# Patient Record
Sex: MALE | Race: BLACK OR AFRICAN AMERICAN | Employment: OTHER | URBAN - METROPOLITAN AREA
[De-identification: names, ages, dates, MRNs, and addresses within clinical notes are randomized per-mention and may not be internally consistent; named-entity substitution may affect disease eponyms.]

---

## 2017-02-28 ENCOUNTER — OFFICE VISIT (OUTPATIENT)
Dept: CARDIOLOGY CLINIC | Age: 82
End: 2017-02-28

## 2017-02-28 VITALS
RESPIRATION RATE: 18 BRPM | HEART RATE: 58 BPM | HEIGHT: 76 IN | DIASTOLIC BLOOD PRESSURE: 70 MMHG | SYSTOLIC BLOOD PRESSURE: 122 MMHG | WEIGHT: 198.4 LBS | BODY MASS INDEX: 24.16 KG/M2

## 2017-02-28 DIAGNOSIS — I48.0 PAROXYSMAL ATRIAL FIBRILLATION (HCC): Primary | ICD-10-CM

## 2017-02-28 PROCEDURE — G8427 DOCREV CUR MEDS BY ELIG CLIN: HCPCS | Performed by: INTERNAL MEDICINE

## 2017-02-28 PROCEDURE — G8484 FLU IMMUNIZE NO ADMIN: HCPCS | Performed by: INTERNAL MEDICINE

## 2017-02-28 PROCEDURE — 1036F TOBACCO NON-USER: CPT | Performed by: INTERNAL MEDICINE

## 2017-02-28 PROCEDURE — 1123F ACP DISCUSS/DSCN MKR DOCD: CPT | Performed by: INTERNAL MEDICINE

## 2017-02-28 PROCEDURE — 99214 OFFICE O/P EST MOD 30 MIN: CPT | Performed by: INTERNAL MEDICINE

## 2017-02-28 PROCEDURE — G8420 CALC BMI NORM PARAMETERS: HCPCS | Performed by: INTERNAL MEDICINE

## 2017-02-28 PROCEDURE — 4040F PNEUMOC VAC/ADMIN/RCVD: CPT | Performed by: INTERNAL MEDICINE

## 2017-02-28 RX ORDER — LORATADINE 10 MG/1
10 TABLET ORAL DAILY
Status: ON HOLD | COMMUNITY
End: 2022-07-07

## 2017-05-15 ENCOUNTER — TELEPHONE (OUTPATIENT)
Dept: CARDIOLOGY CLINIC | Age: 82
End: 2017-05-15

## 2021-09-27 ENCOUNTER — APPOINTMENT (OUTPATIENT)
Dept: CT IMAGING | Age: 86
End: 2021-09-27
Payer: MEDICARE

## 2021-09-27 ENCOUNTER — APPOINTMENT (OUTPATIENT)
Dept: GENERAL RADIOLOGY | Age: 86
End: 2021-09-27
Payer: MEDICARE

## 2021-09-27 ENCOUNTER — HOSPITAL ENCOUNTER (OUTPATIENT)
Age: 86
Setting detail: OBSERVATION
Discharge: HOME | End: 2021-09-29
Attending: FAMILY MEDICINE | Admitting: FAMILY MEDICINE
Payer: MEDICARE

## 2021-09-27 DIAGNOSIS — N18.9 ACUTE KIDNEY INJURY SUPERIMPOSED ON CKD (HCC): ICD-10-CM

## 2021-09-27 DIAGNOSIS — R80.9 PROTEINURIA, UNSPECIFIED TYPE: ICD-10-CM

## 2021-09-27 DIAGNOSIS — I95.1 ORTHOSTATIC HYPOTENSION: ICD-10-CM

## 2021-09-27 DIAGNOSIS — R77.8 ELEVATED TROPONIN: ICD-10-CM

## 2021-09-27 DIAGNOSIS — E87.5 SERUM POTASSIUM ELEVATED: ICD-10-CM

## 2021-09-27 DIAGNOSIS — N39.0 URINARY TRACT INFECTION WITHOUT HEMATURIA, SITE UNSPECIFIED: ICD-10-CM

## 2021-09-27 DIAGNOSIS — N17.9 ACUTE KIDNEY INJURY SUPERIMPOSED ON CKD (HCC): ICD-10-CM

## 2021-09-27 DIAGNOSIS — R55 NEAR SYNCOPE: Primary | ICD-10-CM

## 2021-09-27 LAB
A/G RATIO: 1 (ref 1.1–2.2)
ALBUMIN SERPL-MCNC: 3.8 G/DL (ref 3.4–5)
ALP BLD-CCNC: 59 U/L (ref 40–129)
ALT SERPL-CCNC: 11 U/L (ref 10–40)
ANION GAP SERPL CALCULATED.3IONS-SCNC: 10 MMOL/L (ref 3–16)
AST SERPL-CCNC: 19 U/L (ref 15–37)
BACTERIA: ABNORMAL /HPF
BASOPHILS ABSOLUTE: 0 K/UL (ref 0–0.2)
BASOPHILS RELATIVE PERCENT: 0.4 %
BILIRUB SERPL-MCNC: 0.3 MG/DL (ref 0–1)
BILIRUBIN URINE: NEGATIVE
BLOOD, URINE: NEGATIVE
BUN BLDV-MCNC: 36 MG/DL (ref 7–20)
CALCIUM SERPL-MCNC: 9.5 MG/DL (ref 8.3–10.6)
CHLORIDE BLD-SCNC: 105 MMOL/L (ref 99–110)
CLARITY: ABNORMAL
CO2: 23 MMOL/L (ref 21–32)
COLOR: YELLOW
CREAT SERPL-MCNC: 2 MG/DL (ref 0.8–1.3)
EOSINOPHILS ABSOLUTE: 0.1 K/UL (ref 0–0.6)
EOSINOPHILS RELATIVE PERCENT: 0.7 %
EPITHELIAL CELLS, UA: 0 /HPF (ref 0–5)
GFR AFRICAN AMERICAN: 39
GFR NON-AFRICAN AMERICAN: 32
GLOBULIN: 3.7 G/DL
GLUCOSE BLD-MCNC: 114 MG/DL (ref 70–99)
GLUCOSE URINE: NEGATIVE MG/DL
HCT VFR BLD CALC: 36.6 % (ref 40.5–52.5)
HEMOGLOBIN: 12.1 G/DL (ref 13.5–17.5)
HYALINE CASTS: 7 /LPF (ref 0–8)
KETONES, URINE: NEGATIVE MG/DL
LEUKOCYTE ESTERASE, URINE: ABNORMAL
LYMPHOCYTES ABSOLUTE: 1 K/UL (ref 1–5.1)
LYMPHOCYTES RELATIVE PERCENT: 12.2 %
MCH RBC QN AUTO: 32.3 PG (ref 26–34)
MCHC RBC AUTO-ENTMCNC: 33 G/DL (ref 31–36)
MCV RBC AUTO: 97.9 FL (ref 80–100)
MICROSCOPIC EXAMINATION: YES
MONOCYTES ABSOLUTE: 0.6 K/UL (ref 0–1.3)
MONOCYTES RELATIVE PERCENT: 7.5 %
NEUTROPHILS ABSOLUTE: 6.4 K/UL (ref 1.7–7.7)
NEUTROPHILS RELATIVE PERCENT: 79.2 %
NITRITE, URINE: NEGATIVE
PDW BLD-RTO: 13.6 % (ref 12.4–15.4)
PH UA: 6.5 (ref 5–8)
PLATELET # BLD: 178 K/UL (ref 135–450)
PMV BLD AUTO: 7.5 FL (ref 5–10.5)
POTASSIUM SERPL-SCNC: 5.2 MMOL/L (ref 3.5–5.1)
PROTEIN UA: 100 MG/DL
RBC # BLD: 3.73 M/UL (ref 4.2–5.9)
RBC UA: 1 /HPF (ref 0–4)
SODIUM BLD-SCNC: 138 MMOL/L (ref 136–145)
SPECIFIC GRAVITY UA: 1.02 (ref 1–1.03)
TOTAL PROTEIN: 7.5 G/DL (ref 6.4–8.2)
TROPONIN: 0.02 NG/ML
URINE REFLEX TO CULTURE: YES
URINE TYPE: ABNORMAL
UROBILINOGEN, URINE: 0.2 E.U./DL
WBC # BLD: 8 K/UL (ref 4–11)
WBC UA: 114 /HPF (ref 0–5)

## 2021-09-27 PROCEDURE — 70450 CT HEAD/BRAIN W/O DYE: CPT

## 2021-09-27 PROCEDURE — 96374 THER/PROPH/DIAG INJ IV PUSH: CPT

## 2021-09-27 PROCEDURE — 99283 EMERGENCY DEPT VISIT LOW MDM: CPT

## 2021-09-27 PROCEDURE — 84484 ASSAY OF TROPONIN QUANT: CPT

## 2021-09-27 PROCEDURE — 87186 SC STD MICRODIL/AGAR DIL: CPT

## 2021-09-27 PROCEDURE — G0378 HOSPITAL OBSERVATION PER HR: HCPCS

## 2021-09-27 PROCEDURE — 81001 URINALYSIS AUTO W/SCOPE: CPT

## 2021-09-27 PROCEDURE — 93005 ELECTROCARDIOGRAM TRACING: CPT | Performed by: NURSE PRACTITIONER

## 2021-09-27 PROCEDURE — 36415 COLL VENOUS BLD VENIPUNCTURE: CPT

## 2021-09-27 PROCEDURE — 6360000002 HC RX W HCPCS: Performed by: NURSE PRACTITIONER

## 2021-09-27 PROCEDURE — 87040 BLOOD CULTURE FOR BACTERIA: CPT

## 2021-09-27 PROCEDURE — 87077 CULTURE AEROBIC IDENTIFY: CPT

## 2021-09-27 PROCEDURE — 80053 COMPREHEN METABOLIC PANEL: CPT

## 2021-09-27 PROCEDURE — 87086 URINE CULTURE/COLONY COUNT: CPT

## 2021-09-27 PROCEDURE — 71045 X-RAY EXAM CHEST 1 VIEW: CPT

## 2021-09-27 PROCEDURE — 85025 COMPLETE CBC W/AUTO DIFF WBC: CPT

## 2021-09-27 RX ORDER — SODIUM CHLORIDE 9 MG/ML
25 INJECTION, SOLUTION INTRAVENOUS PRN
Status: DISCONTINUED | OUTPATIENT
Start: 2021-09-27 | End: 2021-09-29 | Stop reason: HOSPADM

## 2021-09-27 RX ORDER — ONDANSETRON 4 MG/1
4 TABLET, ORALLY DISINTEGRATING ORAL EVERY 8 HOURS PRN
Status: DISCONTINUED | OUTPATIENT
Start: 2021-09-27 | End: 2021-09-29 | Stop reason: HOSPADM

## 2021-09-27 RX ORDER — SODIUM CHLORIDE 9 MG/ML
INJECTION, SOLUTION INTRAVENOUS CONTINUOUS
Status: DISCONTINUED | OUTPATIENT
Start: 2021-09-27 | End: 2021-09-29 | Stop reason: HOSPADM

## 2021-09-27 RX ORDER — DABIGATRAN ETEXILATE 75 MG/1
75 CAPSULE, COATED PELLETS ORAL 2 TIMES DAILY
Status: DISCONTINUED | OUTPATIENT
Start: 2021-09-28 | End: 2021-09-28 | Stop reason: CLARIF

## 2021-09-27 RX ORDER — ONDANSETRON 2 MG/ML
4 INJECTION INTRAMUSCULAR; INTRAVENOUS EVERY 6 HOURS PRN
Status: DISCONTINUED | OUTPATIENT
Start: 2021-09-27 | End: 2021-09-29 | Stop reason: HOSPADM

## 2021-09-27 RX ORDER — SODIUM CHLORIDE 0.9 % (FLUSH) 0.9 %
5-40 SYRINGE (ML) INJECTION EVERY 12 HOURS SCHEDULED
Status: DISCONTINUED | OUTPATIENT
Start: 2021-09-27 | End: 2021-09-29 | Stop reason: HOSPADM

## 2021-09-27 RX ORDER — POLYETHYLENE GLYCOL 3350 17 G/17G
17 POWDER, FOR SOLUTION ORAL DAILY PRN
Status: DISCONTINUED | OUTPATIENT
Start: 2021-09-27 | End: 2021-09-29 | Stop reason: HOSPADM

## 2021-09-27 RX ORDER — ACETAMINOPHEN 325 MG/1
650 TABLET ORAL EVERY 6 HOURS PRN
Status: DISCONTINUED | OUTPATIENT
Start: 2021-09-27 | End: 2021-09-29 | Stop reason: HOSPADM

## 2021-09-27 RX ORDER — SODIUM CHLORIDE 0.9 % (FLUSH) 0.9 %
5-40 SYRINGE (ML) INJECTION PRN
Status: DISCONTINUED | OUTPATIENT
Start: 2021-09-27 | End: 2021-09-29 | Stop reason: HOSPADM

## 2021-09-27 RX ORDER — ASPIRIN 81 MG/1
81 TABLET ORAL DAILY
Status: DISCONTINUED | OUTPATIENT
Start: 2021-09-28 | End: 2021-09-29 | Stop reason: HOSPADM

## 2021-09-27 RX ORDER — ACETAMINOPHEN 650 MG/1
650 SUPPOSITORY RECTAL EVERY 6 HOURS PRN
Status: DISCONTINUED | OUTPATIENT
Start: 2021-09-27 | End: 2021-09-29 | Stop reason: HOSPADM

## 2021-09-27 RX ADMIN — Medication 1000 MG: at 20:24

## 2021-09-27 ASSESSMENT — PAIN SCALES - GENERAL: PAINLEVEL_OUTOF10: 0

## 2021-09-27 ASSESSMENT — ENCOUNTER SYMPTOMS
GASTROINTESTINAL NEGATIVE: 1
RESPIRATORY NEGATIVE: 1

## 2021-09-27 NOTE — H&P
[address]  [address] [address]  Dept: [phone]  Loc: [phone]  eMERGENCYdEPARTMENT eNCOUnter      Pt Name: Santa Wheat  MRN: [id number]  [place] 1935  Date of evaluation: 9/27/2021  Provider:JEFF Phipps CNP     Patient was seen and evaluated per myself independently    [facility]       Chief Complaint   Patient presents with    Loss of Consciousness     while at [facility] center in [place], no LOC but wobbly. last ate breakfast in hospital before for same thing       CRITICAL CARE TIME         HISTORY OF PRESENT ILLNESS  (Location/Symptom, Timing/Onset, Context/Setting, Quality, Duration,Modifying Factors, Severity.)   Santa Wheat is a 81 y.o. male who presents to the emergency department PMHx: CKD stage III with a recent MO on CKD with admission to [facility] in August 2021, PAD, BPH, HTN, gout    Patient presented to the emergency department via EMS with reports of near syncope while standing in line at the [facility] to pay bills or sign up for something. States that he last ate anything or drink anything yesterday evening. States he really was not interested in eating or drinking when he got up because he made his list of things that he needed to do for the day and he was going to stop and get something to eat while he was out driving around. He states he remembers the whole event. Patient states he usually uses a walker to walk however he was using his cane at this time. States that he just darted feeling weak all over while standing waiting at the desk. Denied chest pain. Denied chest tightness. Denied nausea. Felt like he just needed to sit down because he was becoming so weak. Denies any recent illness or nausea vomiting or diarrhea. He is unsure of his medications. He lives in his own apartment at the [facility].           Nursing Notes were reviewedand agreed with or any disagreements were addressed in the HPI. REVIEW OF SYSTEMS    (2-9 systems for level 4, 10 or more for level 5)     Review of Systems   Constitutional: Negative. HENT: Negative. Respiratory: Negative. Cardiovascular: Negative. Gastrointestinal: Negative. Musculoskeletal: Negative. Neurological: Positive for weakness. Near syncope while standing       Except as noted above the remainder of the review of systems was reviewed and negative. PAST MEDICAL HISTORY         Diagnosis Date    Diabetic eye exam ([facility] 14    Dr. Tash Barillas DM (diabetes mellitus) ([facility] 2014    Gout     Hypothyroidism 2016    Seasonal allergic rhinitis        SURGICAL HISTORY           Procedure Laterality Date    COLONOSCOPY   appx     normal     HERNIA REPAIR      Saeks, bilateral    INCISIONAL HERNIA REPAIR      bilateral       CURRENT MEDICATIONS     [unfilled]    ALLERGIES     Patient has no known allergies. FAMILY HISTORY     History reviewed. No pertinent family history. Family Status   Relation Name Status    Mother   at age 80        [facility]   [facility] Father   at age 28        gun shot    Brother   at age 76        Leukemia         SOCIAL HISTORY      reports that he quit smoking about 62 years ago. He quit after 5.00 years of use. He has never used smokeless tobacco. He reports that he does not drink alcohol and does not use drugs. PHYSICAL EXAM    (up to 7 for level 4, 8 or more for level 5)     ED Triage Vitals   Enc Vitals Group      BP       Pulse       Resp       Temp       Temp src       SpO2       Weight       Height       Head Circumference       Peak Flow       Pain Score       Pain Loc       Pain Edu? Excl. in [address]? Physical Exam  Vitals and nursing note reviewed. Constitutional:       General: He is not in acute distress. Appearance: He is not ill-appearing, toxic-appearing or diaphoretic.    HENT: Head: Normocephalic and atraumatic. Mouth/Throat:      Pharynx: Oropharynx is clear. Eyes:      Pupils: Pupils are equal, round, and reactive to light. Cardiovascular:      Rate and Rhythm: Normal rate and regular rhythm. Pulses: Normal pulses. Heart sounds: Normal heart sounds. Pulmonary:      Effort: Pulmonary effort is normal.      Breath sounds: Normal breath sounds. Skin:     General: Skin is warm and dry. Capillary Refill: Capillary refill takes less than 2 seconds. Neurological:      General: No focal deficit present. Mental Status: He is alert and oriented to person, place, and time. Psychiatric:         Mood and Affect: Mood normal.         Thought Content: Thought content normal.         Judgment: Judgment normal.           DIAGNOSTIC RESULTS     EKG: All EKG's are interpreted by the Emergency Department Physician who either signs or Co-signs this chart in the absence of a cardiologist.    EKG interpreted per Dr. Lobito Gaxiola and reviewed per myself narrow complex sinus rhythm. T wave inversion in lead III. No evidence of STEMI or ischemia. No evidence of Brugada. No evidence of S1Q3T3. No evidence of axis deviation. No evidence of bundle branch block. Ventricular rate 79, UT interval 136, QRS 72, .       Old EKG not available for viewing however was pulled from care everywhere and the reading is as noted below:  Measurements   Intervals                              Axis             Rate:         80                       P:            64   UT:           140                      QRS:          -25   QRSD:         99                       T:            -13   QT:           395                                       QTc:          456                                                                  Interpretive Statements   Sinus rhythm   Multiple premature complexes, vent & supraven   Borderline left axis deviation   Borderline repolarization abnormality Electronically Signed On 7-2-2021 13:15:36 EDT by Lisa Dubois MD    RADIOLOGY:   Non-plain film images such as CT, Ultrasound and MRI are read by the radiologist. Plain radiographic images are visualized and preliminarilyinterpreted by the emergency physician with the below findings:    Interpretation per the Radiologist below,if available at the time of this note:    XR CHEST PORTABLE   Final Result   No acute cardiopulmonary disease. CT HEAD WO CONTRAST   Final Result   No acute intracranial abnormality. Mild generalized cerebral atrophy and chronic small vessel white matter   ischemic changes.                LABS:  Labs Reviewed   CBC WITH AUTO DIFFERENTIAL - Abnormal; Notable for the following components:       Result Value    RBC 3.73 (*)     Hemoglobin 12.1 (*)     Hematocrit 36.6 (*)     All other components within normal limits    Narrative:     Performed at:  [facility] [address], [place], [address] [facility]   Phone [phone]   COMPREHENSIVE METABOLIC PANEL - Abnormal; Notable for the following components:    Potassium 5.2 (*)     Glucose 114 (*)     BUN 36 (*)     CREATININE 2.0 (*)     GFR Non- 32 (*)     GFR African American 39 (*)     Albumin/Globulin Ratio 1.0 (*)     All other components within normal limits    Narrative:     Performed at:  [facility] [address], [place], [address] [facility]   Phone [phone]   URINE RT REFLEX TO CULTURE - Abnormal; Notable for the following components:    Clarity, UA CLOUDY (*)     Protein,  (*)     Leukocyte Esterase, Urine LARGE (*)     All other components within normal limits    Narrative:     Performed at:  [facility] [address], [place], [address]   Phone [phone]   TROPONIN - Abnormal; Notable for the following components:    Troponin 0.02 (*)     All other components within normal limits    Narrative: Performed at:  [facility]  [address] [facility]  [place], [address]   Phone [phone]   MICROSCOPIC URINALYSIS - Abnormal; Notable for the following components:    Bacteria, UA 1+ (*)     WBC,  (*)     All other components within normal limits    Narrative:     Performed at:  [facility]  [address] [place], [address]   Phone [phone]   CULTURE, URINE   CULTURE, BLOOD 1   CULTURE, BLOOD 2   POCT GLUCOSE       All other labs were within normal range or not returned as of this dictation. EMERGENCY DEPARTMENT COURSE and DIFFERENTIAL DIAGNOSIS/MDM:   Vitals:    Vitals:    09/27/21 1731   BP: (!) 147/75   Pulse: 82   Resp: 18   Temp: 97.2 °F (36.2 °C)   SpO2: 100%   Weight: 16 lb (7.258 kg)   Height: 6' 3\" (1.905 m)     Medications   cefTRIAXone (ROCEPHIN) 1000 mg in sterile water 10 mL IV syringe (has no administration in time range)       MDM  Patient was seen and evaluated per myself. Dr. Juan Guillermo present available for consultation as needed. DDx: MO, dehydration, vasovagal, arrhythmia, electrolyte derangement, metabolic acidosis. On exam the patient is awake alert and oriented quite pleasant. No evidence of dysarthria. No evidence of facial drooping. No hemiparesis or paralysis. Answers questions appropriately. Recalls the events of today. EMS reported low blood pressure on their arrival however no evidence of hypotension or tachycardia here. Chart review indicates that on August 2021 he was admitted to [address] [place] [address] for MO on CKD. His creatinine was 3.27 and there were suspicions was poor p.o. intake and possibly an obstructive BPH process. At discharge after IV fluids his creatinine was 1.94. His baseline is noted to be 1.5. In June he had multiple stents placed in the lower extremities for PAD. And he is also had a toe amputation.   His discharge meds were aspirin and Plavix, starting Flomax and his lisinopril was being held until he followed up with his primary care physician given the MO on CKD scenario. Plan obtain laboratory studies, orthostatic vital signs, CT head and chest x-ray. IVF. Offer food and oral fluids. Orthostatic vital signs:  Blood Pressure Lyin/63      Pulse Lyin PER MINUTE      Blood Pressure Sittin/67      Pulse Sittin PER MINUTE      Standing per myself: Heart rate 92, blood pressure 107/71  These findings are consistent with orthostasis. Patient is eating a sandwich, drinking a Pepsi, and asking for water. Lab results: Potassium 5.2, BUN 36, creatinine 2.0, GFR 39. This is above the patient's renal baseline. It is likely prerenal.  Not sure if he is still taking lisinopril. It would need to be held on admission. Potassium is reflection of the MO on CKD. Troponin is elevated 0.02. EKG is negative for evidence of STEMI or acute ischemia and is likely elevated secondary to the MO on CKD. CBC does not reveal any evidence of acute anemia. No evidence of leukocytosis or an infectious process. Chest x-ray has been completed and results are pending  CT head has been completed and results are pending    Patient will require admission for MO on CKD. IVF, reevaluate labs. I do not see an emergent indication to reverse potassium as this will likely correct with IVF. And we will likely a normalization of the troponin as well. Hospitalist attending has been consulted for admission. Chest x-ray negative for any evidence of acute abnormality. Aortic vascular calcifications noted. No evidence of consolidation, congestion failure or pneumonia. CT head negative for evidence of mass mass-effect intracranial hemorrhage. There is some vascular calcifications noted outside of that no acute abnormality. Urinalysis results indicate evidence of UTI with micro: :  This could be secondary to lack of oral intake as well as BPH causing some obstructive issues. Urine culture will be processed  Patient will be started on antibiotics  And hospitalist service is requesting blood cultures to be obtained as well. This dictation was performed with a verbal recognition program (DRAGON) and it was checked for errors. It is possible that there are still dictated errors within this office note. If so, please bring any errors to my attention for an addendum. All efforts were made to ensure that this office note is accurate. CONSULTS:  IP CONSULT TO HOSPITALIST    PROCEDURES:  Procedures    FINAL IMPRESSION      1. Near syncope    2. Acute kidney injury superimposed on CKD ([facility]    3. Serum potassium elevated    4. Elevated troponin    5. Orthostatic hypotension    6. Urinary tract infection without hematuria, site unspecified    7. Proteinuria, unspecified type          DISPOSITION/PLAN   [unfilled]    PATIENT REFERRED TO:  No follow-up provider specified.     DISCHARGE MEDICATIONS:  New Prescriptions    No medications on file       (Please note that portions of this note were completed with a voice recognition program.  Efforts were made to edit the dictations but occasionally words are mis-transcribed.)    Kurt Sheldon, APRN - CNP

## 2021-09-28 LAB
ANION GAP SERPL CALCULATED.3IONS-SCNC: 9 MMOL/L (ref 3–16)
BUN BLDV-MCNC: 33 MG/DL (ref 7–20)
CALCIUM SERPL-MCNC: 8.8 MG/DL (ref 8.3–10.6)
CHLORIDE BLD-SCNC: 108 MMOL/L (ref 99–110)
CO2: 22 MMOL/L (ref 21–32)
CREAT SERPL-MCNC: 1.8 MG/DL (ref 0.8–1.3)
EKG ATRIAL RATE: 79 BPM
EKG DIAGNOSIS: NORMAL
EKG P AXIS: 48 DEGREES
EKG P-R INTERVAL: 136 MS
EKG Q-T INTERVAL: 376 MS
EKG QRS DURATION: 72 MS
EKG QTC CALCULATION (BAZETT): 431 MS
EKG R AXIS: -18 DEGREES
EKG T AXIS: 21 DEGREES
EKG VENTRICULAR RATE: 79 BPM
GFR AFRICAN AMERICAN: 44
GFR NON-AFRICAN AMERICAN: 36
GLUCOSE BLD-MCNC: 132 MG/DL (ref 70–99)
GLUCOSE BLD-MCNC: 90 MG/DL (ref 70–99)
HCT VFR BLD CALC: 33.4 % (ref 40.5–52.5)
HEMOGLOBIN: 11.4 G/DL (ref 13.5–17.5)
LV EF: 63 %
LVEF MODALITY: NORMAL
MCH RBC QN AUTO: 32.6 PG (ref 26–34)
MCHC RBC AUTO-ENTMCNC: 34.2 G/DL (ref 31–36)
MCV RBC AUTO: 95.2 FL (ref 80–100)
PDW BLD-RTO: 13.6 % (ref 12.4–15.4)
PERFORMED ON: ABNORMAL
PLATELET # BLD: 154 K/UL (ref 135–450)
PMV BLD AUTO: 7.9 FL (ref 5–10.5)
POTASSIUM SERPL-SCNC: 4.8 MMOL/L (ref 3.5–5.1)
RBC # BLD: 3.5 M/UL (ref 4.2–5.9)
SODIUM BLD-SCNC: 139 MMOL/L (ref 136–145)
TROPONIN: <0.01 NG/ML
WBC # BLD: 7 K/UL (ref 4–11)

## 2021-09-28 PROCEDURE — 93306 TTE W/DOPPLER COMPLETE: CPT

## 2021-09-28 PROCEDURE — 93010 ELECTROCARDIOGRAM REPORT: CPT | Performed by: INTERNAL MEDICINE

## 2021-09-28 PROCEDURE — 96376 TX/PRO/DX INJ SAME DRUG ADON: CPT

## 2021-09-28 PROCEDURE — 80048 BASIC METABOLIC PNL TOTAL CA: CPT

## 2021-09-28 PROCEDURE — 96361 HYDRATE IV INFUSION ADD-ON: CPT

## 2021-09-28 PROCEDURE — 6370000000 HC RX 637 (ALT 250 FOR IP): Performed by: FAMILY MEDICINE

## 2021-09-28 PROCEDURE — 84484 ASSAY OF TROPONIN QUANT: CPT

## 2021-09-28 PROCEDURE — 6370000000 HC RX 637 (ALT 250 FOR IP): Performed by: NURSE PRACTITIONER

## 2021-09-28 PROCEDURE — G0378 HOSPITAL OBSERVATION PER HR: HCPCS

## 2021-09-28 PROCEDURE — 2580000003 HC RX 258: Performed by: FAMILY MEDICINE

## 2021-09-28 PROCEDURE — 96375 TX/PRO/DX INJ NEW DRUG ADDON: CPT

## 2021-09-28 PROCEDURE — 85027 COMPLETE CBC AUTOMATED: CPT

## 2021-09-28 PROCEDURE — 36415 COLL VENOUS BLD VENIPUNCTURE: CPT

## 2021-09-28 PROCEDURE — 6360000002 HC RX W HCPCS: Performed by: FAMILY MEDICINE

## 2021-09-28 RX ORDER — FLUTICASONE PROPIONATE 50 MCG
1 SPRAY, SUSPENSION (ML) NASAL DAILY PRN
COMMUNITY

## 2021-09-28 RX ORDER — CLOPIDOGREL BISULFATE 75 MG/1
75 TABLET ORAL DAILY
Status: DISCONTINUED | OUTPATIENT
Start: 2021-09-29 | End: 2021-09-29 | Stop reason: HOSPADM

## 2021-09-28 RX ORDER — FLUTICASONE PROPIONATE 50 MCG
1 SPRAY, SUSPENSION (ML) NASAL DAILY
Status: DISCONTINUED | OUTPATIENT
Start: 2021-09-28 | End: 2021-09-29 | Stop reason: HOSPADM

## 2021-09-28 RX ORDER — LISINOPRIL 10 MG/1
10 TABLET ORAL DAILY
Status: ON HOLD | COMMUNITY
End: 2022-07-19 | Stop reason: HOSPADM

## 2021-09-28 RX ORDER — TAMSULOSIN HYDROCHLORIDE 0.4 MG/1
0.4 CAPSULE ORAL NIGHTLY
Status: DISCONTINUED | OUTPATIENT
Start: 2021-09-28 | End: 2021-09-29 | Stop reason: HOSPADM

## 2021-09-28 RX ORDER — CLOPIDOGREL BISULFATE 75 MG/1
75 TABLET ORAL DAILY
Status: ON HOLD | COMMUNITY
End: 2022-07-19 | Stop reason: HOSPADM

## 2021-09-28 RX ORDER — HYDRALAZINE HYDROCHLORIDE 20 MG/ML
10 INJECTION INTRAMUSCULAR; INTRAVENOUS EVERY 4 HOURS PRN
Status: DISCONTINUED | OUTPATIENT
Start: 2021-09-28 | End: 2021-09-29 | Stop reason: HOSPADM

## 2021-09-28 RX ORDER — TAMSULOSIN HYDROCHLORIDE 0.4 MG/1
0.4 CAPSULE ORAL DAILY
COMMUNITY

## 2021-09-28 RX ORDER — LISINOPRIL 10 MG/1
10 TABLET ORAL DAILY
Status: DISCONTINUED | OUTPATIENT
Start: 2021-09-28 | End: 2021-09-28 | Stop reason: ALTCHOICE

## 2021-09-28 RX ADMIN — SODIUM CHLORIDE: 9 INJECTION, SOLUTION INTRAVENOUS at 10:18

## 2021-09-28 RX ADMIN — DABIGATRAN ETEXILATE MESYLATE 75 MG: 75 CAPSULE ORAL at 00:53

## 2021-09-28 RX ADMIN — Medication 1000 MG: at 21:37

## 2021-09-28 RX ADMIN — FLUTICASONE PROPIONATE 1 SPRAY: 50 SPRAY, METERED NASAL at 10:32

## 2021-09-28 RX ADMIN — Medication 10 ML: at 00:10

## 2021-09-28 RX ADMIN — Medication 10 ML: at 21:37

## 2021-09-28 RX ADMIN — TAMSULOSIN HYDROCHLORIDE 0.4 MG: 0.4 CAPSULE ORAL at 21:37

## 2021-09-28 RX ADMIN — ASPIRIN 81 MG: 81 TABLET, COATED ORAL at 08:27

## 2021-09-28 RX ADMIN — ACETAMINOPHEN 650 MG: 325 TABLET ORAL at 17:16

## 2021-09-28 RX ADMIN — DABIGATRAN ETEXILATE MESYLATE 75 MG: 75 CAPSULE ORAL at 08:27

## 2021-09-28 RX ADMIN — HYDRALAZINE HYDROCHLORIDE 10 MG: 20 INJECTION INTRAMUSCULAR; INTRAVENOUS at 21:43

## 2021-09-28 RX ADMIN — Medication 10 ML: at 08:28

## 2021-09-28 RX ADMIN — HYDRALAZINE HYDROCHLORIDE 10 MG: 20 INJECTION INTRAMUSCULAR; INTRAVENOUS at 16:05

## 2021-09-28 RX ADMIN — SODIUM CHLORIDE: 9 INJECTION, SOLUTION INTRAVENOUS at 00:56

## 2021-09-28 ASSESSMENT — PAIN SCALES - GENERAL
PAINLEVEL_OUTOF10: 0
PAINLEVEL_OUTOF10: 5
PAINLEVEL_OUTOF10: 0

## 2021-09-28 ASSESSMENT — PAIN DESCRIPTION - LOCATION: LOCATION: FOOT

## 2021-09-28 ASSESSMENT — PAIN DESCRIPTION - PAIN TYPE: TYPE: ACUTE PAIN

## 2021-09-28 ASSESSMENT — PAIN DESCRIPTION - ORIENTATION: ORIENTATION: LEFT

## 2021-09-28 NOTE — CONSULTS
Pharmacy Medication History Note:      List of current medications for Ga Sam is a 81 y.o. male is complete.     Source of information  Pharmacy: [facility]        Changes made to medication list:    Medications removed  ( therapy complete or physician discontinued):  dabigatran (pradaxa)  Metoprolol tartrate    Medications added:  Clopidogrel (plavix)  Fluticasone nasal spray  Lisinopril (but on hold for MO)  Tamsulosin    Merna Crenshaw PharmD        Medication Dispense Information    CLOPIDOGREL 75MG    TAB   Sig: TAKE 1 TABLET BY MOUTH ONCE DAILY   Dispensed: 9/15/2021 12:00 AM   Days supply: 30   Quantity: 30 each   Refills remainin   Pharmacy: [facility] [address] DINESH GONZALES [address] [place] [address] [phone] Katerine [place]outh [phone]   [facility] [address] [place] [address]   Phone: [phone]   Fax: [phone] provider: Aureliano Elizabeth   Received from: [facility] (Fill History, Acute Care)   Brand or Generic:  Generic         Medication Dispense Information    FLUTICASONE  SPR 50M   Dispensed: 2021 12:00 AM   Unit strength: 50   Days supply: 30   Quantity: 16 g   Pharmacy: [address] [place] [address]   Phone: [phone]   Fax: [phone] provider: Warner Gottlieb   Received from: [facility] (Claim History, Acute Care)   Brand or Generic:  Generic         Medication Dispense Information    LISINOPRIL   TAB 10M   Dispensed: 2021 12:00 AM   Unit strength: 10   Days supply: 30   Quantity: 30 tablet   Pharmacy: [facility] [address] DINESH GONZALES [address] [facility] [address] [phone] Katerine [place] [phone]   [facility] [address] [place] [address]   Phone: [phone]   Fax: [phone] provider: Joshua Sanchez   Received from: [facility] (Claim History, Acute Care)   Brand or Generic:  Generic                               Medication Dispense Information    TAMSULOSIN   CAP 0.4   Dispensed: 8/26/2021 12:00 AM   Unit strength: 0.4   Days supply: 30   Quantity: 30 capsule   Pharmacy: [facility] [place], [place] - [address] [facility] [address] [facility] [address] [address]   Phone: [phone]   Fax: [phone] provider: Kinga Webster   Received from: [facility] (Claim History, Acute Care)   Brand or Generic:  Generic

## 2021-09-28 NOTE — CARE COORDINATION
Placed a call to family and awaiting a callback. Patient is from [address] [place] [address] living possibly at  the [address] [place] [address].

## 2021-09-28 NOTE — PROGRESS NOTES
[facility] PROGRESS NOTE    9/28/2021 8:28 AM        Name: Christina Peters . Admitted: 9/27/2021  Primary Care Provider: Monique Garber (Tel: [phone])      Subjective:  . Admitted with near syncope   [place] light [place] recreation center when he felt weak. He did not loose consciousness. Feels better this am   Reports urinary frequency     Lives alone in senior apartment complex . Drives   Has CKD     Recently treated for OM ,  Was in [place] for IV antibiotics via tunneled CVC   Was followed by Dr Gayathri Douglass , ID off antibiotics since aug 9 th       Reviewed interval ancillary notes    Current Medications  hydrALAZINE (APRESOLINE) injection 10 mg, Q4H PRN  cefTRIAXone (ROCEPHIN) 1000 mg in sterile water 10 mL IV syringe, Q24H  dabigatran (PRADAXA) capsule 75 mg, BID  aspirin EC tablet 81 mg, Daily  0.9 % sodium chloride infusion, Continuous  sodium chloride flush 0.9 % injection 5-40 mL, 2 times per day  sodium chloride flush 0.9 % injection 5-40 mL, PRN  0.9 % sodium chloride infusion, PRN  ondansetron (ZOFRAN-ODT) disintegrating tablet 4 mg, Q8H PRN   Or  ondansetron (ZOFRAN) injection 4 mg, Q6H PRN  polyethylene glycol (GLYCOLAX) packet 17 g, Daily PRN  acetaminophen (TYLENOL) tablet 650 mg, Q6H PRN   Or  acetaminophen (TYLENOL) suppository 650 mg, Q6H PRN        Objective:  BP (!) 150/68   Pulse 63   Temp 98 °F (36.7 °C) (Oral)   Resp 18   Ht 6' 2\" (1.88 m)   Wt 165 lb 2 oz (74.9 kg)   SpO2 100%   BMI 21.20 kg/m²     Intake/Output Summary (Last 24 hours) at 9/28/2021 0828  Last data filed at 9/28/2021 0806  Gross per 24 hour   Intake --   Output 175 ml   Net -175 ml      Wt Readings from Last 3 Encounters:   09/27/21 165 lb 2 oz (74.9 kg)   02/28/17 198 lb 6.4 oz (90 kg)   12/08/16 200 lb (90.7 kg)       General appearance:  Appears comfortable, looks chronically ill.   Seen this am while eating breakfast   Eyes: Sclera clear. Pupils equal.  ENT: Moist oral mucosa. Trachea midline, no adenopathy. Cardiovascular: Regular rhythm, normal S1, S2. No murmur. No edema in lower extremities  Respiratory: Not using accessory muscles. Good inspiratory effort. Fine bi basilar crackles   GI: Abdomen soft, no tenderness, not distended, normal bowel sounds  Musculoskeletal: No cyanosis in digits, neck supple  Neurology: CN 2-12 grossly intact. No speech or motor deficits  Psych: Normal affect. Alert and oriented in time, place and person  Skin: Warm, dry, poor turgor. Dressing removed from the foot , toe amp site is unremarkable, nickel sized lesion noted over the lateral aspect over the left foot ,  No current drainage noted     Labs and Tests:  CBC:   Recent Labs     09/27/21  1738 09/28/21  0421   WBC 8.0 7.0   HGB 12.1* 11.4*    154     BMP:    Recent Labs     09/27/21 1738 09/28/21  0421    139   K 5.2* 4.8    108   CO2 23 22   BUN 36* 33*   CREATININE 2.0* 1.8*   GLUCOSE 114* 90     Hepatic:   Recent Labs     09/27/21  1738   AST 19   ALT 11   BILITOT 0.3   ALKPHOS 59      notes from [address] in June:    Pre-op Diagnosis:   1. 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 Critical limb ischemia   3. Chronic kidney disease   4. Osteomyelitis of the left foot     Post-op Diagnosis: same     Impression/Plan:   1. Improved perfusion to the left foot following intervention.  However,   has significant distal tibial and microvascular disease of the foot. Gilberto Fountain   will need a repeat intervention to maximize healing potential.  We will   schedule this as an outpatient   2. Plan for left antegrade femoral access, and potential left foot pedal   access   3. Severe left renal artery stenosis   4. Start Plavix 75 mg daily     Procedure:   1. Monitored conscious sedation   2. Ultrasound-guided access of the right common femoral artery with images   captured in Merge   3.  Diagnostic aortogram with bilateral lower extremity runoff   4. Angioplasty of the left popliteal artery   5. Angioplasty of the left tibioperoneal trunk   6. Angioplasty of left peroneal artery   7. Angioplasty of the left posterior tibial artery          Problem List  Active Problems:    Syncope and collapse  Resolved Problems:    * No resolved hospital problems. *       Assessment & Plan:   1. Near syncope:  He is not orthostatic, echo pending, sx likely r/t UTI  2. CKD stage lll:  Appears to be at baseline with creat of 1.8  ( was 1.9 in August)   3. UTI: on rocephin, follow the cultures  4. Hx of OM:  S/p amp: on daily plavix , [facility] wound clinic [facility] [address] [facility]  5. PT/ OT to follow. Likely ready to d/c home in the next 24 hours pending urine cultures         Diet: ADULT DIET;  Regular  Code:Full Code  DVT PPX      JEFF Beltre CNP   9/28/2021 8:28 AM

## 2021-09-28 NOTE — CARE COORDINATION
Discharge Planning Assessment    RN/SW discharge planner met with patient/ (and family member) to discuss reason for admission, current living situation, and potential needs at the time of discharge    Demographics/Insurance verified Yes/BC[employer] medicare    [facility]    Living arrangements: alone    Patient POA/contact: son Scooter Bellamy nearest son    Level of function/Support: Independent with home care    Elton Doran    DME:    Active with any community resources/agencies/skilled home care: wound care   Lesley Puente [phone] COA    Medication compliance issues: yes, patient is non-compliant    Pharmacy/Financial issues that could impact healthcare:    Transportation at the time of discharge: sonScooter. Harvey delined the referral due to past debt. and history of noncompliance.

## 2021-09-28 NOTE — ED NOTES
Report given to 3T RN. Tele visible per 3T.   No further questions at this time     Coralee Gaucher, RN  09/27/21 5724

## 2021-09-28 NOTE — H&P
HOSPITALISTS HISTORY AND PHYSICAL    09/27/21  Patient Information:  Maryann Parrish is a 81 y.o. male [id number]  PCP:  Isabel Del Rosario (Tel: [phone] )    Chief complaint:    Chief Complaint   Patient presents with    Loss of Consciousness     while at [facility] center in [address], no LOC but wobbly. last ate breakfast in hospital before for same thing        History of Present Illness:  Gregory Barrios is a 81 y.o. male with h/o CKD, Afib , DM, PAD hypothyroidism, nicotine use  presented with c/o near syncopal episode. Was standing in line when he felt weak and light headed and leaned over and had to sit down. Denies LOC. He has decreased appetite . ED work up showed elevated creatinine . UA showed pyuria. EKG showed NSR . Troponin is elevated 0.02. HE denies recent illness, no chest pain, palpitations    REVIEW OF SYSTEMS:   Constitutional: Negative for fever,chills or night sweats  ENT: Negative for rhinorrhea, epistaxis, hoarseness, sore throat. Respiratory: Negative for shortness of breath,wheezing  Cardiovascular: Negative for chest pain, palpitations   Gastrointestinal: Negative for nausea, vomiting, diarrhea  Genitourinary: Negative for polyuria, dysuria   Hematologic/Lymphatic: Negative for bleeding tendency, easy bruising  Musculoskeletal: Negative for myalgias and arthralgias  Neurologic: Negative for confusion,dysarthria. Skin: Negative for itching,rash  Psychiatric: Negative for depression,anxiety, agitation. Endocrine: Negative for polydipsia,polyuria,heat /cold intolerance. Past Medical History:   has a past medical history of Diabetic eye exam ([facility] [address] DM (diabetes mellitus) ([facility] [address] Gout, Hypothyroidism, and Seasonal allergic rhinitis. Past Surgical History:   has a past surgical history that includes Incisional hernia repair; hernia repair (2001); and Colonoscopy (2009 appx ).      Medications:  No current facility-administered medications on file prior to encounter. Current Outpatient Medications on File Prior to Encounter   Medication Sig Dispense Refill    loratadine (CLARITIN) 10 MG tablet Take 10 mg by mouth daily      dabigatran (PRADAXA) 75 MG capsule Take 1 capsule by mouth 2 times daily 60 capsule 1    metoprolol tartrate (LOPRESSOR) 25 MG tablet TAKE 1/2 TABLET BY MOUTH TWICE DAILY 90 tablet 3    vitamin B-12 (CYANOCOBALAMIN) 1000 MCG tablet Take 1,000 mcg by mouth daily.  aspirin 81 MG tablet Take 81 mg by mouth daily.  HYDROcodone-acetaminophen (NORCO) 5-325 MG per tablet Take 1 tablet by mouth every 6 hours as needed for Pain .  20 tablet 0    ibuprofen (ADVIL;MOTRIN) 600 MG tablet TAKE 1 TABLET BY MOUTH EVERY 6 HOURS AS NEEDED FOR PAIN 30 tablet 3     Current Facility-Administered Medications   Medication Dose Route Frequency Provider Last Rate Last Admin    cefTRIAXone (ROCEPHIN) 1000 mg in sterile water 10 mL IV syringe  1,000 mg IntraVENous Q24H Radha Luis MD        dabigatran (PRADAXA) capsule 75 mg  75 mg Oral BID Radha Luis MD   75 mg at 09/28/21 0053    aspirin EC tablet 81 mg  81 mg Oral Daily Radha Luis MD        0.9 % sodium chloride infusion   IntraVENous Continuous Radha Luis  mL/hr at 09/28/21 0056 New Bag at 09/28/21 0056    sodium chloride flush 0.9 % injection 5-40 mL  5-40 mL IntraVENous 2 times per day Radha Luis MD        sodium chloride flush 0.9 % injection 5-40 mL  5-40 mL IntraVENous PRN Radha Luis MD        0.9 % sodium chloride infusion  25 mL IntraVENous PRN Radha Luis MD        ondansetron (ZOFRAN-ODT) disintegrating tablet 4 mg  4 mg Oral Q8H PRN Radha Luis MD        Or    ondansetron (ZOFRAN) injection 4 mg  4 mg IntraVENous Q6H PRN Radha Luis MD        polyethylene glycol (GLYCOLAX) packet 17 g  17 g Oral Daily PRN Radha Luis MD        acetaminophen (TYLENOL) tablet 650 mg  650 mg Oral Q6H PRN Radha Luis MD        Or   [employer] acetaminophen (TYLENOL) suppository 650 mg  650 mg Rectal Q6H PRN Jimmy Mccann MD         Allergies:  No Known Allergies     Social History:   reports that he quit smoking about 62 years ago. He quit after 5.00 years of use. He has never used smokeless tobacco. He reports that he does not drink alcohol and does not use drugs. Family History:  family history is not on file. , family history reviewed not pertinent to current admission      Physical Exam:  BP (!) 183/68   Pulse 70   Temp 98.3 °F (36.8 °C) (Oral)   Resp 18   Ht 6' 2\" (1.88 m)   Wt 165 lb 2 oz (74.9 kg)   SpO2 100%   BMI 21.20 kg/m²     General appearance:  Appears comfortable. Well nourished  Eyes: Sclera clear, pupils equal  ENT: Moist mucus membranes, no thrush. Trachea midline. Cardiovascular: Regular rhythm, normal S1, S2. No murmur, gallop, rub. No edema in lower extremities  Respiratory: Clear to auscultation bilaterally, no wheeze, good inspiratory effort  Gastrointestinal: Abdomen soft, non-tender, not distended, normal bowel sounds  Musculoskeletal: No cyanosis in digits, neck supple  Neurology: Cranial nerves grossly intact. Alert and oriented in time, place and person. No speech or motor deficits  Psychiatry: Appropriate affect.  Not agitated  Skin: Warm, dry, normal turgor, no rash    Labs:  CBC:   Lab Results   Component Value Date    WBC 8.0 09/27/2021    RBC 3.73 09/27/2021    HGB 12.1 09/27/2021    HCT 36.6 09/27/2021    MCV 97.9 09/27/2021    MCH 32.3 09/27/2021    MCHC 33.0 09/27/2021    RDW 13.6 09/27/2021     09/27/2021    MPV 7.5 09/27/2021     BMP:    Lab Results   Component Value Date     09/27/2021    K 5.2 09/27/2021     09/27/2021    CO2 23 09/27/2021    BUN 36 09/27/2021    CREATININE 2.0 09/27/2021    CALCIUM 9.5 09/27/2021    GFRAA 39 09/27/2021    GFRAA 59 03/14/2013    LABGLOM 32 09/27/2021    GLUCOSE 114 09/27/2021       Chest Xray:   EKG:        Problem List  Active Problems:    Syncope and collapse  Resolved Problems:    * No resolved hospital problems. *        Assessment/Plan:         1. Near Syncopal episode  Admit with tele  Will check orthostatic vitals  Holding metoprolol  Hydrate  eCHO ordered     MO cr is elevated 2.0  Baseline is around 1.6  Hold nephrotoxins  IV fluids    Pyuria  Cultures pending  Cont on ceftriaxone    Admit asobs. I anticipate hospitalization spanning les than two midnights for investigation and treatment of the above medically necessary diagnoses.       Lola Whitmore MD    09/27/21

## 2021-09-28 NOTE — CARE COORDINATION
[address] [place] [address] Continence Nurse  Consult Note       NAME:  Leidy Cowan  MEDICAL RECORD NUMBER:  [id number]  AGE: 81 y.o. GENDER: male  : 1935  TODAY'S DATE:  2021    Subjective   Reason for WOCN Evaluation and Assessment: wound to amputation site left foot      Leidy Cowan is a 81 y.o. male referred by:   [x] Physician  [x] Nursing  [] Other:     Wound Identification:  Wound Type: non-healing surgical  Contributing Factors: arterial insufficiency    Wound History: present on admission  Current Wound Care Treatment:  Saline moistened gauze, roll gauze, tape    Patient Goal of Care:  [x] Wound Healing  [] Odor Control  [] Palliative Care  [] Pain Control   [] Other:         PAST MEDICAL HISTORY        Diagnosis Date    Diabetic eye exam ([facility] 14    Dr. Shikha Stewart DM (diabetes mellitus) ([facility] 2014    Gout     Hypothyroidism 2016    Seasonal allergic rhinitis        PAST SURGICAL HISTORY    Past Surgical History:   Procedure Laterality Date    COLONOSCOPY   appx     normal     HERNIA REPAIR      Saeks, bilateral    INCISIONAL HERNIA REPAIR      bilateral       FAMILY HISTORY    History reviewed. No pertinent family history. SOCIAL HISTORY    Social History     Tobacco Use    Smoking status: Former Smoker     Years: 5.00     Quit date: 6/3/1959     Years since quittin.3    Smokeless tobacco: Never Used   Vaping Use    Vaping Use: Never used   Substance Use Topics    Alcohol use: No    Drug use: No       ALLERGIES    No Known Allergies    MEDICATIONS    No current facility-administered medications on file prior to encounter.      Current Outpatient Medications on File Prior to Encounter   Medication Sig Dispense Refill    clopidogrel (PLAVIX) 75 MG tablet Take 75 mg by mouth daily Take one tablet daily      fluticasone (FLONASE) 50 MCG/ACT nasal spray 1 spray by Each Nostril route daily One pray to each nostril daily      lisinopril (PRINIVIL;ZESTRIL) 10 MG tablet Take 10 mg by mouth daily      tamsulosin (FLOMAX) 0.4 MG capsule Take 0.4 mg by mouth daily One tablet nightly      loratadine (CLARITIN) 10 MG tablet Take 10 mg by mouth daily      vitamin B-12 (CYANOCOBALAMIN) 1000 MCG tablet Take 1,000 mcg by mouth daily.  aspirin 81 MG tablet Take 81 mg by mouth daily.          Objective    BP (!) 166/82   Pulse 67   Temp 98 °F (36.7 °C) (Oral)   Resp 18   Ht 6' 2\" (1.88 m)   Wt 165 lb 2 oz (74.9 kg)   SpO2 100%   BMI 21.20 kg/m²     LABS:  WBC:    Lab Results   Component Value Date    WBC 7.0 09/28/2021     H/H:    Lab Results   Component Value Date    HGB 11.4 09/28/2021    HCT 33.4 09/28/2021     PTT:    Lab Results   Component Value Date    APTT 31.6 01/13/2014   [APTT}  PT/INR:    Lab Results   Component Value Date    PROTIME 11.4 01/13/2014    PROTIME . 06/20/2011    INR 1.06 01/13/2014     HgBA1c:    Lab Results   Component Value Date    LABA1C 6.4 04/08/2016       Assessment   Greyson Risk Score: Greyson Scale Score: 20    Patient Active Problem List   Diagnosis Code    Gout M10.9    Chronic right shoulder pain M25.511, G89.29    A-fib ([facility]) I48.91    DM (diabetes mellitus) ([facility]) E11.9    CKD (chronic kidney disease) stage 3, GFR 30-59 ml/min ([facility]) N18.30    Hypothyroidism E03.9    Syncope and collapse R55       Measurements:  Wound 09/27/21 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 toe left foot (Active)   Wound Image   09/28/21 1251   Wound Etiology Non-Healing Surgical 09/28/21 1251   Dressing Status New dressing applied 09/28/21 1251   Wound Cleansed Cleansed with saline 09/28/21 1251   Dressing/Treatment Betadine swabs/povidone iodine;Coban/self-adherent bandages;Foam;Roll gauze 09/28/21 1251   Dressing Change Due 09/30/21 09/28/21 1251   Wound Length (cm) 1 cm 09/28/21 1251   Wound Width (cm) 0.5 cm 09/28/21 1251   Wound Surface Area (cm^2) 0.5 cm^2 09/28/21 1251   Wound Assessment Devitalized tissue;Dry 09/28/21 1251   Francesca-wound Assessment Dry/flaky 09/28/21 1251   Margins Defined edges 09/28/21 1251   Wound Thickness Description not for Pressure Injury Partial thickness 09/28/21 1251   Number of days: 0       Wound 09/27/21lateral side left foot  (Active)   Wound Image   09/28/21 1251   Wound Etiology Non-Healing Surgical 09/28/21 1251   Dressing Status New dressing applied 09/28/21 1251   Wound Cleansed Cleansed with saline 09/28/21 1251   Dressing/Treatment Betadine swabs/povidone iodine;Coban/self-adherent bandages;Foam;Roll gauze 09/28/21 1251   Wound Length (cm) 2.5 cm 09/28/21 1251   Wound Width (cm) 3 cm 09/28/21 1251   Wound Depth (cm) 0.2 cm 09/28/21 1251   Wound Surface Area (cm^2) 7.5 cm^2 09/28/21 1251   Wound Volume (cm^3) 1.5 cm^3 09/28/21 1251   Wound Assessment Devitalized tissue; Exposed structure bone;Pink/red 09/28/21 1251   Drainage Amount Scant 09/28/21 1251   Drainage Description Serosanguinous 09/28/21 1251   Odor None 09/28/21 1251   Francesca-wound Assessment Dry/flaky; Intact 09/28/21 1251   Margins Defined edges 09/28/21 1251   Wound Thickness Description not for Pressure Injury Full thickness 09/28/21 1251   Number of days: 0        Patient was admitted for near syncope and uti. Patient reports he had surgery for amputation on his left toes back in the spring. He follows with Dr. Dee Joyce at [place] [address] wound center. His was last seen on 9/14. His next appointment is 10/4. Patient agreeable to visit and to have wound assessed. Old dressing removed. The amputation site of the 3rd toe has devitalized tissue, no drainage or edema. The wound on the side of foot by the fifth toe is pink and red, has exposed bone and tendon. There is no drainage, odor or edema. Patient has no reports of pain. Wounds were cleansed with saline. Betadine swabs applied to both sites. Both wound covered with foam dressing, secured with roll gauze and coban. Spoke to nurse Gloria Hastings, we will do every other day dressing changes.

## 2021-09-29 VITALS
SYSTOLIC BLOOD PRESSURE: 151 MMHG | WEIGHT: 165.4 LBS | BODY MASS INDEX: 21.23 KG/M2 | DIASTOLIC BLOOD PRESSURE: 66 MMHG | HEIGHT: 74 IN | OXYGEN SATURATION: 95 % | RESPIRATION RATE: 17 BRPM | HEART RATE: 83 BPM | TEMPERATURE: 98.4 F

## 2021-09-29 LAB
ANION GAP SERPL CALCULATED.3IONS-SCNC: 9 MMOL/L (ref 3–16)
BUN BLDV-MCNC: 34 MG/DL (ref 7–20)
CALCIUM SERPL-MCNC: 8.9 MG/DL (ref 8.3–10.6)
CHLORIDE BLD-SCNC: 109 MMOL/L (ref 99–110)
CO2: 21 MMOL/L (ref 21–32)
CREAT SERPL-MCNC: 1.6 MG/DL (ref 0.8–1.3)
GFR AFRICAN AMERICAN: 50
GFR NON-AFRICAN AMERICAN: 41
GLUCOSE BLD-MCNC: 102 MG/DL (ref 70–99)
HCT VFR BLD CALC: 34.2 % (ref 40.5–52.5)
HEMOGLOBIN: 11.4 G/DL (ref 13.5–17.5)
MCH RBC QN AUTO: 32.2 PG (ref 26–34)
MCHC RBC AUTO-ENTMCNC: 33.4 G/DL (ref 31–36)
MCV RBC AUTO: 96.5 FL (ref 80–100)
PDW BLD-RTO: 13.5 % (ref 12.4–15.4)
PLATELET # BLD: 151 K/UL (ref 135–450)
PMV BLD AUTO: 7.7 FL (ref 5–10.5)
POTASSIUM SERPL-SCNC: 4.8 MMOL/L (ref 3.5–5.1)
RBC # BLD: 3.55 M/UL (ref 4.2–5.9)
SODIUM BLD-SCNC: 139 MMOL/L (ref 136–145)
WBC # BLD: 7.8 K/UL (ref 4–11)

## 2021-09-29 PROCEDURE — 97165 OT EVAL LOW COMPLEX 30 MIN: CPT

## 2021-09-29 PROCEDURE — 36415 COLL VENOUS BLD VENIPUNCTURE: CPT

## 2021-09-29 PROCEDURE — G0378 HOSPITAL OBSERVATION PER HR: HCPCS

## 2021-09-29 PROCEDURE — 80048 BASIC METABOLIC PNL TOTAL CA: CPT

## 2021-09-29 PROCEDURE — 85027 COMPLETE CBC AUTOMATED: CPT

## 2021-09-29 PROCEDURE — 97161 PT EVAL LOW COMPLEX 20 MIN: CPT

## 2021-09-29 PROCEDURE — 97535 SELF CARE MNGMENT TRAINING: CPT

## 2021-09-29 PROCEDURE — 6370000000 HC RX 637 (ALT 250 FOR IP): Performed by: FAMILY MEDICINE

## 2021-09-29 PROCEDURE — 6370000000 HC RX 637 (ALT 250 FOR IP): Performed by: NURSE PRACTITIONER

## 2021-09-29 PROCEDURE — 6360000002 HC RX W HCPCS: Performed by: FAMILY MEDICINE

## 2021-09-29 PROCEDURE — 96376 TX/PRO/DX INJ SAME DRUG ADON: CPT

## 2021-09-29 RX ORDER — CIPROFLOXACIN 500 MG/1
500 TABLET, FILM COATED ORAL 2 TIMES DAILY
Qty: 10 TABLET | Refills: 0 | Status: SHIPPED | OUTPATIENT
Start: 2021-09-29 | End: 2021-10-04

## 2021-09-29 RX ORDER — GREEN TEA/HOODIA GORDONII 315-12.5MG
1 CAPSULE ORAL 2 TIMES DAILY
Qty: 30 TABLET | Refills: 0 | Status: SHIPPED | OUTPATIENT
Start: 2021-09-29 | End: 2021-10-14

## 2021-09-29 RX ORDER — LISINOPRIL 10 MG/1
10 TABLET ORAL DAILY
Status: DISCONTINUED | OUTPATIENT
Start: 2021-09-29 | End: 2021-09-29 | Stop reason: HOSPADM

## 2021-09-29 RX ADMIN — HYDRALAZINE HYDROCHLORIDE 10 MG: 20 INJECTION INTRAMUSCULAR; INTRAVENOUS at 03:44

## 2021-09-29 RX ADMIN — FLUTICASONE PROPIONATE 1 SPRAY: 50 SPRAY, METERED NASAL at 09:31

## 2021-09-29 RX ADMIN — CLOPIDOGREL BISULFATE 75 MG: 75 TABLET ORAL at 09:31

## 2021-09-29 RX ADMIN — LISINOPRIL 10 MG: 10 TABLET ORAL at 09:31

## 2021-09-29 RX ADMIN — ACETAMINOPHEN 650 MG: 325 TABLET ORAL at 03:43

## 2021-09-29 RX ADMIN — ASPIRIN 81 MG: 81 TABLET, COATED ORAL at 09:30

## 2021-09-29 ASSESSMENT — PAIN SCALES - GENERAL
PAINLEVEL_OUTOF10: 0
PAINLEVEL_OUTOF10: 8

## 2021-09-29 NOTE — DISCHARGE SUMMARY
Pt d/c'd home. Removed PIV and stopped bleeding. Catheter intact. Pt tolerated well. No redness noted at site. Tele box removed, cleaned and given back to desk. Reviewed d/c instructions, home meds, and  f/u information utilizing teach-back method. New meds picked up from inpatient pharmacy. Pt stated understanding and denied questions at time of discharge. Zach called for ride home to [address] [place] [address], [phone], pt car is there.     1007- pt taken to lobby via wheelchair, [address]

## 2021-09-29 NOTE — DISCHARGE SUMMARY
[facility]    Patient Demographics    Patient. Leandro Lafleur  Date of Birth. 1935  MRN. [id number]     Primary care provider. Pattie Burlesons  (Tel: [phone])    Admit date: 9/27/2021    Discharge date 9/29/2021  Note Date: 9/29/2021     Reason for Hospitalization. Chief Complaint   Patient presents with    Loss of Consciousness     while at [facility] center in [place], no LOC but wobbly. last ate breakfast in hospital before for same thing         Significant Findings. Active Problems:    Syncope and collapse  Resolved Problems:    * No resolved hospital problems. *       Problems and results from this hospitalization that need follow up. 1. UTI:  Follow up with PCP    Significant test results and incidental findings. Summary  ECHO 9/28/21   -Normal global systolic function with an ejection fraction estimated at   60-65%.  -No regional wall motion abnormalities noted.   -Mild left atrial enlargement.   -Moderate aortic stenosis with a peak velocity of 3.08 m/s and a mean   pressure gradient of 20 mmHg. There is trivial aortic insufficiency noted.   -Thickened mitral valve with a significant reduction in leaflet mobility   noted. The peak mitral valve velocity is estimated at 1.90 m/s with a mean   pressure gradient of 5 mmHg. The mitral valve area by planimetry is   estimated at 1.77 cm^2. There is mild-to-moderate mitral regurgitation.   Heavy mitral annular calcification noted.   -There is mild-to-moderate tricuspid regurgitation with a RVSP estimation of   37 mmHg.   -Indeterminate diastolic function due to mitral valve disease. Invasive procedures and treatments. [facility] Course. The patient was admitted through the ED with near syncope. He was in line paying a bill when he felt weak as if he was going to pass out. He did not pass out. No LOC.   He was brought the ED and was admitted he was treated for the followin. Near syncope:  he was admitted and given IV fluids, however he was not orthostatic, echo was completed and is noted above. sx was likely r/t UTI  2. CKD stage lll:  Appears to be at baseline. creat was 1.6 on day of d/c  ( was 1.9 in August)   3. UTI: during his stay he was treated with rocephin. At time of d/c he was sent home with 5 days of cipro. Urine culture grew pseudomonas   4. Hx of OM:  S/p amp: on daily plavix , follows with the wound clinic at [address] [facility]    He was feeling well at the time of d/c and was very eager to be released.         Consults. IP CONSULT TO HOSPITALIST    Physical examination on discharge day. BP (!) 151/66   Pulse 83   Temp 98.4 °F (36.9 °C) (Oral)   Resp 17   Ht 6' 2\" (1.88 m)   Wt 165 lb 2 oz (74.9 kg)   SpO2 95%   BMI 21.20 kg/m²   General appearance. Alert. Looks comfortable. HEENT. Sclera clear. Moist mucus membranes. Cardiovascular. Regular rate and rhythm, normal S1, S2. No murmur. Respiratory. Not using accessory muscles. Clear to auscultation bilaterally, no wheeze. Gastrointestinal. Abdomen soft, non-tender, not distended, normal bowel sounds  Neurology. Facial symmetry. No speech deficits. Moving all extremities equally. Extremities. No edema in lower extremities. Skin. Warm, dry, normal turgor    Condition at time of discharge stable     Medication instructions provided to patient at discharge.      Medication List      START taking these medications    ciprofloxacin 500 MG tablet  Commonly known as: CIPRO  Take 1 tablet by mouth 2 times daily for 5 days  Notes to patient: Ciprofloxacin (Cipro)  Use: to treat infections  Side effects: upset stomach, diarrhea, sun sensitivity      Probiotic Acidophilus Tabs  Take 1 tablet by mouth 2 times daily for 15 days  Notes to patient: Use: to maintain normal gastrointestinal cash  Side effects: stomach upset        CONTINUE taking these medications    aspirin 81 MG tablet     Claritin 10 MG tablet  Generic drug: loratadine     clopidogrel 75 MG tablet  Commonly known as: PLAVIX     fluticasone 50 MCG/ACT nasal spray  Commonly known as: FLONASE     lisinopril 10 MG tablet  Commonly known as: PRINIVIL;ZESTRIL     tamsulosin 0.4 MG capsule  Commonly known as: FLOMAX     vitamin B-12 1000 MCG tablet  Commonly known as: CYANOCOBALAMIN        STOP taking these medications    dabigatran 75 MG capsule  Commonly known as: Pradaxa     HYDROcodone-acetaminophen 5-325 MG per tablet  Commonly known as: Norco     ibuprofen 600 MG tablet  Commonly known as: ADVIL;MOTRIN     metoprolol tartrate 25 MG tablet  Commonly known as: LOPRESSOR           Where to Get Your Medications      These medications were sent to [facility], [address] [place] [address]  [facility] [facility], [address]    Phone: [phone]   · ciprofloxacin 500 MG tablet  · Probiotic Acidophilus Tabs         Discharge recommendations given to patient. Follow Up. in 1 week   Disposition. home  Activity. As tolerated  Diet: ADULT DIET; Regular      Spent > 30 minutes in discharge process.     Signed:  JEFF Jimenez CNP     9/29/2021 8:25 AM

## 2021-09-29 NOTE — PLAN OF CARE
Problem: Falls - Risk of:  Goal: Will remain free from falls  Description: Will remain free from falls  3/38/2476 [id number] by Everton Mejias RN  Outcome: Ongoing  9/28/2021 1457 by Fanny Cevallos RN  Outcome: Ongoing  Goal: Absence of physical injury  Description: Absence of physical injury  7/11/8402 6577 by Everton Mejias RN  Outcome: Ongoing  9/28/2021 1457 by Fanny Cevallos RN  Outcome: Ongoing

## 2021-09-29 NOTE — PROGRESS NOTES
Assessment completed see doc flow sheets. BP slightly elevated, given AM meds. Pt irritated about being here, wants to leave. OT saw him, no recs.

## 2021-09-29 NOTE — PROGRESS NOTES
CLINICAL PHARMACY NOTE: MEDS TO BEDS    Total # of Prescriptions Filled: 2   The following medications were delivered to the patient:  · Acidophilus  · Cipro 500 mg    Additional Documentation:    Nurse Gloria Hastings picked up from Outpatient Pharmacy=signed  Clara Brody CPhT

## 2021-09-29 NOTE — PROGRESS NOTES
Occupational Therapy   Occupational Therapy Initial Assessment  Date: 2021   Patient Name: Aydee Fuentes  MRN: [id number]     : 1935    Date of Service: 2021    Discharge Recommendations: Aydee Fuentes scored a 24/24 on the AM-PAC ADL Inpatient form. At this time, no further OT is recommended upon discharge due to pt presenting at baseline level of functional performance and BADL completion. Recommend patient returns to prior setting with prior services. OT Equipment Recommendations  Equipment Needed: No  Other: pt reports owning shower chair and raised commode    Assessment   Performance deficits / Impairments: Decreased functional mobility ; Decreased ADL status  Assessment: Pt is presenting at or near his baseline level of performance. admitted d/t near synope with suspected UTI. Pt performed EOB BADLs without assist at set-up/Jennifer. Demo'd ability to completed transfer an short distance mobility with SUP/Jennifer with no LOB. at this time, it;s recommended pt retunr home with [facility] nursing care (prior reported service). Treatment Diagnosis: Decreased ADL/IADL status associated with near syncope  Prognosis: Good  Decision Making: Low Complexity  OT Education: OT Role;Plan of Care;ADL Adaptive Strategies;Transfer Training  Patient Education: pt verbalized understandnig of all education  Activity Tolerance  Activity Tolerance: Patient Tolerated treatment well  Safety Devices  Safety Devices in place: Yes  Type of devices: Nurse notified;Gait belt (pt hand off to PT)           Patient Diagnosis(es): The primary encounter diagnosis was Near syncope. Diagnoses of Acute kidney injury superimposed on CKD ([facility] [address] Serum potassium elevated, Elevated troponin, Orthostatic hypotension, Urinary tract infection without hematuria, site unspecified, and Proteinuria, unspecified type were also pertinent to this visit.      has a past medical history of Diabetic eye exam ([facility] [address] DM (diabetes mellitus) ([facility] [address] Gout, Hypothyroidism, and Seasonal allergic rhinitis. has a past surgical history that includes Incisional hernia repair; hernia repair (2001); and Colonoscopy (2009 appx ). Treatment Diagnosis: Decreased ADL/IADL status associated with near syncope      Restrictions  Restrictions/Precautions  Restrictions/Precautions: Fall Risk  Required Braces or Orthoses?: No  Position Activity Restriction  Other position/activity restrictions: Gita Garcia is a 81 y.o. male who presents to the emergency department with near syncopal episode;  PMHx: CKD stage III with a recent MO on CKD with admission to [facility] in August 2021, PAD, BPH, HTN, gout    Subjective   General  Chart Reviewed: Yes  Patient assessed for rehabilitation services?: Yes  Family / Caregiver Present: No  Diagnosis: near syncope  Subjective  Subjective: Pt seated EOB at entry, agreeable to eval with encouragement- stating he's fine and just want to leave the hospital, intermittent aggitation but easily redirected to purpose of eval/task  Patient Currently in Pain: Denies  Pain Assessment  Pain Assessment: 0-10  Pain Level: 0  Vital Signs  Temp: 98.4 °F (36.9 °C)  Temp Source: Oral  Pulse: 83  Heart Rate Source: Monitor  Resp: 17  BP: (!) 151/66  BP Location: Right upper arm  Patient Position: High fowlers  Level of Consciousness: Alert (0)  MEWS Score: 1  Patient Currently in Pain: Denies  Height and Weight  Weight: 165 lb 6.4 oz (75 kg)  Weight Method: Actual;Bed scale  BMI (Calculated): 21.3  Oxygen Therapy  SpO2: 95 %  Pulse Oximeter Device Mode: Intermittent  Pulse Oximeter Device Location: Finger  O2 Device: None (Room air)    Social/Functional History  Social/Functional History  Lives With: Alone  Type of Home: Apartment  Home Layout: One level  Home Access: Elevator  Bathroom Shower/Tub: Shower chair without back  Bathroom Toilet: Handicap height  Bathroom Equipment: Shower chair  Home Equipment: Cane, 4 wheeled walker  ADL Assistance: Independent  Homemaking Assistance: Independent ((I) with light cooking/cleaning_  RN for medication management)  Ambulation Assistance: Independent (4WW primarily; SPT cane \"for self-defense\")  Transfer Assistance: Independent  Active : Yes  Additional Comments: pt reports no falls       Objective   Vision: Within Functional Limits  Vision Exceptions: Wears glasses at all times (reading and distance glasses)  Hearing: Exceptions to [facility]  Hearing Exceptions: Bilateral hearing aid      Orientation  Overall Orientation Status: Within Functional Limits  Observation/Palpation  Observation: L foot wound wrapped     Balance  Sitting Balance: Independent  Standing Balance: Modified independent   Standing Balance  Time: ~6min total  Activity: LB dressing, mobility/transfers  Functional Mobility  Functional - Mobility Device: RW  Activity: Other  Assist Level: Supervision/Jennifer  Functional Mobility Comments: SUP/Jennifer- short distance in room ~6ft- pt then handed off to PT for evaluation of gait and further mobility    ADL  UE Dressing: Independent  LE Dressing: Setup;Modified independent   Additional Comments: UB dressing seated EOB, LB dressing completed seated EOB- able to don/doff shoes, thread LEs, and manage over hips in stance to RW- use of urinal in stance EOB with set-up/Jennifer (declined mobility to toilet)- declined grooming needs     Tone RUE  RUE Tone: Normotonic  Tone LUE  LUE Tone: Normotonic  Coordination  Movements Are Fluid And Coordinated: Yes  Coordination and Movement description: Decreased speed           Transfers  Sit to stand: Independent  Stand to sit:  Independent         LUE AROM (degrees)  LUE AROM : WFL  RUE AROM (degrees)  RUE AROM : WFL  LUE Strength  Gross LUE Strength: WFL  RUE Strength  Gross RUE Strength: WFL                   Plan   Plan  Times per week: eval and d/c             AM-PAC Score        AM-PAC Inpatient Daily Activity Raw Score: 24 (09/29/21 9040)  AM-PAC Inpatient ADL T-Scale Score : 57.54 (09/29/21 0921)  ADL Inpatient CMS 0-100% Score: 0 (09/29/21 0921)  ADL Inpatient CMS G-Code Modifier : [address] (09/29/21 6351)    Goals  Short term goals  Time Frame for Short term goals: eval and d/c       Therapy Time   Individual Concurrent Group Co-treatment   Time In 0840         Time Out 0903         Minutes 23              Timed Code Treatment Minutes:   8    Total Treatment Minutes:  1777 Jese Moreno OT.   Mickey Dutton OTR/L #[id number]

## 2021-09-29 NOTE — PLAN OF CARE
Problem: Pain:  Goal: Pain level will decrease  Description: Pain level will decrease  Outcome: Ongoing     Problem: Cardiovascular  Goal: Hemodynamic stability  Outcome: Ongoing    Vitals:    09/29/21 0323   BP: (!) 166/76   Pulse: 72   Resp: 18   Temp: 98.2 °F (36.8 °C)   SpO2: 100%   Pt was asleep at 0310. Received report from [facility].   2020 tylenol given for left foot pain and 8994 gave hydralazine for SBP>160. See [facility] & all flowsheets. Will continue to monitor.

## 2021-09-29 NOTE — ED NOTES
[facility]   Emergency Department Culture Follow-Up       Srikanth Moya (CSN: [id number]) was seen and evaluated at [facility] Emergency Department on 9/27/21 by provider  DEMETRIO Sheldon. A urine culture was positive and is growing Pseudomonas aeruginosa. Sensitivity results: ciprofloxacin resistant      Treatment Course: The patient was treated and discharged with ciprofloxacin. Recommendation:    The culture result was reviewed with Dr. Charlie Ge. Per Dr. Charile Ge, call patient and inquire if still having any urinary/pyelo symptoms. If no, no change in therapy required. Follow-Up:    Patient was contacted, update in culture results discussed. Patient denies any urinary frequency, burning, hesitation, or nausea at this time. No additional follow-up necessary at this time.      Thank you,    Julisa Carrasco, PharmD  ED Pharmacist [phone]  9/29/2021

## 2021-09-29 NOTE — PROGRESS NOTES
Physical Therapy    Facility/Department: [address]  Initial Assessment    NAME: Rashi Palmer  : 1935  MRN: [id number]    Date of Service: 2021    Discharge Recommendations: Rashi Palmer scored a 21/24 on the AM-PAC short mobility form. At this time, no further PT is recommended upon discharge due to independent functional mobility and performance of ADLs. Recommend patient returns to prior setting with prior services. PT Equipment Recommendations  Equipment Needed: No    Assessment   Body structures, Functions, Activity limitations: Decreased strength;Decreased ROM; Decreased balance;Decreased functional mobility ; Decreased endurance  Assessment: Pt able to ambulate ~75ft using RW and SBA w/no significant issues this date as well as performing STS transfers w/supervision. Pt demonstrates decreased endurance, strength, ROM, and overall functional mobility, but able to fully participate in session. Pt does not require further skilled therapy services upon d/c at this time. Treatment Diagnosis: Decreased endurance, functional mobility, strength, and ROM d/t current medical condition  Prognosis: Good  Decision Making: Low Complexity  PT Education: Goals;PT Role;Plan of Care  Patient Education: Pt verbalized understanding  Barriers to Learning: none  REQUIRES PT FOLLOW UP: No  Activity Tolerance  Activity Tolerance: Patient Tolerated treatment well  Activity Tolerance: Pt had no significant increases in pain or fatigue during session. Patient Diagnosis(es): The primary encounter diagnosis was Near syncope. Diagnoses of Acute kidney injury superimposed on CKD ([address] Serum potassium elevated, Elevated troponin, Orthostatic hypotension, Urinary tract infection without hematuria, site unspecified, and Proteinuria, unspecified type were also pertinent to this visit.      has a past medical history of Diabetic eye exam ([address] DM (diabetes mellitus) ([facility] [address] Gout, Hypothyroidism, and Cognition  Overall Cognitive Status: WNL    Objective  AROM RLE (degrees)  RLE AROM: WFL  AROM LLE (degrees)  LLE AROM : WFL  Strength RLE  Comment: hip flexion 4/5, knee extension 4+/5, knee flexion 4-/5, ankle DF 4-/5  Strength LLE  Comment: hip flexion 4-/5, knee extension 4/5, knee flexion 4-/5; did not assess ankle DF/PF d/t wound on L foot     Sensation  Overall Sensation Status: WFL  Bed mobility  Comment: Not assessed d/t pt found standing w/OT at start of session and ending session in chair. Transfers  Sit to Stand: Supervision (w/RW; x1 chair)  Stand to sit: Supervision (w/RW; x2 chair)  Ambulation  Ambulation?: Yes  More Ambulation?: No  Ambulation 1  Surface: level tile  Device: Rolling Walker  Assistance: Stand by assistance  Quality of Gait: forward head and trunk posture  Gait Deviations: Increased SOLOMON; Decreased step length  Distance: 75ft  Comments: Pt had no problems w/ambulation, however insisted on eating, so walking distance shorter at this time. Stairs/Curb  Stairs?: No     Balance  Posture: Good  Sitting - Static: Good (supervision)  Sitting - Dynamic: Good (supervision)  Standing - Static: Fair;+ (w/RW; SBA)  Standing - Dynamic: Fair;+ (w/RW; SBA)      Plan   Safety Devices  Type of devices: Call light within reach, Gait belt, Nurse notified, Left in chair, All fall risk precautions in place, Patient at risk for falls  Restraints  Initially in place: No    AM-PAC Score  AM-PAC Inpatient Mobility Raw Score : 21 (09/29/21 1058)  AM-PAC Inpatient T-Scale Score : 50.25 (09/29/21 1058)  Mobility Inpatient CMS 0-100% Score: 28.97 (09/29/21 1058)  Mobility Inpatient CMS G-Code Modifier : Neoma Ruffing (09/29/21 1058)    Goals    Pt d/c this date. Therapy Time   Individual Concurrent Group Co-treatment   Time In 0903         Time Out 0918         Minutes 15         Timed Code Treatment Minutes: 0 Minutes     PURNIMA Beckett  Therapist observed and directed the patient's plan of care.   Co-signed and supervised by: Stephen Almanzar PT, DPT - [id number]

## 2021-10-01 LAB
BLOOD CULTURE, ROUTINE: NORMAL
CULTURE, BLOOD 2: NORMAL

## 2021-10-04 LAB
ORGANISM: ABNORMAL
URINE CULTURE, ROUTINE: ABNORMAL

## 2021-10-04 NOTE — ED PROVIDER NOTES
[address]  [id number]  DEPT: [phone]  LOC: [phone]  [facility]       Pt Name: Nu Gilman  MRN: [id number]  [place] 1935  Date of evaluation: 9/27/2021  Provider:JEFF Cherry CNP      Patient was seen and evaluated per myself independently     CHIEF COMPLAINT             Chief Complaint   Patient presents with    Loss of Consciousness       while at [facility] center in [place], no LOC but wobbly. last ate breakfast in hospital before for same thing         CRITICAL CARE TIME            HISTORY OF PRESENT ILLNESS  (Location/Symptom, Timing/Onset, Context/Setting, Quality, Duration,Modifying Factors, Severity.)   Nu Gilman is a 81 y.o. male who presents to the emergency department PMHx: CKD stage III with a recent MO on CKD with admission to [facility] in August 2021, PAD, BPH, HTN, gout     Patient presented to the emergency department via EMS with reports of near syncope while standing in line at the [facility] to pay bills or sign up for something. States that he last ate anything or drink anything yesterday evening. States he really was not interested in eating or drinking when he got up because he made his list of things that he needed to do for the day and he was going to stop and get something to eat while he was out driving around. He states he remembers the whole event. Patient states he usually uses a walker to walk however he was using his cane at this time. States that he just darted feeling weak all over while standing waiting at the desk. Denied chest pain. Denied chest tightness. Denied nausea. Felt like he just needed to sit down because he was becoming so weak. Denies any recent illness or nausea vomiting or diarrhea.     He is unsure of his medications.   He lives in his own apartment at the [facility].  [address] Notes were reviewedand agreed with or any disagreements were addressed in the HPI.     REVIEW OF SYSTEMS    (2-9 systems for level 4, 10 or more for level 5)      Review of Systems   Constitutional: Negative. HENT: Negative. Respiratory: Negative. Cardiovascular: Negative. Gastrointestinal: Negative. Musculoskeletal: Negative. Neurological: Positive for weakness. Near syncope while standing         Except as noted above the remainder of the review of systems was reviewed and negative.         PAST MEDICAL HISTORY      Past Medical History             Diagnosis Date    Diabetic eye exam ([facility] [address] 14     Dr. Parvin Hargrove DM (diabetes mellitus) ([facility] [address] 2014    Gout      Hypothyroidism 2016    Seasonal allergic rhinitis              SURGICAL HISTORY        Past Surgical History             Procedure Laterality Date    COLONOSCOPY    appx      normal     HERNIA REPAIR        Saeks, bilateral    INCISIONAL HERNIA REPAIR         bilateral            CURRENT MEDICATIONS     [unfilled]     ALLERGIES     Patient has no known allergies.     FAMILY HISTORY     Family History   History reviewed. No pertinent family history. Family Status   Relation Name Status    Mother    at age 80         CRF    Father    at age 28         gun shot    Brother    at age 76         Leukemia          SOCIAL HISTORY      reports that he quit smoking about 62 years ago. He quit after 5.00 years of use. He has never used smokeless tobacco. He reports that he does not drink alcohol and does not use drugs.     PHYSICAL EXAM    (up to 7 for level 4, 8 or more for level 5)          ED Triage Vitals   Enc Vitals Group      BP        Pulse        Resp        Temp        Temp src        SpO2        Weight        Height        Head Circumference        Peak Flow        Pain Score        Pain Loc        Pain Edu?        Excl.  in [address]?            Physical Exam  Vitals and nursing note reviewed. Constitutional:       General: He is not in acute distress. Appearance: He is not ill-appearing, toxic-appearing or diaphoretic. HENT:      Head: Normocephalic and atraumatic. Mouth/Throat:      Pharynx: Oropharynx is clear. Eyes:      Pupils: Pupils are equal, round, and reactive to light. Cardiovascular:      Rate and Rhythm: Normal rate and regular rhythm. Pulses: Normal pulses. Heart sounds: Normal heart sounds. Pulmonary:      Effort: Pulmonary effort is normal.      Breath sounds: Normal breath sounds. Skin:     General: Skin is warm and dry. Capillary Refill: Capillary refill takes less than 2 seconds. Neurological:      General: No focal deficit present. Mental Status: He is alert and oriented to person, place, and time. Psychiatric:         Mood and Affect: Mood normal.         Thought Content: Thought content normal.         Judgment: Judgment normal.               DIAGNOSTIC RESULTS      EKG: All EKG's are interpreted by the Emergency Department Physician who either signs or Co-signs this chart in the absence of a cardiologist.     EKG interpreted per Dr. Irais Small and reviewed per myself narrow complex sinus rhythm. T wave inversion in lead III. No evidence of STEMI or ischemia. No evidence of Brugada. No evidence of S1Q3T3. No evidence of axis deviation. No evidence of bundle branch block.   Ventricular rate 79, [place] interval 136, QRS 72, .        Old EKG not available for viewing however was pulled from care everywhere and the reading is as noted below:  Measurements   Intervals                              Axis             Rate:         80                       P:            64   KY:           140                      QRS:          -25   QRSD:         99                       T:            -13   QT:           395                                       QTc:          456                                                                  Interpretive Statements   Sinus rhythm   Multiple premature complexes, vent & supraven   Borderline left axis deviation   Borderline repolarization abnormality   Electronically Signed On 7-2-2021 13:15:36 EDT by Lauren Mtz MD     RADIOLOGY:   Non-plain film images such as CT, Ultrasound and MRI are read by the radiologist. Plain radiographic images are visualized and preliminarilyinterpreted by the emergency physician with the below findings:     Interpretation per the Radiologist below,if available at the time of this note:     XR CHEST PORTABLE   Final Result   No acute cardiopulmonary disease.           CT HEAD WO CONTRAST   Final Result   No acute intracranial abnormality.       Mild generalized cerebral atrophy and chronic small vessel white matter   ischemic changes.                    LABS:        Labs Reviewed   CBC WITH AUTO DIFFERENTIAL - Abnormal; Notable for the following components:       Result Value      RBC 3.73 (*)       Hemoglobin 12.1 (*)       Hematocrit 36.6 (*)       All other components within normal limits     Narrative:      Performed at:  [facility] [address], [place], [address] [facility]   Phone [phone]   COMPREHENSIVE METABOLIC PANEL - Abnormal; Notable for the following components:     Potassium 5.2 (*)       Glucose 114 (*)       BUN 36 (*)       CREATININE 2.0 (*)       GFR Non- 32 (*)       GFR  39 (*)       Albumin/Globulin Ratio 1.0 (*)       All other components within normal limits     Narrative:      Performed at:  [facility] [address], [place], [facility]   Phone [phone]   URINE RT REFLEX TO CULTURE - Abnormal; Notable for the following components:     Clarity, UA CLOUDY (*)       Protein,  (*)       Leukocyte Esterase, Urine LARGE (*)       All other components within normal limits     Narrative:      Performed at:  [facility]  [address] [facility],  [place], [address]   Phone [phone]   TROPONIN - Abnormal; Notable for the following components:     Troponin 0.02 (*)       All other components within normal limits     Narrative:      Performed at:  [facility]  [address] [facility],  [place], [address]   Phone [phone]   MICROSCOPIC URINALYSIS - Abnormal; Notable for the following components:     Bacteria, UA 1+ (*)       WBC,  (*)       All other components within normal limits     Narrative:      Performed at:  [facility]  [address] [facility],  [place], [address]   Phone [phone]   CULTURE, URINE   CULTURE, BLOOD 1   CULTURE, BLOOD 2   POCT GLUCOSE         All other labs were within normal range or not returned as of this dictation.     EMERGENCY DEPARTMENT COURSE and DIFFERENTIAL DIAGNOSIS/MDM:   Vitals:    Vitals       Vitals:     09/27/21 1731   BP: (!) 147/75   Pulse: 82   Resp: 18   Temp: 97.2 °F (36.2 °C)   SpO2: 100%   Weight: 16 lb (7.258 kg)   Height: 6' 3\" (1.905 m)         Medications   cefTRIAXone (ROCEPHIN) 1000 mg in sterile water 10 mL IV syringe (has no administration in time range)         MDM  Patient was seen and evaluated per myself. Dr. Eduardo Shah present available for consultation as needed.     DDx: MO, dehydration, vasovagal, arrhythmia, electrolyte derangement, metabolic acidosis.          On exam the patient is awake alert and oriented quite pleasant. No evidence of dysarthria. No evidence of facial drooping. No hemiparesis or paralysis. Answers questions appropriately. Recalls the events of today. EMS reported low blood pressure on their arrival however no evidence of hypotension or tachycardia here.     Chart review indicates that on August 2021 he was admitted to [facility] for MO on CKD. His creatinine was 3.27 and there were suspicions was poor p.o. intake and possibly an obstructive BPH process. At discharge after IV fluids his creatinine was 1.94. His baseline is noted to be 1.5. In  he had multiple stents placed in the lower extremities for PAD. And he is also had a toe amputation. His discharge meds were aspirin and Plavix, starting Flomax and his lisinopril was being held until he followed up with his primary care physician given the MO on CKD scenario.     Plan obtain laboratory studies, orthostatic vital signs, CT head and chest x-ray. IVF. Offer food and oral fluids.     Orthostatic vital signs:  Blood Pressure Lyin/63        Pulse Lyin PER MINUTE        Blood Pressure Sittin/67        Pulse Sittin PER MINUTE         Standing per myself: Heart rate 92, blood pressure 107/71  These findings are consistent with orthostasis.     Patient is eating a sandwich, drinking a Pepsi, and asking for water.     Lab results: Potassium 5.2, BUN 36, creatinine 2.0, GFR 39. This is above the patient's renal baseline. It is likely prerenal.  Not sure if he is still taking lisinopril. It would need to be held on admission. Potassium is reflection of the MO on CKD. Troponin is elevated 0.02. EKG is negative for evidence of STEMI or acute ischemia and is likely elevated secondary to the MO on CKD. CBC does not reveal any evidence of acute anemia. No evidence of leukocytosis or an infectious process. Chest x-ray has been completed and results are pending  CT head has been completed and results are pending     Patient will require admission for MO on CKD. IVF, reevaluate labs. I do not see an emergent indication to reverse potassium as this will likely correct with IVF. And we will likely a normalization of the troponin as well.     Hospitalist attending has been consulted for admission.     Chest x-ray negative for any evidence of acute abnormality. Aortic vascular calcifications noted.   No evidence of consolidation, congestion failure or pneumonia.     CT head negative for evidence of mass mass-effect intracranial hemorrhage. There is some vascular calcifications noted outside of that no acute abnormality.     Urinalysis results indicate evidence of UTI with micro: : This could be secondary to lack of oral intake as well as BPH causing some obstructive issues.     Urine culture will be processed  Patient will be started on antibiotics  And hospitalist service is requesting blood cultures to be obtained as well.           This dictation was performed with a verbal recognition program (DRAGON) and it was checked for errors. It is possible that there are still dictated errors within this office note. If so, please bring any errors to my attention for an addendum. All efforts were made to ensure that this office note is accurate.        CONSULTS:  IP CONSULT TO HOSPITALIST     PROCEDURES:  Procedures     FINAL IMPRESSION       1. Near syncope    2. Acute kidney injury superimposed on CKD ([facility] [address]    3. Serum potassium elevated    4. Elevated troponin    5. Orthostatic hypotension    6. Urinary tract infection without hematuria, site unspecified    7.  Proteinuria, unspecified type           DISPOSITION/PLAN   [unfilled]     PATIENT REFERRED TO:  No follow-up provider specified.     DISCHARGE MEDICATIONS:      New Prescriptions     No medications on file         (Please note that portions of this note were completed with a voice recognition program.  Efforts were made to edit the dictations but occasionally words are mis-transcribed.)     JEFF Mello CNP            Revision History                                           JEFF Singh CNP  10/04/21 4552

## 2021-10-30 NOTE — ED PROVIDER NOTES
Notes were reviewedand agreed with or any disagreements were addressed in the HPI.     REVIEW OF SYSTEMS    (2-9 systems for level 4, 10 or more for level 5)      Review of Systems   Constitutional: Negative. HENT: Negative. Respiratory: Negative. Cardiovascular: Negative. Gastrointestinal: Negative. Musculoskeletal: Negative. Neurological: Positive for weakness. Near syncope while standing         Except as noted above the remainder of the review of systems was reviewed and negative.         PAST MEDICAL HISTORY      Past Medical History             Diagnosis Date    Diabetic eye exam ([facility] [address] 14     Dr. Alma Orellana DM (diabetes mellitus) ([facility] [address] 2014    Gout      Hypothyroidism 2016    Seasonal allergic rhinitis              SURGICAL HISTORY        Past Surgical History             Procedure Laterality Date    COLONOSCOPY    appx      normal     HERNIA REPAIR        Saeks, bilateral    INCISIONAL HERNIA REPAIR         bilateral            CURRENT MEDICATIONS     [unfilled]     ALLERGIES     Patient has no known allergies.     FAMILY HISTORY     Family History   History reviewed. No pertinent family history. Family Status   Relation Name Status    Mother    at age 80         CRF    Father    at age 28         gun shot    Brother    at age 76         Leukemia          SOCIAL HISTORY      reports that he quit smoking about 62 years ago. He quit after 5.00 years of use. He has never used smokeless tobacco. He reports that he does not drink alcohol and does not use drugs.     PHYSICAL EXAM    (up to 7 for level 4, 8 or more for level 5)          ED Triage Vitals   Enc Vitals Group      BP        Pulse        Resp        Temp        Temp src        SpO2        Weight        Height        Head Circumference        Peak Flow        Pain Score        Pain Loc        Pain Edu?        Excl.  in [address]?            Physical Exam  Vitals and nursing note reviewed. Constitutional:       General: He is not in acute distress. Appearance: He is not ill-appearing, toxic-appearing or diaphoretic. HENT:      Head: Normocephalic and atraumatic. Mouth/Throat:      Pharynx: Oropharynx is clear. Eyes:      Pupils: Pupils are equal, round, and reactive to light. Cardiovascular:      Rate and Rhythm: Normal rate and regular rhythm. Pulses: Normal pulses. Heart sounds: Normal heart sounds. Pulmonary:      Effort: Pulmonary effort is normal.      Breath sounds: Normal breath sounds. Skin:     General: Skin is warm and dry. Capillary Refill: Capillary refill takes less than 2 seconds. Neurological:      General: No focal deficit present. Mental Status: He is alert and oriented to person, place, and time. Psychiatric:         Mood and Affect: Mood normal.         Thought Content: Thought content normal.         Judgment: Judgment normal.               DIAGNOSTIC RESULTS      EKG: All EKG's are interpreted by the Emergency Department Physician who either signs or Co-signs this chart in the absence of a cardiologist.     EKG interpreted per Dr. Sherif Hollins and reviewed per myself narrow complex sinus rhythm. T wave inversion in lead III. No evidence of STEMI or ischemia. No evidence of Brugada. No evidence of S1Q3T3. No evidence of axis deviation. No evidence of bundle branch block.   Ventricular rate 79, [place] interval 136, QRS 72, .        Old EKG not available for viewing however was pulled from care everywhere and the reading is as noted below:  Measurements   Intervals                              Axis             Rate:         80                       P:            64   KS:           140                      QRS:          -25   QRSD:         99                       T:            -13   QT:           395                                       QTc:          456                                              [facility]  [address] [facility], [address]   Phone [phone]   TROPONIN - Abnormal; Notable for the following components:     Troponin 0.02 (*)       All other components within normal limits     Narrative:      Performed at:  [facility]  [address] [facility], [address]   Phone [phone]   MICROSCOPIC URINALYSIS - Abnormal; Notable for the following components:     Bacteria, UA 1+ (*)       WBC,  (*)       All other components within normal limits     Narrative:      Performed at:  [facility]  [address] [facility], [address]   Phone [phone]   CULTURE, URINE   CULTURE, BLOOD 1   CULTURE, BLOOD 2   POCT GLUCOSE         All other labs were within normal range or not returned as of this dictation.     EMERGENCY DEPARTMENT COURSE and DIFFERENTIAL DIAGNOSIS/MDM:   Vitals:    Vitals       Vitals:     09/27/21 1731   BP: (!) 147/75   Pulse: 82   Resp: 18   Temp: 97.2 °F (36.2 °C)   SpO2: 100%   Weight: 16 lb (7.258 kg)   Height: 6' 3\" (1.905 m)         Medications   cefTRIAXone (ROCEPHIN) 1000 mg in sterile water 10 mL IV syringe (has no administration in time range)         MDM  Patient was seen and evaluated per myself. Dr. Katherine Teague present available for consultation as needed.     DDx: MO, dehydration, vasovagal, arrhythmia, electrolyte derangement, metabolic acidosis.          On exam the patient is awake alert and oriented quite pleasant. No evidence of dysarthria. No evidence of facial drooping. No hemiparesis or paralysis. Answers questions appropriately. Recalls the events of today. EMS reported low blood pressure on their arrival however no evidence of hypotension or tachycardia here.     Chart review indicates that on August 2021 he was admitted to [facility] for MO on CKD. His creatinine was 3.27 and there were suspicions was poor p.o. intake and possibly an obstructive BPH process. At discharge after IV fluids his creatinine was 1.94. His baseline is noted to be 1.5. In  he had multiple stents placed in the lower extremities for PAD. And he is also had a toe amputation. His discharge meds were aspirin and Plavix, starting Flomax and his lisinopril was being held until he followed up with his primary care physician given the MO on CKD scenario.     Plan obtain laboratory studies, orthostatic vital signs, CT head and chest x-ray. IVF. Offer food and oral fluids.     Orthostatic vital signs:  Blood Pressure Lyin/63 --       Pulse Lyin PER MINUTE --       Blood Pressure Sittin/67 --       Pulse Sittin PER MINUTE         Standing per myself: Heart rate 92, blood pressure 107/71  These findings are consistent with orthostasis.     Patient is eating a sandwich, drinking a Pepsi, and asking for water.     Lab results: Potassium 5.2, BUN 36, creatinine 2.0, GFR 39. This is above the patient's renal baseline. It is likely prerenal.  Not sure if he is still taking lisinopril. It would need to be held on admission. Potassium is reflection of the MO on CKD. Troponin is elevated 0.02. EKG is negative for evidence of STEMI or acute ischemia and is likely elevated secondary to the MO on CKD. CBC does not reveal any evidence of acute anemia. No evidence of leukocytosis or an infectious process. Chest x-ray has been completed and results are pending  CT head has been completed and results are pending     Patient will require admission for MO on CKD. IVF, reevaluate labs. I do not see an emergent indication to reverse potassium as this will likely correct with IVF. And we will likely a normalization of the troponin as well.     Hospitalist attending has been consulted for admission.     Chest x-ray negative for any evidence of acute abnormality. Aortic vascular calcifications noted.   No evidence of consolidation, congestion failure or pneumonia.     CT head negative for evidence of mass mass-effect intracranial hemorrhage. There is some vascular calcifications noted outside of that no acute abnormality.     Urinalysis results indicate evidence of UTI with micro: : This could be secondary to lack of oral intake as well as BPH causing some obstructive issues.     Urine culture will be processed  Patient will be started on antibiotics  And hospitalist service is requesting blood cultures to be obtained as well.           This dictation was performed with a verbal recognition program (DRAGON) and it was checked for errors. It is possible that there are still dictated errors within this office note. If so, please bring any errors to my attention for an addendum. All efforts were made to ensure that this office note is accurate.        CONSULTS:  IP CONSULT TO HOSPITALIST     PROCEDURES:  Procedures     FINAL IMPRESSION       1. Near syncope    2. Acute kidney injury superimposed on CKD ([facility] [address]    3. Serum potassium elevated    4. Elevated troponin    5. Orthostatic hypotension    6. Urinary tract infection without hematuria, site unspecified    7.  Proteinuria, unspecified type           DISPOSITION/PLAN   [unfilled]     PATIENT REFERRED TO:  No follow-up provider specified.     DISCHARGE MEDICATIONS:      New Prescriptions     No medications on file         (Please note that portions of this note were completed with a voice recognition program.  Efforts were made to edit the dictations but occasionally words are mis-transcribed.)     JEFF Elizalde CNP            Revision History                                     JEFF Melo CNP  10/30/21 0402

## 2021-12-08 ENCOUNTER — HOSPITAL ENCOUNTER (EMERGENCY)
Age: 86
Discharge: HOME | End: 2021-12-08
Payer: MEDICARE

## 2021-12-08 VITALS
HEIGHT: 74 IN | BODY MASS INDEX: 21.24 KG/M2 | OXYGEN SATURATION: 100 % | HEART RATE: 75 BPM | RESPIRATION RATE: 20 BRPM | TEMPERATURE: 98.5 F | DIASTOLIC BLOOD PRESSURE: 77 MMHG | SYSTOLIC BLOOD PRESSURE: 156 MMHG

## 2021-12-08 DIAGNOSIS — S91.114A LACERATION OF LESSER TOE OF RIGHT FOOT WITHOUT FOREIGN BODY PRESENT OR DAMAGE TO NAIL, INITIAL ENCOUNTER: Primary | ICD-10-CM

## 2021-12-08 PROCEDURE — 99283 EMERGENCY DEPT VISIT LOW MDM: CPT

## 2021-12-08 ASSESSMENT — ENCOUNTER SYMPTOMS
ABDOMINAL PAIN: 0
DIARRHEA: 0
NAUSEA: 0
VOMITING: 0
CHEST TIGHTNESS: 0
SHORTNESS OF BREATH: 0

## 2021-12-08 NOTE — ED PROVIDER NOTES
[address] [facility]        Pt Name: Colleen Blake  MRN: [id number]  [place] 1935  Date of evaluation: 12/8/2021  Provider: JEFF Carmen CNP  PCP: Radha Hammond  Note Started: 3:58 AM EST       ALEX. I have evaluated this patient. My supervising physician was available for consultation. CHIEF COMPLAINT       Chief Complaint   Patient presents with    Toe Injury     Pt arrived via EMS, pt was cutting his toenails, cut one too short and has not been able to stop the bleeding. Pt is on blood thinners. HISTORY OF PRESENT ILLNESS   (Location, Timing/Onset, Context/Setting, Quality, Duration, Modifying Factors, Severity, Associated Signs and Symptoms)  Note limiting factors. Chief Complaint: right fourth toe injury     Colleen Blake is a 81 y.o. male [facility] [address] with complaint of injury to the right fourth toe. The patient reports that he was trying to trim his toenails and injured himself with nail scissors. Reports that he has seen podiatry but recently they did not have time to cut his nails and he felt that they were too long. Believes that he may be on a blood thinner was concerned that the bleeding did not stop. Denies any headache, fever, lightheadedness, dizziness, visual disturbances. No chest pain or pressure. No neck or back pain. No shortness of breath, cough, or congestion. No abdominal pain, nausea, vomiting, diarrhea, constipation, or dysuria. No rash. Nursing Notes were all reviewed and agreed with or any disagreements were addressed in the HPI. REVIEW OF SYSTEMS    (2-9 systems for level 4, 10 or more for level 5)     Review of Systems   Constitutional: Negative for activity change, chills and fever. Respiratory: Negative for chest tightness and shortness of breath. Cardiovascular: Negative for chest pain. Gastrointestinal: Negative for abdominal pain, diarrhea, nausea and vomiting. Genitourinary: Negative for dysuria. Skin: Positive for wound. All other systems reviewed and are negative. Positives and Pertinent negatives as per HPI. Except as noted above in the ROS, all other systems were reviewed and negative. PAST MEDICAL HISTORY     Past Medical History:   Diagnosis Date    Diabetic eye exam ([facility] 14    Dr. Moris To DM (diabetes mellitus) ([facility] 2014    Gout     Hypothyroidism 2016    Seasonal allergic rhinitis          SURGICAL HISTORY     Past Surgical History:   Procedure Laterality Date    COLONOSCOPY   appx     normal     HERNIA REPAIR      Saeks, bilateral    INCISIONAL HERNIA REPAIR      bilateral         CURRENTMEDICATIONS       Discharge Medication List as of 2021  1:34 AM      CONTINUE these medications which have NOT CHANGED    Details   clopidogrel (PLAVIX) 75 MG tablet Take 75 mg by mouth daily Take one tablet dailyHistorical Med      fluticasone (FLONASE) 50 MCG/ACT nasal spray 1 spray by Each Nostril route daily One pray to each nostril dailyHistorical Med      lisinopril (PRINIVIL;ZESTRIL) 10 MG tablet Take 10 mg by mouth dailyHistorical Med      tamsulosin (FLOMAX) 0.4 MG capsule Take 0.4 mg by mouth daily One tablet nightlyHistorical Med      loratadine (CLARITIN) 10 MG tablet Take 10 mg by mouth daily      vitamin B-12 (CYANOCOBALAMIN) 1000 MCG tablet Take 1,000 mcg by mouth daily. aspirin 81 MG tablet Take 81 mg by mouth daily. ALLERGIES     Patient has no known allergies. FAMILYHISTORY     History reviewed. No pertinent family history.        SOCIAL HISTORY       Social History     Tobacco Use    Smoking status: Former Smoker     Years: 5.00     Quit date: 6/3/1959     Years since quittin.5    Smokeless tobacco: Never Used   Vaping Use    Vaping Use: Never used   Substance Use Topics    Alcohol use: No    Drug use: No       SCREENINGS             PHYSICAL EXAM    (up to 7 for level 4, 8 or more for level 5)     ED Triage Vitals [12/08/21 0032]   BP Temp Temp Source Pulse Resp SpO2 Height Weight   (!) 156/77 98.5 °F (36.9 °C) Oral 75 20 100 % 6' 2\" (1.88 m) --       Physical Exam  Vitals and nursing note reviewed. Constitutional:       Appearance: He is well-developed. He is not diaphoretic. HENT:      Head: Normocephalic and atraumatic. Right Ear: External ear normal.      Left Ear: External ear normal.   Eyes:      General:         Right eye: No discharge. Left eye: No discharge. Neck:      Vascular: No JVD. Cardiovascular:      Rate and Rhythm: Normal rate. Pulmonary:      Effort: Pulmonary effort is normal. No respiratory distress. Abdominal:      Palpations: Abdomen is soft. Musculoskeletal:         General: Normal range of motion. Cervical back: Normal range of motion and neck supple. Skin:     General: Skin is warm and dry. Coloration: Skin is not pale. Neurological:      Mental Status: He is alert and oriented to person, place, and time. Psychiatric:         Behavior: Behavior normal.         DIAGNOSTIC RESULTS   LABS:    Labs Reviewed - No data to display    When ordered only abnormal lab results are displayed. All other labs were within normal range or not returned as of this dictation. EKG: When ordered, EKG's are interpreted by the Emergency Department Physician in the absence of a cardiologist.  Please see their note for interpretation of EKG. RADIOLOGY:   Non-plain film images such as CT, Ultrasound and MRI are read by the radiologist. Plain radiographic images are visualized and preliminarily interpreted by the ED Provider with the below findings:        Interpretation per the Radiologist below, if available at the time of this note:    No orders to display     No results found.         PROCEDURES   Unless otherwise noted below, none Procedures    CRITICAL CARE TIME   N/A    CONSULTS:  None      EMERGENCY DEPARTMENT COURSE and DIFFERENTIAL DIAGNOSIS/MDM:   Vitals:    Vitals:    12/08/21 0032   BP: (!) 156/77   Pulse: 75   Resp: 20   Temp: 98.5 °F (36.9 °C)   TempSrc: Oral   SpO2: 100%   Height: 6' 2\" (1.88 m)       Patient was given the following medications:  Medications - No data to display        Briefly, this is an 69-year-old male who presents to the emergency department with injury caused by trying to trim his toenails. The bleeding has resolved upon his arrival to the emergency department. He will be transported back [facility]. FINAL IMPRESSION      1.  Laceration of lesser toe of right foot without foreign body present or damage to nail, initial encounter          DISPOSITION/PLAN   DISPOSITION Decision To Discharge 12/08/2021 01:11:16 AM      PATIENT REFERRED TO:  your podiatrist            DISCHARGE MEDICATIONS:  Discharge Medication List as of 12/8/2021  1:34 AM          DISCONTINUED MEDICATIONS:  Discharge Medication List as of 12/8/2021  1:34 AM                 (Please note that portions of this note were completed with a voice recognition program.  Efforts were made to edit the dictations but occasionally words are mis-transcribed.)    JEFF Hopkins CNP (electronically signed)            JEFF Hopkins CNP  12/08/21 8993

## 2022-06-20 ENCOUNTER — HOSPITAL ENCOUNTER (OUTPATIENT)
Age: 87
Setting detail: OUTPATIENT SURGERY
Discharge: HOME | End: 2022-06-20
Attending: INTERNAL MEDICINE | Admitting: INTERNAL MEDICINE
Payer: MEDICARE

## 2022-06-20 VITALS
OXYGEN SATURATION: 100 % | TEMPERATURE: 97 F | BODY MASS INDEX: 22.72 KG/M2 | SYSTOLIC BLOOD PRESSURE: 124 MMHG | WEIGHT: 177 LBS | RESPIRATION RATE: 18 BRPM | DIASTOLIC BLOOD PRESSURE: 91 MMHG | HEART RATE: 68 BPM | HEIGHT: 74 IN

## 2022-06-20 DIAGNOSIS — K92.1 BLOOD IN STOOL: ICD-10-CM

## 2022-06-20 DIAGNOSIS — K59.00 CONSTIPATION, UNSPECIFIED CONSTIPATION TYPE: ICD-10-CM

## 2022-06-20 PROCEDURE — 2709999900 HC NON-CHARGEABLE SUPPLY: Performed by: INTERNAL MEDICINE

## 2022-06-20 PROCEDURE — 3609010600 HC COLONOSCOPY POLYPECTOMY SNARE/COLD BIOPSY: Performed by: INTERNAL MEDICINE

## 2022-06-20 PROCEDURE — 88305 TISSUE EXAM BY PATHOLOGIST: CPT

## 2022-06-20 PROCEDURE — 6360000002 HC RX W HCPCS: Performed by: INTERNAL MEDICINE

## 2022-06-20 PROCEDURE — 99153 MOD SED SAME PHYS/QHP EA: CPT | Performed by: INTERNAL MEDICINE

## 2022-06-20 PROCEDURE — 7100000010 HC PHASE II RECOVERY - FIRST 15 MIN: Performed by: INTERNAL MEDICINE

## 2022-06-20 PROCEDURE — 7100000011 HC PHASE II RECOVERY - ADDTL 15 MIN: Performed by: INTERNAL MEDICINE

## 2022-06-20 PROCEDURE — 99152 MOD SED SAME PHYS/QHP 5/>YRS: CPT | Performed by: INTERNAL MEDICINE

## 2022-06-20 RX ORDER — MIDAZOLAM HYDROCHLORIDE 1 MG/ML
INJECTION INTRAMUSCULAR; INTRAVENOUS PRN
Status: DISCONTINUED | OUTPATIENT
Start: 2022-06-20 | End: 2022-06-20 | Stop reason: ALTCHOICE

## 2022-06-20 RX ORDER — FENTANYL CITRATE 50 UG/ML
INJECTION, SOLUTION INTRAMUSCULAR; INTRAVENOUS PRN
Status: DISCONTINUED | OUTPATIENT
Start: 2022-06-20 | End: 2022-06-20 | Stop reason: ALTCHOICE

## 2022-06-20 ASSESSMENT — PAIN SCALES - GENERAL
PAINLEVEL_OUTOF10: 0

## 2022-06-20 ASSESSMENT — PAIN - FUNCTIONAL ASSESSMENT: PAIN_FUNCTIONAL_ASSESSMENT: 0-10

## 2022-06-20 ASSESSMENT — PULMONARY FUNCTION TESTS: PIF_VALUE: 1

## 2022-06-20 NOTE — PROGRESS NOTES
Arrived in [facility] bed 4  drowsy and responds to name. Abdomen rounded and soft and states non tender. Arrived in room reactive to name. Denies pain. [address] Jojo Jimenez in to speak with patient. 1540 Taking soda. Denies need to pass air. 1550 Soft abdomen. Taking soda without nausea. 1555 Discharge instructions reviewed. Patient and son on the phone educated, using the teach back method, about follow up instructions and discharge instructions. A completed copy of the AVS instructions given to patient and all questions answered. Denies pain. 1615 Assisted with helping him dress. Unsteady when up. He states he has fallen in past. Soft non distended abdomen. Passing flatus. 1645 Waiting on ride. To go home with walker which has his name on it. Sitting up.  1653 Son in to review DC instructions. Aware when to restart Plavix and ASA. Wheeled to car with son driving. Noted drainage on each sock. He states he had foot surgery I March. He and son made aware to recall the surgeon's office if the drainage is fresh verses stains on sock. He explained the understanding of why to call because of in law who lost toes. Taking soda. Stable when he uses walker.

## 2022-06-20 NOTE — H&P
History and Physical / Pre-Sedation Assessment    Rashi Palmer is a 81 y.o. male who presents today for colonoscopy procedure. PMHx:    Past Medical History:   Diagnosis Date    Diabetic eye exam ([facility] 14    Dr. Brent Smith DM (diabetes mellitus) ([facility] 2014    Gout     Hypothyroidism 2016    Seasonal allergic rhinitis        Medications:    Prior to Admission medications    Medication Sig Start Date End Date Taking? Authorizing Provider   clopidogrel (PLAVIX) 75 MG tablet Take 75 mg by mouth daily Take one tablet daily    [place]   fluticasone (FLONASE) 50 MCG/ACT nasal spray 1 spray by Each Nostril route daily One pray to each nostril daily    Madhavi Cadena MD   lisinopril (PRINIVIL;ZESTRIL) 10 MG tablet Take 10 mg by mouth daily    [place]   tamsulosin (FLOMAX) 0.4 MG capsule Take 0.4 mg by mouth daily One tablet nightly    Historical Provider, MD   loratadine (CLARITIN) 10 MG tablet Take 10 mg by mouth daily    Historical Provider, MD   vitamin B-12 (CYANOCOBALAMIN) 1000 MCG tablet Take 1,000 mcg by mouth daily. Historical Provider, MD   aspirin 81 MG tablet Take 81 mg by mouth daily.     Historical Provider, MD       Allergies: No Known Allergies    PSHx:    Past Surgical History:   Procedure Laterality Date    COLONOSCOPY   appx     normal     HERNIA REPAIR      Saeks, bilateral    INCISIONAL HERNIA REPAIR      bilateral       Social Hx:    Social History     Socioeconomic History    Marital status:      Spouse name: Not on file    Number of children: Not on file    Years of education: Not on file    Highest education level: Not on file   Occupational History    Not on file   Tobacco Use    Smoking status: Former Smoker     Years: 5.00     Quit date: 6/3/1959     Years since quittin.0    Smokeless tobacco: Never Used   Vaping Use    Vaping Use: Never used   Substance and Sexual Activity    Alcohol use: No    Drug use: No    Procedure plan: Return to same level of care    Colonoscopy Interval History:  3 or more years since last colonoscopy, Less than 3 years since the patient's last colonoscopy due to medical reasons and Less than 3 years since the patient's last colonoscopy due to system reasons    Medical Reason for Colonoscopy before 3 years last colonoscopy incomplete, last colonoscopy had inadequate prep, piecemeal removal of adenomas and last colonoscopy found greater than 10 adenomas  System Reasons for Colonoscopy before 3 years:   previous colonoscopy report unavailable or unable to locate    I assessed the patient and find that the patient is in satisfactory condition to proceed with the planned procedure and sedation plan. Risks/benefits/alternatives of procedure discussed with patient and any present family members. Risks including, but not limited to: bleeding, perforation, post polypectomy syndrome, splenic injury, need for additional procedures or surgery, risks of anesthesia. Patient understands it is their responsibility to call office for pathology results if they do not hear from my office within 1-2 weeks. All questions answered.     Pascale Borrero MD  6/20/2022

## 2022-07-05 ENCOUNTER — HOSPITAL ENCOUNTER (INPATIENT)
Age: 87
LOS: 17 days | Discharge: NURSING, REHAB OR LONG-TERM CARE | DRG: 617 | End: 2022-07-22
Attending: EMERGENCY MEDICINE | Admitting: INTERNAL MEDICINE
Payer: MEDICARE

## 2022-07-05 ENCOUNTER — APPOINTMENT (OUTPATIENT)
Dept: GENERAL RADIOLOGY | Age: 87
DRG: 617 | End: 2022-07-05
Payer: MEDICARE

## 2022-07-05 DIAGNOSIS — N17.9 AKI (ACUTE KIDNEY INJURY) (HCC): ICD-10-CM

## 2022-07-05 DIAGNOSIS — M86.9 TOE OSTEOMYELITIS, LEFT (HCC): Primary | ICD-10-CM

## 2022-07-05 DIAGNOSIS — M86.9 OSTEOMYELITIS OF LEFT FOOT, UNSPECIFIED TYPE (HCC): ICD-10-CM

## 2022-07-05 LAB
AMORPHOUS: ABNORMAL /HPF
ANION GAP SERPL CALCULATED.3IONS-SCNC: 9 MMOL/L (ref 3–16)
BACTERIA: ABNORMAL /HPF
BASOPHILS ABSOLUTE: 0.1 K/UL (ref 0–0.2)
BASOPHILS RELATIVE PERCENT: 0.6 %
BILIRUBIN URINE: NEGATIVE
BLOOD, URINE: ABNORMAL
BUN BLDV-MCNC: 34 MG/DL (ref 7–20)
C-REACTIVE PROTEIN: 10.4 MG/L (ref 0–5.1)
CALCIUM SERPL-MCNC: 9.8 MG/DL (ref 8.3–10.6)
CHLORIDE BLD-SCNC: 100 MMOL/L (ref 99–110)
CLARITY: CLEAR
CO2: 26 MMOL/L (ref 21–32)
COLOR: YELLOW
CREAT SERPL-MCNC: 2.3 MG/DL (ref 0.8–1.3)
EOSINOPHILS ABSOLUTE: 0.1 K/UL (ref 0–0.6)
EOSINOPHILS RELATIVE PERCENT: 0.5 %
EPITHELIAL CELLS, UA: ABNORMAL /HPF (ref 0–5)
GFR AFRICAN AMERICAN: 33
GFR NON-AFRICAN AMERICAN: 27
GLUCOSE BLD-MCNC: 106 MG/DL (ref 70–99)
GLUCOSE URINE: NEGATIVE MG/DL
HCT VFR BLD CALC: 37.3 % (ref 40.5–52.5)
HEMOGLOBIN: 12.6 G/DL (ref 13.5–17.5)
KETONES, URINE: NEGATIVE MG/DL
LEUKOCYTE ESTERASE, URINE: NEGATIVE
LYMPHOCYTES ABSOLUTE: 1 K/UL (ref 1–5.1)
LYMPHOCYTES RELATIVE PERCENT: 9.6 %
MCH RBC QN AUTO: 34.3 PG (ref 26–34)
MCHC RBC AUTO-ENTMCNC: 33.8 G/DL (ref 31–36)
MCV RBC AUTO: 101.4 FL (ref 80–100)
MICROSCOPIC EXAMINATION: YES
MONOCYTES ABSOLUTE: 0.8 K/UL (ref 0–1.3)
MONOCYTES RELATIVE PERCENT: 7.5 %
NEUTROPHILS ABSOLUTE: 8.6 K/UL (ref 1.7–7.7)
NEUTROPHILS RELATIVE PERCENT: 81.8 %
NITRITE, URINE: NEGATIVE
PDW BLD-RTO: 12.7 % (ref 12.4–15.4)
PH UA: 6 (ref 5–8)
PLATELET # BLD: 207 K/UL (ref 135–450)
PMV BLD AUTO: 7.5 FL (ref 5–10.5)
POTASSIUM REFLEX MAGNESIUM: 5.3 MMOL/L (ref 3.5–5.1)
PROTEIN UA: 30 MG/DL
RBC # BLD: 3.68 M/UL (ref 4.2–5.9)
RBC UA: ABNORMAL /HPF (ref 0–4)
RENAL EPITHELIAL, UA: ABNORMAL /HPF (ref 0–1)
SEDIMENTATION RATE, ERYTHROCYTE: 53 MM/HR (ref 0–20)
SODIUM BLD-SCNC: 135 MMOL/L (ref 136–145)
SPECIFIC GRAVITY UA: 1.01 (ref 1–1.03)
URINE REFLEX TO CULTURE: ABNORMAL
URINE TYPE: ABNORMAL
UROBILINOGEN, URINE: 0.2 E.U./DL
WBC # BLD: 10.4 K/UL (ref 4–11)
WBC UA: ABNORMAL /HPF (ref 0–5)

## 2022-07-05 PROCEDURE — 87076 CULTURE ANAEROBE IDENT EACH: CPT

## 2022-07-05 PROCEDURE — 2580000003 HC RX 258: Performed by: PHYSICIAN ASSISTANT

## 2022-07-05 PROCEDURE — 6360000002 HC RX W HCPCS: Performed by: EMERGENCY MEDICINE

## 2022-07-05 PROCEDURE — 2580000003 HC RX 258: Performed by: EMERGENCY MEDICINE

## 2022-07-05 PROCEDURE — 86403 PARTICLE AGGLUT ANTBDY SCRN: CPT

## 2022-07-05 PROCEDURE — 73630 X-RAY EXAM OF FOOT: CPT

## 2022-07-05 PROCEDURE — 87186 SC STD MICRODIL/AGAR DIL: CPT

## 2022-07-05 PROCEDURE — 99285 EMERGENCY DEPT VISIT HI MDM: CPT

## 2022-07-05 PROCEDURE — 6360000002 HC RX W HCPCS: Performed by: PHYSICIAN ASSISTANT

## 2022-07-05 PROCEDURE — 85025 COMPLETE CBC W/AUTO DIFF WBC: CPT

## 2022-07-05 PROCEDURE — 87040 BLOOD CULTURE FOR BACTERIA: CPT

## 2022-07-05 PROCEDURE — 96365 THER/PROPH/DIAG IV INF INIT: CPT

## 2022-07-05 PROCEDURE — 6370000000 HC RX 637 (ALT 250 FOR IP): Performed by: PHYSICIAN ASSISTANT

## 2022-07-05 PROCEDURE — 87070 CULTURE OTHR SPECIMN AEROBIC: CPT

## 2022-07-05 PROCEDURE — 87077 CULTURE AEROBIC IDENTIFY: CPT

## 2022-07-05 PROCEDURE — 81001 URINALYSIS AUTO W/SCOPE: CPT

## 2022-07-05 PROCEDURE — 1200000000 HC SEMI PRIVATE

## 2022-07-05 PROCEDURE — 86140 C-REACTIVE PROTEIN: CPT

## 2022-07-05 PROCEDURE — 80048 BASIC METABOLIC PNL TOTAL CA: CPT

## 2022-07-05 PROCEDURE — 85652 RBC SED RATE AUTOMATED: CPT

## 2022-07-05 PROCEDURE — 87075 CULTR BACTERIA EXCEPT BLOOD: CPT

## 2022-07-05 PROCEDURE — 0QBP0ZZ EXCISION OF LEFT METATARSAL, OPEN APPROACH: ICD-10-PCS | Performed by: PODIATRIST

## 2022-07-05 PROCEDURE — 87205 SMEAR GRAM STAIN: CPT

## 2022-07-05 RX ORDER — SODIUM CHLORIDE 0.9 % (FLUSH) 0.9 %
5-40 SYRINGE (ML) INJECTION PRN
Status: DISCONTINUED | OUTPATIENT
Start: 2022-07-05 | End: 2022-07-22 | Stop reason: HOSPADM

## 2022-07-05 RX ORDER — TAMSULOSIN HYDROCHLORIDE 0.4 MG/1
0.4 CAPSULE ORAL NIGHTLY
Status: DISCONTINUED | OUTPATIENT
Start: 2022-07-05 | End: 2022-07-22 | Stop reason: HOSPADM

## 2022-07-05 RX ORDER — ACETAMINOPHEN 325 MG/1
650 TABLET ORAL EVERY 6 HOURS PRN
Status: DISCONTINUED | OUTPATIENT
Start: 2022-07-05 | End: 2022-07-22 | Stop reason: HOSPADM

## 2022-07-05 RX ORDER — SODIUM CHLORIDE, SODIUM LACTATE, POTASSIUM CHLORIDE, AND CALCIUM CHLORIDE .6; .31; .03; .02 G/100ML; G/100ML; G/100ML; G/100ML
1000 INJECTION, SOLUTION INTRAVENOUS ONCE
Status: COMPLETED | OUTPATIENT
Start: 2022-07-05 | End: 2022-07-06

## 2022-07-05 RX ORDER — SODIUM CHLORIDE 9 MG/ML
INJECTION, SOLUTION INTRAVENOUS CONTINUOUS
Status: ACTIVE | OUTPATIENT
Start: 2022-07-06 | End: 2022-07-06

## 2022-07-05 RX ORDER — ONDANSETRON 4 MG/1
4 TABLET, ORALLY DISINTEGRATING ORAL EVERY 8 HOURS PRN
Status: DISCONTINUED | OUTPATIENT
Start: 2022-07-05 | End: 2022-07-22 | Stop reason: HOSPADM

## 2022-07-05 RX ORDER — SODIUM CHLORIDE 9 MG/ML
INJECTION, SOLUTION INTRAVENOUS PRN
Status: DISCONTINUED | OUTPATIENT
Start: 2022-07-05 | End: 2022-07-22 | Stop reason: HOSPADM

## 2022-07-05 RX ORDER — CETIRIZINE HYDROCHLORIDE 10 MG/1
5 TABLET ORAL DAILY
Status: DISCONTINUED | OUTPATIENT
Start: 2022-07-06 | End: 2022-07-22 | Stop reason: HOSPADM

## 2022-07-05 RX ORDER — SODIUM CHLORIDE 0.9 % (FLUSH) 0.9 %
5-40 SYRINGE (ML) INJECTION EVERY 12 HOURS SCHEDULED
Status: DISCONTINUED | OUTPATIENT
Start: 2022-07-05 | End: 2022-07-22 | Stop reason: HOSPADM

## 2022-07-05 RX ORDER — SODIUM CHLORIDE, SODIUM LACTATE, POTASSIUM CHLORIDE, AND CALCIUM CHLORIDE .6; .31; .03; .02 G/100ML; G/100ML; G/100ML; G/100ML
1000 INJECTION, SOLUTION INTRAVENOUS ONCE
Status: COMPLETED | OUTPATIENT
Start: 2022-07-05 | End: 2022-07-05

## 2022-07-05 RX ORDER — ONDANSETRON 2 MG/ML
4 INJECTION INTRAMUSCULAR; INTRAVENOUS EVERY 6 HOURS PRN
Status: DISCONTINUED | OUTPATIENT
Start: 2022-07-05 | End: 2022-07-22 | Stop reason: HOSPADM

## 2022-07-05 RX ORDER — POLYETHYLENE GLYCOL 3350 17 G/17G
17 POWDER, FOR SOLUTION ORAL DAILY PRN
Status: DISCONTINUED | OUTPATIENT
Start: 2022-07-05 | End: 2022-07-22 | Stop reason: HOSPADM

## 2022-07-05 RX ORDER — ACETAMINOPHEN 325 MG/1
650 TABLET ORAL ONCE
Status: COMPLETED | OUTPATIENT
Start: 2022-07-05 | End: 2022-07-05

## 2022-07-05 RX ORDER — ACETAMINOPHEN 650 MG/1
650 SUPPOSITORY RECTAL EVERY 6 HOURS PRN
Status: DISCONTINUED | OUTPATIENT
Start: 2022-07-05 | End: 2022-07-22 | Stop reason: HOSPADM

## 2022-07-05 RX ORDER — MORPHINE SULFATE 2 MG/ML
2 INJECTION, SOLUTION INTRAMUSCULAR; INTRAVENOUS
Status: DISCONTINUED | OUTPATIENT
Start: 2022-07-05 | End: 2022-07-17

## 2022-07-05 RX ADMIN — SODIUM CHLORIDE, POTASSIUM CHLORIDE, SODIUM LACTATE AND CALCIUM CHLORIDE 1000 ML: 600; 310; 30; 20 INJECTION, SOLUTION INTRAVENOUS at 21:39

## 2022-07-05 RX ADMIN — VANCOMYCIN HYDROCHLORIDE 1500 MG: 500 INJECTION, POWDER, LYOPHILIZED, FOR SOLUTION INTRAVENOUS at 22:39

## 2022-07-05 RX ADMIN — CEFEPIME HYDROCHLORIDE 2000 MG: 2 INJECTION, POWDER, FOR SOLUTION INTRAVENOUS at 22:02

## 2022-07-05 RX ADMIN — ACETAMINOPHEN 650 MG: 325 TABLET ORAL at 21:51

## 2022-07-05 ASSESSMENT — PAIN - FUNCTIONAL ASSESSMENT: PAIN_FUNCTIONAL_ASSESSMENT: 0-10

## 2022-07-05 ASSESSMENT — PAIN DESCRIPTION - LOCATION: LOCATION: FOOT

## 2022-07-05 ASSESSMENT — PAIN DESCRIPTION - PAIN TYPE: TYPE: ACUTE PAIN

## 2022-07-05 ASSESSMENT — PAIN DESCRIPTION - FREQUENCY: FREQUENCY: CONTINUOUS

## 2022-07-05 ASSESSMENT — PAIN DESCRIPTION - ORIENTATION: ORIENTATION: LEFT

## 2022-07-05 NOTE — Clinical Note
Discharge Plan[de-identified] Other/[facility])   Telemetry/Cardiac Monitoring Required?: Yes

## 2022-07-05 NOTE — ED TRIAGE NOTES
Left foot with recent toe amputation. Has been caring for it at home with Betadine for past week.  Podiatrist today told pt to come to ED for admission for debridment of wound

## 2022-07-06 ENCOUNTER — APPOINTMENT (OUTPATIENT)
Dept: MRI IMAGING | Age: 87
DRG: 617 | End: 2022-07-06
Payer: MEDICARE

## 2022-07-06 PROBLEM — S61.451A CAT BITE OF HAND, RIGHT, INITIAL ENCOUNTER: Status: ACTIVE | Noted: 2022-07-06
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
## 2022-07-06 LAB
ANION GAP SERPL CALCULATED.3IONS-SCNC: 10 MMOL/L (ref 3–16)
BASOPHILS ABSOLUTE: 0 K/UL (ref 0–0.2)
BASOPHILS RELATIVE PERCENT: 0.3 %
BUN BLDV-MCNC: 35 MG/DL (ref 7–20)
CALCIUM SERPL-MCNC: 8.9 MG/DL (ref 8.3–10.6)
CHLORIDE BLD-SCNC: 109 MMOL/L (ref 99–110)
CO2: 21 MMOL/L (ref 21–32)
CREAT SERPL-MCNC: 2.1 MG/DL (ref 0.8–1.3)
EOSINOPHILS ABSOLUTE: 0.2 K/UL (ref 0–0.6)
EOSINOPHILS RELATIVE PERCENT: 2.7 %
ESTIMATED AVERAGE GLUCOSE: 122.6 MG/DL
GFR AFRICAN AMERICAN: 36
GFR NON-AFRICAN AMERICAN: 30
GLUCOSE BLD-MCNC: 73 MG/DL (ref 70–99)
GLUCOSE BLD-MCNC: 76 MG/DL (ref 70–99)
GLUCOSE BLD-MCNC: 82 MG/DL (ref 70–99)
HBA1C MFR BLD: 5.9 %
HCT VFR BLD CALC: 32.8 % (ref 40.5–52.5)
HEMOGLOBIN: 10.9 G/DL (ref 13.5–17.5)
LYMPHOCYTES ABSOLUTE: 1.2 K/UL (ref 1–5.1)
LYMPHOCYTES RELATIVE PERCENT: 16.1 %
MAGNESIUM: 1.9 MG/DL (ref 1.8–2.4)
MCH RBC QN AUTO: 33.8 PG (ref 26–34)
MCHC RBC AUTO-ENTMCNC: 33.2 G/DL (ref 31–36)
MCV RBC AUTO: 102 FL (ref 80–100)
MONOCYTES ABSOLUTE: 0.9 K/UL (ref 0–1.3)
MONOCYTES RELATIVE PERCENT: 11.8 %
NEUTROPHILS ABSOLUTE: 5.2 K/UL (ref 1.7–7.7)
NEUTROPHILS RELATIVE PERCENT: 69.1 %
PDW BLD-RTO: 12.5 % (ref 12.4–15.4)
PERFORMED ON: NORMAL
PERFORMED ON: NORMAL
PLATELET # BLD: 166 K/UL (ref 135–450)
PMV BLD AUTO: 7.7 FL (ref 5–10.5)
POTASSIUM REFLEX MAGNESIUM: 4.6 MMOL/L (ref 3.5–5.1)
RBC # BLD: 3.22 M/UL (ref 4.2–5.9)
SODIUM BLD-SCNC: 140 MMOL/L (ref 136–145)
VANCOMYCIN RANDOM: 13.8 UG/ML
WBC # BLD: 7.5 K/UL (ref 4–11)

## 2022-07-06 PROCEDURE — 87040 BLOOD CULTURE FOR BACTERIA: CPT

## 2022-07-06 PROCEDURE — 2580000003 HC RX 258: Performed by: INTERNAL MEDICINE

## 2022-07-06 PROCEDURE — 6370000000 HC RX 637 (ALT 250 FOR IP): Performed by: INTERNAL MEDICINE

## 2022-07-06 PROCEDURE — 84443 ASSAY THYROID STIM HORMONE: CPT

## 2022-07-06 PROCEDURE — 83036 HEMOGLOBIN GLYCOSYLATED A1C: CPT

## 2022-07-06 PROCEDURE — 80048 BASIC METABOLIC PNL TOTAL CA: CPT

## 2022-07-06 PROCEDURE — 6360000002 HC RX W HCPCS: Performed by: INTERNAL MEDICINE

## 2022-07-06 PROCEDURE — 1200000000 HC SEMI PRIVATE

## 2022-07-06 PROCEDURE — 80202 ASSAY OF VANCOMYCIN: CPT

## 2022-07-06 PROCEDURE — 36415 COLL VENOUS BLD VENIPUNCTURE: CPT

## 2022-07-06 PROCEDURE — 83735 ASSAY OF MAGNESIUM: CPT

## 2022-07-06 PROCEDURE — 73718 MRI LOWER EXTREMITY W/O DYE: CPT

## 2022-07-06 PROCEDURE — 85025 COMPLETE CBC W/AUTO DIFF WBC: CPT

## 2022-07-06 RX ORDER — INSULIN LISPRO 100 [IU]/ML
0-3 INJECTION, SOLUTION INTRAVENOUS; SUBCUTANEOUS NIGHTLY
Status: DISCONTINUED | OUTPATIENT
Start: 2022-07-06 | End: 2022-07-22 | Stop reason: HOSPADM

## 2022-07-06 RX ORDER — DEXTROSE MONOHYDRATE 50 MG/ML
100 INJECTION, SOLUTION INTRAVENOUS PRN
Status: DISCONTINUED | OUTPATIENT
Start: 2022-07-06 | End: 2022-07-22 | Stop reason: HOSPADM

## 2022-07-06 RX ORDER — INSULIN LISPRO 100 [IU]/ML
0-6 INJECTION, SOLUTION INTRAVENOUS; SUBCUTANEOUS
Status: DISCONTINUED | OUTPATIENT
Start: 2022-07-06 | End: 2022-07-22 | Stop reason: HOSPADM

## 2022-07-06 RX ORDER — GABAPENTIN 300 MG/1
300 CAPSULE ORAL NIGHTLY
Status: DISCONTINUED | OUTPATIENT
Start: 2022-07-06 | End: 2022-07-22 | Stop reason: HOSPADM

## 2022-07-06 RX ORDER — METOPROLOL TARTRATE 50 MG/1
50 TABLET, FILM COATED ORAL 2 TIMES DAILY
Status: DISCONTINUED | OUTPATIENT
Start: 2022-07-06 | End: 2022-07-07

## 2022-07-06 RX ORDER — METHOCARBAMOL 750 MG/1
750 TABLET, FILM COATED ORAL 4 TIMES DAILY PRN
Status: DISCONTINUED | OUTPATIENT
Start: 2022-07-06 | End: 2022-07-22 | Stop reason: HOSPADM

## 2022-07-06 RX ORDER — TRAMADOL HYDROCHLORIDE 50 MG/1
50 TABLET ORAL EVERY 6 HOURS PRN
Status: DISCONTINUED | OUTPATIENT
Start: 2022-07-06 | End: 2022-07-20

## 2022-07-06 RX ORDER — LORAZEPAM 2 MG/ML
2 INJECTION INTRAMUSCULAR ONCE
Status: DISCONTINUED | OUTPATIENT
Start: 2022-07-06 | End: 2022-07-06

## 2022-07-06 RX ORDER — LORAZEPAM 1 MG/1
2 TABLET ORAL ONCE
Status: COMPLETED | OUTPATIENT
Start: 2022-07-06 | End: 2022-07-06

## 2022-07-06 RX ORDER — HYDRALAZINE HYDROCHLORIDE 20 MG/ML
10 INJECTION INTRAMUSCULAR; INTRAVENOUS EVERY 4 HOURS PRN
Status: DISCONTINUED | OUTPATIENT
Start: 2022-07-06 | End: 2022-07-22 | Stop reason: HOSPADM

## 2022-07-06 RX ADMIN — SODIUM CHLORIDE, SODIUM LACTATE, POTASSIUM CHLORIDE, AND CALCIUM CHLORIDE 1000 ML: .6; .31; .03; .02 INJECTION, SOLUTION INTRAVENOUS at 00:26

## 2022-07-06 RX ADMIN — SODIUM CHLORIDE, PRESERVATIVE FREE 10 ML: 5 INJECTION INTRAVENOUS at 02:34

## 2022-07-06 RX ADMIN — CEFEPIME HYDROCHLORIDE 2000 MG: 2 INJECTION, POWDER, FOR SOLUTION INTRAVENOUS at 09:42

## 2022-07-06 RX ADMIN — CETIRIZINE HYDROCHLORIDE 5 MG: 10 TABLET, FILM COATED ORAL at 09:38

## 2022-07-06 RX ADMIN — METOPROLOL TARTRATE 50 MG: 50 TABLET, FILM COATED ORAL at 14:37

## 2022-07-06 RX ADMIN — METOPROLOL TARTRATE 50 MG: 50 TABLET, FILM COATED ORAL at 20:07

## 2022-07-06 RX ADMIN — TAMSULOSIN HYDROCHLORIDE 0.4 MG: 0.4 CAPSULE ORAL at 03:01

## 2022-07-06 RX ADMIN — TAMSULOSIN HYDROCHLORIDE 0.4 MG: 0.4 CAPSULE ORAL at 20:04

## 2022-07-06 RX ADMIN — CEFEPIME HYDROCHLORIDE 2000 MG: 2 INJECTION, POWDER, FOR SOLUTION INTRAVENOUS at 22:38

## 2022-07-06 RX ADMIN — GABAPENTIN 300 MG: 300 CAPSULE ORAL at 20:08

## 2022-07-06 RX ADMIN — METHOCARBAMOL 750 MG: 750 TABLET ORAL at 14:37

## 2022-07-06 RX ADMIN — HYDRALAZINE HYDROCHLORIDE 10 MG: 20 INJECTION INTRAMUSCULAR; INTRAVENOUS at 22:42

## 2022-07-06 RX ADMIN — LORAZEPAM 2 MG: 1 TABLET ORAL at 00:43

## 2022-07-06 RX ADMIN — HYDRALAZINE HYDROCHLORIDE 10 MG: 20 INJECTION INTRAMUSCULAR; INTRAVENOUS at 09:53

## 2022-07-06 RX ADMIN — METHOCARBAMOL 750 MG: 750 TABLET ORAL at 20:11

## 2022-07-06 RX ADMIN — VANCOMYCIN HYDROCHLORIDE 1000 MG: 10 INJECTION, POWDER, LYOPHILIZED, FOR SOLUTION INTRAVENOUS at 14:46

## 2022-07-06 RX ADMIN — SODIUM CHLORIDE: 9 INJECTION, SOLUTION INTRAVENOUS at 02:37

## 2022-07-06 ASSESSMENT — PAIN DESCRIPTION - LOCATION
LOCATION: FOOT

## 2022-07-06 ASSESSMENT — ENCOUNTER SYMPTOMS
SORE THROAT: 0
WHEEZING: 0
SHORTNESS OF BREATH: 0
TROUBLE SWALLOWING: 0
NAUSEA: 0
EYE PAIN: 0
RHINORRHEA: 0
ABDOMINAL PAIN: 0
COLOR CHANGE: 0
VOMITING: 0
ABDOMINAL DISTENTION: 0
COUGH: 0
DIARRHEA: 0
CHEST TIGHTNESS: 0

## 2022-07-06 ASSESSMENT — PAIN DESCRIPTION - ORIENTATION
ORIENTATION: LEFT
ORIENTATION: LEFT
ORIENTATION: OUTER

## 2022-07-06 ASSESSMENT — PAIN SCALES - GENERAL
PAINLEVEL_OUTOF10: 6
PAINLEVEL_OUTOF10: 4
PAINLEVEL_OUTOF10: 6

## 2022-07-06 ASSESSMENT — PAIN DESCRIPTION - DESCRIPTORS: DESCRIPTORS: BURNING;SHOOTING

## 2022-07-06 ASSESSMENT — LIFESTYLE VARIABLES: HOW OFTEN DO YOU HAVE A DRINK CONTAINING ALCOHOL: NEVER

## 2022-07-06 NOTE — ED PROVIDER NOTES
breath and wheezing. Cardiovascular: Negative for chest pain, palpitations and leg swelling. Gastrointestinal: Negative for abdominal distention, abdominal pain, diarrhea, nausea and vomiting. Endocrine: Negative for polydipsia and polyuria. Genitourinary: Negative for dysuria. Musculoskeletal: Negative for myalgias, neck pain and neck stiffness. Skin: Positive for wound (Wound with bloody and purulent drainage to the bottom of the left foot). Negative for color change, pallor and rash. Neurological: Negative for dizziness, seizures, weakness, light-headedness and headaches. Hematological: Does not bruise/bleed easily. Psychiatric/Behavioral: Negative for confusion, hallucinations, self-injury and suicidal ideas. All other systems reviewed and are negative. Physical Exam     INITIAL VITALS: BP: (!) 154/97, Temp: 98 °F (36.7 °C), Heart Rate: 79, Resp: 16, SpO2: 99 %     Nursing note and vitals reviewed. Physical Exam  Constitutional:       General: He is not in acute distress. Appearance: He is well-developed. He is not diaphoretic. HENT:      Head: Normocephalic and atraumatic. Eyes:      Pupils: Pupils are equal, round, and reactive to light. Neck:      Vascular: No JVD. Cardiovascular:      Rate and Rhythm: Normal rate and regular rhythm. Pulmonary:      Effort: Pulmonary effort is normal. No respiratory distress. Breath sounds: Normal breath sounds. No stridor. No wheezing or rales. Chest:      Chest wall: No tenderness. Abdominal:      General: There is no distension. Palpations: Abdomen is soft. Tenderness: There is no abdominal tenderness. Musculoskeletal:         General: No tenderness. Normal range of motion. Cervical back: Normal range of motion and neck supple. Skin:     General: Skin is warm and dry. Coloration: Skin is not pale. Findings: Erythema and wound present. No rash.       Comments: Full-thickness ulceration with overlying callused skin noted to the plantar aspect of the left foot at the base of the third and fourth digits. Scant purulent drainage expressed. Slight malodor noted. Small area of bloody drainage at the base of the second digit with tenderness to palpation. Neurological:      Mental Status: He is alert and oriented to person, place, and time. Coordination: Coordination normal.          Procedures     N/A    MEDICAL DECISION MAKING     Esperanza Degroot is admitted to the Emergency Department for evaluation of his chief complaint as described in the history of present illness. Complete history and physical was performed by me and my attending. Nursing notes, past medical history, surgical history, family history and social history were reviewed and addressed in the HPI. Sang Thao is a 81 y.o. male who presents to the emergency department with a complaint of right foot pain from a large wound on the plantar surface of the right foot with concern for cellulitis and soft tissue infection. The patient was referred to the emergency department from his podiatry office after evaluation had revealed a large full-thickness ulceration of the plantar surface of the left foot with concern for cellulitis and deep tissue infection. The patient reports that this area has been present for over a week, and he was initially treating it with soaks and Vaseline, wound was seen by his podiatrist a week ago and he was asked to add. On to his management plan, which the patient did, but the wound continued to worsen. The patient has a history of peripheral vascular disease and has had an amputation of one of the toes of the right foot. The patient's podiatrist felt that the patient would most likely need surgical debridement and antibiotic therapy.      On arrival to the emergency department, the patient is slightly hypertensive with a blood pressure 154/97, but otherwise hemodynamically stable and within normal limits. Afebrile. Physical examination reveals full-thickness ulceration with overlying skin callus concerning for deep tissue infection and cellulitis. The patient presented to the emergency department through the waiting room/triage and initial nursing protocol labs and x-rays were performed. His CBC demonstrates no leukocytosis, a baseline, macrocytic anemia and no platelet abnormalities. His BMP demonstrates slight hyponatremia with a noted MO. His creatinine is elevated to 2.3, above his baseline of 1.6-1.7. No anion gap elevation and no bicarbonate disturbances. Potassium is slightly elevated to the upper range of normal at 5.3. An x-ray was performed of the left foot which demonstrates an area concerning for possible osteomyelitis at the base of the proximal phalanx of the second digit of the left foot, though clinical correlation with an MRI was suggested. I added an ESR and CRP, both of which are elevated. I also collected a wound culture from the wound of the left foot which is pending at this time. The patient's case was discussed with the on-call podiatry resident who came to the emergency department and saw the patient at bedside. They agree with the patient necessitates admission for surgical debridement. He was recommended to get the MRI of the left foot, however with his elevated creatinine, he would not be a candidate for delirium at this time. The patient is demonstrating significant history of CKD, and his current renal dysfunction seems to be most likely dehydration. IV fluids were initiated in the emergency department. We will hold on getting the MRI at the moment until we can get a repeat evaluation of the patient's renal function after fluid resuscitation. Additionally, the patient's urinalysis demonstrates slight proteinuria without ketonuria.   He does demonstrate slight pyuria, though there is significant contamination with squamous epithelial cells as well as renal epithelial cells and just scant bacteriuria. We will send a urine culture. Blood cultures also sent. Podiatry requests vancomycin and cefepime for antibiotic management. We will admit medicine for medical management, patient n.p.o. at midnight and the patient will be reevaluated by podiatry in the morning for likely surgical intervention. I discussed this plan at length the patient (and his family member) who verbalizes understanding and is in agreement. The patient was seen and evaluated by myself and the attending physician, Jimmie Jackson MD who agrees with my assessment, treatment and plan. Clinical Impression     1. Toe osteomyelitis, left ([facility]    2. MO (acute kidney injury) [facility])        Disposition     DISPOSITION Admitted 07/06/2022 12:18:06 AM        Desmond Lawson. CLARENCE Salguero  4:48 AM    Relevant History and Diagnostic Information     Past Medical, Surgical, Family, and Social History     He has a past medical history of [facility] DM (diabetes mellitus) ([facility] Gout, Hypothyroidism, and Seasonal allergic rhinitis. He has a past surgical history that includes Incisional hernia repair; hernia repair (2001); Colonoscopy (2009 appx ); and Colonoscopy (N/A, 6/20/2022). His family history is not on file. He reports that he quit smoking about 63 years ago. He quit after 5.00 years of use. He has never used smokeless tobacco. He reports that he does not drink alcohol and does not use drugs.     Medications     Current Discharge Medication List      CONTINUE these medications which have NOT CHANGED    Details   clopidogrel (PLAVIX) 75 MG tablet Take 75 mg by mouth daily Take one tablet daily      fluticasone (FLONASE) 50 MCG/ACT nasal spray 1 spray by Each Nostril route daily One pray to each nostril daily      lisinopril (PRINIVIL;ZESTRIL) 10 MG tablet Take 10 mg by mouth daily      tamsulosin (FLOMAX) 0.4 MG capsule Take 0.4 mg by mouth daily One tablet nightly      loratadine (CLARITIN) 10 MG tablet Take 10 mg by mouth daily      vitamin B-12 (CYANOCOBALAMIN) 1000 MCG tablet Take 1,000 mcg by mouth daily. aspirin 81 MG tablet Take 81 mg by mouth daily. Allergies     He has No Known Allergies. ED Course     Nursing Notes, Past Medical Hx, Past Surgical Hx, Social Hx,Allergies, and Family Hx were reviewed. Patient was given the following medications:  Orders Placed This Encounter   Medications    lactated ringers bolus    acetaminophen (TYLENOL) tablet 650 mg    lactated ringers bolus    cefepime (MAXIPIME) 2000 mg IVPB minibag     Order Specific Question:   Antimicrobial Indications     Answer:   Skin and Soft Tissue Infection     Order Specific Question:   Antimicrobial Indications     Answer:    Other     Order Specific Question:   Other Abx Indication     Answer:   foot osteo    vancomycin (VANCOCIN) 1,500 mg in dextrose 5 % 250 mL IVPB     Order Specific Question:   Antimicrobial Indications     Answer:   Skin and Soft Tissue Infection     Order Specific Question:   Antimicrobial Indications     Answer:   Bone and Joint Infection    cetirizine (ZYRTEC) tablet 5 mg    tamsulosin (FLOMAX) capsule 0.4 mg    sodium chloride flush 0.9 % injection 5-40 mL    sodium chloride flush 0.9 % injection 5-40 mL    0.9 % sodium chloride infusion    OR Linked Order Group     ondansetron (ZOFRAN-ODT) disintegrating tablet 4 mg     ondansetron (ZOFRAN) injection 4 mg    polyethylene glycol (GLYCOLAX) packet 17 g    OR Linked Order Group     acetaminophen (TYLENOL) tablet 650 mg     acetaminophen (TYLENOL) suppository 650 mg    morphine (PF) injection 2 mg    DISCONTD: cefepime (MAXIPIME) 1000 mg IVPB minibag     Order Specific Question:   Antimicrobial Indications     Answer:   Skin and Soft Tissue Infection     Order Specific Question:   Skin duration of therapy     Answer:   5 days    0.9 % sodium chloride infusion    traMADol (ULTRAM) tablet 50 mg    DISCONTD: LORazepam (ATIVAN) injection 2 mg    LORazepam (ATIVAN) tablet 2 mg    vancomycin (VANCOCIN) intermittent dosing (placeholder)     Order Specific Question:   Antimicrobial Indications     Answer:   Skin and Soft Tissue Infection     Order Specific Question:   Skin duration of therapy     Answer:   5 days       Diagnostic Results     ED BEDSIDE ULTRASOUND:  N/A    RECENT VITALS:  BP: (!) 159/72,Temp: 98.1 °F (36.7 °C), Heart Rate: 86, Resp: 17, SpO2: 100 %     RADIOLOGY:  XR FOOT LEFT (MIN 3 VIEWS)   Final Result      Osteomyelitis is not excluded based on this exam particularly at the base of the proximal phalanx of the second digit. If this is a clinical concern evaluation with MRI should be performed.       VL DUP LOWER EXTREMITY ARTERIES LEFT    (Results Pending)   MRI FOOT LEFT WO CONTRAST    (Results Pending)       LABS:   Results for orders placed or performed during the hospital encounter of 07/05/22   Culture, Anaerobic and Aerobic    Specimen: Wound   Result Value Ref Range    Gram Stain Result       No WBC's seen 2+ Gram positive cocci   2+ Large Gram positive rods  resembling Diphtheroids     Culture, Wound    Specimen: Foot   Result Value Ref Range    Gram Stain Result       2+ WBC's (Polymorphonuclear) 3+ Gram positive cocci  in chains and  pairs- resembling Strep 1+ Small Gram positive rods resembling  Diphtheroids     CBC with Auto Differential   Result Value Ref Range    WBC 10.4 4.0 - 11.0 K/uL    RBC 3.68 (L) 4.20 - 5.90 M/uL    Hemoglobin 12.6 (L) 13.5 - 17.5 g/dL    Hematocrit 37.3 (L) 40.5 - 52.5 %    .4 (H) 80.0 - 100.0 fL    MCH 34.3 (H) 26.0 - 34.0 pg    MCHC 33.8 31.0 - 36.0 g/dL    RDW 12.7 12.4 - 15.4 %    Platelets 700 783 - 800 K/uL    MPV 7.5 5.0 - 10.5 fL    Neutrophils % 81.8 %    Lymphocytes % 9.6 %    Monocytes % 7.5 %    Eosinophils % 0.5 %    Basophils % 0.6 %    Neutrophils Absolute 8.6 (H) 1.7 - 7.7 K/uL    Lymphocytes Absolute 1.0 1.0 - 5.1 K/uL    Monocytes Absolute 0.8 0.0 -

## 2022-07-06 NOTE — CONSULTS
Department of Podiatry Consult Note  Resident       Reason for Consult:  needs wound debridement, possible osteo  Requesting Physician:  ADRYAN Wolf    CHIEF COMPLAINT: Left lower extremity cellulitis with possible osteomyelitis    HISTORY OF PRESENT ILLNESS:    The patient is a 81 y.o. male with significant past medical history as listed below who is consulted to podiatry for left lower extremity cellulitis with possible osteomyelitis. Patient presented to the hospital secondary to worsening infection to his left lower extremity, patient was then admitted for possible osteomyelitis. Of note, patient states that he follows with GABRIELA MendozaPKIMBERLEY. patient states that the wound to the bottom of his foot started as a callus, he states that he was just putting Vaseline and soaking the foot when it was a callus. He states that when he saw Dr. Rahul Casiano last week, he was told to start using Betadine to his left foot. Patient states that he wasn't  wrapping his left foot and was just putting Betadine and a sock over top of it. Patient states that he continued  to walk on his left lower extremity. Patient states that there was mild yellow colored drainage coming from the wound to his left foot. Patient denies any other pedal complaints at this time. Patient denies any nausea, vomiting, fever, shortness of breath, or chest pain. Patient does complain of chills at this time.     Past Medical History:        Diagnosis Date    Diabetic eye exam ([facility] [address] 1/29/14    Dr. Cook Eye DM (diabetes mellitus) ([facility] [address] 1/13/2014    Gout     Hypothyroidism 4/18/2016    Seasonal allergic rhinitis      Past Surgical History:        Procedure Laterality Date    COLONOSCOPY  2009 appx     normal     COLONOSCOPY N/A 6/20/2022    COLONOSCOPY POLYPECTOMY SNARE/COLD BIOPSY performed by Terri Ortiz MD at [address], bilateral    INCISIONAL HERNIA REPAIR      bilateral Allergies:   Patient has no known allergies. Medications:   Home Meds  No current facility-administered medications on file prior to encounter. Current Outpatient Medications on File Prior to Encounter   Medication Sig Dispense Refill    clopidogrel (PLAVIX) 75 MG tablet Take 75 mg by mouth daily Take one tablet daily      fluticasone (FLONASE) 50 MCG/ACT nasal spray 1 spray by Each Nostril route daily One pray to each nostril daily      lisinopril (PRINIVIL;ZESTRIL) 10 MG tablet Take 10 mg by mouth daily (Patient not taking: Reported on 7/5/2022)      tamsulosin (FLOMAX) 0.4 MG capsule Take 0.4 mg by mouth daily One tablet nightly      loratadine (CLARITIN) 10 MG tablet Take 10 mg by mouth daily (Patient not taking: Reported on 7/5/2022)      vitamin B-12 (CYANOCOBALAMIN) 1000 MCG tablet Take 1,000 mcg by mouth daily.  aspirin 81 MG tablet Take 81 mg by mouth daily. Current Meds  lactated ringers bolus, Once  cefepime (MAXIPIME) 2000 mg IVPB minibag, Q12H  vancomycin (VANCOCIN) 1,500 mg in dextrose 5 % 250 mL IVPB, Once        Family History:   History reviewed. No pertinent family history. Social History:   TOBACCO:   reports that he quit smoking about 63 years ago. He quit after 5.00 years of use. He has never used smokeless tobacco.  ETOH:   reports no history of alcohol use. DRUGS:   reports no history of drug use. REVIEW OF SYSTEMS:    A 10 point review of systems was conducted, significant findings as noted in HPI. All other systems negative.       PHYSICAL EXAM:      Vitals:    BP (!) 186/99   Pulse 79   Temp 98 °F (36.7 °C) (Oral)   Resp 16   Ht 6' 2\" (1.88 m)   Wt 169 lb 8.5 oz (76.9 kg)   SpO2 92%   BMI 21.77 kg/m²     LABS:   Recent Labs     07/05/22  1723   WBC 10.4   HGB 12.6*   HCT 37.3*        Recent Labs     07/05/22  1723   *   K 5.3*      CO2 26   BUN 34*   CREATININE 2.3*     No results for input(s): PROT, INR, APTT in the last 72 hours.      GENERAL: Patient is alert and oriented x3. No signs of acute distress noted. LOWER EXTREMITY EXAMINATION:  VASCULAR: DP and PT pulses are non-palpable 0/4 b/l. Upon hand-held Doppler examination DP and PT pulses were noted to be biphasic to the right lower extremity. Upon hand-held Doppler examination DP and PT pulses were noted to be monophasic to the left lower extremity. CFT is brisk to the digits of the right foot. CFT is sluggish to the left foot. Skin temperature is warm to cool from proximal to distal with no focal calor noted. Nonpitting edema noted, left lower extremity. No pain with calf compression b/l. NEUROLOGIC: Gross and epicritic sensation is diminished b/l. Protective sensation is diminished at all pedal sites b/l. DERMATOLOGIC:   Patient provided verbal consent for photos to be obtained today, 07/05/22    Left lower extremity:            Full-thickness ulceration noted to the plantar aspect of the left foot subfourth metatarsal head. Wound measures approximately 1.2 cm x 0.6 cm x 0.4 cm. Wound base is a mixture of fibrotic and granular tissue. The wound does probe to bone. Scant purulent drainage expressed. Slight malodor noted. The wound does track medially about 1 cm. No tunneling, fluctuance, or crepitus noted. Right LE soft tissue envelope is closed without ulceration or acute signs of infection. MUSCULOSKELETAL: Muscle strength is 5/5 for all pedal groups tested. Pain with palpation to the periwound, left foot. Ankle joint ROM is decreased in dorsiflexion with the knee extended.   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       COMPARISON: None.       FINDINGS:       3 views of the right foot show distal vascular calcification.       There is been partial amputation of the second, third, fourth and fifth digits.       No erosive changes at the base of the proximal phalanx of the second digit cannot be excluded.       Degenerative changes are seen at the midfoot and sclerosis and subchondral cyst formation.           Impression       Osteomyelitis is not excluded based on this exam particularly at the base of the proximal phalanx of the second digit. If this is a clinical concern evaluation with MRI should be performed. ASSESSMENT/PLAN:   -Cellulitis, left lower extremity  -Full-thickness ulceration, plantar left foot  -Probable osteomyelitis, left foot  -Type 2 diabetes mellitus with peripheral neuropathy  -History of multiple amputations    -Patient seen and examined at bedside in the ED  -Hypertensive otherwise VSS, no leukocytosis noted (WBC 10.4)  -ESR 53 and CRP 10.4  -HbA1c ordered  -Blood cultures 1 & 2 pending  -XR images reviewed, impression noted above  -Arterial duplex of the left lower extremity ordered  -MRI left foot ordered  -Wound culture obtained; will follow up results  -Patient provided verbal consent to perform sharp excisional debridement at today's encounter. Using a sterile #15 blade, the wound noted to the plantar left foot was excisionally debrided down to and including periosteum/bone. Estimated blood loss less than 1 cc in total. Hemostasis obtained with direct pressure.  -Continue IV antibiotics per primary team  -Left lower extremity dressed with Betadine soaked gauze, DSD, and Ace with gentle compression  -post-op shoe ordered to patient room  -Prevalon boots ordered. Patient is to wear at all times while in bed to prevent further deep tissue injury.  -Nonweightbearing to the left lower extremity  -Weight bearing as tolerated to the right lower extremity in postop shoe or protective shoe gear      DISPO: Full-thickness ulceration with cellulitis and probable osteomyelitis, left foot. XR imaging reviewed. Labs and imaging pending. Continue broad-spectrum IV antibiotics.   Patient will likely undergo podiatric surgical intervention this admission pending labs and imaging results. We will continue to follow patient while in house.     The patient assessment and plan was discussed with Dr. Dewey Carey DPM.    Abbi Omalley DPM   Podiatric Resident PGY2  Pager [phone] or [employer]  7/5/2022, 11:04 PM

## 2022-07-06 NOTE — PROGRESS NOTES
4 Eyes Admission Assessment     I agree as the admission nurse that 2 RN's have performed a thorough Head to Toe Skin Assessment on the patient. ALL assessment sites listed below have been assessed on admission. Areas assessed by both nurses:   [x]   Head, Face, and Ears   [x]   Shoulders, Back, and Chest  [x]   Arms, Elbows, and Hands   [x]   Coccyx, Sacrum, and Ischium  [x]   Legs, Feet, and Heels - LLE cellulitis         Does the Patient have Skin Breakdown?   Yes a wound was noted on the Admission Assessment and an LDA was Initiated documentation include the Francesca-wound, Wound Assessment, Measurements, Dressing Treatment, Drainage, and Color\",         Greyson Prevention initiated:  Yes   Wound Care Orders initiated:  Yes      [address] nurse consulted for Pressure Injury (Stage 3,4, Unstageable, DTI, NWPT, and Complex wounds) or Greyson score 18 or lower:  NA      Nurse 1 eSignature: Electronically signed by Severiano Chihuahua, RN on 7/6/22 at 3:45 AM EDT    SHARE this note so that the co-signing nurse is able to place an eSignature  Nurse 2 eSignature: Electronically signed by Vinayak Moyer RN on 7/6/22 at 4:34 AM EDT

## 2022-07-06 NOTE — PROGRESS NOTES
Pt alert & oriented x4, BP elevated, other VSS, afebrile, on RA. LLE wrapped, dressing cdi, femoral pulses +2 bi-lat. Pt NPO for possible procedure/ surgical intervention. Prevalon off-loading boots placed on patient's feet, post-op shoe at bedside. IV fluids infusing per orders. Call light and bedside table within reach. Bed alarm on, wheels locked, and bed in lowest position.

## 2022-07-06 NOTE — PROGRESS NOTES
Clinical Pharmacy Consult Note       Pharmacy consulted by Dr. Helen Ford in ED to order first dose of vancomycin. Indication :   SSTI/Osteo foot    Patient Data:     Recent Labs     07/05/22  1723   *   K 5.3*      CO2 26   BUN 34*   CREATININE 2.3*       Estimated Creatinine Clearance: 26 mL/min (A) (based on SCr of 2.3 mg/dL (H)). Recent Labs     07/05/22  1723   WBC 10.4   HGB 12.6*   HCT 37.3*   .4*          Height:  @[employer](11:LAST)@  Weight: @[employer](14:LAST)@    Plan: Will order vancomycin 1500 x 1 dose, to be administered in ED. Please note this consult covers ED vancomycin dose only. If admitting provider would like further vancomycin dose management by pharmacy, please place additional consult order. Thank you for the consult. Please call with questions.   Marija Walden, PharmD  Main Pharmacy: [id number]

## 2022-07-06 NOTE — PROGRESS NOTES
Pt has been A&Ox4, Pt has had some elevated BP at beginning of the shift, 190s/90s, RN notified MD who ordered PRN hydralazine which was given, upon recheck it was 170s/70s, around 1140 to 1150 pt had 3 short bursts of SVT, none longer than 7-8 seconds, and pt was back to SR in 80-90s each time. Pt was asymptomatic and did not feel any palpitations but BP had lowered considerably to 100/68 and RN informed MD of SVT and BP. MD said he will see pt shortly. Will continue to monitor.

## 2022-07-06 NOTE — H&P
Hospital Medicine History & Physical      PCP: Corwin Beasley    Date of Admission: 7/5/2022    Date of Service: Pt seen/examined on 7/5/2022 and Admitted to Inpatient with expected LOS greater than two midnights due to medical therapy. Chief Complaint: Worsening left foot infection      History Of Present Illness:      81 y.o. male who presents with complaints of worsening wound infection to his left leg. Patient follows with Dr. Viridiana Alfaro from podiatry. Patient states that the wound started in the bottom of his left foot as a callus. Patient has been putting Vaseline and soaking the foot. When he saw his podiatrist last week he was recommended to start using Betadine to the left foot. Patient has not following the wound care recommendations, has been putting Betadine on the wound and has been wearing a sock over it and continue to walk bearing weight on the left foot. Patient started noticing mild yellow drainage coming from the wound. Patient denies fever, chills, nausea, vomiting, chest pain or shortness of breath    Past Medical History:          Diagnosis Date    Diabetic eye exam ([facility] [address] 1/29/14    Dr. Diallo Hunter DM (diabetes mellitus) ([facility] [address] 1/13/2014    Gout     Hypothyroidism 4/18/2016    Seasonal allergic rhinitis        Past Surgical History:          Procedure Laterality Date    COLONOSCOPY  2009 appx     normal     COLONOSCOPY N/A 6/20/2022    COLONOSCOPY POLYPECTOMY SNARE/COLD BIOPSY performed by Dean Dixon MD at [address], bilateral    INCISIONAL HERNIA REPAIR      bilateral       Medications Prior to Admission:      Prior to Admission medications    Medication Sig Start Date End Date Taking?  Authorizing Provider   clopidogrel (PLAVIX) 75 MG tablet Take 75 mg by mouth daily Take one tablet daily    Corwin Beasley   fluticasone [facility]) 50 MCG/ACT nasal spray 1 spray by Each Nostril route daily One pray to each nostril daily Garo Pope MD   lisinopril (PRINIVIL;ZESTRIL) 10 MG tablet Take 10 mg by mouth daily  Patient not taking: Reported on 7/5/2022    Coopermanuelito Paul   tamsulosin [facility]) 0.4 MG capsule Take 0.4 mg by mouth daily One tablet nightly    Historical Provider, MD   loratadine (CLARITIN) 10 MG tablet Take 10 mg by mouth daily  Patient not taking: Reported on 7/5/2022    Historical Provider, MD   vitamin B-12 (CYANOCOBALAMIN) 1000 MCG tablet Take 1,000 mcg by mouth daily. Historical Provider, MD   aspirin 81 MG tablet Take 81 mg by mouth daily. Historical Provider, MD       Allergies:  Patient has no known allergies. Social History:    TOBACCO:   reports that he quit smoking about 63 years ago. He quit after 5.00 years of use. He has never used smokeless tobacco.  ETOH:   reports no history of alcohol use. E-cigarette/Vaping     Questions Responses    E-cigarette/Vaping Use Never User    Start Date     Passive Exposure     Quit Date     Counseling Given     Comments         Family History:    Reviewed and negative in regards to presenting illness/complaint. History reviewed. No pertinent family history. REVIEW OF SYSTEMS COMPLETED:   Pertinent positives as noted in the HPI. All other systems reviewed and negative. PHYSICAL EXAM PERFORMED:    BP (!) 186/99   Pulse 79   Temp 98 °F (36.7 °C) (Oral)   Resp 16   Ht 6' 2\" (1.88 m)   Wt 169 lb 8.5 oz (76.9 kg)   SpO2 92%   BMI 21.77 kg/m²     General appearance:  No apparent distress, appears stated age and cooperative. HEENT:  Normal cephalic, atraumatic without obvious deformity. Pupils equal, round, and reactive to light. Extra ocular muscles intact. Conjunctivae/corneas clear. Neck: Supple, with full range of motion. No jugular venous distention. Trachea midline. Respiratory:  Normal respiratory effort. Clear to auscultation, bilaterally without Rales/Wheezes/Rhonchi.   Cardiovascular:  Regular rate and rhythm with normal S1/S2 without murmurs, rubs or gallops. Abdomen: Soft, non-tender, non-distended with normal bowel sounds. Musculoskeletal:  No clubbing, cyanosis or edema bilaterally. Full range of motion without deformity. Skin: Skin color, texture, turgor normal.  Full-thickness ulceration-plantar aspect of the left foot, fourth metatarsal head, wound base with fibrotic and granular tissue, wound probed to the bone per podiatry with scant purulent drainage, malodorous. No crepitus or fluctuance  Neurologic:  Neurovascularly intact without any focal sensory/motor deficits. Cranial nerves: II-XII intact, grossly non-focal.  Psychiatric:  Alert and oriented, thought content appropriate, normal insight  Capillary Refill: Brisk,3 seconds, normal  Peripheral Pulses: DP and PT pulses-nonpalpable bilaterally, biphasic with Doppler per podiatry on the right, monophasic on the left. Labs:     Recent Labs     07/05/22  1723   WBC 10.4   HGB 12.6*   HCT 37.3*        Recent Labs     07/05/22  1723   *   K 5.3*      CO2 26   BUN 34*   CREATININE 2.3*   CALCIUM 9.8     No results for input(s): AST, ALT, BILIDIR, BILITOT, ALKPHOS in the last 72 hours. No results for input(s): INR in the last 72 hours. No results for input(s): Abril Fuchs in the last 72 hours.     Urinalysis:      Lab Results   Component Value Date/Time    NITRU Negative 07/05/2022 09:54 PM    WBCUA 6-9 07/05/2022 09:54 PM    BACTERIA 1+ 07/05/2022 09:54 PM    RBCUA 3-4 07/05/2022 09:54 PM    BLOODU TRACE-INTACT 07/05/2022 09:54 PM    SPECGRAV 1.015 07/05/2022 09:54 PM    GLUCOSEU Negative 07/05/2022 09:54 PM    GLUCOSEU Negative 07/05/2011 09:10 PM       Radiology:     CXR: I have reviewed the CXR with the following interpretation: NA  EKG:  I have reviewed the EKG with the following interpretation: NA    XR FOOT LEFT (MIN 3 VIEWS)   Final Result      Osteomyelitis is not excluded based on this exam particularly at the base of the proximal phalanx of the second digit. If this is a clinical concern evaluation with MRI should be performed. VL DUP LOWER EXTREMITY ARTERIES LEFT    (Results Pending)       Consults:    IP CONSULT TO PODIATRY  PHARMACY TO DOSE VANCOMYCIN  IP CONSULT TO HOSPITALIST  IP CONSULT TO PHARMACY    ASSESSMENT:    Active Hospital Problems    Diagnosis Date Noted    Osteomyelitis ([facility] [address] [M86.9] 07/05/2022     Priority: Medium   #Cellulitis left lower extremity with full-thickness ulceration plantar surface with probable osteomyelitis. Multiple amputations. ESR = 53 and CRP = 10  #DM-2  #MO on CKD with mild hyperkalemia, baseline serum creatinine is around 1.6  #History of paroxysmal A. fib  #History of hypothyroidism  #History of peripheral vascular disease    PLAN:  -Patient has had bedside excisional debridement by podiatry service  -Continue on vancomycin and cefepime  -Pharmacy consult for vancomycin dosing  -Pain relief as ordered  -Continue wound care per podiatry recommendations  -Nonweightbearing on the left foot  -Gentle IV hydration  -Monitor renal function and electrolytes  -Follow blood and wound cultures  -MRI left foot  -Arterial Doppler of the left foot  -Possible surgical intervention following MRI and other imaging results  -Supportive therapy    DVT Prophylaxis: Lovenox held given possible surgical intervention  Diet: Diet NPO  Code Status: Full Code    PT/OT Eval Status: As tolerated with no weightbearing on the left foot    Dispo -GMF with telemetry       Jones Aguirre MD    Thank you Vita Velasco for the opportunity to be involved in this patient's care. If you have any questions or concerns please feel free to contact me at [phone].

## 2022-07-06 NOTE — CONSULTS
Clinical Pharmacy Progress Note    Vancomycin - Management by Pharmacy    Consult Date(s):  7/5/22  Consulting Provider(s):  Dr. Reed Plant / Plan:  1)  LLE osteomyelitis with cellulitis & ulcer  - Vancomycin   Concurrent Antimicrobials: Cefepime    Day of Vanc Therapy / Ordered Duration: 2   Current Dosing Method: Intermittent dosing due to MO on CKD  o Therapeutic Goal: Trough ~ 15 mcg/mL  o Pt received 1500mg IV x1 last evening. Random level this AM = 13.8mcg/mL  o Will re-dose with 1000mg IV x1 today. o Will check random level in AM tomorrow.  Will continue to monitor clinical condition and make adjustments to regimen as appropriate. Please call with questions--  Thanks--  Liliana Eng, PharmD, BCPS, BCGP  [id number] ([facility])   7/6/2022 9:06 AM        Interval update:  Podiatry discussing possible need for surgical intervention. Wound cultures in process. Subjective/Objective:   Tisha Serrano is a 81 y.o. y/o male with a PMHx that includes DM, CKD (baseline SCr ~ 1.6), gout, hypothyroidism, and seasonal allergies who is admitted with LLE osteomyelitis with cellulitis and ulcer. Pharmacy is consulted to dose Vancomycin. Ht Readings from Last 1 Encounters:   07/05/22 6' 2\" (1.88 m)     Wt Readings from Last 1 Encounters:   07/05/22 169 lb 8.5 oz (76.9 kg)       Current and Prior Antimicrobials:  Cefepime 2000mg EI q12h (7/5-current)  Vancomycin - Pharmacy to dose   Intermittent dosing (7/5-current)    Vanc Level(s) / Doses:  Date Time Dose Level / Type of Level Interpretation   7/5 22:39 1500mg x1  · Intermittent dosing   7/6 04:34  Random = 13.8 mcg/mL · Intermittent dosing  · Give 1000mg IV x1   Note: Serum levels collected for AUC-based dosing may be high if collected in close proximity to the dose administered. This is not necessarily an indicator of toxicity.     Recent Labs     07/05/22  1723 07/06/22  0434 07/06/22  0435   CREATININE 2.3*  --  2.1*   BUN 34*  -- 35*   WBC 10.4 7.5  --        Estimated Creatinine Clearance: 28 mL/min (A) (based on SCr of 2.1 mg/dL (H)).     Cultures & Sensitivities:  Date Site Micro Susceptibility / Result   7/5 Foot wound In process    7/5 Toe wound In process    7/5 Blood x2 sent

## 2022-07-06 NOTE — PROGRESS NOTES
Hospital Medicine  Progress Note    PCP: Jane Castle    Date of Admission: 7/5/2022      Chief Complaint: Worsening left foot pain and swelling      History Of Present Illness:      81 y.o. male with DM type 2, HTN and hypothyroidism, PAF (off pradaxa) and BPH who presented with worsening wound left foot. The wound started in the bottom of his left foot as a callus. Patient has been putting Vaseline and soaking the foot. When he saw his podiatrist (Dr. Alina Wilhelm ) last week he was recommended to start using Betadine to the left foot. Patient has not following the wound care recommendations, has been putting Betadine on the wound and has been wearing a sock over it and continue to walk bearing weight on the left foot. Patient started noticing mild yellow drainage coming from the wound. Patient denies fever, chills, nausea, vomiting, chest pain or shortness of breath    On presentation,, apart from elevated BP, he had fairly normal vitals. He had no leucocytosis. Hgb was 12 with . Cr was 2.3 (baseline about 1.6). Interim HPI    Today he has been noted with elevated Bps SBP up to 190s    He had a run of SVT    MRI done this AM shows osteo of left foot. Medications: Reviewed        Allergies:  Patient has no known allergies. REVIEW OF SYSTEMS COMPLETED:   Pertinent positives as noted in the HPI. All other systems reviewed and negative. PHYSICAL EXAM PERFORMED:    /68   Pulse 86   Temp 97.7 °F (36.5 °C) (Oral)   Resp 16   Ht 6' 2\" (1.88 m)   Wt 169 lb 8.5 oz (76.9 kg)   SpO2 96%   BMI 21.77 kg/m²     General appearance:  No apparent distress, appears stated age and cooperative. HEENT:  Normal cephalic, atraumatic without obvious deformity. Pupils equal, round, and reactive to light. Extra ocular muscles intact. Conjunctivae/corneas clear. Neck: Supple, with full range of motion. No jugular venous distention. Trachea midline.   Respiratory:  Normal respiratory effort. Clear to auscultation, bilaterally without Rales/Wheezes/Rhonchi. Cardiovascular:  Regular rate and rhythm with normal S1/S2 without murmurs, rubs or gallops. Abdomen: Soft, non-tender, non-distended with normal bowel sounds. Musculoskeletal:  No clubbing, cyanosis or edema bilaterally. Full range of motion without deformity. Skin: Skin color, texture, turgor normal.  Full-thickness ulceration-plantar aspect of the left foot, fourth metatarsal head, wound base with fibrotic and granular tissue, wound probed to the bone per podiatry with scant purulent drainage, malodorous. No crepitus or fluctuance  Neurologic:  Neurovascularly intact without any focal sensory/motor deficits. Cranial nerves: II-XII intact, grossly non-focal.  Psychiatric:  Alert and oriented, thought content appropriate, normal insight  Capillary Refill: Brisk,3 seconds, normal  Peripheral Pulses: DP and PT pulses-nonpalpable bilaterally, biphasic with Doppler per podiatry on the right, monophasic on the left. Labs:     Recent Labs     07/05/22 1723 07/06/22  0434   WBC 10.4 7.5   HGB 12.6* 10.9*   HCT 37.3* 32.8*    166     Recent Labs     07/05/22  1723 07/06/22  0435   * 140   K 5.3* 4.6    109   CO2 26 21   BUN 34* 35*   CREATININE 2.3* 2.1*   CALCIUM 9.8 8.9     No results for input(s): AST, ALT, BILIDIR, BILITOT, ALKPHOS in the last 72 hours. No results for input(s): INR in the last 72 hours. No results for input(s): [place] in the last 72 hours.     Urinalysis:      Lab Results   Component Value Date/Time    NITRU Negative 07/05/2022 09:54 PM    WBCUA 6-9 07/05/2022 09:54 PM    BACTERIA 1+ 07/05/2022 09:54 PM    RBCUA 3-4 07/05/2022 09:54 PM    BLOODU TRACE-INTACT 07/05/2022 09:54 PM    SPECGRAV 1.015 07/05/2022 09:54 PM    GLUCOSEU Negative 07/05/2022 09:54 PM    GLUCOSEU Negative 07/05/2011 09:10 PM       Radiology:     CXR: I have reviewed the CXR with the following interpretation: NA  EKG:  I have reviewed the EKG with the following interpretation: NA    MRI FOOT LEFT WO CONTRAST   Final Result   1. Limited exam secondary to motion artifact. 2. There is marrow edema identified within the fourth metatarsal head and proximal fourth phalanx. In the setting of infection or open wound in this region, this is compatible with osteomyelitis. 3. Status post amputations of the second, third, and fifth metatarsals as detailed above. 4. Healed fracture deformities identified involving the proximal first phalanx. There is osteoarthrosis identified at the first metatarsophalangeal joint. 5. There is subcutaneous edema identified about the foot. This can be seen with cellulitis and/or venous insufficiency. No soft tissue abscess is identified. 6. There is severe fatty atrophy involving the deep muscular foot. Correlate for underlying neuropathy. XR FOOT LEFT (MIN 3 VIEWS)   Final Result      Osteomyelitis is not excluded based on this exam particularly at the base of the proximal phalanx of the second digit. If this is a clinical concern evaluation with MRI should be performed. VL DUP LOWER EXTREMITY ARTERIES LEFT    (Results Pending)       Consults:    IP CONSULT TO PODIATRY  PHARMACY TO DOSE VANCOMYCIN  IP CONSULT TO HOSPITALIST  IP CONSULT TO PHARMACY        ASSESSMENT/PLAN:  81 y.o. male with DM type 2, HTN and hypothyroidism, PAF (off pradaxa) and BPH who presented with worsening wound left foot. Cellulitis left lower extremity with full-thickness ulceration plantar surface with osteomyelitis. POA  - Possible diabetic ulcer. H/e, appears A1C is not elevated and Home med list does not have DM meds: verify home med list  - R/o sec to severe PAD   - Hx of Multiple amputations. - = 53 and CRP = 10  - MRI shows osteo  - Cx sent: follow results  - Started on  broad-spectrum IV antibiotics.     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 Hakeem Funez: consult if renal fxn worsens      Paroxysmal A. Fib: POA  - Not on rate control meds. - Appears was prev on Pradaxa. Appears was taken off after he wore a monitor and as \"couldn't afford it\" in 2014 . - Episodes of SVT this admission  - Start metoprolol  - Update TSH, Mg  - Keep on telemetry       HTN: uncontrolled: POA  - Start metoprolol  - Hold lisonopril for now  - PRN hydralazine      Hypothyroidism: POA  - Update TSH    Macrocytic anemia- POA  - Update B12, Folate      BPH: POA  - On  Flomax  - Monitor for retention: Bladder scans        DVT Prophylaxis: Lovenox     Diet: ADULT DIET;  Regular; 4 carb choices (60 gm/meal)  Code Status: Full Code    PT/OT Eval Status: As tolerated with no weightbearing on the left foot      Disposition -[facility] with telemetry  Planned surgery: timing per Patricia Quigley MD

## 2022-07-06 NOTE — PROGRESS NOTES
Podiatric Surgery Daily Progress Note      Admit Date: 7/5/2022      Code:Full Code    Patient seen and examined, labs and records reviewed    Subjective:     Patient seen at bedside this AM.  Patient denies any overnight acute event. Patient denies f/c/n/v/sob/cp. Review of Systems: A 12 point review of symptoms is unremarkable with the exception of the chief complaint. Patient specifically denies nausea, fever, vomiting, chills, shortness of breath, chest pain, abdominal pain, constipation or difficulty urinating. Objective     BP (!) 191/85   Pulse 69   Temp 97.3 °F (36.3 °C) (Oral)   Resp 16   Ht 6' 2\" (1.88 m)   Wt 169 lb 8.5 oz (76.9 kg)   SpO2 99%   BMI 21.77 kg/m²      I/O:    Intake/Output Summary (Last 24 hours) at 7/6/2022 0833  Last data filed at 7/6/2022 0757  Gross per 24 hour   Intake 1299.69 ml   Output 1225 ml   Net 74.69 ml              Wt Readings from Last 3 Encounters:   07/05/22 169 lb 8.5 oz (76.9 kg)   07/05/22 178 lb 3.2 oz (80.8 kg)   06/20/22 177 lb (80.3 kg)       LABS:    Recent Labs     07/05/22  1723 07/06/22  0434   WBC 10.4 7.5   HGB 12.6* 10.9*   HCT 37.3* 32.8*    166        Recent Labs     07/06/22  0435      K 4.6      CO2 21   BUN 35*   CREATININE 2.1*      No results for input(s): PROT, INR, APTT in the last 72 hours. LOWER EXTREMITY EXAMINATION    Dressing to left LE intact. No strikethrough noted to the external dressing. Mild serosanguineous drainage noted to the internal layers of the dressing. VASCULAR: DP and PT pulses are non-palpable 0/4 b/l. Upon hand-held Doppler examination DP and PT pulses were noted to be biphasic to the right lower extremity. Upon hand-held Doppler examination DP and PT pulses were noted to be monophasic to the left lower extremity. CFT is brisk to the digits of the right foot. CFT is sluggish to the left foot. Skin temperature is warm to cool from proximal to distal with no focal calor noted. Nonpitting edema noted, left lower extremity-noted to be improving secondary to compressive therapy. No pain with calf compression b/l.     NEUROLOGIC: Gross and epicritic sensation is diminished b/l. Protective sensation is diminished at all pedal sites b/l.     DERMATOLOGIC:   Patient provided verbal consent for photos to be obtained today:     Left lower extremity:           Full-thickness ulceration noted to the plantar aspect of the left foot subfourth metatarsal head. Wound measures approximately 1.2 cm x 0.6 cm x 0.4 cm. Wound base is a mixture of fibrotic and granular tissue. The wound does probe to bone. No purulent drainage expressed. Slight malodor noted. The wound does track medially about 1 cm. No tunneling, fluctuance, or crepitus noted.     Right LE soft tissue envelope is closed without ulceration or acute signs of infection.     MUSCULOSKELETAL: Muscle strength is 5/5 for all pedal groups tested. Pain with palpation to the periwound, left foot. Ankle joint ROM is decreased in dorsiflexion with the knee extended. 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       COMPARISON: None.       FINDINGS:       3 views of the right foot show distal vascular calcification.       There is been partial amputation of the second, third, fourth and fifth digits.       No erosive changes at the base of the proximal phalanx of the second digit cannot be excluded.       Degenerative changes are seen at the midfoot and sclerosis and subchondral cyst formation.           Impression       Osteomyelitis is not excluded based on this exam particularly at the base of the proximal phalanx of the second digit. If this is a clinical concern evaluation with MRI should be performed.      MRI Left Foot (7/6/2022)  Narrative   MRI OF THE LEFT FOOT       INDICATION: Osteomyelitis.     TECHNIQUE: Multiplanar and multisequence MRI images of the left foot were obtained.       FINDINGS: Images are degraded by motion artifact. The patient is status post resection of the second, and third metatarsal heads. Patient is status post resection of the fifth metatarsal tarsal head and distal shaft as well as the proximal fifth phalanx.       There is abnormal marrow edema identified within the fourth metatarsal head as well as the proximal fourth phalanx.       There is deformity of the proximal first phalanx which may reflect the sequelae of a remote fracture or prior surgery. There is moderate osteoarthrosis identified at the first metatarsophalangeal joint.       There is some mild osteoarthrosis identified at the third, fourth and fifth tarsometatarsal joints. No osseous erosion is identified.       There is subcutaneous edema identified about the foot. There is severe atrophy involving the deep musculature of the foot. Correlate for chronic denervation injury. No soft tissue abscess is identified.           Impression   1. Limited exam secondary to motion artifact. 2. There is marrow edema identified within the fourth metatarsal head and proximal fourth phalanx. In the setting of infection or open wound in this region, this is compatible with osteomyelitis. 3. Status post amputations of the second, third, and fifth metatarsals as detailed above. 4. Healed fracture deformities identified involving the proximal first phalanx. There is osteoarthrosis identified at the first metatarsophalangeal joint. 5. There is subcutaneous edema identified about the foot. This can be seen with cellulitis and/or venous insufficiency. No soft tissue abscess is identified. 6. There is severe fatty atrophy involving the deep muscular foot. Correlate for underlying neuropathy.              ASSESSMENT/PLAN  -Cellulitis, left lower extremity  -Osteomyelitis, left foot  -Full-thickness ulceration, plantar left foot-[place] stage III  -Type 2 diabetes mellitus with peripheral neuropathy  -History of multiple amputations    -Patient seen and examined at bedside this AM  -Hypertensive otherwise VSS, No leukocytosis noted (WBC 7.5)  -White blood cell count noted to be downtrending  -ESR 53 and CRP 10.4  -HbA1c ordered  -Blood cultures 1 & 2 pending  -Imaging reviewed, impression noted above  -Arterial duplex of the left lower extremity ordered  -Wound culture: 2+ WBCs, 3+ gram-positive cocci in chains and pairs, 1+ small gram-positive rods  -Continue IV antibiotics per primary team  -Left lower extremity dressed with Betadine soaked gauze, DSD, and Ace with gentle compression  -post-op shoe ordered to patient room  -Prevalon boots  reapplied. Patient is to wear at all times while in bed to prevent further deep tissue injury.  -Nonweightbearing to the left lower extremity  -Weight bearing as tolerated to the right lower extremity in postop shoe or protective shoe gear  -Lengthy discussion was had with patient at bedside regarding MRI results consisting of conservative versus surgical management of osteomyelitis. At this time patient is unsure of whether to continue with conservative or surgical management. If patient to continue with surgical management arterial duplex will be needed to assess vascularity of the left lower extremity to be able to heal the surgical incision.  -No surgical intervention planned for today. Patient is okay to have diet. DISPO: Full-thickness ulceration with cellulitis and osteomyelitis, left foot. XR and MRI imaging reviewed. Labs and arterial duplex pending. Continue broad-spectrum IV antibiotics. Patient will likely undergo podiatric surgical intervention this admission pending all labs and imaging results. We will continue to follow patient while in house.     Patient assessment and plan was discussed with Dr. Diallo Juárez DPM.    [place] DPM   Podiatric Resident PGY2  Pager [phone] or [place]  7/6/2022, 8:33 AM

## 2022-07-06 NOTE — CARE COORDINATION
Cm following, spoke with pt, from home fiorella has RW and Cane, no serviced currently. Pod following, poss surgery vs IV ABX. Pending PT OT evals for DC recs. ID consulted for ABX recs.    Referral sent to SNF per pt request.  Electronically signed by Katrina Rm RN on 7/6/2022 at 3:26 PM   [phone]

## 2022-07-07 ENCOUNTER — APPOINTMENT (OUTPATIENT)
Dept: VASCULAR LAB | Age: 87
DRG: 617 | End: 2022-07-07
Payer: MEDICARE

## 2022-07-07 PROBLEM — S91.302A OPEN WOUND OF LEFT FOOT: Status: ACTIVE | Noted: 2022-07-07

## 2022-07-07 PROBLEM — A49.02 MRSA INFECTION: Status: ACTIVE | Noted: 2022-07-07

## 2022-07-07 PROBLEM — M86.472 CHRONIC OSTEOMYELITIS OF LEFT FOOT WITH DRAINING SINUS (HCC): Status: ACTIVE | Noted: 2022-07-07

## 2022-07-07 PROBLEM — M86.471 CHRONIC OSTEOMYELITIS OF RIGHT FOOT WITH DRAINING SINUS (HCC): Status: ACTIVE | Noted: 2022-07-07

## 2022-07-07 LAB
ANION GAP SERPL CALCULATED.3IONS-SCNC: 9 MMOL/L (ref 3–16)
BASOPHILS ABSOLUTE: 0 K/UL (ref 0–0.2)
BASOPHILS RELATIVE PERCENT: 0.4 %
BUN BLDV-MCNC: 33 MG/DL (ref 7–20)
CALCIUM SERPL-MCNC: 8.7 MG/DL (ref 8.3–10.6)
CHLORIDE BLD-SCNC: 109 MMOL/L (ref 99–110)
CO2: 23 MMOL/L (ref 21–32)
CREAT SERPL-MCNC: 1.9 MG/DL (ref 0.8–1.3)
EOSINOPHILS ABSOLUTE: 0.3 K/UL (ref 0–0.6)
EOSINOPHILS RELATIVE PERCENT: 3.4 %
GFR AFRICAN AMERICAN: 41
GFR NON-AFRICAN AMERICAN: 34
GLUCOSE BLD-MCNC: 111 MG/DL (ref 70–99)
GLUCOSE BLD-MCNC: 59 MG/DL (ref 70–99)
GLUCOSE BLD-MCNC: 62 MG/DL (ref 70–99)
GLUCOSE BLD-MCNC: 66 MG/DL (ref 70–99)
GLUCOSE BLD-MCNC: 75 MG/DL (ref 70–99)
GLUCOSE BLD-MCNC: 81 MG/DL (ref 70–99)
GLUCOSE BLD-MCNC: 82 MG/DL (ref 70–99)
GLUCOSE BLD-MCNC: 88 MG/DL (ref 70–99)
HCT VFR BLD CALC: 35.3 % (ref 40.5–52.5)
HEMOGLOBIN: 12 G/DL (ref 13.5–17.5)
LYMPHOCYTES ABSOLUTE: 1.2 K/UL (ref 1–5.1)
LYMPHOCYTES RELATIVE PERCENT: 15.5 %
MCH RBC QN AUTO: 34.2 PG (ref 26–34)
MCHC RBC AUTO-ENTMCNC: 34 G/DL (ref 31–36)
MCV RBC AUTO: 100.5 FL (ref 80–100)
MONOCYTES ABSOLUTE: 0.8 K/UL (ref 0–1.3)
MONOCYTES RELATIVE PERCENT: 9.9 %
NEUTROPHILS ABSOLUTE: 5.4 K/UL (ref 1.7–7.7)
NEUTROPHILS RELATIVE PERCENT: 70.8 %
PDW BLD-RTO: 12.8 % (ref 12.4–15.4)
PERFORMED ON: ABNORMAL
PERFORMED ON: NORMAL
PLATELET # BLD: 162 K/UL (ref 135–450)
PMV BLD AUTO: 7.9 FL (ref 5–10.5)
POTASSIUM REFLEX MAGNESIUM: 5 MMOL/L (ref 3.5–5.1)
RBC # BLD: 3.51 M/UL (ref 4.2–5.9)
SODIUM BLD-SCNC: 141 MMOL/L (ref 136–145)
TSH REFLEX: 2.35 UIU/ML (ref 0.27–4.2)
VANCOMYCIN RANDOM: 14.6 UG/ML
WBC # BLD: 7.7 K/UL (ref 4–11)

## 2022-07-07 PROCEDURE — 80048 BASIC METABOLIC PNL TOTAL CA: CPT

## 2022-07-07 PROCEDURE — 1200000000 HC SEMI PRIVATE

## 2022-07-07 PROCEDURE — 36415 COLL VENOUS BLD VENIPUNCTURE: CPT

## 2022-07-07 PROCEDURE — 82746 ASSAY OF FOLIC ACID SERUM: CPT

## 2022-07-07 PROCEDURE — 6360000002 HC RX W HCPCS: Performed by: INTERNAL MEDICINE

## 2022-07-07 PROCEDURE — 85025 COMPLETE CBC W/AUTO DIFF WBC: CPT

## 2022-07-07 PROCEDURE — 6370000000 HC RX 637 (ALT 250 FOR IP): Performed by: INTERNAL MEDICINE

## 2022-07-07 PROCEDURE — 80202 ASSAY OF VANCOMYCIN: CPT

## 2022-07-07 PROCEDURE — 2580000003 HC RX 258: Performed by: INTERNAL MEDICINE

## 2022-07-07 PROCEDURE — 99223 1ST HOSP IP/OBS HIGH 75: CPT | Performed by: INTERNAL MEDICINE

## 2022-07-07 PROCEDURE — 93926 LOWER EXTREMITY STUDY: CPT

## 2022-07-07 PROCEDURE — 82607 VITAMIN B-12: CPT

## 2022-07-07 RX ORDER — TERBINAFINE HYDROCHLORIDE 250 MG/1
250 TABLET ORAL DAILY
COMMUNITY

## 2022-07-07 RX ORDER — CARVEDILOL 6.25 MG/1
6.25 TABLET ORAL 2 TIMES DAILY WITH MEALS
Status: DISCONTINUED | OUTPATIENT
Start: 2022-07-07 | End: 2022-07-14

## 2022-07-07 RX ORDER — ACETAMINOPHEN 325 MG/1
650 TABLET ORAL EVERY 6 HOURS PRN
COMMUNITY

## 2022-07-07 RX ORDER — M-VIT,TX,IRON,MINS/CALC/FOLIC 27MG-0.4MG
1 TABLET ORAL DAILY
COMMUNITY

## 2022-07-07 RX ORDER — FEXOFENADINE HCL 180 MG/1
180 TABLET ORAL DAILY PRN
COMMUNITY

## 2022-07-07 RX ORDER — POLYETHYLENE GLYCOL 3350 17 G/17G
17 POWDER, FOR SOLUTION ORAL DAILY PRN
COMMUNITY

## 2022-07-07 RX ORDER — KETOCONAZOLE 20 MG/G
CREAM TOPICAL DAILY
COMMUNITY

## 2022-07-07 RX ORDER — HYDRALAZINE HYDROCHLORIDE 25 MG/1
25 TABLET, FILM COATED ORAL EVERY 8 HOURS SCHEDULED
Status: DISCONTINUED | OUTPATIENT
Start: 2022-07-07 | End: 2022-07-14

## 2022-07-07 RX ORDER — VALACYCLOVIR HYDROCHLORIDE 500 MG/1
500 TABLET, FILM COATED ORAL 2 TIMES DAILY PRN
COMMUNITY

## 2022-07-07 RX ADMIN — CEFEPIME HYDROCHLORIDE 2000 MG: 2 INJECTION, POWDER, FOR SOLUTION INTRAVENOUS at 23:00

## 2022-07-07 RX ADMIN — CEFEPIME HYDROCHLORIDE 2000 MG: 2 INJECTION, POWDER, FOR SOLUTION INTRAVENOUS at 08:29

## 2022-07-07 RX ADMIN — CETIRIZINE HYDROCHLORIDE 5 MG: 10 TABLET, FILM COATED ORAL at 08:29

## 2022-07-07 RX ADMIN — HYDRALAZINE HYDROCHLORIDE 10 MG: 20 INJECTION INTRAMUSCULAR; INTRAVENOUS at 16:13

## 2022-07-07 RX ADMIN — CARVEDILOL 6.25 MG: 6.25 TABLET, FILM COATED ORAL at 18:18

## 2022-07-07 RX ADMIN — SODIUM CHLORIDE, PRESERVATIVE FREE 10 ML: 5 INJECTION INTRAVENOUS at 20:43

## 2022-07-07 RX ADMIN — HYDRALAZINE HYDROCHLORIDE 25 MG: 25 TABLET, FILM COATED ORAL at 18:18

## 2022-07-07 RX ADMIN — HYDRALAZINE HYDROCHLORIDE 25 MG: 25 TABLET, FILM COATED ORAL at 23:03

## 2022-07-07 RX ADMIN — SODIUM CHLORIDE, PRESERVATIVE FREE 10 ML: 5 INJECTION INTRAVENOUS at 08:31

## 2022-07-07 RX ADMIN — TAMSULOSIN HYDROCHLORIDE 0.4 MG: 0.4 CAPSULE ORAL at 20:42

## 2022-07-07 RX ADMIN — VANCOMYCIN HYDROCHLORIDE 750 MG: 10 INJECTION, POWDER, LYOPHILIZED, FOR SOLUTION INTRAVENOUS at 10:15

## 2022-07-07 RX ADMIN — GABAPENTIN 300 MG: 300 CAPSULE ORAL at 20:42

## 2022-07-07 ASSESSMENT — PAIN SCALES - GENERAL: PAINLEVEL_OUTOF10: 0

## 2022-07-07 NOTE — PROGRESS NOTES
Pt alert & oriented x4, BP elevated throughout night, Pt give prn hydralazine, most recent /81, on call hospitalist aware. Pt HR in 50s while sleeping, Pt easily arousable, asymptomatic. LLE wrapped, dressing cdi, no strike through. Post-op surgical shoe at bedside. Bed alarm on, wheels locked, and bed in lowest position. Call light and bedside table within reach.

## 2022-07-07 NOTE — PROGRESS NOTES
Clinical Pharmacy Progress Note  Medication History      List of of current medications patient is taking is complete. Home Medication list in EPIC updated to reflect changes noted below. Source of information:  Pharmacy dispenses, PCP office notes     Changes made to home medication list:   Medications removed (no longer taking):  · Loratadine     Medications added:   · Diclofenac gel prn  · Terbinafine - prescribed 6/10/22 for nail fungus  · Ketoconazole cream  · Valacyclovir  · Fexofenadine  · Acetaminophen prn  · Vit D  · MVI  · Miralax prn     Medication doses / instructions adjusted:   · None    Other notes:  · Pt is not on any DM medications. Last A1c per [facility] = 5.9% on 5/4/21. PCP notes do not list h/o DM. Please call with questions--  Thanks--  Jean Marie Zhong, PharmD, BCPS, BCGP  [id number] ([facility])   7/7/2022 8:48 AM      Current Outpatient Medications   Medication Instructions    acetaminophen (TYLENOL) 650 mg, Oral, EVERY 6 HOURS PRN    aspirin 81 mg, DAILY    clopidogrel (PLAVIX) 75 mg, Oral, DAILY, Take one tablet daily     diclofenac sodium (VOLTAREN) 2 g, Topical, 4 TIMES DAILY PRN    fexofenadine (ALLEGRA) 180 mg, Oral, DAILY PRN    fluticasone (FLONASE) 50 MCG/ACT nasal spray 1 spray, Each Nostril, DAILY PRN    ketoconazole (NIZORAL) 2 % cream Topical, DAILY, Apply topically daily.      lisinopril (PRINIVIL;ZESTRIL) 10 mg, Oral, DAILY    Multiple Vitamins-Minerals (THERAPEUTIC MULTIVITAMIN-MINERALS) tablet 1 tablet, Oral, DAILY    polyethylene glycol (MIRALAX) 17 g, Oral, DAILY PRN    tamsulosin (FLOMAX) 0.4 mg, Oral, DAILY, One tablet nightly     terbinafine (LAMISIL) 250 mg, Oral, DAILY    valACYclovir (VALTREX) 500 mg, Oral, 2 TIMES DAILY PRN    vitamin B-12 (CYANOCOBALAMIN) 1,000 mcg, DAILY    vitamin D (CHOLECALCIFEROL) 1,000 Units, Oral, DAILY

## 2022-07-07 NOTE — CARE COORDINATION
CM following, pt leaning toward surgical intervention, Pod getting vascular studies prior to OR. Pod surgical intervention TBD, will need PT OT evals post-op for DC recs. SNF referrals have been sent and they are following waiting for ABX final recs, PT OT evals and surgical plan.   Electronically signed by Gali Jade RN on 7/7/2022 at 4:41 PM   [phone]

## 2022-07-07 NOTE — PROGRESS NOTES
Podiatric Surgery Daily Progress Note      Admit Date: 7/5/2022      Code:Full Code    Patient seen and examined, labs and records reviewed    Subjective:     Patient seen at bedside this AM.  Patient denies any overnight acute event. Patient denies f/c/n/v/sob/cp. Review of Systems: A 12 point review of symptoms is unremarkable with the exception of the chief complaint. Patient specifically denies nausea, fever, vomiting, chills, shortness of breath, chest pain, abdominal pain, constipation or difficulty urinating. Objective     BP (!) 161/72   Pulse 53   Temp 97.9 °F (36.6 °C) (Oral)   Resp 18   Ht 6' 2\" (1.88 m)   Wt 169 lb 8.5 oz (76.9 kg)   SpO2 94%   BMI 21.77 kg/m²      I/O:    Intake/Output Summary (Last 24 hours) at 7/7/2022 0909  Last data filed at 7/7/2022 0815  Gross per 24 hour   Intake 1457.53 ml   Output 2150 ml   Net -692.47 ml              Wt Readings from Last 3 Encounters:   07/05/22 169 lb 8.5 oz (76.9 kg)   07/05/22 178 lb 3.2 oz (80.8 kg)   06/20/22 177 lb (80.3 kg)       LABS:    Recent Labs     07/06/22  0434 07/07/22  0458   WBC 7.5 7.7   HGB 10.9* 12.0*   HCT 32.8* 35.3*    162        Recent Labs     07/07/22  0458      K 5.0      CO2 23   BUN 33*   CREATININE 1.9*      No results for input(s): PROT, INR, APTT in the last 72 hours. LOWER EXTREMITY EXAMINATION    Dressing to left LE intact. No strikethrough noted to the external dressing. Mild serosanguineous drainage noted to the internal layers of the dressing. VASCULAR: DP and PT pulses are non-palpable 0/4 b/l. Upon hand-held Doppler examination DP and PT pulses were noted to be biphasic to the right lower extremity. Upon hand-held Doppler examination DP and PT pulses were noted to be monophasic to the left lower extremity. CFT is brisk to the digits of the right foot. CFT is sluggish to the left foot. Skin temperature is warm to cool from proximal to distal with no focal calor noted. Nonpitting edema noted, left lower extremity-noted to be improving secondary to compressive therapy. No pain with calf compression b/l.     NEUROLOGIC: Gross and epicritic sensation is diminished b/l. Protective sensation is diminished at all pedal sites b/l.     DERMATOLOGIC:   Patient provided verbal consent for photos to be obtained today:     Left lower extremity:           Full-thickness ulceration noted to the plantar aspect of the left foot subfourth metatarsal head. Wound measures approximately 1.2 cm x 0.6 cm x 0.4 cm. Wound base is a mixture of fibrotic and granular tissue. The wound does probe to bone. No purulent drainage expressed. Slight malodor noted. The wound does track medially about 1 cm. No tunneling, fluctuance, or crepitus noted.     Right LE soft tissue envelope is closed without ulceration or acute signs of infection.     MUSCULOSKELETAL: Muscle strength is 5/5 for all pedal groups tested. Pain with palpation to the periwound, left foot. Ankle joint ROM is decreased in dorsiflexion with the knee extended. 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       COMPARISON: None.       FINDINGS:       3 views of the right foot show distal vascular calcification.       There is been partial amputation of the second, third, fourth and fifth digits.       No erosive changes at the base of the proximal phalanx of the second digit cannot be excluded.       Degenerative changes are seen at the midfoot and sclerosis and subchondral cyst formation.           Impression       Osteomyelitis is not excluded based on this exam particularly at the base of the proximal phalanx of the second digit. If this is a clinical concern evaluation with MRI should be performed.      MRI Left Foot (7/6/2022)  Narrative   MRI OF THE LEFT FOOT       INDICATION: Osteomyelitis.     TECHNIQUE: Multiplanar and multisequence MRI images of the left foot were obtained.       FINDINGS: Images are degraded by motion artifact. The patient is status post resection of the second, and third metatarsal heads. Patient is status post resection of the fifth metatarsal tarsal head and distal shaft as well as the proximal fifth phalanx.       There is abnormal marrow edema identified within the fourth metatarsal head as well as the proximal fourth phalanx.       There is deformity of the proximal first phalanx which may reflect the sequelae of a remote fracture or prior surgery. There is moderate osteoarthrosis identified at the first metatarsophalangeal joint.       There is some mild osteoarthrosis identified at the third, fourth and fifth tarsometatarsal joints. No osseous erosion is identified.       There is subcutaneous edema identified about the foot. There is severe atrophy involving the deep musculature of the foot. Correlate for chronic denervation injury. No soft tissue abscess is identified.           Impression   1. Limited exam secondary to motion artifact. 2. There is marrow edema identified within the fourth metatarsal head and proximal fourth phalanx. In the setting of infection or open wound in this region, this is compatible with osteomyelitis. 3. Status post amputations of the second, third, and fifth metatarsals as detailed above. 4. Healed fracture deformities identified involving the proximal first phalanx. There is osteoarthrosis identified at the first metatarsophalangeal joint. 5. There is subcutaneous edema identified about the foot. This can be seen with cellulitis and/or venous insufficiency. No soft tissue abscess is identified. 6. There is severe fatty atrophy involving the deep muscular foot. Correlate for underlying neuropathy.            ASSESSMENT/PLAN  -Cellulitis, left lower extremity  -Osteomyelitis, left foot  -Full-thickness ulceration, plantar left foot-Huston stage III  -Type 2 diabetes mellitus with peripheral neuropathy  -History of multiple amputations    -Patient seen and examined at bedside this AM  -Hypertensive otherwise VSS, No leukocytosis noted (WBC 7.7)  -ESR 53 and CRP 10.4  -HbA1c 5.9  -Blood cultures 1 & 2  NGTD  -Imaging reviewed, impression noted above  -Arterial duplex of the left lower extremity ordered  -Wound culture:MRSA  -Continue IV antibiotics per primary team  -Left lower extremity dressed with Betadine soaked gauze, DSD, and Ace with gentle compression  -post-op shoe ordered to patient room  -Prevalon boots  reapplied. Patient is to wear at all times while in bed to prevent further deep tissue injury.  -Nonweightbearing to the left lower extremity  -ID consulted; appreciate recommendations  -Weight bearing as tolerated to the right lower extremity in postop shoe or protective shoe gear  -Lengthy discussion was had with patient at bedside regarding MRI results consisting of conservative versus surgical management of osteomyelitis. Patient is leaning towards surgical intervention. At this time we will wait for  vascular studies to be done to assess blood flow to patient's left lower extremity. Further intervention will be decided after vascular studies are done. DISPO: Full-thickness ulceration with cellulitis and osteomyelitis, left foot. XR and MRI imaging reviewed. Arterial duplex pending. Continue broad-spectrum IV antibiotics. ID consulted; appreciate recommendations. Patient will likely undergo podiatric surgical intervention this admission pending vascular studies. We will continue to follow patient while in house.     Patient assessment and plan was discussed with Dr. Rina Myles DPM.    Talia Cartagena DPM   Podiatric Resident PGY2  Pager [phone] or [employer]  7/7/2022, 9:09 AM

## 2022-07-07 NOTE — PROGRESS NOTES
Hospital Medicine  Progress Note    PCP: Corwin Beasley    Date of Admission: 7/5/2022      Chief Complaint: Worsening left foot pain and swelling      History Of Present Illness:      81 y.o. male with DM type 2, HTN and hypothyroidism, PAF (off pradaxa) and BPH who presented with worsening wound left foot. The wound started in the bottom of his left foot as a callus. Patient has been putting Vaseline and soaking the foot. When he saw his podiatrist (Dr. Viridiana Alfaro ) last week he was recommended to start using Betadine to the left foot. Patient has not following the wound care recommendations, has been putting Betadine on the wound and has been wearing a sock over it and continue to walk bearing weight on the left foot. Patient started noticing mild yellow drainage coming from the wound. Patient denies fever, chills, nausea, vomiting, chest pain or shortness of breath    On presentation,, apart from elevated BP, he had fairly normal vitals. He had no leucocytosis. Hgb was 12 with . Cr was 2.3 (baseline about 1.6). Interim HPI  No new complaints  Sitting up in bed  Denies chest pain    Noted with some mild winston on telemetry    MRI shows osteo of left foot. Awaiting vascular studies      Medications: Reviewed        Allergies:  Patient has no known allergies. REVIEW OF SYSTEMS COMPLETED:   Pertinent positives as noted in the HPI. All other systems reviewed and negative. PHYSICAL EXAM PERFORMED:    BP (!) 197/84   Pulse 58   Temp 97.4 °F (36.3 °C) (Oral)   Resp 18   Ht 6' 2\" (1.88 m)   Wt 169 lb 8.5 oz (76.9 kg)   SpO2 100%   BMI 21.77 kg/m²     General appearance:  No apparent distress, appears stated age and cooperative. HEENT:  Normal cephalic, atraumatic without obvious deformity. Pupils equal, round, and reactive to light. Extra ocular muscles intact. Conjunctivae/corneas clear. Neck: Supple, with full range of motion. No jugular venous distention.  Trachea midline. Respiratory:  Normal respiratory effort. Clear to auscultation, bilaterally without Rales/Wheezes/Rhonchi. Cardiovascular:  Regular rate and rhythm with normal S1/S2 without murmurs, rubs or gallops. Abdomen: Soft, non-tender, non-distended with normal bowel sounds. Musculoskeletal:  No clubbing, cyanosis or edema bilaterally. Full range of motion without deformity. Skin: Skin color, texture, turgor normal.  Full-thickness ulceration-plantar aspect of the left foot, fourth metatarsal head, wound base with fibrotic and granular tissue, wound probed to the bone per podiatry with scant purulent drainage, malodorous. No crepitus or fluctuance  Neurologic:  Neurovascularly intact without any focal sensory/motor deficits. Cranial nerves: II-XII intact, grossly non-focal.  Psychiatric:  Alert and oriented, thought content appropriate, normal insight  Capillary Refill: Brisk,3 seconds, normal  Peripheral Pulses: DP and PT pulses-nonpalpable bilaterally, biphasic with Doppler per podiatry on the right, monophasic on the left. Labs:     Recent Labs     07/05/22  1723 07/06/22  0434 07/07/22  0458   WBC 10.4 7.5 7.7   HGB 12.6* 10.9* 12.0*   HCT 37.3* 32.8* 35.3*    166 162     Recent Labs     07/05/22  1723 07/06/22  0435 07/07/22  0458   * 140 141   K 5.3* 4.6 5.0    109 109   CO2 26 21 23   BUN 34* 35* 33*   CREATININE 2.3* 2.1* 1.9*   CALCIUM 9.8 8.9 8.7     No results for input(s): AST, ALT, BILIDIR, BILITOT, ALKPHOS in the last 72 hours. No results for input(s): INR in the last 72 hours. No results for input(s): Kelsey Highman in the last 72 hours.     Urinalysis:      Lab Results   Component Value Date/Time    NITRU Negative 07/05/2022 09:54 PM    WBCUA 6-9 07/05/2022 09:54 PM    BACTERIA 1+ 07/05/2022 09:54 PM    RBCUA 3-4 07/05/2022 09:54 PM    BLOODU TRACE-INTACT 07/05/2022 09:54 PM    SPECGRAV 1.015 07/05/2022 09:54 PM    GLUCOSEU Negative 07/05/2022 09:54 PM    GLUCOSEU Negative 07/05/2011 09:10 PM       Radiology:     CXR: I have reviewed the CXR with the following interpretation: NA  EKG:  I have reviewed the EKG with the following interpretation: NA    MRI FOOT LEFT WO CONTRAST   Final Result   1. Limited exam secondary to motion artifact. 2. There is marrow edema identified within the fourth metatarsal head and proximal fourth phalanx. In the setting of infection or open wound in this region, this is compatible with osteomyelitis. 3. Status post amputations of the second, third, and fifth metatarsals as detailed above. 4. Healed fracture deformities identified involving the proximal first phalanx. There is osteoarthrosis identified at the first metatarsophalangeal joint. 5. There is subcutaneous edema identified about the foot. This can be seen with cellulitis and/or venous insufficiency. No soft tissue abscess is identified. 6. There is severe fatty atrophy involving the deep muscular foot. Correlate for underlying neuropathy. XR FOOT LEFT (MIN 3 VIEWS)   Final Result      Osteomyelitis is not excluded based on this exam particularly at the base of the proximal phalanx of the second digit. If this is a clinical concern evaluation with MRI should be performed. VL DUP LOWER EXTREMITY ARTERIES LEFT    (Results Pending)       Consults:    IP CONSULT TO PODIATRY  PHARMACY TO DOSE VANCOMYCIN  IP CONSULT TO HOSPITALIST  IP CONSULT TO PHARMACY  IP CONSULT TO PHARMACY  IP CONSULT TO INFECTIOUS DISEASES        ASSESSMENT/PLAN:  81 y.o. male with DM type 2, HTN and hypothyroidism, PAF (off pradaxa) and BPH who presented with worsening wound left foot. Cellulitis left lower extremity with full-thickness ulceration plantar surface with osteomyelitis. POA  - Possible diabetic ulcer. H/e, appears A1C is not elevated and Home med list does not have DM meds: verify home med list  - R/o sec to severe PAD   - Hx of Multiple amputations.     - = 53 and CRP = 10  - MRI shows osteo  - Cx sent: follow results  - Started on broad-spectrum IV antibiotics: ID consulted.    - Update A1C  - Arterial duplex will be needed to assess vascularity   - Planned podiatric surgical intervention: timing per Podiatry       DM-2 complicated by peripheral neuropathy: POA  - Verify home meds for accuracy as does not seem to have diabetic meds  - A1C too does not appear high  - We will monitor BGs closely for 1-2 days  - SSI      Peripheral vascular disease: POA  - On Plavix  - Vascular studies ordered  - Statin if no C/i       CKD stage 3: POA  Mild hyperkalemia: POA  - Baseline Cr 1.6  - Was 2.3 on admission suggesting some renal insufficiency on his CKD stage 3: MO ruled out  - Monitor cr: appears improving  - Continue gentle IV hydration  - Holding lisinopril  - Monitor Cr, K  - Follows with Dr. Juanito Mauricio: consult if renal fxn worsens      Paroxysmal A. Fib: POA  - Not on rate control meds. - Appears was prev on Pradaxa. Appears was taken off after he wore a monitor and as \"couldn't afford it\" in 2014 . - Episodes of SVT this admission  - Start metoprolol  - Updated TSH, Mg: WNL  - Keep on telemetry       HTN: uncontrolled: POA  - Started on metoprolol: Switch to coreg, decrease dose with some winston noted  - Start hydralazine PO as BP really high  - Consider CCB if BP remains high in 1-2 days  - Hold lisonopril for now  - PRN hydralazine IV      Hypothyroidism: POA  - Updated TSH; WNL    Macrocytic anemia- POA  - Update B12, Folate: Pending      BPH: POA  - On  Flomax  - Monitor for retention: Bladder scans        DVT Prophylaxis: Lovenox     Diet: ADULT DIET;  Regular; 4 carb choices (60 gm/meal)  Code Status: Full Code    PT/OT Eval Status: As tolerated with no weightbearing on the left foot      Disposition -GMF with telemetry  Planned surgery: timing per Podi  PT/OT eval after Surgery       Juan King MD

## 2022-07-07 NOTE — CONSULTS
high if collected in close proximity to the dose administered. This is not necessarily an indicator of toxicity. Recent Labs     07/05/22  1723 07/06/22  0434 07/06/22  0435 07/07/22  0458   CREATININE 2.3*  --  2.1* 1.9*   BUN 34*  --  35* 33*   WBC 10.4 7.5  --  7.7       Estimated Creatinine Clearance: 31 mL/min (A) (based on SCr of 1.9 mg/dL (H)).     Cultures & Sensitivities:  Date Site Micro Susceptibility / Result   7/5 Foot wound In process    7/5 Toe wound Mixed skin cash    MRSA No further w/u    BILLY's pending   7/5 Blood x2 No growth to date

## 2022-07-07 NOTE — CONSULTS
Infectious Diseases Inpatient Consult Note    RESIDENT NOTE - reviewed / edited, attending note at bottom    Reason for Consult:   L Diabetic Foot Infection   Requesting Physician:   Dr Gertrudis Booker  Primary Care Physician:  Corwin Beasley  History Obtained From:   Pt, EPIC    Admit Date: 7/5/2022  Hospital Day: 3    CHIEF COMPLAINT:       Chief Complaint   Patient presents with    Foot Pain     Left foot with recent toe amputation. Has been caring for it at home with Betadine for past week. Podiatrist today told pt to come to ED for admission for debridment of wound       HISTORY OF PRESENT ILLNESS:      Fran Woo, 82 yo M with PMH of DM was referred to the ED by his podiatrist, Dr Mackenzie Thakkar due to concerns of worsening infection of L leg wound. L foot ulcer stated out as a callus (he couldn't day specify when) on the plantar surface but has been progressively worsening in the past few months. He bears weigh on the foot. There is associated foot pain, 7/10 that is worse at night. Pain is relieved by tylenol. He had L toe amputation at [facility] (March, 2020) due to gangrenous 2nd toe. In the ED (7/5), he was afebrile, full-thickness ulceration noted to the plantar aspect of the left foot subfourth metatarsal head.  Wound base is a mixture of fibrotic and granular tissue,  Wound is slightly malodorous, probes to bone and no purulent drainage expressed. WBC WNL, Cr 2.3 (baseline is 1.6-1.7), ESR 53, CRP 10.4. HbA1C 5.9. Arterial duplex of the LLE ordered. X-ray, left foot showed an area concerning for possible osteomyelitis at the base of the proximal phalanx of the second digit of the left foot. MRI, left foot showed marrow edema identified within the fourth metatarsal head and proximal fourth phalanx, consistent with osteomyelitis.     Past Medical History:    Past Medical History:   Diagnosis Date    Diabetic eye exam ([facility] [address] 1/29/14    Dr. Diallo Hunter DM (diabetes mellitus) ([facility] [address] 1/13/2014    Gout     Hypothyroidism 4/18/2016    Seasonal allergic rhinitis        Past Surgical History:    Past Surgical History:   Procedure Laterality Date    COLONOSCOPY  2009 appx     normal     COLONOSCOPY N/A 6/20/2022    COLONOSCOPY POLYPECTOMY SNARE/COLD BIOPSY performed by Genesis Gamboa MD at [address], bilateral    INCISIONAL HERNIA REPAIR      bilateral       Current Medications:     vancomycin  750 mg IntraVENous Once    vancomycin (VANCOCIN) intermittent dosing (placeholder)   Other See Admin Instructions    cefepime  2,000 mg IntraVENous Q12H    metoprolol tartrate  50 mg Oral BID    gabapentin  300 mg Oral Nightly    insulin lispro  0-6 Units SubCUTAneous TID WC    insulin lispro  0-3 Units SubCUTAneous Nightly    cetirizine  5 mg Oral Daily    tamsulosin  0.4 mg Oral Nightly    sodium chloride flush  5-40 mL IntraVENous 2 times per day       Allergies:  Patient has no known allergies. Social History:    TOBACCO:    Former smoker, Quit 53 years ago  ETOH:    No   DRUGS:   No  MARITAL STATUS:       Patient lives at home alone. Family History:   No immunodeficiency    REVIEW OF SYSTEMS:    No fever / chills / sweats. No weight loss. No visual change, eye pain, eye discharge. No oral lesion, sore throat, dysphagia. Mild cough / sputum. Denies chest pain, palpitations. Denies n / v / abd pain. No diarrhea. Denies dysuria or change in urinary function. Denies joint swelling or pain. No myalgia, arthralgia. Denies skin changes, itching  Denies focal weakness, sensory change or other neurologic symptom    Denies new / worse depression, psychiatric symptoms    PHYSICAL EXAM:      Vitals:    BP (!) 161/72   Pulse 53   Temp 97.9 °F (36.6 °C) (Oral)   Resp 18   Ht 6' 2\" (1.88 m)   Wt 169 lb 8.5 oz (76.9 kg)   SpO2 94%   BMI 21.77 kg/m²     GENERAL: No apparent distress.     HEENT: Membranes moist, no oral lesion, PERRL  NECK:  Supple, no lymphadenopathy  LUNGS: Clear b/l, no rales, no dullness  CARDIAC: RRR, no murmur appreciated  ABD:  + BS, soft / NT  EXT:  L foot with dressing  NEURO: No focal neurologic findings  PSYCH: Orientation, sensorium, mood normal  LINES:  Peripheral iv    DATA:    Lab Results   Component Value Date    WBC 7.7 07/07/2022    HGB 12.0 (L) 07/07/2022    HCT 35.3 (L) 07/07/2022    .5 (H) 07/07/2022     07/07/2022     Lab Results   Component Value Date    CREATININE 1.9 (H) 07/07/2022    BUN 33 (H) 07/07/2022     07/07/2022    K 5.0 07/07/2022     07/07/2022    CO2 23 07/07/2022       Hepatic Function Panel:   Lab Results   Component Value Date/Time    ALKPHOS 59 09/27/2021 05:38 PM    ALT 11 09/27/2021 05:38 PM    AST 19 09/27/2021 05:38 PM    PROT 7.5 09/27/2021 05:38 PM    PROT 6.7 03/14/2013 09:30 AM    BILITOT 0.3 09/27/2021 05:38 PM    LABALBU 3.8 09/27/2021 05:38 PM       Micro:   L Foot Wound Culture: (7/5)  Gram stain: 2+ WBC's (Polymorphonuclear) 3+ Gram positive cocci  in chains and   pairs- resembling Strep 1+ Small Gram positive rods resembling diphtheroids   Organism: Staph aureus MRSA    Imaging:   Xray, L foot (7/5)  Osteomyelitis is not excluded based on this exam particularly at the base of the proximal phalanx of the second digit. If this is a clinical concern evaluation with MRI should be performed. MRI, Left foot wo contrast (7/5)  1. Limited exam secondary to motion artifact. 2. There is marrow edema identified within the fourth metatarsal head and proximal fourth phalanx. In the setting of infection or open wound in this region, this is compatible with osteomyelitis. 3. Status post amputations of the second, third, and fifth metatarsals as detailed above. 4. Healed fracture deformities identified involving the proximal first phalanx. There is osteoarthrosis identified at the first metatarsophalangeal joint.    5. There is subcutaneous edema identified about the foot. This can be seen with cellulitis and/or venous insufficiency. No soft tissue abscess is identified. 6. There is severe fatty atrophy involving the deep muscular foot. Correlate for underlying neuropathy. IMPRESSION:      Patient Active Problem List   Diagnosis    Gout    Chronic right shoulder pain    [facility] [address]    [facility] mellitus) [facility]    CKD (chronic kidney disease) stage 3, GFR 30-59 ml/min ([facility])    Hypothyroidism    Syncope and collapse    Osteomyelitis ([facility])    Cat bite of hand, right, initial encounter     80 yo M with PMH of toe amputation (2020) with worsening L foot ulcer and pain    L foot osteomyelitis   Left foot pain + worsening ulcer, CRP - 10.4, ESR 54  MRI showed marrow edema identified within the fourth metatarsal head and proximal fourth phalanx. In the setting of infection or open wound in this region, this is compatible with osteomyelitis. Podiatry is following. RECOMMENDATIONS:    Continue vancomycin and cefepime   Wound dressing per podiatry  F/u wound culture sensitivity   Contact precautions   F/u LLE arterial dopler    Discussed with pt  Enrico Gimenez MD     Addendum to Resident Consult note:  Pt seen, examined and evaluated. I have reviewed the current history, physical findings, labs and assessment and plan and agree with note as documented by resident (Dr. Jerica Clayton). 80 yo CKD, hypothyroid, gout  Hx L foot toe amp 3/2020  L foot plantar wound, followed by Podiatry, Dr Esteban Acosta    Referred to [facility] for admission 7/5   Afeb, WBC 10.4, Cr 2.3  X-ray - 'possible osteomyelitis at the base of the proximal phalanx of the second digit '  MRI 4th MT head and prox phalanx edema  Wound probes to bone per Podiatry note.   Cult - mixed skin cash, Diphtheroids, MRSA  Vascular studies ordered    IMP/  L foot wound infection / extension osteomyelitis  Cult gram positive organism including MRSA    REC/  Cont empiric Vanco / Cefepime  Await MRSA sensitivities  Will review imaging   Vascular studies  Await Podiatry Surg plan - may need partial L 4th ray resection     Medical Decision Making: The following items were considered in medical decision making:  Discussion of patient care with other providers  Reviewed clinical lab tests  Reviewed radiology tests  Reviewed other diagnostic tests/interventions  Independent review of radiologic images -  Will review w Radiologist  Microbiology cultures and other micro tests reviewed      Risk of Complications/Morbidity: High   Illness(es)/ Infection present that pose threat to bodily function. There is potential for severe exacerbation of infection/side effects of treatment.   Therapy requires intensive monitoring for antimicrobial agent toxicity    Discussed with pt  Jefm Apgar, MD

## 2022-07-08 LAB
ALBUMIN SERPL-MCNC: 2.9 G/DL (ref 3.4–5)
ANION GAP SERPL CALCULATED.3IONS-SCNC: 7 MMOL/L (ref 3–16)
ANION GAP SERPL CALCULATED.3IONS-SCNC: 9 MMOL/L (ref 3–16)
BASOPHILS ABSOLUTE: 0 K/UL (ref 0–0.2)
BASOPHILS RELATIVE PERCENT: 0.3 %
BUN BLDV-MCNC: 31 MG/DL (ref 7–20)
BUN BLDV-MCNC: 32 MG/DL (ref 7–20)
CALCIUM SERPL-MCNC: 8.5 MG/DL (ref 8.3–10.6)
CALCIUM SERPL-MCNC: 8.6 MG/DL (ref 8.3–10.6)
CHLORIDE BLD-SCNC: 106 MMOL/L (ref 99–110)
CHLORIDE BLD-SCNC: 107 MMOL/L (ref 99–110)
CO2: 21 MMOL/L (ref 21–32)
CO2: 23 MMOL/L (ref 21–32)
CREAT SERPL-MCNC: 1.8 MG/DL (ref 0.8–1.3)
CREAT SERPL-MCNC: 1.8 MG/DL (ref 0.8–1.3)
EOSINOPHILS ABSOLUTE: 0.3 K/UL (ref 0–0.6)
EOSINOPHILS RELATIVE PERCENT: 4 %
FOLATE: 9.4 NG/ML (ref 4.78–24.2)
GFR AFRICAN AMERICAN: 44
GFR AFRICAN AMERICAN: 44
GFR NON-AFRICAN AMERICAN: 36
GFR NON-AFRICAN AMERICAN: 36
GLUCOSE BLD-MCNC: 104 MG/DL (ref 70–99)
GLUCOSE BLD-MCNC: 114 MG/DL (ref 70–99)
GLUCOSE BLD-MCNC: 91 MG/DL (ref 70–99)
GLUCOSE BLD-MCNC: 95 MG/DL (ref 70–99)
GLUCOSE BLD-MCNC: 96 MG/DL (ref 70–99)
GLUCOSE BLD-MCNC: 96 MG/DL (ref 70–99)
GRAM STAIN RESULT: ABNORMAL
HCT VFR BLD CALC: 33.7 % (ref 40.5–52.5)
HEMOGLOBIN: 11.4 G/DL (ref 13.5–17.5)
LYMPHOCYTES ABSOLUTE: 1.4 K/UL (ref 1–5.1)
LYMPHOCYTES RELATIVE PERCENT: 20.8 %
MAGNESIUM: 2 MG/DL (ref 1.8–2.4)
MCH RBC QN AUTO: 33.9 PG (ref 26–34)
MCHC RBC AUTO-ENTMCNC: 33.8 G/DL (ref 31–36)
MCV RBC AUTO: 100.2 FL (ref 80–100)
MONOCYTES ABSOLUTE: 0.7 K/UL (ref 0–1.3)
MONOCYTES RELATIVE PERCENT: 9.7 %
NEUTROPHILS ABSOLUTE: 4.5 K/UL (ref 1.7–7.7)
NEUTROPHILS RELATIVE PERCENT: 65.2 %
ORGANISM: ABNORMAL
PDW BLD-RTO: 12.7 % (ref 12.4–15.4)
PERFORMED ON: ABNORMAL
PERFORMED ON: ABNORMAL
PERFORMED ON: NORMAL
PERFORMED ON: NORMAL
PHOSPHORUS: 2.9 MG/DL (ref 2.5–4.9)
PLATELET # BLD: 160 K/UL (ref 135–450)
PMV BLD AUTO: 8.1 FL (ref 5–10.5)
POTASSIUM REFLEX MAGNESIUM: 4.6 MMOL/L (ref 3.5–5.1)
POTASSIUM SERPL-SCNC: 4.6 MMOL/L (ref 3.5–5.1)
RBC # BLD: 3.36 M/UL (ref 4.2–5.9)
SODIUM BLD-SCNC: 136 MMOL/L (ref 136–145)
SODIUM BLD-SCNC: 137 MMOL/L (ref 136–145)
VANCOMYCIN RANDOM: 15.5 UG/ML
VITAMIN B-12: 1663 PG/ML (ref 211–911)
WBC # BLD: 6.9 K/UL (ref 4–11)
WOUND/ABSCESS: ABNORMAL
WOUND/ABSCESS: ABNORMAL

## 2022-07-08 PROCEDURE — 6370000000 HC RX 637 (ALT 250 FOR IP): Performed by: INTERNAL MEDICINE

## 2022-07-08 PROCEDURE — 85025 COMPLETE CBC W/AUTO DIFF WBC: CPT

## 2022-07-08 PROCEDURE — 36415 COLL VENOUS BLD VENIPUNCTURE: CPT

## 2022-07-08 PROCEDURE — 83735 ASSAY OF MAGNESIUM: CPT

## 2022-07-08 PROCEDURE — 2580000003 HC RX 258: Performed by: INTERNAL MEDICINE

## 2022-07-08 PROCEDURE — 80069 RENAL FUNCTION PANEL: CPT

## 2022-07-08 PROCEDURE — 80202 ASSAY OF VANCOMYCIN: CPT

## 2022-07-08 PROCEDURE — 6360000002 HC RX W HCPCS: Performed by: INTERNAL MEDICINE

## 2022-07-08 PROCEDURE — 1200000000 HC SEMI PRIVATE

## 2022-07-08 PROCEDURE — 6370000000 HC RX 637 (ALT 250 FOR IP): Performed by: SURGERY

## 2022-07-08 PROCEDURE — 99232 SBSQ HOSP IP/OBS MODERATE 35: CPT | Performed by: INTERNAL MEDICINE

## 2022-07-08 RX ORDER — ATORVASTATIN CALCIUM 40 MG/1
40 TABLET, FILM COATED ORAL NIGHTLY
Status: DISCONTINUED | OUTPATIENT
Start: 2022-07-08 | End: 2022-07-22 | Stop reason: HOSPADM

## 2022-07-08 RX ADMIN — VANCOMYCIN HYDROCHLORIDE 750 MG: 10 INJECTION, POWDER, LYOPHILIZED, FOR SOLUTION INTRAVENOUS at 09:22

## 2022-07-08 RX ADMIN — HYDRALAZINE HYDROCHLORIDE 25 MG: 25 TABLET, FILM COATED ORAL at 14:27

## 2022-07-08 RX ADMIN — CEFEPIME HYDROCHLORIDE 2000 MG: 2 INJECTION, POWDER, FOR SOLUTION INTRAVENOUS at 22:19

## 2022-07-08 RX ADMIN — CETIRIZINE HYDROCHLORIDE 5 MG: 10 TABLET, FILM COATED ORAL at 07:37

## 2022-07-08 RX ADMIN — TRAMADOL HYDROCHLORIDE 50 MG: 50 TABLET, COATED ORAL at 07:27

## 2022-07-08 RX ADMIN — HYDRALAZINE HYDROCHLORIDE 25 MG: 25 TABLET, FILM COATED ORAL at 06:32

## 2022-07-08 RX ADMIN — HYDRALAZINE HYDROCHLORIDE 25 MG: 25 TABLET, FILM COATED ORAL at 22:10

## 2022-07-08 RX ADMIN — ATORVASTATIN CALCIUM 40 MG: 40 TABLET, FILM COATED ORAL at 22:10

## 2022-07-08 RX ADMIN — SODIUM CHLORIDE, PRESERVATIVE FREE 10 ML: 5 INJECTION INTRAVENOUS at 07:38

## 2022-07-08 RX ADMIN — CARVEDILOL 6.25 MG: 6.25 TABLET, FILM COATED ORAL at 19:04

## 2022-07-08 RX ADMIN — TAMSULOSIN HYDROCHLORIDE 0.4 MG: 0.4 CAPSULE ORAL at 22:10

## 2022-07-08 RX ADMIN — GABAPENTIN 300 MG: 300 CAPSULE ORAL at 22:10

## 2022-07-08 RX ADMIN — CEFEPIME HYDROCHLORIDE 2000 MG: 2 INJECTION, POWDER, FOR SOLUTION INTRAVENOUS at 14:27

## 2022-07-08 ASSESSMENT — PAIN SCALES - GENERAL
PAINLEVEL_OUTOF10: 4
PAINLEVEL_OUTOF10: 4
PAINLEVEL_OUTOF10: 0

## 2022-07-08 NOTE — PROGRESS NOTES
Patient remains A&Ox4. Hypertensive during shift, prn medication given. Vitals otherwise stable. All medications given without issue. Patient's glucose ran on the low side during shift. Requiring juice to bring it up to [facility]. Patient up to chair during shift. Tolerating diet. Doppler completed at bedside. No complaints of pain or discomfort. Bed is locked, in lowest position, with bed alarm on. Call light within reach.

## 2022-07-08 NOTE — CARE COORDINATION
CM following, Pod team consulted vascular, they plan Anteriogram early next week, consulted Nephro for hydration due to CKD3 and need for exposure to nephrotoxic agents for testing. Pending vascular work up likely [address] with Pod team.    ID following for ABX recs at [facility]. SNF referral sent, accepted at [facility] place will need precert once medically stable and PT OT evals have been completed.   Electronically signed by Olivia Ortiz RN on 7/8/2022 at 2:01 PM   [phone]

## 2022-07-08 NOTE — PROGRESS NOTES
ID Follow-up NOTE    CC:   L DM foot infection  Antibiotics: Vancomycin, Cefepime    Admit Date: 7/5/2022  Hospital Day: 4    Subjective:     Patient c/o L foot pain inessa with dressing change      Objective:     Patient Vitals for the past 8 hrs:   BP Temp Temp src Pulse Resp SpO2   07/08/22 0730 135/69 97.9 °F (36.6 °C) Oral 57 17 99 %   07/08/22 0631 (!) 167/83 98 °F (36.7 °C) Oral 54 16 97 %   07/08/22 0338 (!) 143/67 98.2 °F (36.8 °C) Oral 57 16 99 %     I/O last 3 completed shifts: In: 1500 [P.O.:1500]  Out: 5375 [VUOVO:4687]  I/O this shift:  In: 120 [P.O.:120]  Out: -     EXAM:  GENERAL: No apparent distress.   RA  HEENT: Membranes moist, no oral lesion  NECK:  Supple, no lymphadenopathy  LUNGS: Clear b/l, no rales, no dullness  CARDIAC: RRR, no murmur appreciated  ABD:  + BS, soft / NT  EXT:  L foot with dressing - reviewed images from Podiatry note  NEURO: No focal neurologic findings  PSYCH: Orientation, sensorium, mood normal  LINES:  Peripheral iv       Data Review:  Lab Results   Component Value Date    WBC 6.9 07/08/2022    HGB 11.4 (L) 07/08/2022    HCT 33.7 (L) 07/08/2022    .2 (H) 07/08/2022     07/08/2022     Lab Results   Component Value Date    CREATININE 1.8 (H) 07/08/2022    CREATININE 1.8 (H) 07/08/2022    BUN 31 (H) 07/08/2022    BUN 32 (H) 07/08/2022     07/08/2022     07/08/2022    K 4.6 07/08/2022    K 4.6 07/08/2022     07/08/2022     07/08/2022    CO2 23 07/08/2022    CO2 21 07/08/2022       Hepatic Function Panel:   Lab Results   Component Value Date/Time    ALKPHOS 59 09/27/2021 05:38 PM    ALT 11 09/27/2021 05:38 PM    AST 19 09/27/2021 05:38 PM    PROT 7.5 09/27/2021 05:38 PM    PROT 6.7 03/14/2013 09:30 AM    BILITOT 0.3 09/27/2021 05:38 PM    LABALBU 2.9 07/08/2022 04:55 AM       Micro:   L foot Wound Culture: (7/5)  Gram stain: 2+ WBC, 3+ GPC  in chains and pairs, 1+ Small GPR   Organism: Staph aureus MRSA  Staph aureus mrsa (1)  Antibiotic Interpretation Microscan    ceFAZolin Resistant 16 mcg/mL   clindamycin Sensitive <=0.5 mcg/mL   erythromycin Resistant >4 mcg/mL   oxacillin Resistant >2 mcg/mL   tetracycline Sensitive <=4 mcg/mL   trimethoprim-sulfamethoxazole Resistant >2/38 mcg/mL   vancomycin Sensitive 1 mcg/mL       Imaging:   Xray, L foot (7/5)  Osteomyelitis is not excluded based on this exam particularly at the base of the proximal phalanx of the second digit. If this is a clinical concern evaluation with MRI should be performed.     MRI, L foot wo contrast (7/5)  1. Limited exam secondary to motion artifact. 2. There is marrow edema identified within the fourth metatarsal head and proximal fourth phalanx. In the setting of infection or open wound in this region, this is compatible with osteomyelitis. 3. Status post amputations of the second, third, and fifth metatarsals as detailed above. 4. Healed fracture deformities identified involving the proximal first phalanx. There is osteoarthrosis identified at the first metatarsophalangeal joint. 5. There is subcutaneous edema identified about the foot. This can be seen with cellulitis and/or venous insufficiency. No soft tissue abscess is identified. 6. There is severe fatty atrophy involving the deep muscular foot. Correlate for underlying neuropathy.        Scheduled Meds:   vancomycin  750 mg IntraVENous Q24H    atorvastatin  40 mg Oral Nightly    hydrALAZINE  25 mg Oral 3 times per day    carvedilol  6.25 mg Oral BID WC    cefepime  2,000 mg IntraVENous Q12H    gabapentin  300 mg Oral Nightly    insulin lispro  0-6 Units SubCUTAneous TID WC    insulin lispro  0-3 Units SubCUTAneous Nightly    cetirizine  5 mg Oral Daily    tamsulosin  0.4 mg Oral Nightly    sodium chloride flush  5-40 mL IntraVENous 2 times per day       Continuous Infusions:   dextrose      sodium chloride         PRN Meds:  traMADol, hydrALAZINE, methocarbamol, glucose, dextrose bolus **OR** dextrose bolus, glucagon (rDNA), dextrose, sodium chloride flush, sodium chloride, ondansetron **OR** ondansetron, polyethylene glycol, acetaminophen **OR** acetaminophen, morphine      Assessment:     80 yo CKD, hypothyroid, gout  Hx L foot toe amp 3/2020  L foot plantar wound, followed by Podiatry, Dr Eunice Johnson     Referred to [facility] for admission 7/5   Afeb, WBC 10.4, Cr 2.3  X-ray - 'possible osteomyelitis at the base of the proximal phalanx of the second digit '  MRI 4th MT head and prox phalanx edema  Wound probes to bone per Podiatry note. Cult - mixed skin cash, Diphtheroids, MRSA  Vascular studies ordered     IMP/  L foot wound infection / extension osteomyelitis  Cult gram positive organism including MRSA    Plan:     Cont empiric Vanco / Cefepime  Will review imaging   Await Vascular plan  Await Podiatry Surg plan - may need partial L 4th ray resection    Medical Decision Making: The following items were considered in medical decision making:  Discussion of patient care with other providers  Reviewed clinical lab tests  Reviewed radiology tests  Reviewed other diagnostic tests/interventions  Independent review of radiologic images - will review MRI  Microbiology cultures and other micro tests reviewed      Discussed with pt.   Will confer with Podiatry  Call with ID issues over weekend  Matt Yanez MD

## 2022-07-08 NOTE — CONSULTS
Interval History and plan:      Has CKD  Plan for CO2 angiogram  Seen by vascular surgery\    Plan:  Supportive  Care  No changes today                     Assessment :     CKD Stage 3b  Likely due to DM and hypertension  Cr is 1.8- eGFR of 36 ml/min  UA- bland, wbc 6-9,  No recent one in the system      Hypertension   BP: (135-175)/(67-86)  Heart Rate:  [54-57]   BP goal inpatient [id number] systolic inpatient  Also has Afib    Anemia of CKD             sepsis  DM  PVD    [address]  [facility] would like to thank Rico Lamas MD   for opportunity to serve this patient      Please call with questions at-   24 Hrs Answering service [phone] or  7 am- 5 pm via Perfect serve or cell phone  Amira Hansen MD          CC/reason for consult :     MO     HPI :     Cherie Yanez is a 81 y.o. male presented to   the hospital on 7/5/2022 with wound infection in the left leg. He is followed outpatient by podiatry,for wound, which continued to   Get worse despite the treatment. Also has discharge from the wound  Brought to hospital admitted  Also found to have CKD  because of which we are consulted. ROS:     Seen with- no family    positives in bold   Constitutional:  fever, chills, weakness, weight change, fatigue  Skin:  rash, pruritus, hair loss, bruising, dry skin, petechiae  Head, Face, Neck   headaches, swelling,  cervical adenopathy  Respiratory: shortness of breath, cough, or wheezing  Cardiovascular: chest pain, palpitations, dizzy, edema  Gastrointestinal: nausea, vomiting, diarrhea, constipation,belly pain    Yellow skin, blood in stool  Musculoskeletal:  back pain, muscle weakness, gait problems,       joint pain or swelling. Genitourinary:  dysuria, poor urine flow, flank pain, blood in urine  Neurologic:  vertigo, TIA'S, syncope, seizures, focal weakness  Psychosocial:  insomnia, anxiety, or depression.   Additional positive findings:                    All other remaining systems are Daily  tamsulosin (FLOMAX) capsule 0.4 mg, 0.4 mg, Oral, Nightly  sodium chloride flush 0.9 % injection 5-40 mL, 5-40 mL, IntraVENous, 2 times per day  sodium chloride flush 0.9 % injection 5-40 mL, 5-40 mL, IntraVENous, PRN  0.9 % sodium chloride infusion, , IntraVENous, PRN  ondansetron (ZOFRAN-ODT) disintegrating tablet 4 mg, 4 mg, Oral, Q8H PRN **OR** ondansetron (ZOFRAN) injection 4 mg, 4 mg, IntraVENous, Q6H PRN  polyethylene glycol (GLYCOLAX) packet 17 g, 17 g, Oral, Daily PRN  acetaminophen (TYLENOL) tablet 650 mg, 650 mg, Oral, Q6H PRN **OR** acetaminophen (TYLENOL) suppository 650 mg, 650 mg, Rectal, Q6H PRN  morphine (PF) injection 2 mg, 2 mg, IntraVENous, Q3H PRN       Vitals :     Vitals:    07/08/22 1037   BP: (!) 175/86   Pulse: 57   Resp: 18   Temp: 97.9 °F (36.6 °C)   SpO2: 99%       I & O :       Intake/Output Summary (Last 24 hours) at 7/8/2022 1102  Last data filed at 7/8/2022 1039  Gross per 24 hour   Intake 1260 ml   Output 3650 ml   Net -2390 ml        Physical Examination :     General appearance: Anxious- no, distressed- no, in good spirits-    Alert, pleasant  HEENT: Lips- normal, teeth- ok , oral mucosa- moist  Neck : Mass- no, appears symmetrical, JVD- not visible  Respiratory: Respiratory effort-  normal, wheeze- no, crackles -   Cardiovascular:  Ausculation- No M/R/G, Edema left leg   Abdomen: visible mass- no, distention- no, scar- no, tenderness- no                            hepatosplenomegaly-  no  Musculoskeletal:  clubbing no,cyanosis- no , digital ischemia- no                           muscle strength- grossly normal , tone - grossly normal  Skin: rashes- no , ulcers- no, induration- no, tightening - no  Psychiatric:  Judgement and insight- normal           AAO X 3  Additional finding:   Has left leg wound      LABS:     Recent Labs     07/06/22  0434 07/07/22  0458 07/08/22  0455   WBC 7.5 7.7 6.9   HGB 10.9* 12.0* 11.4*   HCT 32.8* 35.3* 33.7*    162 160     Recent Labs 07/05/22  1723 07/06/22  0424 07/06/22  0435 07/06/22  0435 07/07/22  0458 07/08/22  0455   NA   < >  --  140  --  141 136  137   K   < >  --  4.6   < > 5.0 4.6  4.6   CL   < >  --  109  --  109 106  107   CO2   < >  --  21  --  23 21  23   BUN   < >  --  35*  --  33* 32*  31*   CREATININE   < >  --  2.1*  --  1.9* 1.8*  1.8*   GLUCOSE   < >  --  82  --  82 96  96   MG  --  1.90  --   --   --  2.00   PHOS  --   --   --   --   --  2.9    < > = values in this interval not displayed.

## 2022-07-08 NOTE — PROGRESS NOTES
Patient remains A&Ox4. Hypertensive during shift, prn medication given. Vitals otherwise stable. All medications given without issue. Patient up to chair several times throughout shift. No complaints of pain. IV infusing KVO. Bed is locked, in lowest position with bed alarm on. Call light within reach.

## 2022-07-08 NOTE — PROGRESS NOTES
Podiatric Surgery Daily Progress Note      Admit Date: 7/5/2022      Code:Full Code    Patient seen and examined, labs and records reviewed    Subjective:     Patient seen at bedside this AM.  Patient denies any overnight acute event. Patient denies f/c/n/v/sob/cp. Review of Systems: A 12 point review of symptoms is unremarkable with the exception of the chief complaint. Patient specifically denies nausea, fever, vomiting, chills, shortness of breath, chest pain, abdominal pain, constipation or difficulty urinating. Objective     BP (!) 143/67   Pulse 57   Temp 98.2 °F (36.8 °C) (Oral)   Resp 16   Ht 6' 2\" (1.88 m)   Wt 169 lb 8.5 oz (76.9 kg)   SpO2 99%   BMI 21.77 kg/m²      I/O:    Intake/Output Summary (Last 24 hours) at 7/8/2022 0616  Last data filed at 7/8/2022 0338  Gross per 24 hour   Intake 1200 ml   Output 3600 ml   Net -2400 ml              Wt Readings from Last 3 Encounters:   07/05/22 169 lb 8.5 oz (76.9 kg)   07/05/22 178 lb 3.2 oz (80.8 kg)   06/20/22 177 lb (80.3 kg)       LABS:    Recent Labs     07/07/22  0458 07/08/22  0455   WBC 7.7 6.9   HGB 12.0* 11.4*   HCT 35.3* 33.7*    160        Recent Labs     07/07/22  0458      K 5.0      CO2 23   BUN 33*   CREATININE 1.9*      No results for input(s): PROT, INR, APTT in the last 72 hours. LOWER EXTREMITY EXAMINATION    Dressing to left LE intact. No strikethrough noted to the external dressing. Mild serosanguineous drainage noted to the internal layers of the dressing. VASCULAR: DP and PT pulses are non-palpable 0/4 b/l. Upon hand-held Doppler examination DP and PT pulses were noted to be biphasic to the right lower extremity. Upon hand-held Doppler examination DP and PT pulses were noted to be monophasic to the left lower extremity. CFT is brisk to the digits of the right foot. CFT is sluggish to the left foot. Skin temperature is warm to cool from proximal to distal with no focal calor noted.   Nonpitting edema noted, left lower extremity-noted to be improving secondary to compressive therapy. No pain with calf compression b/l.     NEUROLOGIC: Gross and epicritic sensation is diminished b/l. Protective sensation is diminished at all pedal sites b/l.     DERMATOLOGIC:   Patient provided verbal consent for photos to be obtained today:     Left lower extremity:    Full-thickness ulceration noted to the plantar aspect of the left foot subfourth metatarsal head. Wound measures approximately 1.2 cm x 0.6 cm x 0.4 cm. Wound base is a mixture of fibrotic and granular tissue. The wound does probe to bone. No purulent drainage expressed. Slight malodor noted. The wound does track medially about 1 cm. No tunneling, fluctuance, or crepitus noted.     Right LE soft tissue envelope is closed without ulceration or acute signs of infection.     MUSCULOSKELETAL: Muscle strength is 5/5 for all pedal groups tested. Pain with palpation to the periwound, left foot. Ankle joint ROM is decreased in dorsiflexion with the knee extended. 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       COMPARISON: None.       FINDINGS:       3 views of the right foot show distal vascular calcification.       There is been partial amputation of the second, third, fourth and fifth digits.       No erosive changes at the base of the proximal phalanx of the second digit cannot be excluded.       Degenerative changes are seen at the midfoot and sclerosis and subchondral cyst formation.           Impression       Osteomyelitis is not excluded based on this exam particularly at the base of the proximal phalanx of the second digit. If this is a clinical concern evaluation with MRI should be performed.      MRI Left Foot (7/6/2022)  Narrative   MRI OF THE LEFT FOOT       INDICATION: Osteomyelitis.       TECHNIQUE: Multiplanar and multisequence MRI images of the left foot were obtained.       FINDINGS: Images are degraded by motion artifact. The patient is status post resection of the second, and third metatarsal heads. Patient is status post resection of the fifth metatarsal tarsal head and distal shaft as well as the proximal fifth phalanx.       There is abnormal marrow edema identified within the fourth metatarsal head as well as the proximal fourth phalanx.       There is deformity of the proximal first phalanx which may reflect the sequelae of a remote fracture or prior surgery. There is moderate osteoarthrosis identified at the first metatarsophalangeal joint.       There is some mild osteoarthrosis identified at the third, fourth and fifth tarsometatarsal joints. No osseous erosion is identified.       There is subcutaneous edema identified about the foot. There is severe atrophy involving the deep musculature of the foot. Correlate for chronic denervation injury. No soft tissue abscess is identified.           Impression   1. Limited exam secondary to motion artifact. 2. There is marrow edema identified within the fourth metatarsal head and proximal fourth phalanx. In the setting of infection or open wound in this region, this is compatible with osteomyelitis. 3. Status post amputations of the second, third, and fifth metatarsals as detailed above. 4. Healed fracture deformities identified involving the proximal first phalanx. There is osteoarthrosis identified at the first metatarsophalangeal joint. 5. There is subcutaneous edema identified about the foot. This can be seen with cellulitis and/or venous insufficiency. No soft tissue abscess is identified. 6. There is severe fatty atrophy involving the deep muscular foot. Correlate for underlying neuropathy. Arterial Duplex; Left Lower Extremity  Type of Study:        Extremities Arteries:Lower Extremities Arterial Duplex, VL LOWER EXTREMITY    ARTERIES DUPLEX LEFT.     Tech Comments   Right   The right ankle/brachial index is non-compressible (PT->255 mmHg; DP->255   mmHg). Left   The left ankle/brachial index is non-compressible at the PTA (>255 mmHg); the   HAM is 0.32 at the DPA (62 mmHg). There is normal, multiphasic flow identified in the common femoral artery,   suggesting no evidence of significant aorto-iliac inflow disease. There is atherosclerotic plaque involving the superficial femoral artery with   velocities consistent with <50% stenosis. The popliteal artery is occluded. The tibio-peroneal trunk is occluded. The distal anterior tibial artery is occluded and reconstitutes at the   dorsalis pedis artery. Limited visualization of the peroneal artery due to calcific shadowing,   however, flow is visualized in some segments. Limited visualization at the ankle due to bandages. Incidental finding of partially occluding, chronic phlebitic changes involving   the left popliteal vein, consistent with previous DVT. The previous outside study on 5/5/21 was limited. Unable to make accurate   comparison. Previous ABIs were non-compressible bilaterally. ASSESSMENT/PLAN  -Cellulitis, left lower extremity-improving  -Osteomyelitis, left foot  -Full-thickness ulceration, plantar left foot-Huston stage III  -Peripheral Arterial Disease  -Critical Limb Ischemia; Left lower extremity  -Type 2 diabetes mellitus with peripheral neuropathy  -History of multiple amputations    -Patient seen and examined at bedside this AM  -Hypertensive otherwise VSS, No leukocytosis noted (WBC 6.9)  -ESR 53 and CRP 10.4  -HbA1c 5.9  -Blood cultures 1 & 2  NGTD  -Imaging reviewed, impression noted above  -Wound culture:MRSA, diphtheroids  -Continue IV antibiotics per ID recs  -Left lower extremity dressed with Betadine soaked gauze, DSD, and Ace with gentle compression  -post-op shoe ordered to patient room  -Prevalon boots  reapplied.  Patient is to wear at all times while in

## 2022-07-08 NOTE — PROGRESS NOTES
Pt bg was 62 at bedtime, orange juice given, rechecked . Pt ao4, vss. No complaints at the moment.  Will monitor

## 2022-07-08 NOTE — DISCHARGE INSTR - COC
Continuity of Care Form    Patient Name: Nallely aNm   :  1935  MRN:  [id number]    6 [facility] date:  2022  Discharge date:  2022    Code Status Order: Full Code   Advance Directives:      Admitting Physician:  Anastasiya Elkins MD  PCP: Vernell Holland    Discharging Nurse: Wilson Bonds, SANDIE [facility] Unit/Room#: 6369/0754-78  Discharging Unit Phone Number: [phone]    Emergency Contact:   Extended Emergency Contact Information  Primary Emergency Contact: Mohsen Orr  Home Phone: [phone]  Relation: Child  Secondary Emergency Contact: jonathan meyer  Home Phone: [phone]  Relation: Child    Past Surgical History:  Past Surgical History:   Procedure Laterality Date    COLONOSCOPY   appx     normal     COLONOSCOPY N/A 2022    COLONOSCOPY POLYPECTOMY SNARE/COLD BIOPSY performed by Genesis Gamboa MD at [address] [place] [address], bilateral    INCISIONAL HERNIA REPAIR      bilateral       Immunization History:   Immunization History   Administered Date(s) Administered    COVID-19, MODERNA BLUE border, Primary or Immunocompromised, (age 12y+), IM, 100 mcg/0.5mL 2021    Influenza 10/14/2011    Influenza Virus Vaccine 2013, 2014    Influenza, High Dose (Fluzone 65 yrs and older) 2012, 2015    Pneumococcal Polysaccharide (Nivdsluie84) 2011, 2013    Tdap (Boostrix, Adacel) 2011       Active Problems:  Patient Active Problem List   Diagnosis Code    Gout M10.9    Chronic right shoulder pain M25.511, G89.29    A-fib ([facility]) I48.91    DM (diabetes mellitus) ([facility] E11.9    CKD (chronic kidney disease) stage 3, GFR 30-59 ml/min ([facility]) N18.30    Hypothyroidism E03.9    Syncope and collapse R55    [facility]steomyel[facility] ([facility] M86.9    Cat bite of hand, right, initial encounter S61.451A, W55. 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 ([facility]) M86.472    MRSA infection A49.02    Open wound of left foot S91.302A       Isolation/Infection:   Isolation            No Isolation          Patient Infection Status       Infection Onset Added Last Indicated Last Indicated By Review Planned Expiration Resolved Resolved By    MRSA 07/05/22 07/07/22 07/05/22 Culture, Wound                Nurse Assessment:  Last Vital Signs: BP (!) 170/66   Pulse 62   Temp 98.2 °F (36.8 °C) (Oral)   Resp 16   Ht 6' 2\" (1.88 m)   Wt 169 lb 8.5 oz (76.9 kg)   SpO2 100%   BMI 21.77 kg/m²     Last documented pain score (0-10 scale): Pain Level: 0  Last Weight:   Wt Readings from Last 1 Encounters:   07/05/22 169 lb 8.5 oz (76.9 kg)     Mental Status:      IV Access:  - None    Nursing Mobility/ADLs:  Walking   Dependent  Transfer  Assisted  Bathing  Dependent  Dressing  Dependent  Toileting  Dependent  Feeding  Assisted  Med Admin  Assisted  Med Delivery   whole    Wound Care Documentation and Therapy:  Wound 09/27/21 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 toe left foot (Active)   Number of days: 283       Wound 09/27/21 Foot pressure injury left lateral foot  (Active)   Number of days: 283        Elimination:  Continence: Bowel: No  Bladder: No  Urinary Catheter: None   Colostomy/Ileostomy/Ileal Conduit: No       Date of Last BM: 07/21/22    Intake/Output Summary (Last 24 hours) at 7/8/2022 0004  Last data filed at 7/7/2022 2302  Gross per 24 hour   Intake 840 ml   Output 3375 ml   Net -2535 ml     I/O last 3 completed shifts: In: 2177.5 [P.O.:1080; I.V.:796.9; IV Piggyback:300.6]  Out: 3975 [Urine:3975]    Safety Concerns: At Risk for Falls    Impairments/Disabilities:      Hearing    Nutrition Therapy:  Current Nutrition Therapy:   - Oral Diet:  General    Routes of Feeding: Oral  Liquids:  Thin Liquids  Daily Fluid Restriction: no  Last Modified Barium Swallow with Video (Video Swallowing Test): not done    Treatments at the Time of Hospital Discharge:   Respiratory Treatments: ***  Oxygen Therapy:  {Therapy; copd oxygen:00182}  Ventilator:    {[facility] Vent YWYH:132870062}    Rehab Therapies: {THERAPEUTIC INTERVENTION:7365582428}  Weight Bearing Status/Restrictions: {[facility] Weight Bearin}  Other Medical Equipment (for information only, NOT a DME order):  {EQUIPMENT:452074906}  Other Treatments: ***    Patient's personal belongings (please select all that are sent with patient):  Hearing Aides bilateral, Dentures upper and lower    RN SIGNATURE:  Electronically signed by Coretta Booker RN on 22 at 11:36 AM EDT    CASE MANAGEMENT/SOCIAL WORK SECTION    Inpatient Status Date: 22    Readmission Risk Assessment Score:  Readmission Risk              Risk of Unplanned Readmission:  12           Discharging to Facility/ Agency   Name: Thomas Wesley  Address:  [facility]:[phone]  Fax:    Dialysis Facility (if applicable)   Name:  Address:  Dialysis Schedule:  Phone:  Fax:    / signature: Electronically signed by Concha Vee RN on 22 at 12:27 PM EDT        PHYSICIAN SECTION    Prognosis: Fair    Condition at Discharge: Stable    Rehab Potential (if transferring to Rehab): Fair    Recommended Labs or Other Treatments After Discharge:     [address] [facility]  [phone]    Dr. Mackenzie Thakkar, D.P.M. Post Operative Instructions    - Have Prescriptions filled and take as directed. All medications should be taken with food or milk. - Keep foot elevated six inches above the level of the heart. Support feet, legs, and knees with pillows. - KEEP FOOT ELEVATED AS MUCH AS POSSIBLE UNTIL YOUR NEXT VISIT    - Place an ice pack on bandaged site for 15 minutes every hour while awake    - Keep dressing clean, dry, and intact. DO NOT REMOVE DRESSING. CALL THE OFFICE IF BANDAGE COMES OFF.    - For the first 7 days after surgery, take temperature by mouth three times a day.   Call the office if greater than 101F.    - Ambulate with non-weightbearing to the left lower extremity with assistive walking devices. - All instructions are to be followed until otherwise instructed by your surgeon.    - Call our office if you have any concerns or questions which arise. [phone]    - Call the office for appointment date and time if not known prior to today. CBC and BMP in 3 days  Follow  up with Dr. Malone April: Nephrology    If pxt is discharged with [facility], please do Trial of void in 3-5 days    PT/OT daily with restriction based on surgical recommendations    PODIATRY INSTRUCTIONS    [address] [facility]  [phone]    Dr. Michael Farlye, D.P.M. Leave dressing to left lower extremity clean, dry, intact. If the bandage becomes wet, or mangled, please call Dr Doris Moulton office for further instructions. If you notice foul smelling odor or drainage through the dressing, call the office for further instructions. Strict non weight bearing to the left lower extremity. If you experience nausea, vomiting, fever, chills, shortness of breath, or chest pain, present to the nearest emergency department. Follow up with Dr. Kennedi Reyes within 1 week of hospital discharge. Physician Certification: I certify the above information and transfer of Lissett Woo  is necessary for the continuing treatment of the diagnosis listed and that he requires [facility] for greater 30 days.      Update Admission H&P: No change in H&P    PHYSICIAN SIGNATURE:  Electronically signed by Renate Somers MD on 7/19/22 at 1:34 PM EDT

## 2022-07-08 NOTE — DISCHARGE INSTRUCTIONS
drainage from wound, fevers, or any other concerns about your incision or post op course. Please follow up with Dr. Blanca Carr in 2 weeks. Please call [phone] to make your appointment.

## 2022-07-08 NOTE — PROGRESS NOTES
Hospital Medicine  Progress Note    PCP: Poppy Womack    Date of Admission: 7/5/2022      Chief Complaint: Worsening left foot pain and swelling      History Of Present Illness:      81 y.o. male with DM type 2, HTN and hypothyroidism, PAF (off pradaxa) and BPH who presented with worsening wound left foot. The wound started in the bottom of his left foot as a callus. Patient has been putting Vaseline and soaking the foot. When he saw his podiatrist (Dr. Papa Prather ) last week he was recommended to start using Betadine to the left foot. Patient has not following the wound care recommendations, has been putting Betadine on the wound and has been wearing a sock over it and continue to walk bearing weight on the left foot. Patient started noticing mild yellow drainage coming from the wound. Patient denies fever, chills, nausea, vomiting, chest pain or shortness of breath    On presentation,, apart from elevated BP, he had fairly normal vitals. He had no leucocytosis. Hgb was 12 with . Cr was 2.3 (baseline about 1.6). Interim HPI  No new complaints  Sitting up in bed  Denies chest pain    Noted with some mild winston on telemetry    MRI shows osteo of left foot. Awaiting vascular studies      Medications: Reviewed        Allergies:  Patient has no known allergies. REVIEW OF SYSTEMS COMPLETED:   Pertinent positives as noted in the HPI. All other systems reviewed and negative. PHYSICAL EXAM PERFORMED:    BP (!) 175/86   Pulse 57   Temp 97.9 °F (36.6 °C) (Oral)   Resp 18   Ht 6' 2\" (1.88 m)   Wt 169 lb 8.5 oz (76.9 kg)   SpO2 99%   BMI 21.77 kg/m²     General appearance:  No apparent distress, appears stated age and cooperative. HEENT:  Normal cephalic, atraumatic without obvious deformity. Pupils equal, round, and reactive to light. Extra ocular muscles intact. Conjunctivae/corneas clear. Neck: Supple, with full range of motion. No jugular venous distention.  Trachea midline. Respiratory:  Normal respiratory effort. Clear to auscultation, bilaterally without Rales/Wheezes/Rhonchi. Cardiovascular:  Regular rate and rhythm with normal S1/S2 without murmurs, rubs or gallops. Abdomen: Soft, non-tender, non-distended with normal bowel sounds. Musculoskeletal:  No clubbing, cyanosis or edema bilaterally. Full range of motion without deformity. Skin: Skin color, texture, turgor normal.  Full-thickness ulceration-plantar aspect of the left foot, fourth metatarsal head, wound base with fibrotic and granular tissue, wound probed to the bone per podiatry with scant purulent drainage, malodorous. No crepitus or fluctuance  Neurologic:  Neurovascularly intact without any focal sensory/motor deficits. Cranial nerves: II-XII intact, grossly non-focal.  Psychiatric:  Alert and oriented, thought content appropriate, normal insight  Capillary Refill: Brisk,3 seconds, normal  Peripheral Pulses: DP and PT pulses-nonpalpable bilaterally, biphasic with Doppler per podiatry on the right, monophasic on the left. Labs:     Recent Labs     07/06/22 0434 07/07/22 0458 07/08/22 0455   WBC 7.5 7.7 6.9   HGB 10.9* 12.0* 11.4*   HCT 32.8* 35.3* 33.7*    162 160     Recent Labs     07/06/22 0435 07/06/22 0435 07/07/22 0458 07/08/22 0455     --  141 136  137   K 4.6   < > 5.0 4.6  4.6     --  109 106  107   CO2 21  --  23 21  23   BUN 35*  --  33* 32*  31*   CREATININE 2.1*  --  1.9* 1.8*  1.8*   CALCIUM 8.9  --  8.7 8.6  8.5   PHOS  --   --   --  2.9    < > = values in this interval not displayed. No results for input(s): AST, ALT, BILIDIR, BILITOT, ALKPHOS in the last 72 hours. No results for input(s): INR in the last 72 hours. No results for input(s): Nando Pears in the last 72 hours.     Urinalysis:      Lab Results   Component Value Date/Time    NITRU Negative 07/05/2022 09:54 PM    WBCUA 6-9 07/05/2022 09:54 PM    BACTERIA 1+ 07/05/2022 09:54 PM RBCUA 3-4 07/05/2022 09:54 PM    BLOODU TRACE-INTACT 07/05/2022 09:54 PM    SPECGRAV 1.015 07/05/2022 09:54 PM    GLUCOSEU Negative 07/05/2022 09:54 PM    GLUCOSEU Negative 07/05/2011 09:10 PM       Radiology:     CXR: I have reviewed the CXR with the following interpretation: NA  EKG:  I have reviewed the EKG with the following interpretation: NA    VL DUP LOWER EXTREMITY ARTERIES LEFT   Final Result      MRI FOOT LEFT WO CONTRAST   Final Result   1. Limited exam secondary to motion artifact. 2. There is marrow edema identified within the fourth metatarsal head and proximal fourth phalanx. In the setting of infection or open wound in this region, this is compatible with osteomyelitis. 3. Status post amputations of the second, third, and fifth metatarsals as detailed above. 4. Healed fracture deformities identified involving the proximal first phalanx. There is osteoarthrosis identified at the first metatarsophalangeal joint. 5. There is subcutaneous edema identified about the foot. This can be seen with cellulitis and/or venous insufficiency. No soft tissue abscess is identified. 6. There is severe fatty atrophy involving the deep muscular foot. Correlate for underlying neuropathy. XR FOOT LEFT (MIN 3 VIEWS)   Final Result      Osteomyelitis is not excluded based on this exam particularly at the base of the proximal phalanx of the second digit. If this is a clinical concern evaluation with MRI should be performed. Consults:    IP CONSULT TO PODIATRY  PHARMACY TO DOSE VANCOMYCIN  IP CONSULT TO HOSPITALIST  IP CONSULT TO PHARMACY  IP CONSULT TO PHARMACY  IP CONSULT TO INFECTIOUS DISEASES  IP CONSULT TO VASCULAR SURGERY  IP CONSULT TO NEPHROLOGY        ASSESSMENT/PLAN:  81 y.o. male with DM type 2, HTN and hypothyroidism, PAF (off pradaxa) and BPH who presented with worsening wound left foot.        Cellulitis left lower extremity with full-thickness ulceration plantar surface with osteomyelitis. POA  - Possible diabetic ulcer. H/e, appears A1C is not elevated and Home med list does not have DM meds: verify home med list  - R/o sec to severe PAD   - Hx of Multiple amputations. - Sed rate was 53 and CRP10  - MRI shows osteo  - Cx sent: mixed skin cash, Diphtheroids, MRSA; follow results  - Started on broad-spectrum IV antibiotics: ID consulted.    - Update A1C  - Arterial duplex will be needed to assess vascularity   - Planned podiatric surgical intervention: timing per Podiatry       DM-2 complicated by peripheral neuropathy: POA  - Verify home meds for accuracy as does not seem to have diabetic meds  - A1C too does not appear high  - We will monitor BGs closely for 1-2 days  - SSI      Peripheral vascular disease: POA  - On Plavix  - Vascular studies ordered  - Seen by Vascular Surgery: planned angiogram of the LLE prior to podiatry intervention to further assess his lower extremity: due to his CKD, planned CO2 angio. Neprho consulted. - Statin if no C/i       CKD stage 3: POA  Mild hyperkalemia: POA  - Baseline Cr 1.6  - Was 2.3 on admission suggesting some renal insufficiency on his CKD stage 3: MO ruled out  - Monitor cr: appears improving  - Continue gentle IV hydration  - Holding lisinopril  - Monitor Cr, K  - Follows with Dr. Raheem Mar: planned CO2 angio. Neprho consulted. Paroxysmal A. Fib: POA  - Not on rate control meds. - Appears was prev on Pradaxa. Appears was taken off after he wore a monitor and as \"couldn't afford it\" in 2014 .    - Started on metoprolol  - TSH, Mg: WNL  - Keep on telemetry       HTN: uncontrolled: POA  - Started on metoprolol: Switched to coreg, decrease dose with some winston noted  - Started hydralazine PO as BP was really high  - Consider CCB if BP remains high   - Hold lisonopril for now  - PRN hydralazine IV      Hypothyroidism: POA  - Updated TSH; WNL    Macrocytic anemia- POA  - Update B12, Folate: Pending      BPH: POA  - On  Flomax  - Monitor for retention: Bladder scans        DVT Prophylaxis: Lovenox     Diet: ADULT DIET;  Regular; 4 carb choices (60 gm/meal)  Code Status: Full Code    PT/OT Eval Status: As tolerated with no weightbearing on the left foot      Disposition -F with telemetry  Planned surgery: timing per Podi  Angio next week prior to surgery  PT/OT eval after Surgery       Renate Somers MD

## 2022-07-08 NOTE — CONSULTS
VascularSurgery   Resident Consult Note      Chief Complaint: Abnormal LE duplex    History obtained from: EMR, Pt    History of Present Illness :   Melly Ro is a 81 y.o. male with a PMx of CKD, DVT, Afib, DM, HTN, HLD,  LLE osteomyelitis s/p toe amputation, PAD s/p angiogram with angioplasty of L pop, L tibioperoneal trunk, L peroneal, and L posterior tibial arteries (6/15/21) presented to [facility] with complaints of worsening wound infection left leg. The pt states he was seeing wound care outpt for a calus on his L foot with no progress in healing. He was told to report to the ED for further care. He states he has been walking on it and he only has pain in that L foot when he does walk. He does use a walker, but states he does not need it as he feels steady on his feet. Denies rest pain to his BLE. He does not recall any vascular intervention to his lower extremities. He does say he saw a vascular surgeon in the past but has not had follow up with them. Pt states he is on plavix but had to stop it in the past due to a GI bleed. Denies smoking. He does take it daily now. He denies fever, chills, SOB, CP, or sick contacts. Risk Factors: Age, Gender, Family History, Smoking, HTN, Hyperlipidemia, Diabetes    Past Medical History:        Diagnosis Date    Diabetic eye exam ([facility] [address] 1/29/14    Dr. Cook Eye DM (diabetes mellitus) ([facility] [address] 1/13/2014    Gout     Hypothyroidism 4/18/2016    Seasonal allergic rhinitis        Past Surgical History:           Procedure Laterality Date    COLONOSCOPY  2009 appx     normal     COLONOSCOPY N/A 6/20/2022    COLONOSCOPY POLYPECTOMY SNARE/COLD BIOPSY performed by Terri Ortiz MD at [address], bilateral    INCISIONAL HERNIA REPAIR      bilateral       Allergies:  Patient has no known allergies. Medications:   Home Meds  No current facility-administered medications on file prior to encounter.      Current Outpatient Medications on File Prior to Encounter   Medication Sig Dispense Refill    diclofenac sodium (VOLTAREN) 1 % GEL Apply 2 g topically 4 times daily as needed for Pain (wrist pain)      ketoconazole (NIZORAL) 2 % cream Apply topically daily Apply topically daily.  terbinafine (LAMISIL) 250 MG tablet Take 250 mg by mouth daily      valACYclovir (VALTREX) 500 MG tablet Take 500 mg by mouth 2 times daily as needed      fexofenadine (ALLEGRA) 180 MG tablet Take 180 mg by mouth daily as needed      acetaminophen (TYLENOL) 325 MG tablet Take 650 mg by mouth every 6 hours as needed for Pain      vitamin D (CHOLECALCIFEROL) 25 MCG (1000 UT) TABS tablet Take 1,000 Units by mouth daily      Multiple Vitamins-Minerals (THERAPEUTIC MULTIVITAMIN-MINERALS) tablet Take 1 tablet by mouth daily      polyethylene glycol (MIRALAX) 17 g PACK packet Take 17 g by mouth daily as needed      clopidogrel (PLAVIX) 75 MG tablet Take 75 mg by mouth daily Take one tablet daily      fluticasone (FLONASE) 50 MCG/ACT nasal spray 1 spray by Each Nostril route daily as needed for Rhinitis or Allergies       lisinopril (PRINIVIL;ZESTRIL) 10 MG tablet Take 10 mg by mouth daily       tamsulosin (FLOMAX) 0.4 MG capsule Take 0.4 mg by mouth daily One tablet nightly      vitamin B-12 (CYANOCOBALAMIN) 1000 MCG tablet Take 1,000 mcg by mouth daily.  aspirin 81 MG tablet Take 81 mg by mouth daily.        Current Meds  hydrALAZINE (APRESOLINE) tablet 25 mg, 3 times per day  carvedilol (COREG) tablet 6.25 mg, BID WC  traMADol (ULTRAM) tablet 50 mg, Q6H PRN  vancomycin (VANCOCIN) intermittent dosing (placeholder), See Admin Instructions  cefepime (MAXIPIME) 2000 mg IVPB minibag, Q12H  hydrALAZINE (APRESOLINE) injection 10 mg, Q4H PRN  methocarbamol (ROBAXIN) tablet 750 mg, 4x Daily PRN  gabapentin (NEURONTIN) capsule 300 mg, Nightly  glucose chewable tablet 16 g, PRN  dextrose bolus 10% 125 mL, PRN   Or  dextrose bolus 10% 250 mL, PRN  glucagon (rDNA) injection 1 mg, PRN  dextrose 5 % solution, PRN  insulin lispro (1 Unit Dial) 0-6 Units, TID WC  insulin lispro (1 Unit Dial) 0-3 Units, Nightly  cetirizine (ZYRTEC) tablet 5 mg, Daily  tamsulosin (FLOMAX) capsule 0.4 mg, Nightly  sodium chloride flush 0.9 % injection 5-40 mL, 2 times per day  sodium chloride flush 0.9 % injection 5-40 mL, PRN  0.9 % sodium chloride infusion, PRN  ondansetron (ZOFRAN-ODT) disintegrating tablet 4 mg, Q8H PRN   Or  ondansetron (ZOFRAN) injection 4 mg, Q6H PRN  polyethylene glycol (GLYCOLAX) packet 17 g, Daily PRN  acetaminophen (TYLENOL) tablet 650 mg, Q6H PRN   Or  acetaminophen (TYLENOL) suppository 650 mg, Q6H PRN  morphine (PF) injection 2 mg, Q3H PRN        Family History:   History reviewed. No pertinent family history. Social History:   TOBACCO:   reports that he quit smoking about 63 years ago. He quit after 5.00 years of use. He has never used smokeless tobacco.  ETOH:   reports no history of alcohol use. DRUGS:   reports no history of drug use. ROS: A 10 point review of systems was conducted, significant findings as noted in HPI. All other systems negative.     Physical exam:   Vitals:    07/07/22 2302 07/08/22 0338 07/08/22 0631 07/08/22 0730   BP: (!) 170/66 (!) 143/67 (!) 167/83 135/69   Pulse: 62 57 54 57   Resp: 16 16 16 17   Temp: 98.2 °F (36.8 °C) 98.2 °F (36.8 °C) 98 °F (36.7 °C) 97.9 °F (36.6 °C)   TempSrc: Oral Oral Oral Oral   SpO2: 100% 99% 97% 99%   Weight:       Height:           General appearance: alert, no acute distress, grooming appropriate  Eyes: PERRLA, no scleral icterus  Neck: trachea midline, no JVD, no lymphadenopathy  Chest/Lungs: CTAB, no crackles/rales, wheezes/rhonchi, normal effort  Cardiovascular: RRR, no murmurs/gallops/rubs  Abdomen: soft, non-tender, non-distended, +BS, no guarding/rigidity  Skin: warm and dry, no rashes  Extremities: no edema, no cyanosis  Neuro: A&Ox3, no focal deficits, sensation intact    Pulses    R P PT DP   R + +/- +/- +/-   L + +/- +/- +/-    +, -/+ ,-/-     Labs:    CBC:   Recent Labs     07/06/22 0434 07/07/22 0458 07/08/22 0455   WBC 7.5 7.7 6.9   HGB 10.9* 12.0* 11.4*   HCT 32.8* 35.3* 33.7*   .0* 100.5* 100.2*    162 160     BMP:   Recent Labs     07/06/22 0435 07/07/22 0458 07/08/22 0455    141 137   K 4.6 5.0 4.6    109 107   CO2 21 23 23   BUN 35* 33* 31*   CREATININE 2.1* 1.9* 1.8*     PT/INR: No results for input(s): PROTIME, INR in the last 72 hours. APTT: No results for input(s): APTT in the last 72 hours. Liver Profile:  Lab Results   Component Value Date/Time    AST 19 09/27/2021 05:38 PM    ALT 11 09/27/2021 05:38 PM    BILITOT 0.3 09/27/2021 05:38 PM    ALKPHOS 59 09/27/2021 05:38 PM     Lab Results   Component Value Date/Time    CHOL 163 04/08/2016 09:55 AM    HDL 54 04/08/2016 09:55 AM    TRIG 80 04/08/2016 09:55 AM     UA:   Lab Results   Component Value Date/Time    COLORU Yellow 07/05/2022 09:54 PM    PHUR 6.0 07/05/2022 09:54 PM    WBCUA 6-9 07/05/2022 09:54 PM    RBCUA 3-4 07/05/2022 09:54 PM    MUCUS 4+ 01/13/2014 11:58 AM    BACTERIA 1+ 07/05/2022 09:54 PM    CLARITYU Clear 07/05/2022 09:54 PM    SPECGRAV 1.015 07/05/2022 09:54 PM    LEUKOCYTESUR Negative 07/05/2022 09:54 PM    UROBILINOGEN 0.2 07/05/2022 09:54 PM    BILIRUBINUR Negative 07/05/2022 09:54 PM    BILIRUBINUR Negative 07/05/2011 09:10 PM    BLOODU TRACE-INTACT 07/05/2022 09:54 PM    GLUCOSEU Negative 07/05/2022 09:54 PM    GLUCOSEU Negative 07/05/2011 09:10 PM    AMORPHOUS 2+ 07/05/2022 09:54 PM       Imaging  BLE arterial duplex  Right   The right ankle/brachial index is non-compressible (PT->255 mmHg; DP->255   mmHg). Left   The left ankle/brachial index is non-compressible at the PTA (>255 mmHg); the   HAM is 0.32 at the DPA (62 mmHg).    There is normal, multiphasic flow identified in the common femoral artery,   suggesting no evidence of significant aorto-iliac inflow disease. There is atherosclerotic plaque involving the superficial femoral artery with   velocities consistent with <50% stenosis. The popliteal artery is occluded. The tibio-peroneal trunk is occluded. The distal anterior tibial artery is occluded and reconstitutes at the   dorsalis pedis artery. Limited visualization of the peroneal artery due to calcific shadowing,   however, flow is visualized in some segments. Limited visualization at the ankle due to bandages. Incidental finding of partially occluding, chronic phlebitic changes involving   the left popliteal vein, consistent with previous DVT. The previous outside study on 5/5/21 was limited. Unable to make accurate   comparison. Previous ABIs were non-compressible bilaterally. Assessment/Plan: This is a 81 y.o. male with hx of CKD, DVT, Afib, DM, HTN, HLD, LLE osteomyelitis s/p toe amputation, PAD s/p angiogram with angioplasty of L pop, L tibioperoneal trunk, L peroneal, and L posterior tibial arteries (6/15/21) presented to [facility] with complaints of worsening wound infection left leg. Denies rest pain. Walks with walker. pain is localized to wound on L foot. Pt takes plavix/ASA, however was on hold in the recent past for GI bleed. Denies smoking. A1C 5.9.    - Given hx of lower extremity vascular issues and duplex results, the pt would benefit from angiogram of the LLE prior to podiatry intervention to further assess his lower extremity  - Pt with CKD, will plan for CO2 angio. - The pt has eaten this morning, so will not be able to get angio today.  NPO Sunday at midnight  - Will get nephrology on board for CKD given pt will likely receive some contrast.   - Plan for CO2 angiogram on Monday.   - Recommend initiation of statin  - Spoke with Dr. Ana Otero and senior resident     JEFF Gagnon - NP  07/08/22  7:55 AM

## 2022-07-08 NOTE — PROGRESS NOTES
Clinical Pharmacy Progress Note    Vancomycin - Management by Pharmacy    Consult Date(s):  7/5/22  Consulting Provider(s):  Dr. Zoya Lara / Plan:  1)  LLE osteomyelitis with cellulitis & ulcer  - Vancomycin   Concurrent Antimicrobials: Cefepime    Day of Vanc Therapy / Ordered Duration: 4   Current Dosing Method: Intermittent dosing due to MO on CKD  o Therapeutic Goal: Trough ~ 15 mcg/mL  o Random level this AM = 15.5 mcg/mL, drawn ~20 hrs after dose yesterday.    o Given that SCr (today 1.8) is trending near baseline of 1.6, will begin scheduled regimen. Will schedule vancomycin 750mg IV q24h to start today. Per Bayesian kinetics, regime predicts an AUC of 445 mg/L*h with a trough of 15.3 mcg/mL. o Plan to repeat a level in a few days to ensure appropriate.  Will continue to monitor clinical condition and make adjustments to regimen as appropriate. Please call with questions--  Thanks--  Makenzie Dee PharmD., [facility]   7/8/2022 8:21 AM  Wireless: [phone]      Interval update:  Podiatry discussing possible need for surgical intervention, partial resection of L 4th ray. Wound cultures growing mixed skin cash and MRSA. Subjective/Objective:   Muara Lopez is a 81 y.o. y/o male with a PMHx that includes DM, CKD (baseline SCr ~ 1.6), gout, hypothyroidism, and seasonal allergies who is admitted with LLE osteomyelitis with cellulitis and ulcer. Pharmacy is consulted to dose Vancomycin.     Ht Readings from Last 1 Encounters:   07/05/22 6' 2\" (1.88 m)     Wt Readings from Last 1 Encounters:   07/05/22 169 lb 8.5 oz (76.9 kg)       Current and Prior Antimicrobials:  Cefepime 2000mg EI q12h (7/5-current)  Vancomycin - Pharmacy to dose   Intermittent dosing (7/5-7/8)   750mg IV q24h (7/8-current)    Vanc Level(s) / Doses:  Date Time Level / Type of Level Interpretation / Dose   7/5 22:39  · 1500mg IV x1 at 22:39  · Intermittent dosing   7/6 04:34 Random = 13.8 mcg/mL · Intermittent dosing  · 1000mg IV x1 at 14:46   7/7 04:58 Random = 14.6 mcg/mL · Intermittent dosing  · Order 750mg IV x1    7/8 0455 Random = 15.5 mcg/mL · Will begin scheduled dosing of 750mg IV q24h. · Predicted  mg/L*h. Note: Serum levels collected for AUC-based dosing may be high if collected in close proximity to the dose administered. This is not necessarily an indicator of toxicity. Recent Labs     07/05/22  1723 07/06/22  0434 07/06/22  0435 07/07/22  0458 07/08/22  0455   CREATININE   < >  --  2.1* 1.9* 1.8*   BUN   < >  --  35* 33* 31*   WBC  --  7.5  --  7.7 6.9    < > = values in this interval not displayed. Estimated Creatinine Clearance: 33 mL/min (A) (based on SCr of 1.8 mg/dL (H)).     Cultures & Sensitivities:  Date Site Micro Susceptibility / Result   7/5 Toe wound Mixed skin cash    MRSA No further w/u    S=Vanc (BILLY=1), TCN.    R = Erythro, Bactrim   7/5 Foot wound Mixed skin cash    MRSA No further w/u    Same as above   7/5 Blood x2 No growth to date

## 2022-07-09 LAB
ALBUMIN SERPL-MCNC: 3.2 G/DL (ref 3.4–5)
ANION GAP SERPL CALCULATED.3IONS-SCNC: 5 MMOL/L (ref 3–16)
BASOPHILS ABSOLUTE: 0 K/UL (ref 0–0.2)
BASOPHILS RELATIVE PERCENT: 0.4 %
BLOOD CULTURE, ROUTINE: NORMAL
BUN BLDV-MCNC: 30 MG/DL (ref 7–20)
CALCIUM SERPL-MCNC: 8.8 MG/DL (ref 8.3–10.6)
CHLORIDE BLD-SCNC: 107 MMOL/L (ref 99–110)
CO2: 24 MMOL/L (ref 21–32)
CREAT SERPL-MCNC: 1.7 MG/DL (ref 0.8–1.3)
EOSINOPHILS ABSOLUTE: 0.3 K/UL (ref 0–0.6)
EOSINOPHILS RELATIVE PERCENT: 3.7 %
GFR AFRICAN AMERICAN: 46
GFR NON-AFRICAN AMERICAN: 38
GLUCOSE BLD-MCNC: 104 MG/DL (ref 70–99)
GLUCOSE BLD-MCNC: 105 MG/DL (ref 70–99)
GLUCOSE BLD-MCNC: 127 MG/DL (ref 70–99)
GLUCOSE BLD-MCNC: 97 MG/DL (ref 70–99)
GLUCOSE BLD-MCNC: 98 MG/DL (ref 70–99)
HCT VFR BLD CALC: 34.8 % (ref 40.5–52.5)
HEMOGLOBIN: 11.9 G/DL (ref 13.5–17.5)
LYMPHOCYTES ABSOLUTE: 1.4 K/UL (ref 1–5.1)
LYMPHOCYTES RELATIVE PERCENT: 19.5 %
MAGNESIUM: 2 MG/DL (ref 1.8–2.4)
MCH RBC QN AUTO: 34.1 PG (ref 26–34)
MCHC RBC AUTO-ENTMCNC: 34.2 G/DL (ref 31–36)
MCV RBC AUTO: 100 FL (ref 80–100)
MONOCYTES ABSOLUTE: 0.6 K/UL (ref 0–1.3)
MONOCYTES RELATIVE PERCENT: 8.1 %
NEUTROPHILS ABSOLUTE: 5 K/UL (ref 1.7–7.7)
NEUTROPHILS RELATIVE PERCENT: 68.3 %
PDW BLD-RTO: 13.1 % (ref 12.4–15.4)
PERFORMED ON: ABNORMAL
PERFORMED ON: NORMAL
PHOSPHORUS: 3.4 MG/DL (ref 2.5–4.9)
PLATELET # BLD: 149 K/UL (ref 135–450)
PMV BLD AUTO: 8.1 FL (ref 5–10.5)
POTASSIUM SERPL-SCNC: 5.1 MMOL/L (ref 3.5–5.1)
RBC # BLD: 3.48 M/UL (ref 4.2–5.9)
SODIUM BLD-SCNC: 136 MMOL/L (ref 136–145)
WBC # BLD: 7.3 K/UL (ref 4–11)

## 2022-07-09 PROCEDURE — 36415 COLL VENOUS BLD VENIPUNCTURE: CPT

## 2022-07-09 PROCEDURE — 85025 COMPLETE CBC W/AUTO DIFF WBC: CPT

## 2022-07-09 PROCEDURE — 6360000002 HC RX W HCPCS: Performed by: INTERNAL MEDICINE

## 2022-07-09 PROCEDURE — 80069 RENAL FUNCTION PANEL: CPT

## 2022-07-09 PROCEDURE — 83735 ASSAY OF MAGNESIUM: CPT

## 2022-07-09 PROCEDURE — 1200000000 HC SEMI PRIVATE

## 2022-07-09 PROCEDURE — 2580000003 HC RX 258: Performed by: INTERNAL MEDICINE

## 2022-07-09 PROCEDURE — 6370000000 HC RX 637 (ALT 250 FOR IP): Performed by: INTERNAL MEDICINE

## 2022-07-09 PROCEDURE — 6370000000 HC RX 637 (ALT 250 FOR IP): Performed by: SURGERY

## 2022-07-09 RX ORDER — NIFEDIPINE 30 MG/1
30 TABLET, FILM COATED, EXTENDED RELEASE ORAL DAILY
Status: DISCONTINUED | OUTPATIENT
Start: 2022-07-09 | End: 2022-07-11

## 2022-07-09 RX ADMIN — CETIRIZINE HYDROCHLORIDE 5 MG: 10 TABLET, FILM COATED ORAL at 08:46

## 2022-07-09 RX ADMIN — TAMSULOSIN HYDROCHLORIDE 0.4 MG: 0.4 CAPSULE ORAL at 22:59

## 2022-07-09 RX ADMIN — SODIUM CHLORIDE, PRESERVATIVE FREE 10 ML: 5 INJECTION INTRAVENOUS at 23:11

## 2022-07-09 RX ADMIN — GABAPENTIN 300 MG: 300 CAPSULE ORAL at 22:59

## 2022-07-09 RX ADMIN — METHOCARBAMOL 750 MG: 750 TABLET ORAL at 15:14

## 2022-07-09 RX ADMIN — TRAMADOL HYDROCHLORIDE 50 MG: 50 TABLET, COATED ORAL at 13:16

## 2022-07-09 RX ADMIN — CEFEPIME HYDROCHLORIDE 2000 MG: 2 INJECTION, POWDER, FOR SOLUTION INTRAVENOUS at 23:17

## 2022-07-09 RX ADMIN — VANCOMYCIN HYDROCHLORIDE 750 MG: 10 INJECTION, POWDER, LYOPHILIZED, FOR SOLUTION INTRAVENOUS at 08:48

## 2022-07-09 RX ADMIN — MORPHINE SULFATE 2 MG: 2 INJECTION, SOLUTION INTRAMUSCULAR; INTRAVENOUS at 03:28

## 2022-07-09 RX ADMIN — CEFEPIME HYDROCHLORIDE 2000 MG: 2 INJECTION, POWDER, FOR SOLUTION INTRAVENOUS at 10:23

## 2022-07-09 RX ADMIN — TRAMADOL HYDROCHLORIDE 50 MG: 50 TABLET, COATED ORAL at 01:38

## 2022-07-09 RX ADMIN — NIFEDIPINE 30 MG: 30 TABLET, EXTENDED RELEASE ORAL at 10:21

## 2022-07-09 RX ADMIN — HYDRALAZINE HYDROCHLORIDE 25 MG: 25 TABLET, FILM COATED ORAL at 22:59

## 2022-07-09 RX ADMIN — HYDRALAZINE HYDROCHLORIDE 25 MG: 25 TABLET, FILM COATED ORAL at 15:14

## 2022-07-09 RX ADMIN — HYDRALAZINE HYDROCHLORIDE 25 MG: 25 TABLET, FILM COATED ORAL at 07:57

## 2022-07-09 ASSESSMENT — PAIN SCALES - GENERAL
PAINLEVEL_OUTOF10: 0
PAINLEVEL_OUTOF10: 5
PAINLEVEL_OUTOF10: 5
PAINLEVEL_OUTOF10: 7
PAINLEVEL_OUTOF10: 10
PAINLEVEL_OUTOF10: 6

## 2022-07-09 ASSESSMENT — PAIN DESCRIPTION - ORIENTATION: ORIENTATION: LEFT

## 2022-07-09 ASSESSMENT — PAIN DESCRIPTION - DESCRIPTORS: DESCRIPTORS: SQUEEZING

## 2022-07-09 ASSESSMENT — PAIN DESCRIPTION - LOCATION
LOCATION: FOOT

## 2022-07-09 NOTE — PROGRESS NOTES
Patient is A&O. VSS with exception to BP being elevated during shift. Medication given as ordered. Patient with some reports of pain throughout shift. Expressed concern about upcoming surgery, residents to bedside to answer questions. No other needs noted at this time.     Electronically signed by Dora Nuñez RN on 7/9/2022 at 6:47 PM

## 2022-07-09 NOTE — PROGRESS NOTES
Vascular Surgery   Daily Progress Note  Patient: José Kay      CC: abnormal LE duplex    SUBJECTIVE:   Patient rested well overnight. No acute complaints. Slept well. No foot pain currently. Sensation and function intact. ROS:   A 14 point review of systems was conducted, significant findings as noted above. All other systems negative. OBJECTIVE:    PHYSICAL EXAM:    Vitals:    07/08/22 2020 07/08/22 2210 07/09/22 0100 07/09/22 0500   BP: (!) 169/77 (!) 181/8 (!) 170/82 (!) 165/76   Pulse: 58  58 54   Resp: 18  18 18   Temp: 97.4 °F (36.3 °C)  97.5 °F (36.4 °C) 97.3 °F (36.3 °C)   TempSrc: Oral  Oral Oral   SpO2: 100%  100% 98%   Weight:       Height:             General appearance: alert, no acute distress, grooming appropriate  Eyes: PERRLA, no scleral icterus  Neck: trachea midline, no JVD, no lymphadenopathy  Chest/Lungs: normal effort  Cardiovascular: RRR  Abdomen: soft, non-tender, non-distended, no guarding/rigidity  Skin: warm and dry, no rashes  Extremities: no edema, no cyanosis  Neuro: A&Ox3, no focal deficits, sensation intact    LABS:   Recent Labs     07/08/22  0455 07/09/22  0514   WBC 6.9 7.3   HGB 11.4* 11.9*   HCT 33.7* 34.8*   .2* 100.0    149        Recent Labs     07/08/22 0455 07/09/22  0514     137 136   K 4.6  4.6 5.1     107 107   CO2 21  23 24   PHOS 2.9 3.4   BUN 32*  31* 30*   CREATININE 1.8*  1.8* 1.7*      No results for input(s): AST, ALT, ALB, BILIDIR, BILITOT, ALKPHOS in the last 72 hours. No results for input(s): LIPASE, AMYLASE in the last 72 hours. No results for input(s): PROT, INR, APTT in the last 72 hours. No results for input(s): CKTOTAL, CKMB, CKMBINDEX, TROPONINI in the last 72 hours.       ASSESSMENT & PLAN:   This is a 81 y.o. male with hx of CKD, DVT, Afib, DM, HTN, HLD, LLE osteomyelitis s/p toe amputation, PAD s/p angiogram with angioplasty of L pop, L tibioperoneal trunk, L peroneal, and L posterior tibial arteries (6/15/21) presented to [facility] with complaints of worsening wound infection left leg. Denies rest pain. Walks with walker. pain is localized to wound on L foot. Pt takes plavix/ASA, however was on hold in the recent past for GI bleed. Denies smoking. A1C 5.9.     - Given hx of lower extremity vascular issues and duplex results, the pt would benefit from angiogram of the LLE prior to podiatry intervention to further assess his lower extremity  - Will get nephrology on board for CKD given pt will likely receive some contrast.   - Plan for CO2 angiogram on Monday. NPO on Sunday midnight  - Started on Lipitor 40 daily    Enrique Wilson DO, [place]  PGY1, General Surgery  07/09/22  6:45 AM    I am post-call today and will not be on campus. Please contact Dr. Darron Caldera at [phone] for questions or concerns regarding this patient.

## 2022-07-09 NOTE — PROGRESS NOTES
Nonpitting edema noted, left lower extremity-noted to be improving secondary to compressive therapy. No pain with calf compression b/l.     NEUROLOGIC: Gross and epicritic sensation is diminished b/l. Protective sensation is diminished at all pedal sites b/l.     DERMATOLOGIC:   Left lower extremity has a full-thickness ulceration noted to the plantar aspect of the left foot subfourth metatarsal head. Wound measures approximately 1.2 cm x 0.6 cm x 0.4 cm. Wound base is a mixture of fibrotic and granular tissue. The wound does probe to bone. Scant seropurulent drainage expressed with compression but no crepitus, fluctuance, or malodor noted. The wound does track medially about 1 cm but no tunneling or undermining noted.     Right LE soft tissue envelope is closed without ulceration or acute signs of infection.     MUSCULOSKELETAL: Muscle strength is 5/5 for all pedal groups tested. Pain with palpation to the periwound, left foot. Ankle joint ROM is decreased in dorsiflexion with the knee extended. 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       COMPARISON: None.       FINDINGS:       3 views of the right foot show distal vascular calcification.       There is been partial amputation of the second, third, fourth and fifth digits.       No erosive changes at the base of the proximal phalanx of the second digit cannot be excluded.       Degenerative changes are seen at the midfoot and sclerosis and subchondral cyst formation.           Impression       Osteomyelitis is not excluded based on this exam particularly at the base of the proximal phalanx of the second digit. If this is a clinical concern evaluation with MRI should be performed.      MRI Left Foot (7/6/2022)  Narrative   MRI OF THE LEFT FOOT       INDICATION: Osteomyelitis.       TECHNIQUE: Multiplanar and multisequence MRI images of the left foot were obtained.       FINDINGS: Images are degraded by motion artifact. The patient is status post resection of the second, and third metatarsal heads. Patient is status post resection of the fifth metatarsal tarsal head and distal shaft as well as the proximal fifth phalanx.       There is abnormal marrow edema identified within the fourth metatarsal head as well as the proximal fourth phalanx.       There is deformity of the proximal first phalanx which may reflect the sequelae of a remote fracture or prior surgery. There is moderate osteoarthrosis identified at the first metatarsophalangeal joint.       There is some mild osteoarthrosis identified at the third, fourth and fifth tarsometatarsal joints. No osseous erosion is identified.       There is subcutaneous edema identified about the foot. There is severe atrophy involving the deep musculature of the foot. Correlate for chronic denervation injury. No soft tissue abscess is identified.           Impression   1. Limited exam secondary to motion artifact. 2. There is marrow edema identified within the fourth metatarsal head and proximal fourth phalanx. In the setting of infection or open wound in this region, this is compatible with osteomyelitis. 3. Status post amputations of the second, third, and fifth metatarsals as detailed above. 4. Healed fracture deformities identified involving the proximal first phalanx. There is osteoarthrosis identified at the first metatarsophalangeal joint. 5. There is subcutaneous edema identified about the foot. This can be seen with cellulitis and/or venous insufficiency. No soft tissue abscess is identified. 6. There is severe fatty atrophy involving the deep muscular foot. Correlate for underlying neuropathy. Arterial Duplex;  Left Lower Extremity  Type of Study:        Extremities Arteries:Lower Extremities Arterial Duplex, VL LOWER EXTREMITY    ARTERIES DUPLEX LEFT.       Tech Comments   Right   The right ankle/brachial index is non-compressible (PT->255 mmHg; DP->255   mmHg). Left   The left ankle/brachial index is non-compressible at the PTA (>255 mmHg); the   HAM is 0.32 at the DPA (62 mmHg). There is normal, multiphasic flow identified in the common femoral artery,   suggesting no evidence of significant aorto-iliac inflow disease. There is atherosclerotic plaque involving the superficial femoral artery with   velocities consistent with <50% stenosis. The popliteal artery is occluded. The tibio-peroneal trunk is occluded. The distal anterior tibial artery is occluded and reconstitutes at the   dorsalis pedis artery. Limited visualization of the peroneal artery due to calcific shadowing,   however, flow is visualized in some segments. Limited visualization at the ankle due to bandages. Incidental finding of partially occluding, chronic phlebitic changes involving   the left popliteal vein, consistent with previous DVT. The previous outside study on 5/5/21 was limited. Unable to make accurate   comparison. Previous ABIs were non-compressible bilaterally. ASSESSMENT/PLAN  1. Cellulitis, left lower extremity-improving  2. Osteomyelitis, left foot  3. Full-thickness ulceration, plantar left foot (Huston stage III)  4. Critical Limb Ischemia 2/2 Peripheral Arterial Disease, left lower extremity  5. Type 2 diabetes mellitus with peripheral neuropathy  6. History of multiple amputations    -Patient seen and examined at bedside this AM  -Hypertensive otherwise VSS, No leukocytosis noted (WBC 7.3)  -ESR 53 and CRP 10.4  -HbA1c 5.9  -Blood cultures 1 & 2  NGTD  -Imaging reviewed, impression noted above  -Wound culture: MRSA, diphtheroids  -Continue antibiotics per ID recs; vancomycin and cefepime  -Left lower extremity dressed with Betadine soaked gauze, DSD, and Ace with gentle compression  -Prevalon boots reapplied.  Patient is to wear at all times while in bed to

## 2022-07-09 NOTE — PROGRESS NOTES
Hospital Medicine  Progress Note    PCP: Catelydia Rodriguez    Date of Admission: 7/5/2022      Chief Complaint: Worsening left foot pain and swelling      History Of Present Illness:    81 y.o. male with DM type 2, HTN and hypothyroidism, PAF (off pradaxa) and BPH who presented with worsening wound left foot. The wound started in the bottom of his left foot as a callus. Patient has been putting Vaseline and soaking the foot. When he saw his podiatrist (Dr. Alexandria Brennan ) last week he was recommended to start using Betadine to the left foot. Patient has not following the wound care recommendations, has been putting Betadine on the wound and has been wearing a sock over it and continue to walk bearing weight on the left foot. Patient started noticing mild yellow drainage coming from the wound. Patient denies fever, chills, nausea, vomiting, chest pain or shortness of breath    On presentation,, apart from elevated BP, he had fairly normal vitals. He had no leucocytosis. Hgb was 12 with . Cr was 2.3 (baseline about 1.6). Interim HPI  No new complaints  Sitting up in bed  Denies chest pain    Noted with some mild winston on telemetry    MRI shows osteo of left foot. Awaiting vascular studies      Medications: Reviewed        Allergies:  Patient has no known allergies. REVIEW OF SYSTEMS COMPLETED:   Pertinent positives as noted in the HPI. All other systems reviewed and negative. PHYSICAL EXAM PERFORMED:    BP (!) 166/80   Pulse 56   Temp 97.7 °F (36.5 °C) (Oral)   Resp 18   Ht 6' 2\" (1.88 m)   Wt 169 lb 8.5 oz (76.9 kg)   SpO2 98%   BMI 21.77 kg/m²     General appearance:  No apparent distress, appears stated age and cooperative. HEENT:  Normal cephalic, atraumatic without obvious deformity. Pupils equal, round, and reactive to light. Extra ocular muscles intact. Conjunctivae/corneas clear. Neck: Supple, with full range of motion. No jugular venous distention.  Trachea midline. Respiratory:  Normal respiratory effort. Clear to auscultation, bilaterally without Rales/Wheezes/Rhonchi. Cardiovascular:  Regular rate and rhythm with normal S1/S2 without murmurs, rubs or gallops. Abdomen: Soft, non-tender, non-distended with normal bowel sounds. Musculoskeletal:  No clubbing, cyanosis or edema bilaterally. Full range of motion without deformity. Skin: Skin color, texture, turgor normal.  Full-thickness ulceration-plantar aspect of the left foot, fourth metatarsal head, wound base with fibrotic and granular tissue, wound probed to the bone per podiatry with scant purulent drainage, malodorous. No crepitus or fluctuance  Neurologic:  Neurovascularly intact without any focal sensory/motor deficits. Cranial nerves: II-XII intact, grossly non-focal.  Psychiatric:  Alert and oriented, thought content appropriate, normal insight  Capillary Refill: Brisk,3 seconds, normal  Peripheral Pulses: DP and PT pulses-nonpalpable bilaterally, biphasic with Doppler per podiatry on the right, monophasic on the left. Labs:     Recent Labs     07/07/22 0458 07/08/22 0455 07/09/22  0514   WBC 7.7 6.9 7.3   HGB 12.0* 11.4* 11.9*   HCT 35.3* 33.7* 34.8*    160 149     Recent Labs     07/07/22 0458 07/07/22 0458 07/08/22 0455 07/09/22  0514     --  136  137 136   K 5.0   < > 4.6  4.6 5.1     --  106  107 107   CO2 23  --  21  23 24   BUN 33*  --  32*  31* 30*   CREATININE 1.9*  --  1.8*  1.8* 1.7*   CALCIUM 8.7  --  8.6  8.5 8.8   PHOS  --   --  2.9 3.4    < > = values in this interval not displayed. No results for input(s): AST, ALT, BILIDIR, BILITOT, ALKPHOS in the last 72 hours. No results for input(s): INR in the last 72 hours. No results for input(s): Hali Hartley in the last 72 hours.     Urinalysis:      Lab Results   Component Value Date/Time    NITRU Negative 07/05/2022 09:54 PM    WBCUA 6-9 07/05/2022 09:54 PM    BACTERIA 1+ 07/05/2022 09:54 PM RBCUA 3-4 07/05/2022 09:54 PM    BLOODU TRACE-INTACT 07/05/2022 09:54 PM    SPECGRAV 1.015 07/05/2022 09:54 PM    GLUCOSEU Negative 07/05/2022 09:54 PM    GLUCOSEU Negative 07/05/2011 09:10 PM       Radiology:     CXR: I have reviewed the CXR with the following interpretation: NA  EKG:  I have reviewed the EKG with the following interpretation: NA    VL DUP LOWER EXTREMITY ARTERIES LEFT   Final Result      MRI FOOT LEFT WO CONTRAST   Final Result   1. Limited exam secondary to motion artifact. 2. There is marrow edema identified within the fourth metatarsal head and proximal fourth phalanx. In the setting of infection or open wound in this region, this is compatible with osteomyelitis. 3. Status post amputations of the second, third, and fifth metatarsals as detailed above. 4. Healed fracture deformities identified involving the proximal first phalanx. There is osteoarthrosis identified at the first metatarsophalangeal joint. 5. There is subcutaneous edema identified about the foot. This can be seen with cellulitis and/or venous insufficiency. No soft tissue abscess is identified. 6. There is severe fatty atrophy involving the deep muscular foot. Correlate for underlying neuropathy. XR FOOT LEFT (MIN 3 VIEWS)   Final Result      Osteomyelitis is not excluded based on this exam particularly at the base of the proximal phalanx of the second digit. If this is a clinical concern evaluation with MRI should be performed. Consults:    IP CONSULT TO PODIATRY  PHARMACY TO DOSE VANCOMYCIN  IP CONSULT TO HOSPITALIST  IP CONSULT TO PHARMACY  IP CONSULT TO PHARMACY  IP CONSULT TO INFECTIOUS DISEASES  IP CONSULT TO VASCULAR SURGERY  IP CONSULT TO NEPHROLOGY        ASSESSMENT/PLAN:  81 y.o. male with DM type 2, HTN and hypothyroidism, PAF (off pradaxa) and BPH who presented with worsening wound left foot.        Cellulitis left lower extremity with full-thickness ulceration plantar surface with osteomyelitis. POA  - Possible diabetic ulcer. H/e, appears A1C is not elevated and Home med list does not have DM meds: verify home med list  - R/o sec to severe PAD   - Hx of Multiple amputations. - Sed rate was 53 and CRP10  - MRI shows osteo  - Cx sent: mixed skin cash, Diphtheroids, MRSA; follow results  - Started on broad-spectrum IV antibiotics: ID consulted.    - Update A1C  - Arterial duplex will be needed to assess vascularity   - Planned podiatric surgical intervention: timing per Podiatry       DM-2 complicated by peripheral neuropathy: POA  - Verify home meds for accuracy as does not seem to have diabetic meds  - A1C too does not appear high  - We will monitor BGs closely for 1-2 days  - SSI      Peripheral vascular disease: POA  - On Plavix  - Vascular studies ordered  - Seen by Vascular Surgery: planned angiogram of the LLE prior to podiatry intervention to further assess his lower extremity: due to his CKD, planned CO2 angio. Neprho consulted. - Statin if no C/i       CKD stage 3: POA  Mild hyperkalemia: POA  - Baseline Cr 1.6  - Was 2.3 on admission suggesting some renal insufficiency on his CKD stage 3: MO ruled out  - Monitor cr: appears improving  - Continue gentle IV hydration  - Holding lisinopril  - Monitor Cr, K  - Follows with Dr. Stas Corey: planned CO2 angio. Neprho consulted. Paroxysmal A. Fib: POA  - Not on rate control meds. - Appears was prev on Pradaxa. Appears was taken off after he wore a monitor and as \"couldn't afford it\" in 2014 .    - Started on metoprolol  - TSH, Mg: WNL  - Keep on telemetry       HTN: uncontrolled: POA  - Started on metoprolol: Switched to coreg, decrease dose with some winston noted  - Started hydralazine PO as BP was really high  - Add CCB as BP remains high   - Hold lisonopril for now  - PRN hydralazine IV      Hypothyroidism: POA  - Updated TSH; WNL    Macrocytic anemia- POA  - Update B12, Folate: Pending      BPH: POA  - On  Flomax  - Monitor for retention: Bladder scans        DVT Prophylaxis: Lovenox     Diet: ADULT DIET;  Regular; 4 carb choices (60 gm/meal)  Code Status: Full Code    PT/OT Eval Status: As tolerated with no weightbearing on the left foot      Disposition -F with telemetry  Planned surgery: timing per Podiatry  Angio next week prior to surgery  PT/OT eval after Surgery       Ghislaine Nugent MD

## 2022-07-09 NOTE — PROGRESS NOTES
Interval History and plan:      Cr stable at 1.7  BP on high side     Plan:  Agree with adding nifedipine                      Assessment :     CKD Stage 3b  Likely due to DM and hypertension  Cr is 1.8- eGFR of 36 ml/min  UA- bland, wbc 6-9,  No recent one in the system      Hypertension   BP: (131-166)/(61-80)  Heart Rate:  [54-58]   BP goal inpatient [id number] systolic inpatient  Also has Afib    Anemia of CKD             sepsis  DM  PVD    [facility] would like to thank Yasmany Cedeño MD   for opportunity to serve this patient      Please call with questions at-   24 Hrs Answering service [phone] or  7 am- 5 pm via Perfect serve or cell phone  Linette Bronson MD          CC/reason for consult :     MO     HPI :     Es Guaman is a 81 y.o. male presented to   the hospital on 7/5/2022 with wound infection in the left leg. He is followed outpatient by podiatry,for wound, which continued to   Get worse despite the treatment. Also has discharge from the wound  Brought to hospital admitted  Also found to have CKD  because of which we are consulted. ROS:     Seen with- no family    positives in bold   Constitutional:  fever, chills, weakness, weight change, fatigue  Skin:  rash, pruritus, hair loss, bruising, dry skin, petechiae  Head, Face, Neck   headaches, swelling,  cervical adenopathy  Respiratory: shortness of breath, cough, or wheezing  Cardiovascular: chest pain, palpitations, dizzy, edema  Gastrointestinal: nausea, vomiting, diarrhea, constipation,belly pain    Yellow skin, blood in stool  Musculoskeletal:  back pain, muscle weakness, gait problems,       joint pain or swelling. Genitourinary:  dysuria, poor urine flow, flank pain, blood in urine  Neurologic:  vertigo, TIA'S, syncope, seizures, focal weakness  Psychosocial:  insomnia, anxiety, or depression.   Additional positive findings:                    All other remaining systems are negative or unable to obtain        PMH/PSH/SH/Family History:     Past Medical History:   Diagnosis Date    [facility] 1/29/14    Dr. Allyson Antoine DM (diabetes mellitus) ([facility] 1/13/2014    Gout     Hypothyroidism 4/18/2016    Seasonal allergic rhinitis        Past Surgical History:   Procedure Laterality Date    COLONOSCOPY  2009 appx     normal     COLONOSCOPY N/A 6/20/2022    COLONOSCOPY POLYPECTOMY SNARE/COLD BIOPSY performed by Kurt Chao MD at [address], bilateral    INCISIONAL HERNIA REPAIR      bilateral        reports that he quit smoking about 63 years ago. He quit after 5.00 years of use. He has never used smokeless tobacco. He reports that he does not drink alcohol and does not use drugs. family history is not on file.          Medication:     Current Facility-Administered Medications: NIFEdipine (ADALAT CC) extended release tablet 30 mg, 30 mg, Oral, Daily  vancomycin (VANCOCIN) 750 mg in dextrose 5 % 250 mL IVPB, 750 mg, IntraVENous, Q24H  atorvastatin (LIPITOR) tablet 40 mg, 40 mg, Oral, Nightly  hydrALAZINE (APRESOLINE) tablet 25 mg, 25 mg, Oral, 3 times per day  carvedilol (COREG) tablet 6.25 mg, 6.25 mg, Oral, BID WC  traMADol (ULTRAM) tablet 50 mg, 50 mg, Oral, Q6H PRN  cefepime (MAXIPIME) 2000 mg IVPB minibag, 2,000 mg, IntraVENous, Q12H  hydrALAZINE (APRESOLINE) injection 10 mg, 10 mg, IntraVENous, Q4H PRN  methocarbamol (ROBAXIN) tablet 750 mg, 750 mg, Oral, 4x Daily PRN  gabapentin (NEURONTIN) capsule 300 mg, 300 mg, Oral, Nightly  glucose chewable tablet 16 g, 4 tablet, Oral, PRN  dextrose bolus 10% 125 mL, 125 mL, IntraVENous, PRN **OR** dextrose bolus 10% 250 mL, 250 mL, IntraVENous, PRN  glucagon (rDNA) injection 1 mg, 1 mg, IntraMUSCular, PRN  dextrose 5 % solution, 100 mL/hr, IntraVENous, PRN  insulin lispro (1 Unit Dial) 0-6 Units, 0-6 Units, SubCUTAneous, TID WC  insulin lispro (1 Unit Dial) 0-3 Units, 0-3 Units, SubCUTAneous, Nightly  cetirizine (ZYRTEC) tablet 5 mg, 5 mg, Oral, Daily  tamsulosin (FLOMAX) capsule 0.4 mg, 0.4 mg, Oral, Nightly  sodium chloride flush 0.9 % injection 5-40 mL, 5-40 mL, IntraVENous, 2 times per day  sodium chloride flush 0.9 % injection 5-40 mL, 5-40 mL, IntraVENous, PRN  0.9 % sodium chloride infusion, , IntraVENous, PRN  ondansetron (ZOFRAN-ODT) disintegrating tablet 4 mg, 4 mg, Oral, Q8H PRN **OR** ondansetron (ZOFRAN) injection 4 mg, 4 mg, IntraVENous, Q6H PRN  polyethylene glycol (GLYCOLAX) packet 17 g, 17 g, Oral, Daily PRN  acetaminophen (TYLENOL) tablet 650 mg, 650 mg, Oral, Q6H PRN **OR** acetaminophen (TYLENOL) suppository 650 mg, 650 mg, Rectal, Q6H PRN  morphine (PF) injection 2 mg, 2 mg, IntraVENous, Q3H PRN       Vitals :     Vitals:    07/09/22 1019   BP: 131/61   Pulse: 57   Resp: 16   Temp: 97.6 °F (36.4 °C)   SpO2: 99%       I & O :       Intake/Output Summary (Last 24 hours) at 7/9/2022 1044  Last data filed at 7/9/2022 0845  Gross per 24 hour   Intake 240 ml   Output 2400 ml   Net -2160 ml        Physical Examination :     General appearance: Anxious- no, distressed- no, in good spirits-    Alert, pleasant  HEENT: Lips- normal, teeth- ok , oral mucosa- moist  Neck : Mass- no, appears symmetrical, JVD- not visible  Respiratory: Respiratory effort-  normal, wheeze- no, crackles -   Cardiovascular:  Ausculation- No M/R/G, Edema left leg   Abdomen: visible mass- no, distention- no, scar- no, tenderness- no                            hepatosplenomegaly-  no  Musculoskeletal:  clubbing no,cyanosis- no , digital ischemia- no                           muscle strength- grossly normal , tone - grossly normal  Skin: rashes- no , ulcers- no, induration- no, tightening - no  Psychiatric:  Judgement and insight- normal           AAO X 3  Additional finding:   Has left leg wound      LABS:     Recent Labs     07/07/22  0458 07/08/22  0455 07/09/22  0514   WBC 7.7 6.9 7.3   HGB 12.0* 11.4* 11.9*   HCT 35.3* 33.7* 34.8*    160 149     Recent Labs     07/07/22  0458 07/07/22  0458 07/08/22  0455 07/09/22  0514     --  136  137 136   K 5.0   < > 4.6  4.6 5.1     --  106  107 107   CO2 23  --  21  23 24   BUN 33*  --  32*  31* 30*   CREATININE 1.9*  --  1.8*  1.8* 1.7*   GLUCOSE 82  --  96  96 98   MG  --   --  2.00 2.00   PHOS  --   --  2.9 3.4    < > = values in this interval not displayed.

## 2022-07-09 NOTE — PLAN OF CARE
Problem: ABCDS Injury Assessment  Goal: Absence of physical injury  Outcome: Progressing     Problem: Skin/Tissue Integrity - Adult  Goal: Skin integrity remains intact  Outcome: Progressing     Problem: Chronic Conditions and Co-morbidities  Goal: Patient's chronic conditions and co-morbidity symptoms are monitored and maintained or improved  Outcome: Progressing     Problem: Pain  Goal: Verbalizes/displays adequate comfort level or baseline comfort level  Outcome: Progressing

## 2022-07-09 NOTE — PROGRESS NOTES
Clinical Pharmacy Progress Note    Vancomycin - Management by Pharmacy    Consult Date(s):  7/5/22  Consulting Provider(s):  Dr. Shwetha Garcia / Plan:  1)  LLE osteomyelitis with cellulitis & ulcer  - Vancomycin   Concurrent Antimicrobials: Cefepime    Day of Vanc Therapy / Ordered Duration: 5   Current Dosing Method: Bayesian-Guided AUC dosing   Therapeutic Goal: 400-600 mg/L*hr   Current Dose / Frequency:  750mg IV q24h    Plan / Rationale:    o Regimen scheduled yesterday given Scr trending towards baseline (~1.6). Scr today 1.7.    o Continue 750mg IV q24h. Per Bayesian kinetics, regimen predicts an AUC of 427 mg/L*h with a trough of 14.6 mcg/mL. o Plan to obtain a random level 7/10 AM to ensure appropriate.  Will continue to monitor clinical condition and make adjustments to regimen as appropriate. Please call with questions--  Thanks--  Casey Mckeon PharmD., [facility]   7/9/2022 9:14 AM  Wireless: [phone]      Interval update:  Planning for CO2 arteriogram next week. Podiatry to assess surgical intervention based on these results. Patient remains on antibiotics; afebrile. Subjective/Objective:   Toni Pinto is a 81 y.o. y/o male with a PMHx that includes DM, CKD (baseline SCr ~ 1.6), gout, hypothyroidism, and seasonal allergies who is admitted with LLE osteomyelitis with cellulitis and ulcer. Pharmacy is consulted to dose Vancomycin.     Ht Readings from Last 1 Encounters:   07/05/22 6' 2\" (1.88 m)     Wt Readings from Last 1 Encounters:   07/05/22 169 lb 8.5 oz (76.9 kg)       Current and Prior Antimicrobials:  Cefepime 2000mg EI q12h (7/5-current)  Vancomycin - Pharmacy to dose   Intermittent dosing (7/5-7/8)   750mg IV q24h (7/8-current)    Vanc Level(s) / Doses:  Date Time Level / Type of Level Interpretation / Dose   7/5 22:39  · 1500mg IV x1 at 22:39  · Intermittent dosing   7/6 04:34 Random = 13.8 mcg/mL · Intermittent dosing  · 1000mg IV x1 at 14:46   7/7 04:58 Random = 14.6 mcg/mL · Intermittent dosing  · Order 750mg IV x1    7/8 0455 Random = 15.5 mcg/mL · Will begin scheduled dosing of 750mg IV q24h. · Predicted  mg/L*h.     7/10 0600 Random - ordered ·    Note: Serum levels collected for AUC-based dosing may be high if collected in close proximity to the dose administered. This is not necessarily an indicator of toxicity. Recent Labs     07/07/22  0458 07/08/22 0455 07/09/22  0514   CREATININE 1.9* 1.8*  1.8* 1.7*   BUN 33* 32*  31* 30*   WBC 7.7 6.9 7.3       Estimated Creatinine Clearance: 35 mL/min (A) (based on SCr of 1.7 mg/dL (H)).     Cultures & Sensitivities:  Date Site Micro Susceptibility / Result   7/5 Toe wound Mixed skin cash    MRSA No further w/u    S=Vanc (BILLY=1), TCN.    R = Erythro, Bactrim   7/5 Foot wound Mixed skin cash    MRSA No further w/u    Same as above   7/5 Blood x2 No growth to date

## 2022-07-10 ENCOUNTER — APPOINTMENT (OUTPATIENT)
Dept: GENERAL RADIOLOGY | Age: 87
DRG: 617 | End: 2022-07-10
Payer: MEDICARE

## 2022-07-10 LAB
ALBUMIN SERPL-MCNC: 3.2 G/DL (ref 3.4–5)
ANION GAP SERPL CALCULATED.3IONS-SCNC: 6 MMOL/L (ref 3–16)
BACTERIA: ABNORMAL /HPF
BASOPHILS ABSOLUTE: 0 K/UL (ref 0–0.2)
BASOPHILS RELATIVE PERCENT: 0.5 %
BILIRUBIN URINE: NEGATIVE
BLOOD, URINE: NEGATIVE
BUN BLDV-MCNC: 34 MG/DL (ref 7–20)
CALCIUM SERPL-MCNC: 8.6 MG/DL (ref 8.3–10.6)
CHLORIDE BLD-SCNC: 108 MMOL/L (ref 99–110)
CLARITY: CLEAR
CO2: 23 MMOL/L (ref 21–32)
COLOR: YELLOW
CREAT SERPL-MCNC: 1.9 MG/DL (ref 0.8–1.3)
CULTURE, BLOOD 2: NORMAL
EOSINOPHILS ABSOLUTE: 0.3 K/UL (ref 0–0.6)
EOSINOPHILS RELATIVE PERCENT: 3.6 %
EPITHELIAL CELLS, UA: ABNORMAL /HPF (ref 0–5)
GFR AFRICAN AMERICAN: 41
GFR NON-AFRICAN AMERICAN: 34
GLUCOSE BLD-MCNC: 101 MG/DL (ref 70–99)
GLUCOSE BLD-MCNC: 139 MG/DL (ref 70–99)
GLUCOSE BLD-MCNC: 162 MG/DL (ref 70–99)
GLUCOSE BLD-MCNC: 96 MG/DL (ref 70–99)
GLUCOSE BLD-MCNC: 98 MG/DL (ref 70–99)
GLUCOSE URINE: NEGATIVE MG/DL
HCT VFR BLD CALC: 32.7 % (ref 40.5–52.5)
HEMOGLOBIN: 10.9 G/DL (ref 13.5–17.5)
KETONES, URINE: NEGATIVE MG/DL
LEUKOCYTE ESTERASE, URINE: NEGATIVE
LYMPHOCYTES ABSOLUTE: 1.4 K/UL (ref 1–5.1)
LYMPHOCYTES RELATIVE PERCENT: 17.5 %
MAGNESIUM: 2.1 MG/DL (ref 1.8–2.4)
MCH RBC QN AUTO: 34 PG (ref 26–34)
MCHC RBC AUTO-ENTMCNC: 33.4 G/DL (ref 31–36)
MCV RBC AUTO: 101.9 FL (ref 80–100)
MICROSCOPIC EXAMINATION: YES
MONOCYTES ABSOLUTE: 0.8 K/UL (ref 0–1.3)
MONOCYTES RELATIVE PERCENT: 9.4 %
NEUTROPHILS ABSOLUTE: 5.7 K/UL (ref 1.7–7.7)
NEUTROPHILS RELATIVE PERCENT: 69 %
NITRITE, URINE: NEGATIVE
PDW BLD-RTO: 13 % (ref 12.4–15.4)
PERFORMED ON: ABNORMAL
PERFORMED ON: NORMAL
PH UA: 6 (ref 5–8)
PHOSPHORUS: 3.1 MG/DL (ref 2.5–4.9)
PLATELET # BLD: 163 K/UL (ref 135–450)
PMV BLD AUTO: 8.3 FL (ref 5–10.5)
POTASSIUM SERPL-SCNC: 5.2 MMOL/L (ref 3.5–5.1)
PROTEIN UA: 30 MG/DL
RBC # BLD: 3.21 M/UL (ref 4.2–5.9)
RBC UA: ABNORMAL /HPF (ref 0–4)
SODIUM BLD-SCNC: 137 MMOL/L (ref 136–145)
SPECIFIC GRAVITY UA: 1.02 (ref 1–1.03)
URINE REFLEX TO CULTURE: ABNORMAL
URINE TYPE: ABNORMAL
UROBILINOGEN, URINE: 0.2 E.U./DL
VANCOMYCIN RANDOM: 14 UG/ML
WBC # BLD: 8.3 K/UL (ref 4–11)
WBC UA: ABNORMAL /HPF (ref 0–5)

## 2022-07-10 PROCEDURE — 6360000002 HC RX W HCPCS: Performed by: INTERNAL MEDICINE

## 2022-07-10 PROCEDURE — 36415 COLL VENOUS BLD VENIPUNCTURE: CPT

## 2022-07-10 PROCEDURE — 6370000000 HC RX 637 (ALT 250 FOR IP): Performed by: INTERNAL MEDICINE

## 2022-07-10 PROCEDURE — 6370000000 HC RX 637 (ALT 250 FOR IP): Performed by: SURGERY

## 2022-07-10 PROCEDURE — 2580000003 HC RX 258: Performed by: INTERNAL MEDICINE

## 2022-07-10 PROCEDURE — 81001 URINALYSIS AUTO W/SCOPE: CPT

## 2022-07-10 PROCEDURE — 85025 COMPLETE CBC W/AUTO DIFF WBC: CPT

## 2022-07-10 PROCEDURE — 1200000000 HC SEMI PRIVATE

## 2022-07-10 PROCEDURE — 71045 X-RAY EXAM CHEST 1 VIEW: CPT

## 2022-07-10 PROCEDURE — 80069 RENAL FUNCTION PANEL: CPT

## 2022-07-10 PROCEDURE — 80202 ASSAY OF VANCOMYCIN: CPT

## 2022-07-10 PROCEDURE — 83735 ASSAY OF MAGNESIUM: CPT

## 2022-07-10 RX ORDER — SODIUM CHLORIDE 9 MG/ML
INJECTION, SOLUTION INTRAVENOUS CONTINUOUS
Status: DISCONTINUED | OUTPATIENT
Start: 2022-07-10 | End: 2022-07-10

## 2022-07-10 RX ORDER — SODIUM CHLORIDE 9 MG/ML
INJECTION, SOLUTION INTRAVENOUS CONTINUOUS
Status: DISCONTINUED | OUTPATIENT
Start: 2022-07-11 | End: 2022-07-11

## 2022-07-10 RX ADMIN — CEFEPIME HYDROCHLORIDE 2000 MG: 2 INJECTION, POWDER, FOR SOLUTION INTRAVENOUS at 10:59

## 2022-07-10 RX ADMIN — SODIUM CHLORIDE, PRESERVATIVE FREE 10 ML: 5 INJECTION INTRAVENOUS at 19:56

## 2022-07-10 RX ADMIN — SODIUM CHLORIDE: 9 INJECTION, SOLUTION INTRAVENOUS at 21:24

## 2022-07-10 RX ADMIN — HYDRALAZINE HYDROCHLORIDE 25 MG: 25 TABLET, FILM COATED ORAL at 07:00

## 2022-07-10 RX ADMIN — SODIUM CHLORIDE, PRESERVATIVE FREE 10 ML: 5 INJECTION INTRAVENOUS at 08:00

## 2022-07-10 RX ADMIN — HYDRALAZINE HYDROCHLORIDE 25 MG: 25 TABLET, FILM COATED ORAL at 21:13

## 2022-07-10 RX ADMIN — CETIRIZINE HYDROCHLORIDE 5 MG: 10 TABLET, FILM COATED ORAL at 07:53

## 2022-07-10 RX ADMIN — CARVEDILOL 6.25 MG: 6.25 TABLET, FILM COATED ORAL at 17:01

## 2022-07-10 RX ADMIN — CARVEDILOL 6.25 MG: 6.25 TABLET, FILM COATED ORAL at 07:53

## 2022-07-10 RX ADMIN — NIFEDIPINE 30 MG: 30 TABLET, EXTENDED RELEASE ORAL at 07:53

## 2022-07-10 RX ADMIN — GABAPENTIN 300 MG: 300 CAPSULE ORAL at 19:56

## 2022-07-10 RX ADMIN — ATORVASTATIN CALCIUM 40 MG: 40 TABLET, FILM COATED ORAL at 19:56

## 2022-07-10 RX ADMIN — VANCOMYCIN HYDROCHLORIDE 750 MG: 10 INJECTION, POWDER, LYOPHILIZED, FOR SOLUTION INTRAVENOUS at 07:56

## 2022-07-10 RX ADMIN — INSULIN LISPRO 1 UNITS: 100 INJECTION, SOLUTION INTRAVENOUS; SUBCUTANEOUS at 12:01

## 2022-07-10 RX ADMIN — TAMSULOSIN HYDROCHLORIDE 0.4 MG: 0.4 CAPSULE ORAL at 19:56

## 2022-07-10 RX ADMIN — CEFEPIME HYDROCHLORIDE 2000 MG: 2 INJECTION, POWDER, FOR SOLUTION INTRAVENOUS at 21:25

## 2022-07-10 NOTE — PROGRESS NOTES
Patient is A&O, but seems slightly confused when discussing situation. Patient asked this RN \" Alda Antes? \" but was able to state he was in the hospital but seemed unsure as to why. Patient reminded about infection in foot and need for IV abx. Will work on keep patient awake during the day to help prevent increase in confusion. Patient VSS. LLE dressing remains clean, dry and intact. No other needs or changes noted at this time.      Electronically signed by Isidro Blizzard, RN on 7/10/2022 at 8:08 AM

## 2022-07-10 NOTE — PROGRESS NOTES
Hospital Medicine  Progress Note    PCP: Tigist Deleon    Date of Admission: 7/5/2022      Chief Complaint: Worsening left foot pain and swelling      History Of Present Illness:    81 y.o. male with DM type 2, HTN and hypothyroidism, PAF (off pradaxa) and BPH who presented with worsening wound left foot. The wound started in the bottom of his left foot as a callus. Patient has been putting Vaseline and soaking the foot. When he saw his podiatrist (Dr. Brain Castro ) last week he was recommended to start using Betadine to the left foot. Patient has not following the wound care recommendations, has been putting Betadine on the wound and has been wearing a sock over it and continue to walk bearing weight on the left foot. Patient started noticing mild yellow drainage coming from the wound. Patient denies fever, chills, nausea, vomiting, chest pain or shortness of breath    On presentation,, apart from elevated BP, he had fairly normal vitals. He had no leucocytosis. Hgb was 12 with . Cr was 2.3 (baseline about 1.6). Interim HPI  No new complaints  Sitting up in bed  Denies chest pain    MRI shows osteo of left foot. Mild confusion today. We will obtain UA    Sitting OOB eating BF    Awaiting vascular studies      Medications: Reviewed        Allergies:  Patient has no known allergies. REVIEW OF SYSTEMS COMPLETED:   Pertinent positives as noted in the HPI. All other systems reviewed and negative. PHYSICAL EXAM PERFORMED:    BP (!) 148/71   Pulse 64   Temp 98.2 °F (36.8 °C) (Oral)   Resp 16   Ht 6' 2\" (1.88 m)   Wt 169 lb 8.5 oz (76.9 kg)   SpO2 96%   BMI 21.77 kg/m²     General appearance:  No apparent distress, appears stated age and cooperative. HEENT:  Normal cephalic, atraumatic without obvious deformity. .  Neck: Supple, with full range of motion. No jugular venous distention. Trachea midline. Respiratory:  Normal respiratory effort.  Clear to auscultation, bilaterally without Rales/Wheezes/Rhonchi. Cardiovascular:  Regular rate and rhythm with normal S1/S2 without murmurs, rubs or gallops. Abdomen: Soft, non-tender, non-distended with normal bowel sounds. Musculoskeletal:  No clubbing, cyanosis or edema bilaterally. Full range of motion without deformity. Skin: Dressing over left foot: C/D/I  Neurologic:  Neurovascularly intact without any focal sensory/motor deficits. Cranial nerves: II-XII intact, grossly non-focal.  Psychiatric:  Alert and oriented, thought content appropriate, normal insight  Capillary Refill: Brisk,3 seconds, normal  Peripheral Pulses: DP and PT pulses-nonpalpable bilaterally, biphasic with Doppler per podiatry on the right, monophasic on the left. Labs:     Recent Labs     07/08/22  0455 07/09/22  0514 07/10/22  0639   WBC 6.9 7.3 8.3   HGB 11.4* 11.9* 10.9*   HCT 33.7* 34.8* 32.7*    149 163     Recent Labs     07/08/22  0455 07/09/22  0514 07/10/22  0639     137 136 137   K 4.6  4.6 5.1 5.2*     107 107 108   CO2 21  23 24 23   BUN 32*  31* 30* 34*   CREATININE 1.8*  1.8* 1.7* 1.9*   CALCIUM 8.6  8.5 8.8 8.6   PHOS 2.9 3.4 3.1     No results for input(s): AST, ALT, BILIDIR, BILITOT, ALKPHOS in the last 72 hours. No results for input(s): INR in the last 72 hours. No results for input(s): Cherre Gash in the last 72 hours.     Urinalysis:      Lab Results   Component Value Date/Time    NITRU Negative 07/05/2022 09:54 PM    WBCUA 6-9 07/05/2022 09:54 PM    BACTERIA 1+ 07/05/2022 09:54 PM    RBCUA 3-4 07/05/2022 09:54 PM    BLOODU TRACE-INTACT 07/05/2022 09:54 PM    SPECGRAV 1.015 07/05/2022 09:54 PM    GLUCOSEU Negative 07/05/2022 09:54 PM    GLUCOSEU Negative 07/05/2011 09:10 PM       Radiology:     CXR: I have reviewed the CXR with the following interpretation: NA  EKG:  I have reviewed the EKG with the following interpretation: NA    VL DUP LOWER EXTREMITY ARTERIES LEFT   Final Result      MRI FOOT LEFT WO CONTRAST   Final Result   1. Limited exam secondary to motion artifact. 2. There is marrow edema identified within the fourth metatarsal head and proximal fourth phalanx. In the setting of infection or open wound in this region, this is compatible with osteomyelitis. 3. Status post amputations of the second, third, and fifth metatarsals as detailed above. 4. Healed fracture deformities identified involving the proximal first phalanx. There is osteoarthrosis identified at the first metatarsophalangeal joint. 5. There is subcutaneous edema identified about the foot. This can be seen with cellulitis and/or venous insufficiency. No soft tissue abscess is identified. 6. There is severe fatty atrophy involving the deep muscular foot. Correlate for underlying neuropathy. XR FOOT LEFT (MIN 3 VIEWS)   Final Result      Osteomyelitis is not excluded based on this exam particularly at the base of the proximal phalanx of the second digit. If this is a clinical concern evaluation with MRI should be performed. Consults:    IP CONSULT TO PODIATRY  PHARMACY TO DOSE VANCOMYCIN  IP CONSULT TO HOSPITALIST  IP CONSULT TO PHARMACY  IP CONSULT TO PHARMACY  IP CONSULT TO INFECTIOUS DISEASES  IP CONSULT TO VASCULAR SURGERY  IP CONSULT TO NEPHROLOGY        ASSESSMENT/PLAN:  81 y.o. male with DM type 2, HTN and hypothyroidism, PAF (off pradaxa) and BPH who presented with worsening wound left foot. Cellulitis left lower extremity with full-thickness ulceration plantar surface with osteomyelitis. POA  - Possible diabetic ulcer. H/e, appears A1C is not elevated and Home med list does not have DM meds: verify home med list  - R/o sec to severe PAD   - Hx of Multiple amputations.     - Sed rate was 53 and CRP10  - MRI shows osteo  - Cx sent: mixed skin cash, Diphtheroids, MRSA; follow results  - Started on broad-spectrum IV antibiotics: ID consulted.    - Update A1C  - Arterial duplex will be needed to assess vascularity   - Planned podiatric surgical intervention: timing per Podiatry       DM-2 complicated by peripheral neuropathy: POA  - Verify home meds for accuracy as does not seem to have diabetic meds  - A1C too does not appear high  - We will monitor BGs closely for 1-2 days  - SSI      Peripheral vascular disease: POA  - On Plavix  - Vascular studies ordered  - Seen by Vascular Surgery: planned angiogram of the LLE prior to podiatry intervention to further assess his lower extremity: due to his CKD, planned CO2 angio. Neprho consulted. - Statin if no C/i       CKD stage 3: POA  Mild hyperkalemia: POA  - Baseline Cr 1.6  - Was 2.3 on admission suggesting some renal insufficiency on his CKD stage 3: MO ruled out  - Monitor cr: appears improving  - Continue gentle IV hydration  - Holding lisinopril  - Monitor Cr, K  - Follows with Dr. Evan Perez: planned CO2 angio. Neprho consulted. Paroxysmal A. Fib: POA  - Not on rate control meds. - Appears was prev on Pradaxa. Appears was taken off after he wore a monitor and as \"couldn't afford it\" in 2014 . - Started on metoprolol  - TSH, Mg: WNL  - Keep on telemetry       HTN: POA  - Started on metoprolol: Switched to coreg, decrease dose with some winston noted  - Started hydralazine PO as BP was really high  - Added CCB as BP remains high   - Hold lisonopril for now inessa with hyperkalemia  - BP improved  - PRN hydralazine IV      Hypothyroidism: POA  - Updated TSH; WNL    Macrocytic anemia- POA  - Update B12, Folate: WNL      BPH: POA  - On  Flomax  - Monitor for retention: Bladder scans        DVT Prophylaxis: Lovenox     Diet: ADULT DIET;  Regular; 4 carb choices (60 gm/meal)  Code Status: Full Code    PT/OT Eval Status: As tolerated with no weightbearing on the left foot      Disposition -GMF with telemetry  CO2 angiogram Monday  Planned surgery: thereafter per Podiatry  PT/OT eval after Surgery       Art Machuca MD

## 2022-07-10 NOTE — PROGRESS NOTES
Interval History and plan:      BP high yesterday  Nifedipine was added yesterday  Blood pressure much better today  k on high side  Cr stable    Plan:  Plan for angiogram tomorrow  Ordered for iv fluids to start 6 am tomorrow                         Assessment :     CKD Stage 3b  Likely due to DM and hypertension  Cr is 1.8- eGFR of 36 ml/min  UA- bland, wbc 6-9,  No recent one in the system      Hypertension   BP: (138-149)/(60-68)  Heart Rate:  [67-73]   BP goal inpatient 582-930 systolic inpatient  Also has Afib    Anemia of CKD             sepsis  DM  PVD    [facility] would like to thank Fabiola Hill MD   for opportunity to serve this patient      Please call with questions at-   24 Hrs Answering service [phone] or  7 am- 5 pm via Perfect [employer] or cell phone  Cameron Murphy MD          CC/reason for consult :     MO     HPI :     Ronnie Sol is a 81 y.o. male presented to   the hospital on 7/5/2022 with wound infection in the left leg. He is followed outpatient by podiatry,for wound, which continued to   Get worse despite the treatment. Also has discharge from the wound  Brought to hospital admitted  Also found to have CKD  because of which we are consulted. ROS:     Seen with- no family    positives in bold   Constitutional:  fever, chills, weakness, weight change, fatigue  Skin:  rash, pruritus, hair loss, bruising, dry skin, petechiae  Head, Face, Neck   headaches, swelling,  cervical adenopathy  Respiratory: shortness of breath, cough, or wheezing  Cardiovascular: chest pain, palpitations, dizzy, edema  Gastrointestinal: nausea, vomiting, diarrhea, constipation,belly pain    Yellow skin, blood in stool  Musculoskeletal:  back pain, muscle weakness, gait problems,       joint pain or swelling.   Genitourinary:  dysuria, poor urine flow, flank pain, blood in urine  Neurologic:  vertigo, TIA'S, syncope, seizures, focal weakness  Psychosocial:  insomnia, anxiety, or depression. Additional positive findings:                    All other remaining systems are negative or unable to obtain        PMH/PSH/SH/Family History:     Past Medical History:   Diagnosis Date    Diabetic eye exam ([facility] [address] 1/29/14    Dr. Heriberto Thakkar DM (diabetes mellitus) ([facility] [address] 1/13/2014    Gout     Hypothyroidism 4/18/2016    Seasonal allergic rhinitis        Past Surgical History:   Procedure Laterality Date    COLONOSCOPY  2009 appx     normal     COLONOSCOPY N/A 6/20/2022    COLONOSCOPY POLYPECTOMY SNARE/COLD BIOPSY performed by Raiza Ivan MD at [address], bilateral    INCISIONAL HERNIA REPAIR      bilateral        reports that he quit smoking about 63 years ago. He quit after 5.00 years of use. He has never used smokeless tobacco. He reports that he does not drink alcohol and does not use drugs. family history is not on file.          Medication:     Current Facility-Administered Medications: NIFEdipine (ADALAT CC) extended release tablet 30 mg, 30 mg, Oral, Daily  vancomycin (VANCOCIN) 750 mg in dextrose 5 % 250 mL IVPB, 750 mg, IntraVENous, Q24H  atorvastatin (LIPITOR) tablet 40 mg, 40 mg, Oral, Nightly  hydrALAZINE (APRESOLINE) tablet 25 mg, 25 mg, Oral, 3 times per day  carvedilol (COREG) tablet 6.25 mg, 6.25 mg, Oral, BID WC  traMADol (ULTRAM) tablet 50 mg, 50 mg, Oral, Q6H PRN  cefepime (MAXIPIME) 2000 mg IVPB minibag, 2,000 mg, IntraVENous, Q12H  hydrALAZINE (APRESOLINE) injection 10 mg, 10 mg, IntraVENous, Q4H PRN  methocarbamol (ROBAXIN) tablet 750 mg, 750 mg, Oral, 4x Daily PRN  gabapentin (NEURONTIN) capsule 300 mg, 300 mg, Oral, Nightly  glucose chewable tablet 16 g, 4 tablet, Oral, PRN  dextrose bolus 10% 125 mL, 125 mL, IntraVENous, PRN **OR** dextrose bolus 10% 250 mL, 250 mL, IntraVENous, PRN  glucagon (rDNA) injection 1 mg, 1 mg, IntraMUSCular, PRN  dextrose 5 % solution, 100 mL/hr, IntraVENous, PRN  insulin lispro (1 Unit Dial) 0-6 Units, 0-6 Units, SubCUTAneous, TID WC  insulin lispro (1 Unit Dial) 0-3 Units, 0-3 Units, SubCUTAneous, Nightly  cetirizine (ZYRTEC) tablet 5 mg, 5 mg, Oral, Daily  tamsulosin (FLOMAX) capsule 0.4 mg, 0.4 mg, Oral, Nightly  sodium chloride flush 0.9 % injection 5-40 mL, 5-40 mL, IntraVENous, 2 times per day  sodium chloride flush 0.9 % injection 5-40 mL, 5-40 mL, IntraVENous, PRN  0.9 % sodium chloride infusion, , IntraVENous, PRN  ondansetron (ZOFRAN-ODT) disintegrating tablet 4 mg, 4 mg, Oral, Q8H PRN **OR** ondansetron (ZOFRAN) injection 4 mg, 4 mg, IntraVENous, Q6H PRN  polyethylene glycol (GLYCOLAX) packet 17 g, 17 g, Oral, Daily PRN  acetaminophen (TYLENOL) tablet 650 mg, 650 mg, Oral, Q6H PRN **OR** acetaminophen (TYLENOL) suppository 650 mg, 650 mg, Rectal, Q6H PRN  morphine (PF) injection 2 mg, 2 mg, IntraVENous, Q3H PRN       Vitals :     Vitals:    07/10/22 0355   BP: 138/68   Pulse: 67   Resp: 16   Temp: 97.5 °F (36.4 °C)   SpO2: 97%       I & O :       Intake/Output Summary (Last 24 hours) at 7/10/2022 4405  Last data filed at 7/10/2022 0358  Gross per 24 hour   Intake 600 ml   Output 1300 ml   Net -700 ml        Physical Examination :     General appearance: Anxious- no, distressed- no, in good spirits-    Alert, pleasant  HEENT: Lips- normal, teeth- ok , oral mucosa- moist  Neck : Mass- no, appears symmetrical, JVD- not visible  Respiratory: Respiratory effort-  normal, wheeze- no, crackles -   Cardiovascular:  Ausculation- No M/R/G, Edema left leg   Abdomen: visible mass- no, distention- no, scar- no, tenderness- no                            hepatosplenomegaly-  no  Musculoskeletal:  clubbing no,cyanosis- no , digital ischemia- no                           muscle strength- grossly normal , tone - grossly normal  Skin: rashes- no , ulcers- no, induration- no, tightening - no  Psychiatric:  Judgement and insight- normal           AAO X 3  Additional finding:   Has left leg wound      LABS: Recent Labs     07/08/22 0455 07/09/22  0514   WBC 6.9 7.3   HGB 11.4* 11.9*   HCT 33.7* 34.8*    149     Recent Labs     07/08/22 0455 07/09/22 0514     137 136   K 4.6  4.6 5.1     107 107   CO2 21  23 24   BUN 32*  31* 30*   CREATININE 1.8*  1.8* 1.7*   GLUCOSE 96  96 98   MG 2.00 2.00   PHOS 2.9 3.4

## 2022-07-10 NOTE — PROGRESS NOTES
Vascular Surgery   Daily Progress Note  Patient: Toni Pinto      CC: abnormal LE duplex    SUBJECTIVE:   Patient rested well overnight. No acute complaints. Slept well. No foot pain currently. Sensation and function intact. ROS:   A 14 point review of systems was conducted, significant findings as noted above. All other systems negative. OBJECTIVE:    PHYSICAL EXAM:    Vitals:    07/09/22 1724 07/09/22 2010 07/09/22 2259 07/10/22 0355   BP:  133/69 (!) 149/60 138/68   Pulse: 57 71 73 67   Resp:   16 16   Temp:  97.3 °F (36.3 °C) 97.6 °F (36.4 °C) 97.5 °F (36.4 °C)   TempSrc:  Oral Oral Oral   SpO2:  98% 96% 97%   Weight:       Height:             General appearance: alert, no acute distress, grooming appropriate  Eyes: PERRLA, no scleral icterus  Neck: trachea midline, no JVD, no lymphadenopathy  Chest/Lungs: normal effort  Cardiovascular: RRR  Abdomen: soft, non-tender, non-distended, no guarding/rigidity  Skin: warm and dry, no rashes  Extremities: no edema, no cyanosis, doplerable RLE, DP/PT, palpable pop fem  Neuro: A&Ox3, no focal deficits, sensation intact    LABS:   Recent Labs     07/08/22  0455 07/09/22  0514   WBC 6.9 7.3   HGB 11.4* 11.9*   HCT 33.7* 34.8*   .2* 100.0    149        Recent Labs     07/08/22 0455 07/09/22  0514     137 136   K 4.6  4.6 5.1     107 107   CO2 21  23 24   PHOS 2.9 3.4   BUN 32*  31* 30*   CREATININE 1.8*  1.8* 1.7*      No results for input(s): AST, ALT, ALB, BILIDIR, BILITOT, ALKPHOS in the last 72 hours. No results for input(s): LIPASE, AMYLASE in the last 72 hours. No results for input(s): PROT, INR, APTT in the last 72 hours. No results for input(s): CKTOTAL, CKMB, CKMBINDEX, TROPONINI in the last 72 hours.       ASSESSMENT & PLAN:   This is a 81 y.o. male with hx of CKD, DVT, Afib, DM, HTN, HLD, LLE osteomyelitis s/p toe amputation, PAD s/p angiogram with angioplasty of L pop, L tibioperoneal trunk, L peroneal, and L posterior tibial arteries (6/15/21) presented to [facility] with complaints of worsening wound infection left leg. Denies rest pain. Walks with walker. pain is localized to wound on L foot. Pt takes plavix/ASA, however was on hold in the recent past for GI bleed. Denies smoking. A1C 5.9.     - Given hx of lower extremity vascular issues and duplex results, the pt would benefit from angiogram of the LLE prior to podiatry intervention to further assess his lower extremity  - Neph following appreciate recs for hydration prior to angio tomorrow  - Plan for CO2 angiogram on Monday. NPO at midnight  - continue on Lipitor 40 daily    Mingo Ji DO  PGY1, General Surgery  07/10/22  6:02 AM  [employer]  Pager: [phone]    I am post-call today and will not be on campus. Please contact Dr. Tad Stuart at [phone] for questions or concerns regarding this patient.

## 2022-07-10 NOTE — PLAN OF CARE
Problem: Chronic Conditions and Co-morbidities  Goal: Patient's chronic conditions and co-morbidity symptoms are monitored and maintained or improved  Outcome: Progressing     Problem: Pain  Goal: Verbalizes/displays adequate comfort level or baseline comfort level  Outcome: Progressing  Flowsheets (Taken 7/10/2022 0752)  Verbalizes/displays adequate comfort level or baseline comfort level:   Encourage patient to monitor pain and request assistance   Assess pain using appropriate pain scale   Administer analgesics based on type and severity of pain and evaluate response

## 2022-07-10 NOTE — PROGRESS NOTES
Patient is A&O. VSS exception of slight elevated BP x1, no BM. Patient voiding yellow/clear urine in bedside urinal. Pt. Denied pain during shift. Pt. Tolerating diet. BG within norm limits - no insulin needed per sliding scale. IV ABX infused without complications. No further needs at this time. Will continue to monitor.

## 2022-07-10 NOTE — PROGRESS NOTES
Clinical Pharmacy Progress Note    Vancomycin - Management by Pharmacy    Consult Date(s):  7/5/22  Consulting Provider(s):  Dr. Mendez Lies / Plan:  1)  LLE osteomyelitis with cellulitis & ulcer  - Vancomycin   Concurrent Antimicrobials: Cefepime    Day of Vanc Therapy / Ordered Duration: 6   Current Dosing Method: Bayesian-Guided AUC dosing   Therapeutic Goal: 400-600 mg/L*hr   Current Dose / Frequency:  750mg IV q24h    Plan / Rationale:    o Regimen scheduled 7/8 given Scr trending towards baseline of ~1.6. Scr today 1.9 from 1.7 yesterday. o Random level today = 14 mg/L, drawn ~22 hrs after previous dose. Calculated AUC is 432 mg/L*hr with trough of 14.6 mg/L.    o Will continue this same regimen for now. Scr may continue to fluctuate, so will monitor closely. Further levels to be ordered as clinically appropriate.  Will continue to monitor clinical condition and make adjustments to regimen as appropriate. Please call with questions--  Thanks--  Kaylen Santillan PharmD., [facility]   7/10/2022 8:50 AM  Wireless: [phone]      Interval update:  Planning for CO2 arteriogram possibly tomorrow. Podiatry to assess surgical intervention based on these results. Patient remains on antibiotics; afebrile. Subjective/Objective:   Panchito Gautam is a 81 y.o. y/o male with a PMHx that includes DM, CKD (baseline SCr ~ 1.6), gout, hypothyroidism, and seasonal allergies who is admitted with LLE osteomyelitis with cellulitis and ulcer. Pharmacy is consulted to dose Vancomycin.     Ht Readings from Last 1 Encounters:   07/05/22 6' 2\" (1.88 m)     Wt Readings from Last 1 Encounters:   07/05/22 169 lb 8.5 oz (76.9 kg)       Current and Prior Antimicrobials:  Cefepime 2000mg EI q12h (7/5-current)  Vancomycin - Pharmacy to dose   Intermittent dosing (7/5-7/8)   750mg IV q24h (7/8-current)    Vanc Level(s) / Doses:  Date Time Level / Type of Level Interpretation / Dose   7/5 22:39  · 1500mg IV x1 at 22:39  · Intermittent dosing   7/6 04:34 Random = 13.8 mcg/mL · Intermittent dosing  · 1000mg IV x1 at 14:46   7/7 04:58 Random = 14.6 mcg/mL · Intermittent dosing  · Order 750mg IV x1    7/8 0455 Random = 15.5 mcg/mL · Will begin scheduled dosing of 750mg IV q24h. · Predicted  mg/L*h.     7/10 0639 Random = 14 mcg/mL · Drawn ~22 hrs after previous dose  · Calculated AUC = 432 mg/L*h  · Continue same regimen for now   Note: Serum levels collected for AUC-based dosing may be high if collected in close proximity to the dose administered. This is not necessarily an indicator of toxicity. Recent Labs     07/08/22 0455 07/09/22  0514 07/10/22  0639   CREATININE 1.8*  1.8* 1.7* 1.9*   BUN 32*  31* 30* 34*   WBC 6.9 7.3 8.3       Estimated Creatinine Clearance: 31 mL/min (A) (based on SCr of 1.9 mg/dL (H)).     Cultures & Sensitivities:  Date Site Micro Susceptibility / Result   7/5 Toe wound Mixed skin cash    MRSA No further w/u    S=Vanc (BILLY=1), TCN.    R = Erythro, Bactrim   7/5 Foot wound Mixed skin cash    MRSA No further w/u    Same as above   7/5 Blood x2 No growth to date

## 2022-07-10 NOTE — PROGRESS NOTES
Podiatric Surgery Daily Progress Note      Admit Date: 7/5/2022      Code:Full Code    Patient seen and examined, labs and records reviewed    Subjective:     Patient seen at bedside this AM.  Patient denies any overnight acute event. Patient denies f/c/n/v/sob/cp. Review of Systems: A 12 point review of symptoms is unremarkable with the exception of the chief complaint. Patient specifically denies nausea, fever, vomiting, chills, shortness of breath, chest pain, abdominal pain, constipation or difficulty urinating. Objective     BP (!) 148/71   Pulse 64   Temp 98.2 °F (36.8 °C) (Oral)   Resp 16   Ht 6' 2\" (1.88 m)   Wt 169 lb 8.5 oz (76.9 kg)   SpO2 96%   BMI 21.77 kg/m²      I/O:    Intake/Output Summary (Last 24 hours) at 7/10/2022 0935  Last data filed at 7/10/2022 0750  Gross per 24 hour   Intake 600 ml   Output 1100 ml   Net -500 ml              Wt Readings from Last 3 Encounters:   07/05/22 169 lb 8.5 oz (76.9 kg)   07/05/22 178 lb 3.2 oz (80.8 kg)   06/20/22 177 lb (80.3 kg)       LABS:    Recent Labs     07/09/22  0514 07/10/22  0639   WBC 7.3 8.3   HGB 11.9* 10.9*   HCT 34.8* 32.7*    163        Recent Labs     07/10/22  0639      K 5.2*      CO2 23   PHOS 3.1   BUN 34*   CREATININE 1.9*      No results for input(s): PROT, INR, APTT in the last 72 hours. LOWER EXTREMITY EXAMINATION    Dressing to left LE intact. No strikethrough noted to the external dressing. Mild serosanguineous drainage noted to the internal layers of the dressing. VASCULAR: DP and PT pulses are non-palpable 0/4 b/l. Upon hand-held Doppler examination DP and PT pulses were noted to be biphasic to the right lower extremity. Upon hand-held Doppler examination DP and PT pulses were noted to be monophasic to the left lower extremity. CFT is brisk to the digits of the right foot. CFT is sluggish to the left foot. Skin temperature is warm to cool from proximal to distal with no focal calor noted. Nonpitting edema noted, left lower extremity-noted to be improving secondary to compressive therapy. No pain with calf compression b/l.     NEUROLOGIC: Gross and epicritic sensation is diminished b/l. Protective sensation is diminished at all pedal sites b/l.     DERMATOLOGIC:   Left lower extremity has a full-thickness ulceration noted to the plantar aspect of the left foot subfourth metatarsal head. Wound measures approximately 1.2 cm x 0.6 cm x 0.4 cm. Wound base is a mixture of fibrotic and granular tissue. The wound does probe to bone. Scant seropurulent drainage expressed with compression but no crepitus, fluctuance, or malodor noted. The wound does track medially about 1 cm but no tunneling or undermining noted.     Patient provided verbal consent for pictures obtained today:          Right LE soft tissue envelope is closed without ulceration or acute signs of infection.     MUSCULOSKELETAL: Muscle strength is 5/5 for all pedal groups tested. Pain with palpation to the periwound, left foot. Ankle joint ROM is decreased in dorsiflexion with the knee extended. 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       COMPARISON: None.       FINDINGS:       3 views of the right foot show distal vascular calcification.       There is been partial amputation of the second, third, fourth and fifth digits.       No erosive changes at the base of the proximal phalanx of the second digit cannot be excluded.       Degenerative changes are seen at the midfoot and sclerosis and subchondral cyst formation.           Impression       Osteomyelitis is not excluded based on this exam particularly at the base of the proximal phalanx of the second digit. If this is a clinical concern evaluation with MRI should be performed.      MRI Left Foot (7/6/2022)  Narrative   MRI OF THE LEFT FOOT     INDICATION: Osteomyelitis.       TECHNIQUE: Multiplanar and multisequence MRI images of the left foot were obtained.       FINDINGS: Images are degraded by motion artifact. The patient is status post resection of the second, and third metatarsal heads. Patient is status post resection of the fifth metatarsal tarsal head and distal shaft as well as the proximal fifth phalanx.       There is abnormal marrow edema identified within the fourth metatarsal head as well as the proximal fourth phalanx.       There is deformity of the proximal first phalanx which may reflect the sequelae of a remote fracture or prior surgery. There is moderate osteoarthrosis identified at the first metatarsophalangeal joint.       There is some mild osteoarthrosis identified at the third, fourth and fifth tarsometatarsal joints. No osseous erosion is identified.       There is subcutaneous edema identified about the foot. There is severe atrophy involving the deep musculature of the foot. Correlate for chronic denervation injury. No soft tissue abscess is identified.           Impression   1. Limited exam secondary to motion artifact. 2. There is marrow edema identified within the fourth metatarsal head and proximal fourth phalanx. In the setting of infection or open wound in this region, this is compatible with osteomyelitis. 3. Status post amputations of the second, third, and fifth metatarsals as detailed above. 4. Healed fracture deformities identified involving the proximal first phalanx. There is osteoarthrosis identified at the first metatarsophalangeal joint. 5. There is subcutaneous edema identified about the foot. This can be seen with cellulitis and/or venous insufficiency. No soft tissue abscess is identified. 6. There is severe fatty atrophy involving the deep muscular foot. Correlate for underlying neuropathy. Arterial Duplex;  Left Lower Extremity  Type of Study:        Extremities Arteries:Lower Extremities Arterial Duplex, VL LOWER EXTREMITY    ARTERIES DUPLEX LEFT.       Tech Comments   Right   The right ankle/brachial index is non-compressible (PT->255 mmHg; DP->255   mmHg). Left   The left ankle/brachial index is non-compressible at the PTA (>255 mmHg); the   HAM is 0.32 at the DPA (62 mmHg). There is normal, multiphasic flow identified in the common femoral artery,   suggesting no evidence of significant aorto-iliac inflow disease. There is atherosclerotic plaque involving the superficial femoral artery with   velocities consistent with <50% stenosis. The popliteal artery is occluded. The tibio-peroneal trunk is occluded. The distal anterior tibial artery is occluded and reconstitutes at the   dorsalis pedis artery. Limited visualization of the peroneal artery due to calcific shadowing,   however, flow is visualized in some segments. Limited visualization at the ankle due to bandages. Incidental finding of partially occluding, chronic phlebitic changes involving   the left popliteal vein, consistent with previous DVT. The previous outside study on 5/5/21 was limited. Unable to make accurate   comparison. Previous ABIs were non-compressible bilaterally. ASSESSMENT/PLAN  1. Cellulitis, left lower extremity-improving  2. Osteomyelitis, left foot  3. Full-thickness ulceration, plantar left foot (Huston stage III)  4. Critical Limb Ischemia 2/2 Peripheral Arterial Disease, left lower extremity  5. Type 2 diabetes mellitus with peripheral neuropathy  6.  History of multiple amputations    -Patient seen and examined at bedside this AM  -Hypertensive otherwise VSS, No leukocytosis noted (WBC 8.3)  -ESR 53 and CRP 10.4  -HbA1c 5.9  -Blood cultures 1 & 2  NGTD  -Imaging reviewed, impression noted above  -Wound culture: MRSA, diphtheroids  -Continue antibiotics per ID recommendations; vancomycin and cefepime  -Left lower extremity dressed with Betadine soaked gauze, DSD, and Ace with gentle compression  -Prevalon boots reapplied. Patient is to wear at all times while in bed to prevent further deep tissue injury.  -Recommend patient be nonweightbearing to left lower extremity but weight bearing as tolerated to right lower extremity. -Vascular study shows that patient has critical limb ischemia to the left lower extremity. Surgical intervention will depend on vascular recommendations. -Vascular consulted and plan for C02 angiogram Monday; appreciate recommendations. DISPO: Full-thickness ulceration with cellulitis and osteomyelitis, left foot. Recommend patient continue on antibiotics per ID recommendations. Patient is to remain NWB to left LE. Vascular consulted; plan for CO2 angiogram monday. Surgical intervention from podiatric standpoint will be dependent on vascular recommendations.       Patient assessment and plan was discussed with Dr. Hay Zendejas DPM.    Jose Angel Sparrow DPM  Podiatric Resident, PGY-2  Pager #: [phone] or Perfect Serve

## 2022-07-11 ENCOUNTER — HOSPITAL ENCOUNTER (OUTPATIENT)
Dept: INTERVENTIONAL RADIOLOGY/VASCULAR | Age: 87
Discharge: HOME | End: 2022-07-11

## 2022-07-11 LAB
ABO/RH: NORMAL
ALBUMIN SERPL-MCNC: 3.4 G/DL (ref 3.4–5)
ALBUMIN SERPL-MCNC: 3.5 G/DL (ref 3.4–5)
ANAEROBIC CULTURE: ABNORMAL
ANION GAP SERPL CALCULATED.3IONS-SCNC: 10 MMOL/L (ref 3–16)
ANION GAP SERPL CALCULATED.3IONS-SCNC: 9 MMOL/L (ref 3–16)
ANTI-XA UNFRAC HEPARIN: 0.34 IU/ML (ref 0.3–0.7)
ANTI-XA UNFRAC HEPARIN: 0.65 IU/ML (ref 0.3–0.7)
ANTIBODY SCREEN: NORMAL
APTT: 81.2 SEC (ref 23–34.3)
BASOPHILS ABSOLUTE: 0 K/UL (ref 0–0.2)
BASOPHILS ABSOLUTE: 0.1 K/UL (ref 0–0.2)
BASOPHILS RELATIVE PERCENT: 0.5 %
BASOPHILS RELATIVE PERCENT: 0.6 %
BUN BLDV-MCNC: 35 MG/DL (ref 7–20)
BUN BLDV-MCNC: 41 MG/DL (ref 7–20)
CALCIUM SERPL-MCNC: 9 MG/DL (ref 8.3–10.6)
CALCIUM SERPL-MCNC: 9.4 MG/DL (ref 8.3–10.6)
CHLORIDE BLD-SCNC: 107 MMOL/L (ref 99–110)
CHLORIDE BLD-SCNC: 108 MMOL/L (ref 99–110)
CO2: 22 MMOL/L (ref 21–32)
CO2: 23 MMOL/L (ref 21–32)
CREAT SERPL-MCNC: 1.8 MG/DL (ref 0.8–1.3)
CREAT SERPL-MCNC: 2 MG/DL (ref 0.8–1.3)
EOSINOPHILS ABSOLUTE: 0.3 K/UL (ref 0–0.6)
EOSINOPHILS ABSOLUTE: 0.3 K/UL (ref 0–0.6)
EOSINOPHILS RELATIVE PERCENT: 3.4 %
EOSINOPHILS RELATIVE PERCENT: 3.7 %
GFR AFRICAN AMERICAN: 39
GFR AFRICAN AMERICAN: 44
GFR NON-AFRICAN AMERICAN: 32
GFR NON-AFRICAN AMERICAN: 36
GLUCOSE BLD-MCNC: 108 MG/DL (ref 70–99)
GLUCOSE BLD-MCNC: 114 MG/DL (ref 70–99)
GLUCOSE BLD-MCNC: 151 MG/DL (ref 70–99)
GLUCOSE BLD-MCNC: 69 MG/DL (ref 70–99)
GLUCOSE BLD-MCNC: 92 MG/DL (ref 70–99)
GLUCOSE BLD-MCNC: 96 MG/DL (ref 70–99)
GRAM STAIN RESULT: ABNORMAL
HCT VFR BLD CALC: 34.7 % (ref 40.5–52.5)
HCT VFR BLD CALC: 37.7 % (ref 40.5–52.5)
HCT VFR BLD CALC: 38 % (ref 40.5–52.5)
HEMOGLOBIN: 11.7 G/DL (ref 13.5–17.5)
HEMOGLOBIN: 12.6 G/DL (ref 13.5–17.5)
HEMOGLOBIN: 12.7 G/DL (ref 13.5–17.5)
INR BLD: 1.01 (ref 0.87–1.14)
INR BLD: 1.16 (ref 0.87–1.14)
LYMPHOCYTES ABSOLUTE: 1.5 K/UL (ref 1–5.1)
LYMPHOCYTES ABSOLUTE: 1.6 K/UL (ref 1–5.1)
LYMPHOCYTES RELATIVE PERCENT: 18.6 %
LYMPHOCYTES RELATIVE PERCENT: 20.4 %
MAGNESIUM: 2 MG/DL (ref 1.8–2.4)
MAGNESIUM: 2.1 MG/DL (ref 1.8–2.4)
MCH RBC QN AUTO: 33.8 PG (ref 26–34)
MCH RBC QN AUTO: 34 PG (ref 26–34)
MCH RBC QN AUTO: 34 PG (ref 26–34)
MCHC RBC AUTO-ENTMCNC: 33.3 G/DL (ref 31–36)
MCHC RBC AUTO-ENTMCNC: 33.5 G/DL (ref 31–36)
MCHC RBC AUTO-ENTMCNC: 33.7 G/DL (ref 31–36)
MCV RBC AUTO: 100.8 FL (ref 80–100)
MCV RBC AUTO: 101.3 FL (ref 80–100)
MCV RBC AUTO: 101.7 FL (ref 80–100)
MONOCYTES ABSOLUTE: 0.7 K/UL (ref 0–1.3)
MONOCYTES ABSOLUTE: 0.7 K/UL (ref 0–1.3)
MONOCYTES RELATIVE PERCENT: 8.1 %
MONOCYTES RELATIVE PERCENT: 9.8 %
NEUTROPHILS ABSOLUTE: 4.9 K/UL (ref 1.7–7.7)
NEUTROPHILS ABSOLUTE: 5.8 K/UL (ref 1.7–7.7)
NEUTROPHILS RELATIVE PERCENT: 65.6 %
NEUTROPHILS RELATIVE PERCENT: 69.3 %
ORGANISM: ABNORMAL
ORGANISM: ABNORMAL
PDW BLD-RTO: 12.8 % (ref 12.4–15.4)
PDW BLD-RTO: 13 % (ref 12.4–15.4)
PDW BLD-RTO: 13 % (ref 12.4–15.4)
PERFORMED ON: ABNORMAL
PERFORMED ON: NORMAL
PHOSPHORUS: 2.9 MG/DL (ref 2.5–4.9)
PHOSPHORUS: 4 MG/DL (ref 2.5–4.9)
PLATELET # BLD: 150 K/UL (ref 135–450)
PLATELET # BLD: 152 K/UL (ref 135–450)
PLATELET # BLD: 162 K/UL (ref 135–450)
PMV BLD AUTO: 7.8 FL (ref 5–10.5)
PMV BLD AUTO: 8.4 FL (ref 5–10.5)
PMV BLD AUTO: 9.1 FL (ref 5–10.5)
POTASSIUM SERPL-SCNC: 4.9 MMOL/L (ref 3.5–5.1)
POTASSIUM SERPL-SCNC: 5.6 MMOL/L (ref 3.5–5.1)
PROTHROMBIN TIME: 13.2 SEC (ref 11.7–14.5)
PROTHROMBIN TIME: 14.7 SEC (ref 11.7–14.5)
RBC # BLD: 3.44 M/UL (ref 4.2–5.9)
RBC # BLD: 3.71 M/UL (ref 4.2–5.9)
RBC # BLD: 3.75 M/UL (ref 4.2–5.9)
SODIUM BLD-SCNC: 139 MMOL/L (ref 136–145)
SODIUM BLD-SCNC: 140 MMOL/L (ref 136–145)
WBC # BLD: 7.5 K/UL (ref 4–11)
WBC # BLD: 8.4 K/UL (ref 4–11)
WBC # BLD: 8.4 K/UL (ref 4–11)
WOUND/ABSCESS: ABNORMAL
WOUND/ABSCESS: ABNORMAL

## 2022-07-11 PROCEDURE — C1769 GUIDE WIRE: HCPCS

## 2022-07-11 PROCEDURE — 99152 MOD SED SAME PHYS/QHP 5/>YRS: CPT

## 2022-07-11 PROCEDURE — C1725 CATH, TRANSLUMIN NON-LASER: HCPCS

## 2022-07-11 PROCEDURE — 2580000003 HC RX 258

## 2022-07-11 PROCEDURE — 37228 PR REVSC OPN/PRQ TIB/PERO W/ANGIOPLASTY UNI: CPT | Performed by: SURGERY

## 2022-07-11 PROCEDURE — 83735 ASSAY OF MAGNESIUM: CPT

## 2022-07-11 PROCEDURE — 2500000003 HC RX 250 WO HCPCS

## 2022-07-11 PROCEDURE — 85025 COMPLETE CBC W/AUTO DIFF WBC: CPT

## 2022-07-11 PROCEDURE — C1894 INTRO/SHEATH, NON-LASER: HCPCS

## 2022-07-11 PROCEDURE — 85730 THROMBOPLASTIN TIME PARTIAL: CPT

## 2022-07-11 PROCEDURE — 75710 ARTERY X-RAYS ARM/LEG: CPT | Performed by: SURGERY

## 2022-07-11 PROCEDURE — 37224 HC FEM POP TERRITORY PLASTY: CPT

## 2022-07-11 PROCEDURE — 2580000003 HC RX 258: Performed by: INTERNAL MEDICINE

## 2022-07-11 PROCEDURE — 6370000000 HC RX 637 (ALT 250 FOR IP)

## 2022-07-11 PROCEDURE — 2580000003 HC RX 258: Performed by: SURGERY

## 2022-07-11 PROCEDURE — 85520 HEPARIN ASSAY: CPT

## 2022-07-11 PROCEDURE — 86850 RBC ANTIBODY SCREEN: CPT

## 2022-07-11 PROCEDURE — 80069 RENAL FUNCTION PANEL: CPT

## 2022-07-11 PROCEDURE — 75774 ARTERY X-RAY EACH VESSEL: CPT

## 2022-07-11 PROCEDURE — 99153 MOD SED SAME PHYS/QHP EA: CPT

## 2022-07-11 PROCEDURE — 85027 COMPLETE CBC AUTOMATED: CPT

## 2022-07-11 PROCEDURE — C1760 CLOSURE DEV, VASC: HCPCS

## 2022-07-11 PROCEDURE — 85610 PROTHROMBIN TIME: CPT

## 2022-07-11 PROCEDURE — 86900 BLOOD TYPING SEROLOGIC ABO: CPT

## 2022-07-11 PROCEDURE — 2060000000 HC ICU INTERMEDIATE R&B

## 2022-07-11 PROCEDURE — 6370000000 HC RX 637 (ALT 250 FOR IP): Performed by: SURGERY

## 2022-07-11 PROCEDURE — 94760 N-INVAS EAR/PLS OXIMETRY 1: CPT

## 2022-07-11 PROCEDURE — 99232 SBSQ HOSP IP/OBS MODERATE 35: CPT | Performed by: INTERNAL MEDICINE

## 2022-07-11 PROCEDURE — 75710 ARTERY X-RAYS ARM/LEG: CPT

## 2022-07-11 PROCEDURE — 6360000002 HC RX W HCPCS: Performed by: SURGERY

## 2022-07-11 PROCEDURE — 94150 VITAL CAPACITY TEST: CPT

## 2022-07-11 PROCEDURE — 6370000000 HC RX 637 (ALT 250 FOR IP): Performed by: INTERNAL MEDICINE

## 2022-07-11 PROCEDURE — 6360000002 HC RX W HCPCS: Performed by: INTERNAL MEDICINE

## 2022-07-11 PROCEDURE — 6360000002 HC RX W HCPCS

## 2022-07-11 PROCEDURE — 2709999900 HC NON-CHARGEABLE SUPPLY

## 2022-07-11 PROCEDURE — 36415 COLL VENOUS BLD VENIPUNCTURE: CPT

## 2022-07-11 PROCEDURE — C1887 CATHETER, GUIDING: HCPCS

## 2022-07-11 PROCEDURE — 86901 BLOOD TYPING SEROLOGIC RH(D): CPT

## 2022-07-11 RX ORDER — LABETALOL HYDROCHLORIDE 5 MG/ML
10 INJECTION, SOLUTION INTRAVENOUS
Status: DISCONTINUED | OUTPATIENT
Start: 2022-07-11 | End: 2022-07-22 | Stop reason: HOSPADM

## 2022-07-11 RX ORDER — SODIUM CHLORIDE 0.9 % (FLUSH) 0.9 %
5-40 SYRINGE (ML) INJECTION PRN
Status: DISCONTINUED | OUTPATIENT
Start: 2022-07-11 | End: 2022-07-22 | Stop reason: HOSPADM

## 2022-07-11 RX ORDER — HEPARIN SODIUM 10000 [USP'U]/100ML
5-30 INJECTION, SOLUTION INTRAVENOUS CONTINUOUS
Status: DISCONTINUED | OUTPATIENT
Start: 2022-07-11 | End: 2022-07-13

## 2022-07-11 RX ORDER — HEPARIN SODIUM 1000 [USP'U]/ML
2000 INJECTION, SOLUTION INTRAVENOUS; SUBCUTANEOUS PRN
Status: DISCONTINUED | OUTPATIENT
Start: 2022-07-11 | End: 2022-07-17

## 2022-07-11 RX ORDER — SODIUM CHLORIDE 0.9 % (FLUSH) 0.9 %
5-40 SYRINGE (ML) INJECTION EVERY 12 HOURS SCHEDULED
Status: DISCONTINUED | OUTPATIENT
Start: 2022-07-11 | End: 2022-07-22 | Stop reason: HOSPADM

## 2022-07-11 RX ORDER — HEPARIN SODIUM 1000 [USP'U]/ML
4000 INJECTION, SOLUTION INTRAVENOUS; SUBCUTANEOUS ONCE
Status: COMPLETED | OUTPATIENT
Start: 2022-07-11 | End: 2022-07-11

## 2022-07-11 RX ORDER — HEPARIN SODIUM 1000 [USP'U]/ML
4000 INJECTION, SOLUTION INTRAVENOUS; SUBCUTANEOUS PRN
Status: DISCONTINUED | OUTPATIENT
Start: 2022-07-11 | End: 2022-07-17

## 2022-07-11 RX ORDER — ATORVASTATIN CALCIUM 20 MG/1
20 TABLET, FILM COATED ORAL NIGHTLY
Status: DISCONTINUED | OUTPATIENT
Start: 2022-07-12 | End: 2022-07-11 | Stop reason: SDUPTHER

## 2022-07-11 RX ORDER — NIFEDIPINE 30 MG/1
60 TABLET, FILM COATED, EXTENDED RELEASE ORAL DAILY
Status: DISCONTINUED | OUTPATIENT
Start: 2022-07-12 | End: 2022-07-19

## 2022-07-11 RX ORDER — ENOXAPARIN SODIUM 100 MG/ML
40 INJECTION SUBCUTANEOUS DAILY
Status: CANCELLED | OUTPATIENT
Start: 2022-07-11

## 2022-07-11 RX ORDER — CLONIDINE HYDROCHLORIDE 0.1 MG/1
0.1 TABLET ORAL
Status: DISCONTINUED | OUTPATIENT
Start: 2022-07-11 | End: 2022-07-22 | Stop reason: HOSPADM

## 2022-07-11 RX ORDER — SODIUM CHLORIDE 9 MG/ML
INJECTION, SOLUTION INTRAVENOUS PRN
Status: DISCONTINUED | OUTPATIENT
Start: 2022-07-11 | End: 2022-07-22 | Stop reason: HOSPADM

## 2022-07-11 RX ORDER — MORPHINE SULFATE 2 MG/ML
4 INJECTION, SOLUTION INTRAMUSCULAR; INTRAVENOUS
Status: DISCONTINUED | OUTPATIENT
Start: 2022-07-11 | End: 2022-07-11

## 2022-07-11 RX ORDER — ASPIRIN 81 MG/1
81 TABLET ORAL DAILY
Status: DISCONTINUED | OUTPATIENT
Start: 2022-07-11 | End: 2022-07-22 | Stop reason: HOSPADM

## 2022-07-11 RX ORDER — MORPHINE SULFATE 2 MG/ML
2 INJECTION, SOLUTION INTRAMUSCULAR; INTRAVENOUS
Status: DISCONTINUED | OUTPATIENT
Start: 2022-07-11 | End: 2022-07-11

## 2022-07-11 RX ADMIN — SODIUM CHLORIDE, PRESERVATIVE FREE 10 ML: 5 INJECTION INTRAVENOUS at 08:32

## 2022-07-11 RX ADMIN — CETIRIZINE HYDROCHLORIDE 5 MG: 10 TABLET, FILM COATED ORAL at 08:18

## 2022-07-11 RX ADMIN — VANCOMYCIN HYDROCHLORIDE 750 MG: 10 INJECTION, POWDER, LYOPHILIZED, FOR SOLUTION INTRAVENOUS at 08:19

## 2022-07-11 RX ADMIN — CEFEPIME HYDROCHLORIDE 2000 MG: 2 INJECTION, POWDER, FOR SOLUTION INTRAVENOUS at 15:10

## 2022-07-11 RX ADMIN — Medication 12 UNITS/KG/HR: at 14:54

## 2022-07-11 RX ADMIN — INSULIN LISPRO 1 UNITS: 100 INJECTION, SOLUTION INTRAVENOUS; SUBCUTANEOUS at 20:50

## 2022-07-11 RX ADMIN — CARVEDILOL 6.25 MG: 6.25 TABLET, FILM COATED ORAL at 18:34

## 2022-07-11 RX ADMIN — SODIUM CHLORIDE: 9 INJECTION, SOLUTION INTRAVENOUS at 20:54

## 2022-07-11 RX ADMIN — HYDRALAZINE HYDROCHLORIDE 25 MG: 25 TABLET, FILM COATED ORAL at 14:41

## 2022-07-11 RX ADMIN — HYDRALAZINE HYDROCHLORIDE 25 MG: 25 TABLET, FILM COATED ORAL at 22:05

## 2022-07-11 RX ADMIN — GABAPENTIN 300 MG: 300 CAPSULE ORAL at 19:59

## 2022-07-11 RX ADMIN — CARVEDILOL 6.25 MG: 6.25 TABLET, FILM COATED ORAL at 08:18

## 2022-07-11 RX ADMIN — METHOCARBAMOL 750 MG: 750 TABLET ORAL at 20:22

## 2022-07-11 RX ADMIN — HEPARIN SODIUM 4000 UNITS: 1000 INJECTION, SOLUTION INTRAVENOUS; SUBCUTANEOUS at 14:42

## 2022-07-11 RX ADMIN — NIFEDIPINE 30 MG: 30 TABLET, EXTENDED RELEASE ORAL at 08:18

## 2022-07-11 RX ADMIN — TAMSULOSIN HYDROCHLORIDE 0.4 MG: 0.4 CAPSULE ORAL at 19:59

## 2022-07-11 RX ADMIN — ASPIRIN 81 MG: 81 TABLET, COATED ORAL at 14:41

## 2022-07-11 RX ADMIN — TRAMADOL HYDROCHLORIDE 50 MG: 50 TABLET, COATED ORAL at 05:54

## 2022-07-11 RX ADMIN — SODIUM PHOSPHATE, MONOBASIC, MONOHYDRATE 10 MMOL: 276; 142 INJECTION, SOLUTION INTRAVENOUS at 10:38

## 2022-07-11 RX ADMIN — ATORVASTATIN CALCIUM 40 MG: 40 TABLET, FILM COATED ORAL at 19:59

## 2022-07-11 RX ADMIN — HYDRALAZINE HYDROCHLORIDE 25 MG: 25 TABLET, FILM COATED ORAL at 05:49

## 2022-07-11 RX ADMIN — SODIUM CHLORIDE: 9 INJECTION, SOLUTION INTRAVENOUS at 05:51

## 2022-07-11 RX ADMIN — CEFEPIME HYDROCHLORIDE 2000 MG: 2 INJECTION, POWDER, FOR SOLUTION INTRAVENOUS at 20:55

## 2022-07-11 ASSESSMENT — PAIN SCALES - GENERAL
PAINLEVEL_OUTOF10: 3
PAINLEVEL_OUTOF10: 6
PAINLEVEL_OUTOF10: 0
PAINLEVEL_OUTOF10: 0
PAINLEVEL_OUTOF10: 9
PAINLEVEL_OUTOF10: 0

## 2022-07-11 ASSESSMENT — PAIN DESCRIPTION - LOCATION
LOCATION: FOOT
LOCATION: FOOT

## 2022-07-11 ASSESSMENT — PAIN DESCRIPTION - ORIENTATION
ORIENTATION: LEFT
ORIENTATION: LEFT

## 2022-07-11 ASSESSMENT — PAIN DESCRIPTION - FREQUENCY
FREQUENCY: INTERMITTENT
FREQUENCY: INTERMITTENT

## 2022-07-11 ASSESSMENT — PAIN DESCRIPTION - ONSET
ONSET: AWAKENED FROM SLEEP
ONSET: AWAKENED FROM SLEEP

## 2022-07-11 ASSESSMENT — PAIN DESCRIPTION - DESCRIPTORS
DESCRIPTORS: ACHING
DESCRIPTORS: ACHING;THROBBING

## 2022-07-11 ASSESSMENT — PAIN DESCRIPTION - PAIN TYPE: TYPE: ACUTE PAIN

## 2022-07-11 ASSESSMENT — PAIN - FUNCTIONAL ASSESSMENT
PAIN_FUNCTIONAL_ASSESSMENT: PREVENTS OR INTERFERES SOME ACTIVE ACTIVITIES AND ADLS
PAIN_FUNCTIONAL_ASSESSMENT: PREVENTS OR INTERFERES WITH MANY ACTIVE NOT PASSIVE ACTIVITIES

## 2022-07-11 NOTE — CARE COORDINATION
Case Management Assessment           Daily Note                 Date/ Time of Note: 7/11/2022 4:18 PM         Note completed by: Gabriele Oreilly RN    Patient Name: Panchito Gautam  YOB: 1935    Diagnosis:Osteomyelitis [facility]) [M86.9]  Toe osteomyelitis, left ([facility] [M86.9]  MO (acute kidney injury) ([facility] [N17.9]  Cat bite of hand, right, initial encounter Maritza Ayoub  Patient Admission Status: Inpatient    Date of Admission:7/5/2022  8:07 PM Length of Stay: 6 GLOS: GMLOS: 6.1    Current Plan of Care: vascular procedure 07/11/22, podiatry following, wound care  ________________________________________________________________________________________  PT AM-PAC:   / 24 per last evaluation on: PENDING 07/11/22     OT AM-PAC:   / 24 per last evaluation on: PENDING 07/11/22     DME Needs for discharge:   ________________________________________________________________________________________  Discharge Plan: To Be Determined DUE TO: Pending medical progress    Tentative discharge date: TBD    Current barriers to discharge: Medical stability    Referrals completed: Not Applicable    Resources/ information provided: Not indicated at this time  ________________________________________________________________________________________  Case Management Notes: CM continues to follow for DC planning and needs. Patient had vascular procedure today, podiatry continues to follow and possible intervention noted. Maggie Martinez and his family were provided with choice of provider; he and his family are in agreement with the discharge plan.     Care Transition Patient: Komal Oreilly RN  [facility]  Case Management Department  Ph: [phone]  Fax: [phone]

## 2022-07-11 NOTE — PROGRESS NOTES
Interval History and plan:      BP control is much better  Cr stable at near baseline. S/p LLE angiography today per Dr. Bob Kan. Angioplasty to the left popleteal and tibioperoneal trunk done. Made 1500 ml urine yesterdday    Plan:  Renal function stable  BP control better. If renal function stays stable over the next few days, will change the hydralazine to chlorthalidone for BP management. Continue current medications. Assessment :     CKD Stage 3b  Likely due to DM and hypertension  Cr is 1.8- eGFR of 36 ml/min  UA- bland, wbc 6-9,  No recent one in the system      Hypertension   BP: (133-159)/(68-80)  Heart Rate:  [60-66]   BP goal inpatient [phone] systolic inpatient  Home regimen: lisinopril 10 mg daily only  Current inpatient regimen: carvedilol 6.25 mg BID, hydralazine 25 mg TID, nifedipine 60 mg daily. Also has Afib    Anemia of CKD             sepsis  DM  PVD    [facility] would like to thank Renate Somers MD   for opportunity to serve this patient      Please call with questions at-   24 Hrs Answering service [phone] or  7 am- 5 pm via Perfect serve or cell phone  Maco Bee MD          CC/reason for consult :     MO     HPI :     Lissett Woo is a 81 y.o. male presented to   the hospital on 7/5/2022 with wound infection in the left leg. He is followed outpatient by podiatry,for wound, which continued to   Get worse despite the treatment. Also has discharge from the wound  Brought to hospital admitted  Also found to have CKD  because of which we are consulted.          ROS:     Seen with- no family    positives in bold   Constitutional:  fever, chills, weakness, weight change, fatigue  Skin:  rash, pruritus, hair loss, bruising, dry skin, petechiae  Head, Face, Neck   headaches, swelling,  cervical adenopathy  Respiratory: shortness of breath, cough, or wheezing  Cardiovascular: chest pain, palpitations, dizzy, edema  Gastrointestinal: nausea, vomiting, diarrhea, constipation,belly pain    Yellow skin, blood in stool  Musculoskeletal:  back pain, muscle weakness, gait problems,       joint pain or swelling. Genitourinary:  dysuria, poor urine flow, flank pain, blood in urine  Neurologic:  vertigo, TIA'S, syncope, seizures, focal weakness  Psychosocial:  insomnia, anxiety, or depression. Additional positive findings:                    All other remaining systems are negative or unable to obtain        PMH/PSH/SH/Family History:     Past Medical History:   Diagnosis Date    [facility] 1/29/14    Dr. Allyson Antoine DM (diabetes mellitus) ([facility] 1/13/2014    Gout     Hypothyroidism 4/18/2016    Seasonal allergic rhinitis        Past Surgical History:   Procedure Laterality Date    COLONOSCOPY  2009 appx     normal     COLONOSCOPY N/A 6/20/2022    COLONOSCOPY POLYPECTOMY SNARE/COLD BIOPSY performed by Kurt Chao MD at [address], bilateral    INCISIONAL HERNIA REPAIR      bilateral        reports that he quit smoking about 63 years ago. He quit after 5.00 years of use. He has never used smokeless tobacco. He reports that he does not drink alcohol and does not use drugs. family history is not on file.          Medication:     Current Facility-Administered Medications: sodium phosphate 10 mmol in sodium chloride 0.9 % 250 mL IVPB, 10 mmol, IntraVENous, Once  sodium chloride flush 0.9 % injection 5-40 mL, 5-40 mL, IntraVENous, 2 times per day  sodium chloride flush 0.9 % injection 5-40 mL, 5-40 mL, IntraVENous, PRN  0.9 % sodium chloride infusion, , IntraVENous, PRN  cloNIDine (CATAPRES) tablet 0.1 mg, 0.1 mg, Oral, Q2H PRN  labetalol (NORMODYNE;TRANDATE) injection 10 mg, 10 mg, IntraVENous, Q2H PRN  aspirin EC tablet 81 mg, 81 mg, Oral, Daily  heparin (porcine) injection 4,000 Units, 4,000 Units, IntraVENous, Once  heparin (porcine) injection 4,000 Units, 4,000 Units, IntraVENous, PRN  heparin (porcine) injection 2,000 Units, 2,000 Units, IntraVENous, PRN  heparin 25,000 units in dextrose 5% 250 mL (premix) infusion, 5-30 Units/kg/hr, IntraVENous, Continuous  [START ON 7/12/2022] NIFEdipine (ADALAT CC) extended release tablet 60 mg, 60 mg, Oral, Daily  vancomycin (VANCOCIN) 750 mg in dextrose 5 % 250 mL IVPB, 750 mg, IntraVENous, Q24H  atorvastatin (LIPITOR) tablet 40 mg, 40 mg, Oral, Nightly  hydrALAZINE (APRESOLINE) tablet 25 mg, 25 mg, Oral, 3 times per day  carvedilol (COREG) tablet 6.25 mg, 6.25 mg, Oral, BID WC  traMADol (ULTRAM) tablet 50 mg, 50 mg, Oral, Q6H PRN  cefepime (MAXIPIME) 2000 mg IVPB minibag, 2,000 mg, IntraVENous, Q12H  hydrALAZINE (APRESOLINE) injection 10 mg, 10 mg, IntraVENous, Q4H PRN  methocarbamol (ROBAXIN) tablet 750 mg, 750 mg, Oral, 4x Daily PRN  gabapentin (NEURONTIN) capsule 300 mg, 300 mg, Oral, Nightly  glucose chewable tablet 16 g, 4 tablet, Oral, PRN  dextrose bolus 10% 125 mL, 125 mL, IntraVENous, PRN **OR** dextrose bolus 10% 250 mL, 250 mL, IntraVENous, PRN  glucagon (rDNA) injection 1 mg, 1 mg, IntraMUSCular, PRN  dextrose 5 % solution, 100 mL/hr, IntraVENous, PRN  insulin lispro (1 Unit Dial) 0-6 Units, 0-6 Units, SubCUTAneous, TID WC  insulin lispro (1 Unit Dial) 0-3 Units, 0-3 Units, SubCUTAneous, Nightly  cetirizine (ZYRTEC) tablet 5 mg, 5 mg, Oral, Daily  tamsulosin (FLOMAX) capsule 0.4 mg, 0.4 mg, Oral, Nightly  sodium chloride flush 0.9 % injection 5-40 mL, 5-40 mL, IntraVENous, 2 times per day  sodium chloride flush 0.9 % injection 5-40 mL, 5-40 mL, IntraVENous, PRN  0.9 % sodium chloride infusion, , IntraVENous, PRN  ondansetron (ZOFRAN-ODT) disintegrating tablet 4 mg, 4 mg, Oral, Q8H PRN **OR** ondansetron (ZOFRAN) injection 4 mg, 4 mg, IntraVENous, Q6H PRN  polyethylene glycol (GLYCOLAX) packet 17 g, 17 g, Oral, Daily PRN  acetaminophen (TYLENOL) tablet 650 mg, 650 mg, Oral, Q6H PRN **OR** acetaminophen (TYLENOL) suppository 650

## 2022-07-11 NOTE — PROGRESS NOTES
ID Follow-up NOTE    CC:   L DM foot infection  Antibiotics: Vancomycin, Cefepime    Admit Date: 7/5/2022  Hospital Day: 7    Subjective:     Patient c/o L foot pain inessa with dressing change      Objective:     Patient Vitals for the past 8 hrs:   BP Temp Temp src Pulse Resp SpO2 Weight   07/11/22 0803 (!) 159/80 97.6 °F (36.4 °C) Oral 64 14 97 % --   07/11/22 0631 -- -- -- 66 -- -- --   07/11/22 0549 (!) 159/86 -- -- -- -- -- --   07/11/22 0503 -- -- -- 60 -- -- --   07/11/22 0345 -- -- -- -- -- -- 177 lb 7.5 oz (80.5 kg)   07/11/22 0343 (!) 157/79 97.4 °F (36.3 °C) Oral 62 14 99 % --     I/O last 3 completed shifts: In: 1 [P.O.:720; I.V.:250]  Out: 1950 [Urine:1950]  No intake/output data recorded. EXAM:  GENERAL: No apparent distress.   RA  HEENT: Membranes moist, no oral lesion  NECK:  Supple, no lymphadenopathy  LUNGS: Clear b/l, no rales, no dullness  CARDIAC: RRR, no murmur appreciated  ABD:  + BS, soft / NT  EXT:  L foot with dressing - reviewed images from Podiatry note  NEURO: No focal neurologic findings  PSYCH: Orientation, sensorium, mood normal  LINES:  Peripheral iv       Data Review:  Lab Results   Component Value Date    WBC 7.5 07/11/2022    HGB 11.7 (L) 07/11/2022    HCT 34.7 (L) 07/11/2022    .8 (H) 07/11/2022     07/11/2022     Lab Results   Component Value Date    CREATININE 2.0 (H) 07/11/2022    BUN 41 (H) 07/11/2022     07/11/2022    K 4.9 07/11/2022     07/11/2022    CO2 23 07/11/2022       Hepatic Function Panel:   Lab Results   Component Value Date/Time    ALKPHOS 59 09/27/2021 05:38 PM    ALT 11 09/27/2021 05:38 PM    AST 19 09/27/2021 05:38 PM    PROT 7.5 09/27/2021 05:38 PM    PROT 6.7 03/14/2013 09:30 AM    BILITOT 0.3 09/27/2021 05:38 PM    LABALBU 3.5 07/11/2022 04:50 AM       Micro:   L foot Wound Culture: (7/5)  Gram stain: 2+ WBC, 3+ GPC  in chains and pairs, 1+ Small GPR   Organism: Staph aureus MRSA  Staph aureus mrsa (1)  Antibiotic Interpretation Microscan    ceFAZolin Resistant 16 mcg/mL   clindamycin Sensitive <=0.5 mcg/mL   erythromycin Resistant >4 mcg/mL   oxacillin Resistant >2 mcg/mL   tetracycline Sensitive <=4 mcg/mL   trimethoprim-sulfamethoxazole Resistant >2/38 mcg/mL   vancomycin Sensitive 1 mcg/mL       Imaging:   Xray, L foot (7/5)  Osteomyelitis is not excluded based on this exam particularly at the base of the proximal phalanx of the second digit. If this is a clinical concern evaluation with MRI should be performed.     MRI, L foot wo contrast (7/5)  1. Limited exam secondary to motion artifact. 2. There is marrow edema identified within the fourth metatarsal head and proximal fourth phalanx. In the setting of infection or open wound in this region, this is compatible with osteomyelitis. 3. Status post amputations of the second, third, and fifth metatarsals as detailed above. 4. Healed fracture deformities identified involving the proximal first phalanx. There is osteoarthrosis identified at the first metatarsophalangeal joint. 5. There is subcutaneous edema identified about the foot. This can be seen with cellulitis and/or venous insufficiency. No soft tissue abscess is identified. 6. There is severe fatty atrophy involving the deep muscular foot. Correlate for underlying neuropathy.        Scheduled Meds:   sodium phosphate IVPB  10 mmol IntraVENous Once    NIFEdipine  30 mg Oral Daily    vancomycin  750 mg IntraVENous Q24H    atorvastatin  40 mg Oral Nightly    hydrALAZINE  25 mg Oral 3 times per day    carvedilol  6.25 mg Oral BID     cefepime  2,000 mg IntraVENous Q12H    gabapentin  300 mg Oral Nightly    insulin lispro  0-6 Units SubCUTAneous TID     insulin lispro  0-3 Units SubCUTAneous Nightly    cetirizine  5 mg Oral Daily    tamsulosin  0.4 mg Oral Nightly    sodium chloride flush  5-40 mL IntraVENous 2 times per day       Continuous Infusions:   sodium chloride 60 mL/hr at 07/11/22 0551    dextrose  sodium chloride Stopped (07/11/22 0158)       PRN Meds:  traMADol, hydrALAZINE, methocarbamol, glucose, dextrose bolus **OR** dextrose bolus, glucagon (rDNA), dextrose, sodium chloride flush, sodium chloride, ondansetron **OR** ondansetron, polyethylene glycol, acetaminophen **OR** acetaminophen, morphine      Assessment:     82 yo CKD, hypothyroid, gout  Hx L foot toe amp 3/2020  L foot plantar wound, followed by Podiatry, Dr Viridiana Alfaro     Referred to [facility] for admission 7/5   Afeb, WBC 10.4, Cr 2.3  X-ray - 'possible osteomyelitis at the base of the proximal phalanx of the second digit '  MRI 4th MT head and prox phalanx edema  Wound probes to bone per Podiatry note. Cult - mixed skin cash, Diphtheroids, MRSA  Vascular studies ordered     IMP/  L foot wound infection / extension osteomyelitis  Cult gram positive organism including MRSA    Plan:     Cont empiric Vanco / Cefepime    Angiogram today per Vascular  Wound care and surgical intervention per Podiatry (may need partial L 4th ray resection)    Medical Decision Making:   The following items were considered in medical decision making:  Discussion of patient care with other providers  Reviewed clinical lab tests  Reviewed radiology tests  Reviewed other diagnostic tests/interventions  Independent review of radiologic images - reviewed MRI w Radiologist 7/8  Microbiology cultures and other micro tests reviewed      Discussed with pt  Danis Mcdowell MD

## 2022-07-11 NOTE — PROGRESS NOTES
Podiatric Surgery Daily Progress Note      Admit Date: 7/5/2022      Code:Full Code    Patient seen and examined, labs and records reviewed    Subjective:     Patient seen at bedside this AM.  Patient denies any acute overnight events. Patient denies f/c/n/v/sob/cp. Review of Systems: A 12 point review of symptoms is unremarkable with the exception of the chief complaint. Patient specifically denies nausea, fever, vomiting, chills, shortness of breath, chest pain, abdominal pain, constipation or difficulty urinating. Objective     BP (!) 159/86   Pulse 60   Temp 97.4 °F (36.3 °C) (Oral)   Resp 14   Ht 6' 2\" (1.88 m)   Wt 177 lb 7.5 oz (80.5 kg)   SpO2 99%   BMI 22.79 kg/m²      I/O:    Intake/Output Summary (Last 24 hours) at 7/11/2022 0619  Last data filed at 7/11/2022 0345  Gross per 24 hour   Intake 790 ml   Output 1500 ml   Net -710 ml              Wt Readings from Last 3 Encounters:   07/11/22 177 lb 7.5 oz (80.5 kg)   07/05/22 178 lb 3.2 oz (80.8 kg)   06/20/22 177 lb (80.3 kg)       LABS:    Recent Labs     07/10/22  0639 07/11/22  0450   WBC 8.3 7.5   HGB 10.9* 11.7*   HCT 32.7* 34.7*    150        Recent Labs     07/10/22  0639      K 5.2*      CO2 23   PHOS 3.1   BUN 34*   CREATININE 1.9*      No results for input(s): PROT, INR, APTT in the last 72 hours. LOWER EXTREMITY EXAMINATION    Dressing to left LE intact. No strikethrough noted to the external dressing. Mild serosanguineous drainage noted to the internal layers of the dressing. VASCULAR: DP and PT pulses are non-palpable 0/4 b/l. Upon hand-held Doppler examination DP and PT pulses were noted to be biphasic to the right lower extremity. Upon hand-held Doppler examination DP and PT pulses were noted to be monophasic to the left lower extremity. CFT is brisk to the digits of the right foot. CFT is sluggish to the left foot. Skin temperature is warm to cool from proximal to distal with no focal calor noted. Nonpitting edema noted, left lower extremity-noted to be improving secondary to compressive therapy. No pain with calf compression b/l.     NEUROLOGIC: Gross and epicritic sensation is diminished b/l. Protective sensation is diminished at all pedal sites b/l.     DERMATOLOGIC:   Left lower extremity has a full-thickness ulceration noted to the plantar aspect of the left foot subfourth metatarsal head. Wound measures approximately 1.2 cm x 0.6 cm x 0.4 cm. Wound base is a mixture of fibrotic and granular tissue. The wound does probe to bone. Scant seropurulent drainage expressed with compression but no crepitus, fluctuance, or malodor noted. The wound does track medially about 1 cm but no tunneling or undermining noted.     Patient provided verbal consent for pictures obtained today:          Right LE soft tissue envelope is closed without ulceration or acute signs of infection.     MUSCULOSKELETAL: Muscle strength is 5/5 for all pedal groups tested. Pain with palpation to the periwound, left foot. Ankle joint ROM is decreased in dorsiflexion with the knee extended. 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       COMPARISON: None.       FINDINGS:       3 views of the right foot show distal vascular calcification.       There is been partial amputation of the second, third, fourth and fifth digits.       No erosive changes at the base of the proximal phalanx of the second digit cannot be excluded.       Degenerative changes are seen at the midfoot and sclerosis and subchondral cyst formation.           Impression       Osteomyelitis is not excluded based on this exam particularly at the base of the proximal phalanx of the second digit. If this is a clinical concern evaluation with MRI should be performed.      MRI Left Foot (7/6/2022)  Narrative   MRI OF THE LEFT FOOT     INDICATION: Osteomyelitis.       TECHNIQUE: Multiplanar and multisequence MRI images of the left foot were obtained.       FINDINGS: Images are degraded by motion artifact. The patient is status post resection of the second, and third metatarsal heads. Patient is status post resection of the fifth metatarsal tarsal head and distal shaft as well as the proximal fifth phalanx.       There is abnormal marrow edema identified within the fourth metatarsal head as well as the proximal fourth phalanx.       There is deformity of the proximal first phalanx which may reflect the sequelae of a remote fracture or prior surgery. There is moderate osteoarthrosis identified at the first metatarsophalangeal joint.       There is some mild osteoarthrosis identified at the third, fourth and fifth tarsometatarsal joints. No osseous erosion is identified.       There is subcutaneous edema identified about the foot. There is severe atrophy involving the deep musculature of the foot. Correlate for chronic denervation injury. No soft tissue abscess is identified.           Impression   1. Limited exam secondary to motion artifact. 2. There is marrow edema identified within the fourth metatarsal head and proximal fourth phalanx. In the setting of infection or open wound in this region, this is compatible with osteomyelitis. 3. Status post amputations of the second, third, and fifth metatarsals as detailed above. 4. Healed fracture deformities identified involving the proximal first phalanx. There is osteoarthrosis identified at the first metatarsophalangeal joint. 5. There is subcutaneous edema identified about the foot. This can be seen with cellulitis and/or venous insufficiency. No soft tissue abscess is identified. 6. There is severe fatty atrophy involving the deep muscular foot. Correlate for underlying neuropathy. Arterial Duplex;  Left Lower Extremity  Type of Study:        Extremities Arteries:Lower Extremities soaked gauze, DSD, and Ace with gentle compression  -Prevalon boots reapplied. Patient is to wear at all times while in bed to prevent further deep tissue injury.  -Recommend patient be nonweightbearing to left lower extremity but weight bearing as tolerated to right lower extremity. -Vascular study shows that patient has critical limb ischemia to the left lower extremity. Surgical intervention will depend on vascular recommendations. -Vascular consulted and plan for C02 angiogram today; appreciate recommendations. DISPO: Full-thickness ulceration with cellulitis (resolved) and osteomyelitis, left foot. Recommend patient continue on antibiotics per ID recommendations, vancomycin and cefepime. Vascular consulted; plan for CO2 angiogram today. Surgical intervention from podiatric standpoint will be dependent on vascular recommendations.       Patient assessment and plan was discussed with Dr. Michael Farley DPM.    Gaviota Baer DPM  Podiatric Resident, PGY-2  Pager #: [phone] or Perfect Serve

## 2022-07-11 NOTE — ED PROVIDER NOTES
ED Attending Attestation Note     Date of evaluation: 7/5/2022    This patient was seen by the advance practice provider. I have seen and examined the patient, agree with the workup, evaluation, management and diagnosis. The care plan has been discussed. My assessment reveals a well appearing male, no distress, L foot with small wound of the plantar surface near site or recent amputation.      Jolynn Page MD  07/11/22 2131

## 2022-07-11 NOTE — PLAN OF CARE
Problem: ABCDS Injury Assessment  Goal: Absence of physical injury  Outcome: Progressing     Problem: Skin/Tissue Integrity - Adult  Goal: Skin integrity remains intact  Outcome: Progressing     Problem: Safety - Adult  Goal: Free from fall injury  Outcome: Progressing     Problem: Chronic Conditions and Co-morbidities  Goal: Patient's chronic conditions and co-morbidity symptoms are monitored and maintained or improved  Outcome: Progressing     Problem: Skin/Tissue Integrity  Goal: Absence of new skin breakdown  Description: 1. Monitor for areas of redness and/or skin breakdown  2. Assess vascular access sites hourly  3. Every 4-6 hours minimum:  Change oxygen saturation probe site  4. Every 4-6 hours:  If on nasal continuous positive airway pressure, respiratory therapy assess nares and determine need for appliance change or resting period.   Outcome: Progressing

## 2022-07-11 NOTE — PROGRESS NOTES
Clinical Pharmacy Progress Note    Vancomycin - Management by Pharmacy    Consult Date(s):  7/5/22  Consulting Provider(s):  Dr. Hilda Jay / Plan:  1)  LLE osteomyelitis with cellulitis & ulcer  - Vancomycin   Concurrent Antimicrobials:   o Cefepime    Day of Vanc Therapy / Ordered Duration: 7   Current Dosing Method: Bayesian-Guided AUC dosing   Therapeutic Goal: 400-600 mg/L*hr   Current Dose / Frequency:  750mg IV q24h    Plan / Rationale:    o Pt has hx of CKD - baseline SCr is ~1.6.  o SCr trending up slightly (1.9?2). o On 750mg IV q24h. o Last level checked on 7/10 - calculated AUC = 432 mg/L*h with trough of 14.6 mg/L.  o Will monitor renal function closely -- if SCr continues to trend up, will plan to recheck vancomycin level tomorrow.  Will continue to monitor clinical condition and make adjustments to regimen as appropriate. Please call with questions--  Ty Bowers PharmD, [facility]  Wireless: [phone]   7/11/2022 8:59 AM        Interval update:  Plan for CO2 angiogram today with possible surgical intervention with podiatry pending results. Subjective/Objective:   José Kay is a 81 y.o. y/o male with a PMHx that includes DM, CKD (baseline SCr ~ 1.6), gout, hypothyroidism, and seasonal allergies who is admitted with LLE osteomyelitis with cellulitis and ulcer. Pharmacy is consulted to dose Vancomycin.     Ht Readings from Last 1 Encounters:   07/05/22 6' 2\" (1.88 m)     Wt Readings from Last 1 Encounters:   07/11/22 177 lb 7.5 oz (80.5 kg)       Current and Prior Antimicrobials:  Cefepime (7/5-current)  Vancomycin - Pharmacy to dose   Intermittent dosing (7/5-7/8)   750mg IV q24h (7/8-current)    Vanc Level(s) / Doses:  Date Time Level / Type of Level Interpretation / Dose   7/5 22:39  · 1500mg IV x1 at 22:39  · Intermittent dosing   7/6 04:34 Random = 13.8 mcg/mL · Intermittent dosing  · 1000mg IV x1 at 14:46   7/7 04:58 Random = 14.6 mcg/mL · Intermittent dosing  · Order 750mg IV x1    7/8 0455 Random = 15.5 mcg/mL · Will begin scheduled dosing of 750mg IV q24h. · Predicted  mg/L*h.     7/10 0639 Random = 14 mcg/mL · Drawn ~22 hrs after previous dose  · Calculated AUC = 432 mg/L*h  · Continue same regimen for now   Note: Serum levels collected for AUC-based dosing may be high if collected in close proximity to the dose administered. This is not necessarily an indicator of toxicity. Recent Labs     07/09/22  0514 07/10/22  0639 07/11/22  0450   CREATININE 1.7* 1.9* 2.0*   BUN 30* 34* 41*   WBC 7.3 8.3 7.5       Estimated Creatinine Clearance: 31 mL/min (A) (based on SCr of 2 mg/dL (H)).     Cultures & Sensitivities:  Date Site Micro Susceptibility / Result   7/5 Toe wound Mixed skin cash    MRSA No further w/u    S=Vanc (BILLY=1), TCN.    R = Erythro, Bactrim   7/5 Foot wound Mixed skin cash    MRSA No further w/u    Same as above   7/5 Blood x2 No growth to date

## 2022-07-11 NOTE — BRIEF OP NOTE
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 Ultrasound guided access right common femoral artery  Left lower extremity arteriogram  Left popliteal and tibioperoneal trunk angioplasty    Surgeon:  Damir Anderson MD    Monitored sedation    Estimated Blood Loss (mL): Minimal    Complications: None    Specimens:   * Cannot find log *    Implants:  * No surgical log found *      Drains:   External Urinary Catheter (Active)   Suction N/A 07/10/22 2329   Urine Color Yellow 07/10/22 2329   Urine Appearance Clear 07/10/22 2329   Output (mL) 250 mL 07/10/22 2329       Findings: Partial reconstitution of vessel with angioplasty; however, could not resolve dissection and patient will require bypass for long-term result. Plan tentatively this week.      Electronically signed by Damir Anderson MD on 7/11/2022 at 12:24 PM

## 2022-07-11 NOTE — PROGRESS NOTES
localized to wound on L foot. Pt takes plavix/ASA, however was on hold in the recent past for GI bleed. Denies smoking. A1C 5.9.     - Given hx of lower extremity vascular issues and duplex results, the pt would benefit from an angiogram of the LLE prior to podiatry intervention to further assess his lower extremities.    -CO2 Angiogram today w/possible angioplasty.  - Neph following, appreciate recs for hydration prior to angiogram   - continue Lipitor 40 daily    Kwaku Gonzalez DO   PGY1, General Surgery  07/11/22  6:43 AM  Pager # [phone]

## 2022-07-11 NOTE — PROGRESS NOTES
Hospital Medicine  Progress Note    PCP: Kelly Almanza    Date of Admission: 7/5/2022      Chief Complaint: Worsening left foot pain and swelling      History Of Present Illness:    81 y.o. male with DM type 2, HTN and hypothyroidism, PAF (off pradaxa) and BPH who presented with worsening wound left foot. The wound started in the bottom of his left foot as a callus. Patient has been putting Vaseline and soaking the foot. When he saw his podiatrist (Dr. Dwana Nissen ) last week he was recommended to start using Betadine to the left foot. Patient has not following the wound care recommendations, has been putting Betadine on the wound and has been wearing a sock over it and continue to walk bearing weight on the left foot. Patient started noticing mild yellow drainage coming from the wound. Patient denies fever, chills, nausea, vomiting, chest pain or shortness of breath    On presentation,, apart from elevated BP, he had fairly normal vitals. He had no leucocytosis. Hgb was 12 with . Cr was 2.3 (baseline about 1.6). Interim HPI  No new complaints  Denies chest pain    MRI shows osteo of left foot. Sitting OOB eating BF    Awaiting vascular studies      Medications: Reviewed        Allergies:  Patient has no known allergies. REVIEW OF SYSTEMS COMPLETED:   Pertinent positives as noted in the HPI. All other systems reviewed and negative. PHYSICAL EXAM PERFORMED:    BP (!) 159/80   Pulse 64   Temp 97.6 °F (36.4 °C) (Oral)   Resp 14   Ht 6' 2\" (1.88 m)   Wt 177 lb 7.5 oz (80.5 kg)   SpO2 97%   BMI 22.79 kg/m²     General appearance:  No apparent distress, appears stated age and cooperative. HEENT:  Normal cephalic, atraumatic without obvious deformity. .  Neck: Supple, with full range of motion. No jugular venous distention. Trachea midline. Respiratory:  Normal respiratory effort. Clear to auscultation, bilaterally without Rales/Wheezes/Rhonchi.   Cardiovascular:  Regular rate and rhythm with normal S1/S2 without murmurs, rubs or gallops. Abdomen: Soft, non-tender, non-distended with normal bowel sounds. Musculoskeletal:  No clubbing, cyanosis or edema bilaterally. Full range of motion without deformity. Skin: Dressing over left foot: C/D/I  Neurologic:  Neurovascularly intact without any focal sensory/motor deficits. Cranial nerves: II-XII intact, grossly non-focal.  Psychiatric:  Alert and oriented, thought content appropriate, normal insight  Capillary Refill: Brisk,3 seconds, normal  Peripheral Pulses: DP and PT pulses-nonpalpable bilaterally, biphasic with Doppler per podiatry on the right, monophasic on the left. Labs:     Recent Labs     07/09/22  0514 07/10/22  0639 07/11/22  0450   WBC 7.3 8.3 7.5   HGB 11.9* 10.9* 11.7*   HCT 34.8* 32.7* 34.7*    163 150     Recent Labs     07/09/22  0514 07/10/22  0639 07/11/22  0450    137 139   K 5.1 5.2* 4.9    108 107   CO2 24 23 23   BUN 30* 34* 41*   CREATININE 1.7* 1.9* 2.0*   CALCIUM 8.8 8.6 9.4   PHOS 3.4 3.1 2.9     No results for input(s): AST, ALT, BILIDIR, BILITOT, ALKPHOS in the last 72 hours. Recent Labs     07/11/22  0450   INR 1.01     No results for input(s): Erickson Shackle in the last 72 hours. Urinalysis:      Lab Results   Component Value Date/Time    NITRU Negative 07/10/2022 05:02 PM    WBCUA 0-2 07/10/2022 05:02 PM    BACTERIA Rare 07/10/2022 05:02 PM    RBCUA 3-4 07/10/2022 05:02 PM    BLOODU Negative 07/10/2022 05:02 PM    SPECGRAV 1.020 07/10/2022 05:02 PM    GLUCOSEU Negative 07/10/2022 05:02 PM    GLUCOSEU Negative 07/05/2011 09:10 PM       Radiology:     CXR: I have reviewed the CXR with the following interpretation: NA  EKG:  I have reviewed the EKG with the following interpretation: NA    XR CHEST PORTABLE   Final Result      No acute cardiopulmonary findings. VL DUP LOWER EXTREMITY ARTERIES LEFT   Final Result      MRI FOOT LEFT WO CONTRAST   Final Result   1.  Limited exam secondary to motion artifact. 2. There is marrow edema identified within the fourth metatarsal head and proximal fourth phalanx. In the setting of infection or open wound in this region, this is compatible with osteomyelitis. 3. Status post amputations of the second, third, and fifth metatarsals as detailed above. 4. Healed fracture deformities identified involving the proximal first phalanx. There is osteoarthrosis identified at the first metatarsophalangeal joint. 5. There is subcutaneous edema identified about the foot. This can be seen with cellulitis and/or venous insufficiency. No soft tissue abscess is identified. 6. There is severe fatty atrophy involving the deep muscular foot. Correlate for underlying neuropathy. XR FOOT LEFT (MIN 3 VIEWS)   Final Result      Osteomyelitis is not excluded based on this exam particularly at the base of the proximal phalanx of the second digit. If this is a clinical concern evaluation with MRI should be performed. IR ANGIOGRAM EXTREMITY LEFT    (Results Pending)       Consults:    IP CONSULT TO PODIATRY  PHARMACY TO DOSE VANCOMYCIN  IP CONSULT TO HOSPITALIST  IP CONSULT TO PHARMACY  IP CONSULT TO PHARMACY  IP CONSULT TO INFECTIOUS DISEASES  IP CONSULT TO VASCULAR SURGERY  IP CONSULT TO NEPHROLOGY        ASSESSMENT/PLAN:  81 y.o. male with DM type 2, HTN and hypothyroidism, PAF (off pradaxa) and BPH who presented with worsening wound left foot. Cellulitis left lower extremity with full-thickness ulceration plantar surface with osteomyelitis. POA  - Possible diabetic ulcer. H/e, appears A1C is not elevated and Home med list does not have DM meds: verify home med list  - R/o sec to severe PAD   - Hx of Multiple amputations.     - Sed rate was 53 and CRP10  - MRI shows osteo  - Cx sent: mixed skin cash, Diphtheroids, MRSA; follow results  - Started on broad-spectrum IV antibiotics: ID consulted.    - Update A1C  - Arterial duplex will be needed to assess vascularity   - Planned podiatric surgical intervention: timing per Podiatry       DM-2 complicated by peripheral neuropathy: POA  - Verify home meds for accuracy as does not seem to have diabetic meds  - A1C too does not appear high  - We will monitor BGs closely for 1-2 days  - SSI      Peripheral vascular disease: POA  - On Plavix  - Vascular studies ordered  - Seen by Vascular Surgery: planned angiogram of the LLE prior to podiatry intervention to further assess his lower extremity: due to his CKD, planned CO2 angio. Neprho consulted. - Statin if no C/i       CKD stage 3: POA  Mild hyperkalemia: POA  - Baseline Cr 1.6  - Was 2.3 on admission suggesting some renal insufficiency on his CKD stage 3: MO ruled out  - Monitor cr: appears improving  - Continue gentle IV hydration  - Holding lisinopril  - Monitor Cr, K  - Follows with Dr. Camilo Life: planned CO2 angio. Neprho consulted. Paroxysmal A. Fib: POA  - Not on rate control meds. - Appears was prev on Pradaxa. Appears was taken off after he wore a monitor and as \"couldn't afford it\" in 2014 . - Started on metoprolol  - TSH, Mg: WNL  - Keep on telemetry       HTN: POA  - Started on metoprolol: Switched to coreg, decrease dose with some winston noted  - Started hydralazine PO as BP was really high  - Added CCB as BP remains high: increase dose to 60 mg dly Nifedipine  - Holding lisonopril for now inessa with hyperkalemia  - BP improved  - PRN hydralazine IV      Hypothyroidism: POA  - Updated TSH; WNL    Macrocytic anemia- POA  - Update B12, Folate: WNL      BPH: POA  - On  Flomax  - Monitor for retention: Bladder scans        DVT Prophylaxis: Lovenox     Diet: Diet NPO Exceptions are: Sips of Water with Meds  Code Status: Full Code    PT/OT Eval Status: As tolerated with no weightbearing on the left foot      Disposition -GMF with telemetry  CO2 Angiogram today w/possible angioplasty.   Planned surgery: thereafter per Podiatry dependent on vascular recommendations  PT/OT eval after Surgery       Renate Somers MD

## 2022-07-11 NOTE — PLAN OF CARE
Problem: Skin/Tissue Integrity - Adult  Goal: Skin integrity remains intact  Outcome: Progressing   LLE with wound, dressing changed per podiatry. Patient repositioned q 2 hours with pillow support, heels elevated. Continue to monitor and assess skin integrity throughout shift. Problem: Discharge Planning  Goal: Discharge to home or other facility with appropriate resources  Outcome: Progressing   Patient to be discharged when medically stable. Problem: Safety - Adult  Goal: Free from fall injury  Outcome: Progressing   Fall precautions in place. Bed alarm activated, low position, wheel locked. NWB L foot. Call light and belongings within reach. Continue to monitor safety. Problem: Pain  Goal: Verbalizes/displays adequate comfort level or baseline comfort level  Outcome: Progressing   C/o mild pain but denies need for medication. Will monitor and medicate as needed.

## 2022-07-11 NOTE — PROGRESS NOTES
Vascular Surgery  Post-operative Note      Procedure(s) Performed: U/S guided access of right common femoral artery, left lower extremity arteriogram, and left popliteal and tibioperoneal trunk angioplasty    Subjective:   Patient's pain is controlled, denies nausea or vomiting. Tolerating diet. Patient remained flat post-operatively for three hours without issue, voiding appropriately. Denies flatus or BM at this time. Objective:  Anesthesia type: MAC      I/O    Intra op    Post op     Fluids  200 mL 200 mL     EBL Minimal 0 mL     Urine 0 mL 200 mL     Vitals:   Vitals:    07/11/22 0631 07/11/22 0803 07/11/22 1237 07/11/22 1517   BP:  (!) 159/80 133/68 (!) 144/71   Pulse: 66 64 60 58   Resp:  14 14 14   Temp:  97.6 °F (36.4 °C) 97.7 °F (36.5 °C) 98 °F (36.7 °C)   TempSrc:  Oral Oral Oral   SpO2:  97% 96% 97%   Weight:       Height:           Physical Exam:  Post-op vital signs:  Stable   General appearance: alert, no acute distress, grooming appropriate  Eyes: PERRLA, no scleral icterus  Neck: trachea midline, no JVD, no lymphadenopathy  Chest/Lungs: normal effort  Cardiovascular: RRR  Abdomen: soft, non-tender, non-distended, no guarding/rigidity  Skin: warm and dry, no rashes  Extremities: no edema, no cyanosis, doplerable RLE, DP/PT, palpable pop fem, right groin is hemostat with no ecchymosis and no evidence of hematoma. Neuro: A&Ox3, no focal deficits, sensation intact    Assessment and Plan  This is a 81y.o. year old male status post U/S guided access of right common femoral artery, left lower extremity arteriogram, and left popliteal and tibioperoneal trunk angioplasty secondary to atherosclerosis of LLE with ulceration. (7/11) POD0.     Pain management: Roxicodone pain panel PRN  Cardiovasc: hemodynamically stable, will continue to monitor  Respiratory:  IS ordered to bedside, encourage hourly IS and deep breathing, wean oxygen as tolerated  Fluids:  None, Diet: Renal diet  : Urine output is adequate   Ambulation: OOB to chair, encourage ambulation  Prophylaxis: SCDs, ASA 81mg, hep gtt  Antibiotics: Patient currently on cefepime, vanc and flagyl  Wound: Local wound care      Jovita Humphrey DO, MS  PGY2, General Surgery  07/11/22  4:02 PM  [phone]

## 2022-07-12 ENCOUNTER — ANESTHESIA EVENT (OUTPATIENT)
Dept: OPERATING ROOM | Age: 87
DRG: 617 | End: 2022-07-12
Payer: MEDICARE

## 2022-07-12 ENCOUNTER — APPOINTMENT (OUTPATIENT)
Dept: VASCULAR LAB | Age: 87
DRG: 617 | End: 2022-07-12
Payer: MEDICARE

## 2022-07-12 LAB
ALBUMIN SERPL-MCNC: 3 G/DL (ref 3.4–5)
ANION GAP SERPL CALCULATED.3IONS-SCNC: 7 MMOL/L (ref 3–16)
ANTI-XA UNFRAC HEPARIN: 0.37 IU/ML (ref 0.3–0.7)
BASOPHILS ABSOLUTE: 0 K/UL (ref 0–0.2)
BASOPHILS RELATIVE PERCENT: 0.5 %
BUN BLDV-MCNC: 35 MG/DL (ref 7–20)
CALCIUM SERPL-MCNC: 8.6 MG/DL (ref 8.3–10.6)
CHLORIDE BLD-SCNC: 109 MMOL/L (ref 99–110)
CO2: 23 MMOL/L (ref 21–32)
CREAT SERPL-MCNC: 2.1 MG/DL (ref 0.8–1.3)
EOSINOPHILS ABSOLUTE: 0.3 K/UL (ref 0–0.6)
EOSINOPHILS RELATIVE PERCENT: 3.3 %
GFR AFRICAN AMERICAN: 36
GFR NON-AFRICAN AMERICAN: 30
GLUCOSE BLD-MCNC: 104 MG/DL (ref 70–99)
GLUCOSE BLD-MCNC: 121 MG/DL (ref 70–99)
GLUCOSE BLD-MCNC: 124 MG/DL (ref 70–99)
GLUCOSE BLD-MCNC: 127 MG/DL (ref 70–99)
GLUCOSE BLD-MCNC: 94 MG/DL (ref 70–99)
HCT VFR BLD CALC: 31.9 % (ref 40.5–52.5)
HEMOGLOBIN: 11 G/DL (ref 13.5–17.5)
LYMPHOCYTES ABSOLUTE: 1.6 K/UL (ref 1–5.1)
LYMPHOCYTES RELATIVE PERCENT: 17.9 %
MAGNESIUM: 1.9 MG/DL (ref 1.8–2.4)
MCH RBC QN AUTO: 34.3 PG (ref 26–34)
MCHC RBC AUTO-ENTMCNC: 34.5 G/DL (ref 31–36)
MCV RBC AUTO: 99.6 FL (ref 80–100)
MONOCYTES ABSOLUTE: 0.7 K/UL (ref 0–1.3)
MONOCYTES RELATIVE PERCENT: 8.4 %
NEUTROPHILS ABSOLUTE: 6.2 K/UL (ref 1.7–7.7)
NEUTROPHILS RELATIVE PERCENT: 69.9 %
PDW BLD-RTO: 12.7 % (ref 12.4–15.4)
PERFORMED ON: ABNORMAL
PERFORMED ON: NORMAL
PHOSPHORUS: 3.2 MG/DL (ref 2.5–4.9)
PLATELET # BLD: 140 K/UL (ref 135–450)
PMV BLD AUTO: 9.1 FL (ref 5–10.5)
POTASSIUM SERPL-SCNC: 5.4 MMOL/L (ref 3.5–5.1)
RBC # BLD: 3.21 M/UL (ref 4.2–5.9)
SODIUM BLD-SCNC: 139 MMOL/L (ref 136–145)
WBC # BLD: 8.8 K/UL (ref 4–11)

## 2022-07-12 PROCEDURE — 83735 ASSAY OF MAGNESIUM: CPT

## 2022-07-12 PROCEDURE — 6370000000 HC RX 637 (ALT 250 FOR IP): Performed by: SURGERY

## 2022-07-12 PROCEDURE — 6370000000 HC RX 637 (ALT 250 FOR IP)

## 2022-07-12 PROCEDURE — 99233 SBSQ HOSP IP/OBS HIGH 50: CPT | Performed by: SURGERY

## 2022-07-12 PROCEDURE — 2580000003 HC RX 258

## 2022-07-12 PROCEDURE — 2580000003 HC RX 258: Performed by: STUDENT IN AN ORGANIZED HEALTH CARE EDUCATION/TRAINING PROGRAM

## 2022-07-12 PROCEDURE — 6360000002 HC RX W HCPCS

## 2022-07-12 PROCEDURE — 6360000002 HC RX W HCPCS: Performed by: SURGERY

## 2022-07-12 PROCEDURE — 36415 COLL VENOUS BLD VENIPUNCTURE: CPT

## 2022-07-12 PROCEDURE — 2580000003 HC RX 258: Performed by: SURGERY

## 2022-07-12 PROCEDURE — 6370000000 HC RX 637 (ALT 250 FOR IP): Performed by: STUDENT IN AN ORGANIZED HEALTH CARE EDUCATION/TRAINING PROGRAM

## 2022-07-12 PROCEDURE — 93971 EXTREMITY STUDY: CPT

## 2022-07-12 PROCEDURE — 93306 TTE W/DOPPLER COMPLETE: CPT

## 2022-07-12 PROCEDURE — 99232 SBSQ HOSP IP/OBS MODERATE 35: CPT | Performed by: INTERNAL MEDICINE

## 2022-07-12 PROCEDURE — 2060000000 HC ICU INTERMEDIATE R&B

## 2022-07-12 PROCEDURE — 85025 COMPLETE CBC W/AUTO DIFF WBC: CPT

## 2022-07-12 PROCEDURE — 85520 HEPARIN ASSAY: CPT

## 2022-07-12 PROCEDURE — 80069 RENAL FUNCTION PANEL: CPT

## 2022-07-12 RX ORDER — SODIUM CHLORIDE, SODIUM LACTATE, POTASSIUM CHLORIDE, CALCIUM CHLORIDE 600; 310; 30; 20 MG/100ML; MG/100ML; MG/100ML; MG/100ML
INJECTION, SOLUTION INTRAVENOUS CONTINUOUS
Status: DISCONTINUED | OUTPATIENT
Start: 2022-07-12 | End: 2022-07-14

## 2022-07-12 RX ORDER — SODIUM CHLORIDE, SODIUM LACTATE, POTASSIUM CHLORIDE, CALCIUM CHLORIDE 600; 310; 30; 20 MG/100ML; MG/100ML; MG/100ML; MG/100ML
INJECTION, SOLUTION INTRAVENOUS CONTINUOUS
Status: DISCONTINUED | OUTPATIENT
Start: 2022-07-13 | End: 2022-07-12 | Stop reason: SDUPTHER

## 2022-07-12 RX ADMIN — CARVEDILOL 6.25 MG: 6.25 TABLET, FILM COATED ORAL at 18:36

## 2022-07-12 RX ADMIN — Medication 12.05 UNITS/KG/HR: at 15:36

## 2022-07-12 RX ADMIN — GABAPENTIN 300 MG: 300 CAPSULE ORAL at 21:28

## 2022-07-12 RX ADMIN — CLONIDINE HYDROCHLORIDE 0.1 MG: 0.1 TABLET ORAL at 04:29

## 2022-07-12 RX ADMIN — SODIUM CHLORIDE, POTASSIUM CHLORIDE, SODIUM LACTATE AND CALCIUM CHLORIDE: 600; 310; 30; 20 INJECTION, SOLUTION INTRAVENOUS at 12:34

## 2022-07-12 RX ADMIN — SODIUM ZIRCONIUM CYCLOSILICATE 10 G: 10 POWDER, FOR SUSPENSION ORAL at 13:13

## 2022-07-12 RX ADMIN — VANCOMYCIN HYDROCHLORIDE 750 MG: 10 INJECTION, POWDER, LYOPHILIZED, FOR SOLUTION INTRAVENOUS at 12:40

## 2022-07-12 RX ADMIN — ASPIRIN 81 MG: 81 TABLET, COATED ORAL at 10:07

## 2022-07-12 RX ADMIN — CETIRIZINE HYDROCHLORIDE 5 MG: 10 TABLET, FILM COATED ORAL at 10:07

## 2022-07-12 RX ADMIN — SODIUM CHLORIDE, PRESERVATIVE FREE 10 ML: 5 INJECTION INTRAVENOUS at 09:00

## 2022-07-12 RX ADMIN — SODIUM CHLORIDE, PRESERVATIVE FREE 10 ML: 5 INJECTION INTRAVENOUS at 21:29

## 2022-07-12 RX ADMIN — SODIUM CHLORIDE, PRESERVATIVE FREE 10 ML: 5 INJECTION INTRAVENOUS at 10:08

## 2022-07-12 RX ADMIN — TAMSULOSIN HYDROCHLORIDE 0.4 MG: 0.4 CAPSULE ORAL at 21:25

## 2022-07-12 RX ADMIN — CEFEPIME HYDROCHLORIDE 2000 MG: 2 INJECTION, POWDER, FOR SOLUTION INTRAVENOUS at 15:32

## 2022-07-12 RX ADMIN — METHOCARBAMOL 750 MG: 750 TABLET ORAL at 04:29

## 2022-07-12 RX ADMIN — HYDRALAZINE HYDROCHLORIDE 25 MG: 25 TABLET, FILM COATED ORAL at 21:27

## 2022-07-12 RX ADMIN — ATORVASTATIN CALCIUM 40 MG: 40 TABLET, FILM COATED ORAL at 21:27

## 2022-07-12 RX ADMIN — SODIUM ZIRCONIUM CYCLOSILICATE 10 G: 10 POWDER, FOR SUSPENSION ORAL at 21:35

## 2022-07-12 RX ADMIN — HYDRALAZINE HYDROCHLORIDE 25 MG: 25 TABLET, FILM COATED ORAL at 14:51

## 2022-07-12 RX ADMIN — NIFEDIPINE 60 MG: 30 TABLET, EXTENDED RELEASE ORAL at 10:07

## 2022-07-12 RX ADMIN — HYDRALAZINE HYDROCHLORIDE 25 MG: 25 TABLET, FILM COATED ORAL at 04:29

## 2022-07-12 ASSESSMENT — PAIN SCALES - GENERAL
PAINLEVEL_OUTOF10: 0

## 2022-07-12 NOTE — PROGRESS NOTES
Clinical Pharmacy Progress Note    Vancomycin - Management by Pharmacy    Consult Date(s):  7/5/22  Consulting Provider(s):  Dr. Hamilton Negrete / Plan:  1)  LLE osteomyelitis with cellulitis & ulcer  - Vancomycin   Concurrent Antimicrobials:   o Cefepime - Day #8   Day of Vanc Therapy / Ordered Duration: #8   Current Dosing Method: Bayesian-Guided AUC dosing   Therapeutic Goal: 400-600 mg/L*hr   Current Dose / Frequency:  750mg IV q24h    Plan / Rationale:    o Pt has hx of CKD - baseline SCr is ~1.6. SCr down to 1.8, from 2 yesterday AM.  o On 750mg IV q24h. o Last level checked on 7/10 - calculated AUC = 432 mg/L*h with trough of 14.6 mg/L.  o Will order vancomycin level for tomorrow AM to ensure AUC remains therapeutic.  Will continue to monitor clinical condition and make adjustments to regimen as appropriate. Please call with questions--  Reny Montgomery PharmD, [facility]  Wireless: [id number]   7/12/2022 7:58 AM        Interval update:  S/p LLE arteriogram and L popliteal/tibioperoneal trunk angioplasty (7/11) -- planning for L SFA-peroneal bypass tomorrow. Subjective/Objective:   Juma Mahmood is a 81 y.o. y/o male with a PMHx that includes DM, CKD (baseline SCr ~ 1.6), gout, hypothyroidism, and seasonal allergies who is admitted with LLE osteomyelitis with cellulitis and ulcer. Pharmacy is consulted to dose Vancomycin.     Ht Readings from Last 1 Encounters:   07/05/22 6' 2\" (1.88 m)     Wt Readings from Last 1 Encounters:   07/12/22 178 lb 2.1 oz (80.8 kg)       Current and Prior Antimicrobials:  Cefepime (7/5-current)  Vancomycin - Pharmacy to dose   Intermittent dosing (7/5-7/8)   750mg IV q24h (7/8-current)    Vanc Level(s) / Doses:  Date Time Level / Type of Level Interpretation / Dose   7/5 22:39  · 1500mg IV x1 at 22:39  · Intermittent dosing   7/6 04:34 Random = 13.8 mcg/mL · Intermittent dosing  · 1000mg IV x1 at 14:46   7/7 04:58 Random = 14.6 mcg/mL · Intermittent dosing  · Order 750mg IV x1    7/8 0455 Random = 15.5 mcg/mL · Will begin scheduled dosing of 750mg IV q24h. · Predicted  mg/L*h.     7/10 0639 Random = 14 mcg/mL · Drawn ~22 hrs after previous dose  · Calculated AUC = 432 mg/L*h  · Continue 750mg IV q24h   Note: Serum levels collected for AUC-based dosing may be high if collected in close proximity to the dose administered. This is not necessarily an indicator of toxicity. Recent Labs     07/10/22  0639 07/10/22  0639 07/11/22  0450 07/11/22  1455 07/11/22  1456   CREATININE 1.9*  --  2.0* 1.8*  --    BUN 34*  --  41* 35*  --    WBC 8.3   < > 7.5 8.4 8.4    < > = values in this interval not displayed. Estimated Creatinine Clearance: 34 mL/min (A) (based on SCr of 1.8 mg/dL (H)).     Cultures & Sensitivities:  Date Site Micro Susceptibility / Result   7/5 Toe wound Mixed skin cash    MRSA No further w/u    S=Vanc (BILLY=1), TCN.    R = Erythro, Bactrim   7/5 Foot wound Mixed skin cash    MRSA No further w/u    Same as above   7/5 Blood x2 No growth to date

## 2022-07-12 NOTE — PROGRESS NOTES
ID Follow-up NOTE  RESIDENT NOTE - reviewed / edited, attending note at bottom    CC:   L foot infection   Antibiotics: Vancomycin, Cefepime     Admit Date: 7/5/2022  Hospital Day: 8    Subjective:     Patient complains of foot pain, 5/10, worse with walking. No acute overnight events. Being prepped for L SFA-Peroneal bypass tomorrow (7/13)      Objective:     Patient Vitals for the past 8 hrs:   BP Temp Temp src Pulse Resp SpO2 Weight   07/12/22 0815 134/65 98 °F (36.7 °C) Oral 77 16 96 % --   07/12/22 0548 118/74 -- -- 55 -- -- --   07/12/22 0418 (!) 182/70 98 °F (36.7 °C) Oral 66 18 98 % 178 lb 2.1 oz (80.8 kg)   07/12/22 0115 -- -- -- 62 -- -- --     I/O last 3 completed shifts: In: 660 [P.O.:660]  Out: 2750 [Urine:2750]  No intake/output data recorded. EXAM:  GENERAL: No apparent distress. HEENT: Membranes moist, no oral lesion  NECK:  Supple, no lymphadenopathy  LUNGS: Clear b/l, no rales, no dullness  CARDIAC: RRR, no murmur appreciated  ABD:  + BS, soft / NT  EXT:  L foot with dressing. No edema, lesions on R foot.   NEURO: No focal neurologic findings  PSYCH: Orientation, sensorium, mood normal  LINES:  Peripheral iv       Data Review:  Lab Results   Component Value Date    WBC 8.4 07/11/2022    HGB 12.6 (L) 07/11/2022    HCT 37.7 (L) 07/11/2022    .7 (H) 07/11/2022     07/11/2022     Lab Results   Component Value Date    CREATININE 1.8 (H) 07/11/2022    BUN 35 (H) 07/11/2022     07/11/2022    K 5.6 (H) 07/11/2022     07/11/2022    CO2 22 07/11/2022       Hepatic Function Panel:   Lab Results   Component Value Date/Time    ALKPHOS 59 09/27/2021 05:38 PM    ALT 11 09/27/2021 05:38 PM    AST 19 09/27/2021 05:38 PM    PROT 7.5 09/27/2021 05:38 PM    PROT 6.7 03/14/2013 09:30 AM    BILITOT 0.3 09/27/2021 05:38 PM    LABALBU 3.4 07/11/2022 02:55 PM       MICRO:  L foot Wound Culture: (7/5)  Gram stain: 2+ WBC, 3+ GPC  in chains and pairs, 1+ Small GPR   Organism: Staph aureus MRSA  Staph aureus mrsa (1)  Antibiotic Interpretation Microscan     ceFAZolin Resistant 16 mcg/mL   clindamycin Sensitive <=0.5 mcg/mL   erythromycin Resistant >4 mcg/mL   oxacillin Resistant >2 mcg/mL   tetracycline Sensitive <=4 mcg/mL   trimethoprim-sulfamethoxazole Resistant >2/38 mcg/mL   vancomycin Sensitive 1 mcg/mL        IMAGING:  Xray, L foot (7/5)  Osteomyelitis is not excluded based on this exam particularly at the base of the proximal phalanx of the second digit. If this is a clinical concern evaluation with MRI should be performed.     MRI, L foot wo contrast (7/5)  1. Limited exam secondary to motion artifact. 2. There is marrow edema identified within the fourth metatarsal head and proximal fourth phalanx. In the setting of infection or open wound in this region, this is compatible with osteomyelitis. 3. Status post amputations of the second, third, and fifth metatarsals as detailed above. 4. Healed fracture deformities identified involving the proximal first phalanx. There is osteoarthrosis identified at the first metatarsophalangeal joint. 5. There is subcutaneous edema identified about the foot. This can be seen with cellulitis and/or venous insufficiency. No soft tissue abscess is identified. 6. There is severe fatty atrophy involving the deep muscular foot.  Correlate for underlying neuropathy.        Scheduled Meds:   sodium chloride flush  5-40 mL IntraVENous 2 times per day    aspirin  81 mg Oral Daily    NIFEdipine  60 mg Oral Daily    vancomycin  750 mg IntraVENous Q24H    atorvastatin  40 mg Oral Nightly    hydrALAZINE  25 mg Oral 3 times per day    carvedilol  6.25 mg Oral BID WC    cefepime  2,000 mg IntraVENous Q12H    gabapentin  300 mg Oral Nightly    insulin lispro  0-6 Units SubCUTAneous TID WC    insulin lispro  0-3 Units SubCUTAneous Nightly    cetirizine  5 mg Oral Daily    tamsulosin  0.4 mg Oral Nightly    sodium chloride flush  5-40 mL IntraVENous 2 times per day       Continuous Infusions:   sodium chloride Stopped (07/12/22 0100)    heparin (PORCINE) Infusion 12 Units/kg/hr (07/11/22 1454)    dextrose      sodium chloride Stopped (07/11/22 0158)       PRN Meds:  sodium chloride flush, sodium chloride, cloNIDine, labetalol, heparin (porcine), heparin (porcine), traMADol, hydrALAZINE, methocarbamol, glucose, dextrose bolus **OR** dextrose bolus, glucagon (rDNA), dextrose, sodium chloride flush, sodium chloride, ondansetron **OR** ondansetron, polyethylene glycol, acetaminophen **OR** acetaminophen, morphine      Assessment:     82 yo PMH CKD, hypothyroidism, gout post U/S guided access of R common femoral artery, LLE arteriogram and L popliteal and tibioperoneal trunk angioplasty secondary to atherosclerosis os LLE with ulceration (7/11)  -L foot wound infection/osteomyelitis with L toe amputation 3/2020   -Wound culture: MRSA diphtheroids. Plan:     Cont Vanc/Cefepime   Wound care per podiatry   For L SFA-Peroneal bypass tomorrow (7/13)  NPO midnight   F/u echo    Discussed with pt, Dr Sarah Yee MD     Addendum to Resident Progress note:  Pt seen, examined and evaluated. I have reviewed the current history, physical findings, labs and assessment and plan and agree with note as documented by resident (Dr. Farzana Kc). 82 yo CKD, hypothyroid, gout  Hx L foot toe amp 3/2020  L foot plantar wound, followed by Podiatry, Dr Abbott Imus     Referred to [facility] admission 7/5   Afeb, WBC 10.4, Cr 2.3  X-ray - 'possible osteomyelitis at the base of the proximal phalanx of the second digit '  MRI 4th MT head and prox phalanx edema  Wound probes to bone per Podiatry note.   Cult - mixed skin cash, Diphtheroids, MRSA    7/11 Angio - unable to revascularize endovascularly     IMP/  L foot wound infection / extension osteomyelitis  Cult gram positive organism including MRSA     Plan:      Cont empiric Vanco / Cefepime  OR for bypass 7/13  Wound care and surgical intervention per Podiatry (may need partial L 4th ray resection)    Medical Decision Making:   The following items were considered in medical decision making:  Discussion of patient care with other providers  Reviewed clinical lab tests  Reviewed radiology tests  Reviewed other diagnostic tests/interventions  Independent review of radiologic images - reviewed MRI w Radiologist 7/8  Microbiology cultures and other micro tests reviewed      Discussed with pt  Farzad Mendez MD

## 2022-07-12 NOTE — FLOWSHEET NOTE
07/12/22 1238   Encounter Summary   Encounter Overview/Reason  Initial Encounter;Rituals, Rites and Sacraments   Rituals, Rites and Sacraments   Type Hindu Communion   Assessment/Intervention/Outcome   Outcome Refused/Declined

## 2022-07-12 NOTE — PROGRESS NOTES
Hospital Medicine  Progress Note    PCP: Mauri Pena    Date of Admission: 7/5/2022      Chief Complaint: Worsening left foot pain and swelling      History Of Present Illness:    81 y.o. male with DM type 2, HTN and hypothyroidism, PAF (off pradaxa) and BPH who presented with worsening wound left foot. The wound started in the bottom of his left foot as a callus. Patient has been putting Vaseline and soaking the foot. When he saw his podiatrist (Dr. Larisa Garcia ) last week he was recommended to start using Betadine to the left foot. Patient has not following the wound care recommendations, has been putting Betadine on the wound and has been wearing a sock over it and continue to walk bearing weight on the left foot. Patient started noticing mild yellow drainage coming from the wound. Patient denies fever, chills, nausea, vomiting, chest pain or shortness of breath    On presentation,, apart from elevated BP, he had fairly normal vitals. He had no leucocytosis. Hgb was 12 with . Cr was 2.3 (baseline about 1.6). Interim HPI  No new complaints  Denies chest pain    MRI shows osteo of left foot. Sitting OOB     S/p vascular studies with plans for superficial femoral artery to peroneal artery bypass of LLE      Medications: Reviewed        Allergies:  Patient has no known allergies. REVIEW OF SYSTEMS COMPLETED:   Pertinent positives as noted in the HPI. All other systems reviewed and negative. PHYSICAL EXAM PERFORMED:    /65   Pulse 77   Temp 98 °F (36.7 °C) (Oral)   Resp 16   Ht 6' 2\" (1.88 m)   Wt 178 lb 2.1 oz (80.8 kg)   SpO2 96%   BMI 22.87 kg/m²     General appearance:  No apparent distress, appears stated age and cooperative. HEENT:  Normal cephalic, atraumatic without obvious deformity. .  Neck: Supple, with full range of motion. No jugular venous distention. Trachea midline. Respiratory:  Normal respiratory effort.  Clear to auscultation, bilaterally without Rales/Wheezes/Rhonchi. Cardiovascular:  Regular rate and rhythm with normal S1/S2 without murmurs, rubs or gallops. Abdomen: Soft, non-tender, non-distended with normal bowel sounds. Musculoskeletal:  No clubbing, cyanosis or edema bilaterally. Full range of motion without deformity. Skin: Dressing over left foot: C/D/I  Neurologic:  grossly non-focal.  Psychiatric:  Alert and oriented, thought content appropriate, normal insight  Capillary Refill: Brisk,3 seconds, normal  Peripheral Pulses: DP and PT pulses-nonpalpable bilaterally, biphasic with Doppler per podiatry on the right, monophasic on the left. Labs:     Recent Labs     07/11/22  0450 07/11/22  1455 07/11/22  1456   WBC 7.5 8.4 8.4   HGB 11.7* 12.7* 12.6*   HCT 34.7* 38.0* 37.7*    162 152     Recent Labs     07/10/22  0639 07/11/22  0450 07/11/22  1455    139 140   K 5.2* 4.9 5.6*    107 108   CO2 23 23 22   BUN 34* 41* 35*   CREATININE 1.9* 2.0* 1.8*   CALCIUM 8.6 9.4 9.0   PHOS 3.1 2.9 4.0     No results for input(s): AST, ALT, BILIDIR, BILITOT, ALKPHOS in the last 72 hours. Recent Labs     07/11/22  0450 07/11/22  1455   INR 1.01 1.16*     No results for input(s): Assunta Lucero in the last 72 hours. Urinalysis:      Lab Results   Component Value Date/Time    NITRU Negative 07/10/2022 05:02 PM    WBCUA 0-2 07/10/2022 05:02 PM    BACTERIA Rare 07/10/2022 05:02 PM    RBCUA 3-4 07/10/2022 05:02 PM    BLOODU Negative 07/10/2022 05:02 PM    SPECGRAV 1.020 07/10/2022 05:02 PM    GLUCOSEU Negative 07/10/2022 05:02 PM    GLUCOSEU Negative 07/05/2011 09:10 PM       Radiology:     CXR: I have reviewed the CXR with the following interpretation: NA  EKG:  I have reviewed the EKG with the following interpretation: NA    XR CHEST PORTABLE   Final Result      No acute cardiopulmonary findings. VL DUP LOWER EXTREMITY ARTERIES LEFT   Final Result      MRI FOOT LEFT WO CONTRAST   Final Result   1.  Limited exam secondary to motion artifact. 2. There is marrow edema identified within the fourth metatarsal head and proximal fourth phalanx. In the setting of infection or open wound in this region, this is compatible with osteomyelitis. 3. Status post amputations of the second, third, and fifth metatarsals as detailed above. 4. Healed fracture deformities identified involving the proximal first phalanx. There is osteoarthrosis identified at the first metatarsophalangeal joint. 5. There is subcutaneous edema identified about the foot. This can be seen with cellulitis and/or venous insufficiency. No soft tissue abscess is identified. 6. There is severe fatty atrophy involving the deep muscular foot. Correlate for underlying neuropathy. XR FOOT LEFT (MIN 3 VIEWS)   Final Result      Osteomyelitis is not excluded based on this exam particularly at the base of the proximal phalanx of the second digit. If this is a clinical concern evaluation with MRI should be performed. IR ANGIOGRAM EXTREMITY LEFT    (Results Pending)   VL PRE OP VEIN MAPPING    (Results Pending)       Consults:    IP CONSULT TO PODIATRY  PHARMACY TO DOSE VANCOMYCIN  IP CONSULT TO HOSPITALIST  IP CONSULT TO PHARMACY  IP CONSULT TO PHARMACY  IP CONSULT TO INFECTIOUS DISEASES  IP CONSULT TO VASCULAR SURGERY  IP CONSULT TO NEPHROLOGY        ASSESSMENT/PLAN:  81 y.o. male with DM type 2, HTN and hypothyroidism, PAF (off pradaxa) and BPH who presented with worsening wound left foot. Cellulitis left lower extremity with full-thickness ulceration plantar surface with osteomyelitis. POA  - Possible diabetic ulcer. H/e, appears A1C is not elevated and Home med list does not have DM meds: verify home med list  - R/o sec to severe PAD   - Hx of Multiple amputations.     - Sed rate was 53 and CRP10  - MRI shows osteo  - Cx sent: mixed skin cash, Diphtheroids, MRSA; follow results  - Started on broad-spectrum IV antibiotics: ID consulted.    - Update A1C  - Arterial duplex will be needed to assess vascularity   - Planned podiatric surgical intervention: timing per Podiatry       DM-2 complicated by peripheral neuropathy: POA  - Verify home meds for accuracy as does not seem to have diabetic meds  - A1C too does not appear high  - We will monitor BGs closely for 1-2 days  - SSI      Peripheral vascular disease: POA  - On Plavix  - Vascular studies ordered  - Seen by Vascular Surgery: planned angiogram of the LLE prior to podiatry intervention to further assess his lower extremity: due to his CKD, planned CO2 angio. Neprho consulted. - Statin if no C/i       CKD stage 3: POA  Mild hyperkalemia: POA  - Baseline Cr 1.6  - Was 2.3 on admission suggesting some renal insufficiency on his CKD stage 3: MO ruled out  - Monitor cr: appears improving  - Continue gentle IV hydration  - Holding lisinopril  - Monitor Cr, K  - Follows with Dr. Sanjiv Gaines: planned CO2 angio. Neprho consulted. Paroxysmal A. Fib: POA  - Not on rate control meds. - Appears was prev on Pradaxa. Appears was taken off after he wore a monitor and as \"couldn't afford it\" in 2014 . - Started on metoprolol  - TSH, Mg: WNL  - Keep on telemetry       HTN: POA  - Started on metoprolol: Switched to coreg, decrease dose with some winston noted  - Started hydralazine PO as BP was really high  - Added CCB as BP remains high: increase dose to 60 mg dly Nifedipine  - Holding lisonopril for now inessa with hyperkalemia  - BP improved  - PRN hydralazine IV      Hypothyroidism: POA  - Updated TSH; WNL    Macrocytic anemia- POA  - Update B12, Folate: WNL      BPH: POA  - On  Flomax  - Monitor for retention: Bladder scans        Pxt tells me after discussing with his sons, he wants to be Full code. DVT Prophylaxis: Lovenox     Diet: ADULT DIET;  Regular; 4 carb choices (60 gm/meal)  Diet NPO Exceptions are: Sips of Water with Meds  Code Status: Full Code    PT/OT Eval Status: As tolerated with no weightbearing on the left foot      Disposition -GMF with telemetry  Superficial femoral artery to peroneal artery bypass of LLE tomorrow  Possible surgery: thereafter per Podiatry dependent on vascular recommendations  PT/OT eval after Surgery       Junior Agapito MD

## 2022-07-12 NOTE — PROGRESS NOTES
Vascular Surgery   Daily Progress Note  Patient: Cherie Yanez    CC: Atherosclerosis native artery left lower extremity with ulceration    SUBJECTIVE:  Patient rested well overnight. Has no complaint this am    ROS: A 14 point review of systems was conducted, significant findings as noted above. All other systems negative. OBJECTIVE:   Infusions:   sodium chloride Stopped (07/12/22 0100)    heparin (PORCINE) Infusion 12 Units/kg/hr (07/11/22 1454)    dextrose      sodium chloride Stopped (07/11/22 0158)      I/O:I/O last 3 completed shifts: In: 850 [P.O.:600; I.V.:250]  Out: 3100 [Urine:3100]           Wt Readings from Last 1 Encounters:   07/12/22 178 lb 2.1 oz (80.8 kg)       Exam:  /74   Pulse 55   Temp 98 °F (36.7 °C) (Oral)   Resp 18   Ht 6' 2\" (1.88 m)   Wt 178 lb 2.1 oz (80.8 kg)   SpO2 98%   BMI 22.87 kg/m²     General appearance: alert, no acute distress, grooming appropriate  Eyes: PERRLA, no scleral icterus  Neck: trachea midline, no JVD, no lymphadenopathy  Chest/Lungs: normal effort  Cardiovascular: RRR  Abdomen: soft, non-tender, non-distended, no guarding/rigidity  Skin: warm and dry, no rashes  Extremities: no edema, no cyanosis, doplerable RLE, DP/PT, palpable pop fem, right groin is hemostat with no ecchymosis and no evidence of hematoma. LLE wrapped with ace wrapping. Neuro: A&Ox3, no focal deficits, sensation intact            LABS:   Recent Labs     07/11/22  1455 07/11/22  1456   WBC 8.4 8.4   HGB 12.7* 12.6*   HCT 38.0* 37.7*   .3* 101.7*    152        Recent Labs     07/11/22  0450 07/11/22  1455    140   K 4.9 5.6*    108   CO2 23 22   PHOS 2.9 4.0   BUN 41* 35*   CREATININE 2.0* 1.8*      No results for input(s): AST, ALT, ALB, BILIDIR, BILITOT, ALKPHOS in the last 72 hours. No results for input(s): LIPASE, AMYLASE in the last 72 hours.      Recent Labs     07/11/22  0450 07/11/22  1455   INR 1.01 1.16*   APTT  --  81.2*      No results for input(s): CKTOTAL, CKMB, CKMBINDEX, TROPONINI in the last 72 hours. ASSESSMENT/PLAN:   This is a 81y.o. year old male status post U/S guided access of right common femoral artery, left lower extremity arteriogram, and left popliteal and tibioperoneal trunk angioplasty secondary to atherosclerosis of LLE with ulceration. (7/11). - F/u echo  - F/u vein mapping  -pre-op and consent for L SFA-Peroneal bypass tomorrow. -NPO midnight  -Med management per primary. Appreciate risk assessment and medical clearance.      Lakshmi Rowe DO   PGY1, General Surgery  07/12/22  6:39 AM  Pager # [phone]

## 2022-07-12 NOTE — PROGRESS NOTES
Interval History and plan:      BP is now at goal.  Cr stable at near baseline. Cr 2.1 today  Made 1470 mL urine yesterday  Hyperkalemia better today after a dose of lokelma    Plan:  Renal function stable  Continue current antihypertensive medications  Noted plans for vascular surgery to revascularize the left leg                       Assessment :     CKD Stage 3b  Likely due to DM and hypertension  Cr is 1.8- eGFR of 36 ml/min  UA- bland, wbc 6-9,  No recent one in the system      Hypertension   BP: ()/(43-74)  Heart Rate:  [55-78]   BP goal inpatient [id number] systolic inpatient  Home regimen: lisinopril 10 mg daily only  Current inpatient regimen: carvedilol 6.25 mg BID, hydralazine 25 mg TID, nifedipine 60 mg daily. Also has Afib    Anemia of CKD             sepsis  DM  PVD    [facility] would like to thank Marily Skelton MD   for opportunity to serve this patient      Please call with questions at-   24 Hrs Answering service [phone] or  7 am- 5 pm via Perfect serve or cell phone  Navin Del Toro MD          CC/reason for consult :     MO     HPI :     Asad Gottlieb is a 81 y.o. male presented to   the hospital on 7/5/2022 with wound infection in the left leg. He is followed outpatient by podiatry,for wound, which continued to   Get worse despite the treatment. Also has discharge from the wound  Brought to hospital admitted  Also found to have CKD  because of which we are consulted.          ROS:     Seen with- no family    positives in bold   Constitutional:  fever, chills, weakness, weight change, fatigue  Skin:  rash, pruritus, hair loss, bruising, dry skin, petechiae  Head, Face, Neck   headaches, swelling,  cervical adenopathy  Respiratory: shortness of breath, cough, or wheezing  Cardiovascular: chest pain, palpitations, dizzy, edema  Gastrointestinal: nausea, vomiting, diarrhea, constipation,belly pain    Yellow skin, blood in stool  Musculoskeletal:  back pain, muscle weakness, gait problems,       joint pain or swelling. Genitourinary:  dysuria, poor urine flow, flank pain, blood in urine  Neurologic:  vertigo, TIA'S, syncope, seizures, focal weakness  Psychosocial:  insomnia, anxiety, or depression. Additional positive findings:                    All other remaining systems are negative or unable to obtain        PMH/PSH/SH/Family History:     Past Medical History:   Diagnosis Date    [facility] 1/[facility]/14    Dr. Miky Kim DM (diabetes mellitus) ([facility] 1/13/2014    Gout     Hypothyroidism 4/18/2016    Seasonal allergic rhinitis        Past Surgical History:   Procedure Laterality Date    COLONOSCOPY  2009 appx     normal     COLONOSCOPY N/A 6/20/2022    COLONOSCOPY POLYPECTOMY SNARE/COLD BIOPSY performed by Ruddy Uriarte MD at [address], bilateral    INCISIONAL HERNIA REPAIR      bilateral        reports that he quit smoking about 63 years ago. He quit after 5.00 years of use. He has never used smokeless tobacco. He reports that he does not drink alcohol and does not use drugs. family history is not on file.          Medication:     Current Facility-Administered Medications: lactated ringers infusion, , IntraVENous, Continuous  sodium zirconium cyclosilicate (LOKELMA) oral suspension 10 g, 10 g, Oral, TID  sodium chloride flush 0.9 % injection 5-40 mL, 5-40 mL, IntraVENous, 2 times per day  sodium chloride flush 0.9 % injection 5-40 mL, 5-40 mL, IntraVENous, PRN  0.9 % sodium chloride infusion, , IntraVENous, PRN  cloNIDine (CATAPRES) tablet 0.1 mg, 0.1 mg, Oral, Q2H PRN  labetalol (NORMODYNE;TRANDATE) injection 10 mg, 10 mg, IntraVENous, Q2H PRN  aspirin EC tablet 81 mg, 81 mg, Oral, Daily  heparin (porcine) injection 4,000 Units, 4,000 Units, IntraVENous, PRN  heparin (porcine) injection 2,000 Units, 2,000 Units, IntraVENous, PRN  heparin 25,000 units in dextrose 5% 250 mL (premix) infusion, 5-30 Units/kg/hr, IntraVENous, Continuous  NIFEdipine (ADALAT CC) extended release tablet 60 mg, 60 mg, Oral, Daily  vancomycin (VANCOCIN) 750 mg in dextrose 5 % 250 mL IVPB, 750 mg, IntraVENous, Q24H  atorvastatin (LIPITOR) tablet 40 mg, 40 mg, Oral, Nightly  hydrALAZINE (APRESOLINE) tablet 25 mg, 25 mg, Oral, 3 times per day  carvedilol (COREG) tablet 6.25 mg, 6.25 mg, Oral, BID WC  traMADol (ULTRAM) tablet 50 mg, 50 mg, Oral, Q6H PRN  cefepime (MAXIPIME) 2000 mg IVPB minibag, 2,000 mg, IntraVENous, Q12H  hydrALAZINE (APRESOLINE) injection 10 mg, 10 mg, IntraVENous, Q4H PRN  methocarbamol (ROBAXIN) tablet 750 mg, 750 mg, Oral, 4x Daily PRN  gabapentin (NEURONTIN) capsule 300 mg, 300 mg, Oral, Nightly  glucose chewable tablet 16 g, 4 tablet, Oral, PRN  dextrose bolus 10% 125 mL, 125 mL, IntraVENous, PRN **OR** dextrose bolus 10% 250 mL, 250 mL, IntraVENous, PRN  glucagon (rDNA) injection 1 mg, 1 mg, IntraMUSCular, PRN  dextrose 5 % solution, 100 mL/hr, IntraVENous, PRN  insulin lispro (1 Unit Dial) 0-6 Units, 0-6 Units, SubCUTAneous, TID WC  insulin lispro (1 Unit Dial) 0-3 Units, 0-3 Units, SubCUTAneous, Nightly  cetirizine (ZYRTEC) tablet 5 mg, 5 mg, Oral, Daily  tamsulosin (FLOMAX) capsule 0.4 mg, 0.4 mg, Oral, Nightly  sodium chloride flush 0.9 % injection 5-40 mL, 5-40 mL, IntraVENous, 2 times per day  sodium chloride flush 0.9 % injection 5-40 mL, 5-40 mL, IntraVENous, PRN  0.9 % sodium chloride infusion, , IntraVENous, PRN  ondansetron (ZOFRAN-ODT) disintegrating tablet 4 mg, 4 mg, Oral, Q8H PRN **OR** ondansetron (ZOFRAN) injection 4 mg, 4 mg, IntraVENous, Q6H PRN  polyethylene glycol (GLYCOLAX) packet 17 g, 17 g, Oral, Daily PRN  acetaminophen (TYLENOL) tablet 650 mg, 650 mg, Oral, Q6H PRN **OR** acetaminophen (TYLENOL) suppository 650 mg, 650 mg, Rectal, Q6H PRN  morphine (PF) injection 2 mg, 2 mg, IntraVENous, Q3H PRN       Vitals :     Vitals:    07/12/22 1013   BP:    Pulse: 58   Resp:    Temp:    SpO2: I & O :       Intake/Output Summary (Last 24 hours) at 7/12/2022 1037  Last data filed at 7/11/2022 2321  Gross per 24 hour   Intake 360 ml   Output 2150 ml   Net -1790 ml        Physical Examination :     General appearance: Anxious- no, distressed- no, in good spirits-    Alert, pleasant  HEENT: Lips- normal, teeth- ok , oral mucosa- moist  Neck : Mass- no, appears symmetrical, JVD- not visible  Respiratory: Respiratory effort-  normal, wheeze- no, crackles -   Cardiovascular:  Ausculation- No M/R/G, Edema left leg   Abdomen: visible mass- no, distention- no, scar- no, tenderness- no                            hepatosplenomegaly-  no  Musculoskeletal:  clubbing no,cyanosis- no , digital ischemia- no                           muscle strength- grossly normal , tone - grossly normal  Skin: rashes- no , ulcers- no, induration- no, tightening - no  Psychiatric:  Judgement and insight- normal           AAO X 3  Additional finding:   Has left leg wound      LABS:     Recent Labs     07/11/22  1455 07/11/22  1456 07/12/22  0902   WBC 8.4 8.4 8.8   HGB 12.7* 12.6* 11.0*   HCT 38.0* 37.7* 31.9*    152 140     Recent Labs     07/11/22  0450 07/11/22  1455 07/12/22  0902    140 139   K 4.9 5.6* 5.4*    108 109   CO2 23 22 23   BUN 41* 35* 35*   CREATININE 2.0* 1.8* 2.1*   GLUCOSE 114* 96 104*   MG 2.10 2.00 1.90   PHOS 2.9 4.0 3.2

## 2022-07-12 NOTE — PLAN OF CARE
Problem: Skin/Tissue Integrity - Adult  Goal: Skin integrity remains intact  7/12/2022 0049 by Jacoby Teixeira RN  Outcome: Progressing   LLE with ulceration, podiatry changing dressing; otherwise, skin intact. Heels elevated with pillows, patient turned and repositioned q 2 hours to maintain skin integrity. Condom catheter intact to maintain skin integrity. Problem: Skin/Tissue Integrity - Adult  Goal: Incisions, wounds, or drain sites healing without S/S of infection  Outcome: Progressing   LLE dressing c/d/intact. Dressing changed per podiatry. No s/s of infection noted. Patient receiving IV antibiotics. Problem: Discharge Planning  Goal: Discharge to home or other facility with appropriate resources  Outcome: Progressing  Patient to be discharged when medically stable. Problem: Safety - Adult  Goal: Free from fall injury  7/12/2022 0049 by Jacoby Teixeira RN  Outcome: Progressing    Fall precautions in place. Bed alarm activated, low position, wheels locked. Call light and belongings within reach. Continue to monitor safety. Problem: Pain  Goal: Verbalizes/displays adequate comfort level or baseline comfort level  Outcome: Progressing   C/o intermittent pain and spasms, patient medicated with robaxin which alleviated discomfort. Will monitor.

## 2022-07-12 NOTE — CARE COORDINATION
CM following for discharge planning. Pt is from home alone, remains on IV abx, NPO for surgery in am : superficial femoral artery to peroneal artery bypass of LLE. Pod following, ID following. PT/OT evals pending. Home with [facility] vs SNF . If SNF would like [facility] and pt would need precert.      Manuela Cowden RN, BSN, [address]  Case Management Department  [phone]

## 2022-07-12 NOTE — PROGRESS NOTES
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 Diet: NPO at midnight  2. IVF: [email or web address] cc/hr per Nephrology recs  3. Antibiotics: Cefepime, Vancomycin  4. Labs to be drawn: follow-up Type and Screen, INR  5. Anesthesia: to see patient  6. Consent: will obtain and place in chart  7. Pulmonary: CXR: reviewed imaging from 07/10 showing no cardiopulmonary abnormalities  8.  Cardiac: Echo: will review echo from today 07/12      [facility], DO  07/12/22  10:12 AM

## 2022-07-12 NOTE — PROGRESS NOTES
Physician Progress Note      Chuy Albrecht  CSN #:                  [id number]  :                       1935  ADMIT DATE:       2022 8:07 PM  [address] [place] [facility] DATE:  RESPONDING  PROVIDER #:        Wendy Diana MD          QUERY TEXT:    Patient admitted with . Noted documentation of sepsis in Nephrology consult   note . In order to support the diagnosis of sepsis, please include   additional clinical indicators in your documentation. Or please document if   the diagnosis of sepsis has been ruled out after further study. The medical record reflects the following:  Risk Factors: 82 yo w/ hx of DM, osteomyelitis  Clinical Indicators: Per Nephrology note : sepsis. WBC 6.9 - 10.4. Temp   97.4 - 98.2. Per IM 7/10: Cellulitis left lower extremity with full-thickness   ulceration plantar surface with osteomyelitis. Treatment: Blood cultures, IVF bolus, IV Maxipime, IV Vancomycin. Options provided:  -- Sepsis was ruled out after study  -- Sepsis present as evidenced by, Please document evidence.   -- Other - I will add my own diagnosis  -- Disagree - Not applicable / Not valid  -- Disagree - Clinically unable to determine / Unknown  -- Refer to Clinical Documentation Reviewer    PROVIDER RESPONSE TEXT:    Sepsis is present as evidenced by cellulitis    Query created by: Tammie Vanegas on 2022 12:26 PM      Electronically signed by:  Wendy Diana MD 2022 3:27 PM

## 2022-07-12 NOTE — PROGRESS NOTES
Podiatric Surgery Daily Progress Note      Admit Date: 7/5/2022      Code:Full Code    Patient seen and examined, labs and records reviewed    Subjective:     Patient seen at bedside this AM. Patient states that he thinks he is a dream this morning. Patient is aware of person, place and time. Patient denies any acute overnight events. Patient denies f/c/n/v/sob/cp. Review of Systems: A 12 point review of symptoms is unremarkable with the exception of the chief complaint. Patient specifically denies nausea, fever, vomiting, chills, shortness of breath, chest pain, abdominal pain, constipation or difficulty urinating. Objective     /74   Pulse 55   Temp 98 °F (36.7 °C) (Oral)   Resp 18   Ht 6' 2\" (1.88 m)   Wt 178 lb 2.1 oz (80.8 kg)   SpO2 98%   BMI 22.87 kg/m²      I/O:    Intake/Output Summary (Last 24 hours) at 7/12/2022 0650  Last data filed at 7/11/2022 2321  Gross per 24 hour   Intake 360 ml   Output 2150 ml   Net -1790 ml              Wt Readings from Last 3 Encounters:   07/12/22 178 lb 2.1 oz (80.8 kg)   07/05/22 178 lb 3.2 oz (80.8 kg)   06/20/22 177 lb (80.3 kg)       LABS:    Recent Labs     07/11/22  1455 07/11/22  1456   WBC 8.4 8.4   HGB 12.7* 12.6*   HCT 38.0* 37.7*    152        Recent Labs     07/11/22  1455      K 5.6*      CO2 22   PHOS 4.0   BUN 35*   CREATININE 1.8*        Recent Labs     07/11/22  0450 07/11/22  1455   INR 1.01 1.16*   APTT  --  81.2*       LOWER EXTREMITY EXAMINATION    Dressing to left LE intact. No strikethrough noted to the external dressing. Mild serosanguineous drainage noted to the internal layers of the dressing. VASCULAR: DP and PT pulses are non-palpable 0/4 b/l. Upon hand-held Doppler examination DP and PT pulses were noted to be biphasic to the right lower extremity. Upon hand-held Doppler examination DP and PT pulses were noted to be monophasic to the left lower extremity. CFT is brisk to the digits of the right foot. CFT is sluggish to the left foot. Skin temperature is warm to cool from proximal to distal with no focal calor noted. Nonpitting edema noted, left lower extremity-noted to be improving secondary to compressive therapy. No pain with calf compression b/l.     NEUROLOGIC: Gross and epicritic sensation is diminished b/l. Protective sensation is diminished at all pedal sites b/l.     DERMATOLOGIC:   Left lower extremity has a full-thickness ulceration noted to the plantar aspect of the left foot subfourth metatarsal head. Wound measures approximately 1.2 cm x 0.6 cm x 0.4 cm. Wound base is a mixture of fibrotic and granular tissue. The wound does probe to bone. Scant seropurulent drainage expressed with compression but no crepitus, fluctuance, or malodor noted. The wound does track medially about 1 cm but no tunneling or undermining noted.     Patient provided verbal consent for pictures obtained today:          Right LE soft tissue envelope is closed without ulceration or acute signs of infection.     MUSCULOSKELETAL: Muscle strength is 5/5 for all pedal groups tested. Pain with palpation to the periwound, left foot. Ankle joint ROM is decreased in dorsiflexion with the knee extended. 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       COMPARISON: None.       FINDINGS:       3 views of the right foot show distal vascular calcification.       There is been partial amputation of the second, third, fourth and fifth digits.       No erosive changes at the base of the proximal phalanx of the second digit cannot be excluded.       Degenerative changes are seen at the midfoot and sclerosis and subchondral cyst formation.           Impression       Osteomyelitis is not excluded based on this exam particularly at the base of the proximal phalanx of the second digit.  If this is a clinical concern evaluation with MRI should be performed. MRI Left Foot (7/6/2022)  Narrative   MRI OF THE LEFT FOOT       INDICATION: Osteomyelitis.       TECHNIQUE: Multiplanar and multisequence MRI images of the left foot were obtained.       FINDINGS: Images are degraded by motion artifact. The patient is status post resection of the second, and third metatarsal heads. Patient is status post resection of the fifth metatarsal tarsal head and distal shaft as well as the proximal fifth phalanx.       There is abnormal marrow edema identified within the fourth metatarsal head as well as the proximal fourth phalanx.       There is deformity of the proximal first phalanx which may reflect the sequelae of a remote fracture or prior surgery. There is moderate osteoarthrosis identified at the first metatarsophalangeal joint.       There is some mild osteoarthrosis identified at the third, fourth and fifth tarsometatarsal joints. No osseous erosion is identified.       There is subcutaneous edema identified about the foot. There is severe atrophy involving the deep musculature of the foot. Correlate for chronic denervation injury. No soft tissue abscess is identified.           Impression   1. Limited exam secondary to motion artifact. 2. There is marrow edema identified within the fourth metatarsal head and proximal fourth phalanx. In the setting of infection or open wound in this region, this is compatible with osteomyelitis. 3. Status post amputations of the second, third, and fifth metatarsals as detailed above. 4. Healed fracture deformities identified involving the proximal first phalanx. There is osteoarthrosis identified at the first metatarsophalangeal joint. 5. There is subcutaneous edema identified about the foot. This can be seen with cellulitis and/or venous insufficiency. No soft tissue abscess is identified. 6. There is severe fatty atrophy involving the deep muscular foot. Correlate for underlying neuropathy. packed with 1/4 iodoform packing then covered with a betadine soaked gauze, DSD, and Ace with gentle compression  -Prevalon boots reapplied. Patient is to wear at all times while in bed to prevent further deep tissue injury.  -Recommend patient be nonweightbearing to left lower extremity but weight bearing as tolerated to right lower extremity. -Vascular study shows that patient has critical limb ischemia to the left lower extremity. Surgical intervention will depend on vascular recommendations. -Vascular consulted angiogram done but could not fully resolve dissection and patient will require bypass; appreciate recommendations. DISPO: Full-thickness ulceration with cellulitis (resolved) and osteomyelitis, left foot. Recommend patient continue on antibiotics per ID recommendations, vancomycin and cefepime. Vascular consulted; plan bypass during this admission. Surgical intervention from podiatric standpoint will be dependent on vascular recommendations.       Patient assessment and plan was discussed with Dr. Dewey Carey DPM.    Josefina Le DPM  Podiatric Resident, PGY-2  Pager #: [phone] or Perfect Serve

## 2022-07-12 NOTE — PLAN OF CARE
Problem: ABCDS Injury Assessment  Goal: Absence of physical injury  Outcome: Progressing  Flowsheets  Taken 7/12/2022 1025  Absence of Physical Injury: Implement safety measures based on patient assessment  Taken 7/12/2022 0751  Absence of Physical Injury: Implement safety measures based on patient assessment     Problem: Skin/Tissue Integrity - Adult  Goal: Skin integrity remains intact  7/12/2022 1025 by Katelynn Cannon RN  Outcome: Progressing  Flowsheets  Taken 7/12/2022 1025  Skin Integrity Remains Intact: Monitor for areas of redness and/or skin breakdown  Taken 7/12/2022 8555  Skin Integrity Remains Intact: Monitor for areas of redness and/or skin breakdown     Problem: Skin/Tissue Integrity - Adult  Goal: Incisions, wounds, or drain sites healing without S/S of infection  7/12/2022 1025 by Katelynn Cannon RN  Outcome: Progressing  Flowsheets (Taken 7/12/2022 1025)  Incisions, Wounds, or Drain Sites Healing Without Sign and Symptoms of Infection: ADMISSION and DAILY: Assess and document risk factors for pressure ulcer development     Problem: Skin/Tissue Integrity - Adult  Goal: Oral mucous membranes remain intact  Outcome: Progressing  Flowsheets (Taken 7/12/2022 1025)  Oral Mucous Membranes Remain Intact: Assess oral mucosa and hygiene practices     Problem: Discharge Planning  Goal: Discharge to home or other facility with appropriate resources  7/12/2022 1025 by Katelynn Cannon RN  Outcome: Progressing  Flowsheets (Taken 7/12/2022 1025)  Discharge to home or other facility with appropriate resources:   Identify barriers to discharge with patient and caregiver   Arrange for needed discharge resources and transportation as appropriate   Identify discharge learning needs (meds, wound care, etc)     Problem: Safety - Adult  Goal: Free from fall injury  7/12/2022 1025 by Katelynn Cannon RN  Outcome: Progressing  Flowsheets  Taken 7/12/2022 1025  Free From Fall Injury: Instruct family/caregiver on patient safety  Taken 7/12/2022 0751  Free From Fall Injury: Instruct family/caregiver on patient safety     Problem: Chronic Conditions and Co-morbidities  Goal: Patient's chronic conditions and co-morbidity symptoms are monitored and maintained or improved  Outcome: Progressing  Flowsheets (Taken 7/12/2022 1025)  Care Plan - Patient's Chronic Conditions and Co-Morbidity Symptoms are Monitored and Maintained or Improved:   Monitor and assess patient's chronic conditions and comorbid symptoms for stability, deterioration, or improvement   Collaborate with multidisciplinary team to address chronic and comorbid conditions and prevent exacerbation or deterioration   Update acute care plan with appropriate goals if chronic or comorbid symptoms are exacerbated and prevent overall improvement and discharge     Problem: Pain  Goal: Verbalizes/displays adequate comfort level or baseline comfort level  7/12/2022 1025 by Gladys Duke RN  Outcome: Progressing  Flowsheets (Taken 7/12/2022 1025)  Verbalizes/displays adequate comfort level or baseline comfort level:   Encourage patient to monitor pain and request assistance   Assess pain using appropriate pain scale   Administer analgesics based on type and severity of pain and evaluate response   Implement non-pharmacological measures as appropriate and evaluate response     Problem: Skin/Tissue Integrity  Goal: Absence of new skin breakdown  Description: 1. Monitor for areas of redness and/or skin breakdown  2. Assess vascular access sites hourly  3. Every 4-6 hours minimum:  Change oxygen saturation probe site  4. Every 4-6 hours:  If on nasal continuous positive airway pressure, respiratory therapy assess nares and determine need for appliance change or resting period.   Outcome: Progressing

## 2022-07-13 ENCOUNTER — APPOINTMENT (OUTPATIENT)
Dept: GENERAL RADIOLOGY | Age: 87
DRG: 617 | End: 2022-07-13
Payer: MEDICARE

## 2022-07-13 ENCOUNTER — ANESTHESIA (OUTPATIENT)
Dept: OPERATING ROOM | Age: 87
DRG: 617 | End: 2022-07-13
Payer: MEDICARE

## 2022-07-13 LAB
ALBUMIN SERPL-MCNC: 3.3 G/DL (ref 3.4–5)
ANION GAP SERPL CALCULATED.3IONS-SCNC: 5 MMOL/L (ref 3–16)
ANTI-XA UNFRAC HEPARIN: 0.32 IU/ML (ref 0.3–0.7)
BASOPHILS ABSOLUTE: 0 K/UL (ref 0–0.2)
BASOPHILS RELATIVE PERCENT: 0.3 %
BUN BLDV-MCNC: 38 MG/DL (ref 7–20)
CALCIUM SERPL-MCNC: 8.9 MG/DL (ref 8.3–10.6)
CHLORIDE BLD-SCNC: 111 MMOL/L (ref 99–110)
CO2: 24 MMOL/L (ref 21–32)
CREAT SERPL-MCNC: 2.1 MG/DL (ref 0.8–1.3)
EOSINOPHILS ABSOLUTE: 0.3 K/UL (ref 0–0.6)
EOSINOPHILS RELATIVE PERCENT: 3.5 %
GFR AFRICAN AMERICAN: 36
GFR NON-AFRICAN AMERICAN: 30
GLUCOSE BLD-MCNC: 101 MG/DL (ref 70–99)
GLUCOSE BLD-MCNC: 103 MG/DL (ref 70–99)
GLUCOSE BLD-MCNC: 110 MG/DL (ref 70–99)
GLUCOSE BLD-MCNC: 69 MG/DL (ref 70–99)
GLUCOSE BLD-MCNC: 90 MG/DL (ref 70–99)
GLUCOSE BLD-MCNC: 91 MG/DL (ref 70–99)
GLUCOSE BLD-MCNC: 98 MG/DL (ref 70–99)
HCT VFR BLD CALC: 33 % (ref 40.5–52.5)
HEMOGLOBIN: 10.9 G/DL (ref 13.5–17.5)
INR BLD: 1.14 (ref 0.87–1.14)
LYMPHOCYTES ABSOLUTE: 1.7 K/UL (ref 1–5.1)
LYMPHOCYTES RELATIVE PERCENT: 19.9 %
MAGNESIUM: 1.9 MG/DL (ref 1.8–2.4)
MCH RBC QN AUTO: 33.7 PG (ref 26–34)
MCHC RBC AUTO-ENTMCNC: 33 G/DL (ref 31–36)
MCV RBC AUTO: 102 FL (ref 80–100)
MONOCYTES ABSOLUTE: 0.8 K/UL (ref 0–1.3)
MONOCYTES RELATIVE PERCENT: 9.1 %
NEUTROPHILS ABSOLUTE: 5.7 K/UL (ref 1.7–7.7)
NEUTROPHILS RELATIVE PERCENT: 67.2 %
PDW BLD-RTO: 13.2 % (ref 12.4–15.4)
PERFORMED ON: ABNORMAL
PERFORMED ON: NORMAL
PERFORMED ON: NORMAL
PHOSPHORUS: 2.9 MG/DL (ref 2.5–4.9)
PLATELET # BLD: 131 K/UL (ref 135–450)
PMV BLD AUTO: 8.9 FL (ref 5–10.5)
POTASSIUM SERPL-SCNC: 5 MMOL/L (ref 3.5–5.1)
PROTHROMBIN TIME: 14.5 SEC (ref 11.7–14.5)
RBC # BLD: 3.23 M/UL (ref 4.2–5.9)
SODIUM BLD-SCNC: 140 MMOL/L (ref 136–145)
VANCOMYCIN RANDOM: 17.1 UG/ML
WBC # BLD: 8.5 K/UL (ref 4–11)

## 2022-07-13 PROCEDURE — 6370000000 HC RX 637 (ALT 250 FOR IP)

## 2022-07-13 PROCEDURE — 3209999900 FLUORO FOR SURGICAL PROCEDURES

## 2022-07-13 PROCEDURE — 73590 X-RAY EXAM OF LOWER LEG: CPT

## 2022-07-13 PROCEDURE — 6360000002 HC RX W HCPCS: Performed by: NURSE ANESTHETIST, CERTIFIED REGISTERED

## 2022-07-13 PROCEDURE — 6360000002 HC RX W HCPCS: Performed by: ANESTHESIOLOGY

## 2022-07-13 PROCEDURE — 6360000002 HC RX W HCPCS

## 2022-07-13 PROCEDURE — 6360000004 HC RX CONTRAST MEDICATION: Performed by: SURGERY

## 2022-07-13 PROCEDURE — 2580000003 HC RX 258: Performed by: NURSE ANESTHETIST, CERTIFIED REGISTERED

## 2022-07-13 PROCEDURE — 6370000000 HC RX 637 (ALT 250 FOR IP): Performed by: STUDENT IN AN ORGANIZED HEALTH CARE EDUCATION/TRAINING PROGRAM

## 2022-07-13 PROCEDURE — 2580000003 HC RX 258: Performed by: STUDENT IN AN ORGANIZED HEALTH CARE EDUCATION/TRAINING PROGRAM

## 2022-07-13 PROCEDURE — P9041 ALBUMIN (HUMAN),5%, 50ML: HCPCS | Performed by: NURSE ANESTHETIST, CERTIFIED REGISTERED

## 2022-07-13 PROCEDURE — 80202 ASSAY OF VANCOMYCIN: CPT

## 2022-07-13 PROCEDURE — 80069 RENAL FUNCTION PANEL: CPT

## 2022-07-13 PROCEDURE — 85018 HEMOGLOBIN: CPT

## 2022-07-13 PROCEDURE — 047K3ZZ DILATION OF RIGHT FEMORAL ARTERY, PERCUTANEOUS APPROACH: ICD-10-PCS | Performed by: SURGERY

## 2022-07-13 PROCEDURE — 85025 COMPLETE CBC W/AUTO DIFF WBC: CPT

## 2022-07-13 PROCEDURE — 2709999900 HC NON-CHARGEABLE SUPPLY: Performed by: SURGERY

## 2022-07-13 PROCEDURE — 047N3ZZ DILATION OF LEFT POPLITEAL ARTERY, PERCUTANEOUS APPROACH: ICD-10-PCS | Performed by: SURGERY

## 2022-07-13 PROCEDURE — 36415 COLL VENOUS BLD VENIPUNCTURE: CPT

## 2022-07-13 PROCEDURE — 35566 ART BYP FEM-ANT-POST TIB/PRL: CPT | Performed by: SURGERY

## 2022-07-13 PROCEDURE — 85520 HEPARIN ASSAY: CPT

## 2022-07-13 PROCEDURE — 2000000000 HC ICU R&B

## 2022-07-13 PROCEDURE — 2580000003 HC RX 258: Performed by: SURGERY

## 2022-07-13 PROCEDURE — 3600000014 HC SURGERY LEVEL 4 ADDTL 15MIN: Performed by: SURGERY

## 2022-07-13 PROCEDURE — C1894 INTRO/SHEATH, NON-LASER: HCPCS | Performed by: SURGERY

## 2022-07-13 PROCEDURE — A4217 STERILE WATER/SALINE, 500 ML: HCPCS | Performed by: SURGERY

## 2022-07-13 PROCEDURE — 6370000000 HC RX 637 (ALT 250 FOR IP): Performed by: SURGERY

## 2022-07-13 PROCEDURE — 6360000002 HC RX W HCPCS: Performed by: SURGERY

## 2022-07-13 PROCEDURE — 2580000003 HC RX 258

## 2022-07-13 PROCEDURE — 2500000003 HC RX 250 WO HCPCS

## 2022-07-13 PROCEDURE — B41G1ZZ FLUOROSCOPY OF LEFT LOWER EXTREMITY ARTERIES USING LOW OSMOLAR CONTRAST: ICD-10-PCS | Performed by: SURGERY

## 2022-07-13 PROCEDURE — 7100000000 HC PACU RECOVERY - FIRST 15 MIN: Performed by: SURGERY

## 2022-07-13 PROCEDURE — 3700000001 HC ADD 15 MINUTES (ANESTHESIA): Performed by: SURGERY

## 2022-07-13 PROCEDURE — 3600000004 HC SURGERY LEVEL 4 BASE: Performed by: SURGERY

## 2022-07-13 PROCEDURE — 2720000010 HC SURG SUPPLY STERILE: Performed by: SURGERY

## 2022-07-13 PROCEDURE — 85610 PROTHROMBIN TIME: CPT

## 2022-07-13 PROCEDURE — 2500000003 HC RX 250 WO HCPCS: Performed by: NURSE ANESTHETIST, CERTIFIED REGISTERED

## 2022-07-13 PROCEDURE — 83735 ASSAY OF MAGNESIUM: CPT

## 2022-07-13 PROCEDURE — 99232 SBSQ HOSP IP/OBS MODERATE 35: CPT | Performed by: INTERNAL MEDICINE

## 2022-07-13 PROCEDURE — 3700000000 HC ANESTHESIA ATTENDED CARE: Performed by: SURGERY

## 2022-07-13 PROCEDURE — 7100000001 HC PACU RECOVERY - ADDTL 15 MIN: Performed by: SURGERY

## 2022-07-13 RX ORDER — MAGNESIUM SULFATE IN WATER 40 MG/ML
2000 INJECTION, SOLUTION INTRAVENOUS ONCE
Status: DISCONTINUED | OUTPATIENT
Start: 2022-07-13 | End: 2022-07-14

## 2022-07-13 RX ORDER — IODIXANOL 320 MG/ML
INJECTION, SOLUTION INTRAVASCULAR PRN
Status: DISCONTINUED | OUTPATIENT
Start: 2022-07-13 | End: 2022-07-13 | Stop reason: HOSPADM

## 2022-07-13 RX ORDER — FENTANYL CITRATE 50 UG/ML
INJECTION, SOLUTION INTRAMUSCULAR; INTRAVENOUS PRN
Status: DISCONTINUED | OUTPATIENT
Start: 2022-07-13 | End: 2022-07-13 | Stop reason: SDUPTHER

## 2022-07-13 RX ORDER — SODIUM CHLORIDE 9 MG/ML
25 INJECTION, SOLUTION INTRAVENOUS PRN
Status: DISCONTINUED | OUTPATIENT
Start: 2022-07-13 | End: 2022-07-13 | Stop reason: HOSPADM

## 2022-07-13 RX ORDER — FENTANYL CITRATE 50 UG/ML
50 INJECTION, SOLUTION INTRAMUSCULAR; INTRAVENOUS EVERY 5 MIN PRN
Status: DISCONTINUED | OUTPATIENT
Start: 2022-07-13 | End: 2022-07-13 | Stop reason: HOSPADM

## 2022-07-13 RX ORDER — OXYCODONE HYDROCHLORIDE 5 MG/1
5 TABLET ORAL EVERY 4 HOURS PRN
Status: CANCELLED | OUTPATIENT
Start: 2022-07-13

## 2022-07-13 RX ORDER — LABETALOL HYDROCHLORIDE 5 MG/ML
10 INJECTION, SOLUTION INTRAVENOUS
Status: DISCONTINUED | OUTPATIENT
Start: 2022-07-13 | End: 2022-07-13 | Stop reason: HOSPADM

## 2022-07-13 RX ORDER — SODIUM CHLORIDE 9 MG/ML
INJECTION, SOLUTION INTRAVENOUS CONTINUOUS PRN
Status: DISCONTINUED | OUTPATIENT
Start: 2022-07-13 | End: 2022-07-13 | Stop reason: SDUPTHER

## 2022-07-13 RX ORDER — GLYCOPYRROLATE 0.2 MG/ML
INJECTION INTRAMUSCULAR; INTRAVENOUS PRN
Status: DISCONTINUED | OUTPATIENT
Start: 2022-07-13 | End: 2022-07-13 | Stop reason: SDUPTHER

## 2022-07-13 RX ORDER — METOCLOPRAMIDE HYDROCHLORIDE 5 MG/ML
10 INJECTION INTRAMUSCULAR; INTRAVENOUS
Status: DISCONTINUED | OUTPATIENT
Start: 2022-07-13 | End: 2022-07-13 | Stop reason: HOSPADM

## 2022-07-13 RX ORDER — SODIUM CHLORIDE 0.9 % (FLUSH) 0.9 %
5-40 SYRINGE (ML) INJECTION PRN
Status: DISCONTINUED | OUTPATIENT
Start: 2022-07-13 | End: 2022-07-13 | Stop reason: HOSPADM

## 2022-07-13 RX ORDER — PROPOFOL 10 MG/ML
INJECTION, EMULSION INTRAVENOUS PRN
Status: DISCONTINUED | OUTPATIENT
Start: 2022-07-13 | End: 2022-07-13 | Stop reason: SDUPTHER

## 2022-07-13 RX ORDER — HEPARIN SODIUM 1000 [USP'U]/ML
INJECTION, SOLUTION INTRAVENOUS; SUBCUTANEOUS PRN
Status: DISCONTINUED | OUTPATIENT
Start: 2022-07-13 | End: 2022-07-13 | Stop reason: SDUPTHER

## 2022-07-13 RX ORDER — ALBUMIN, HUMAN INJ 5% 5 %
SOLUTION INTRAVENOUS PRN
Status: DISCONTINUED | OUTPATIENT
Start: 2022-07-13 | End: 2022-07-13 | Stop reason: SDUPTHER

## 2022-07-13 RX ORDER — ONDANSETRON 2 MG/ML
4 INJECTION INTRAMUSCULAR; INTRAVENOUS
Status: DISCONTINUED | OUTPATIENT
Start: 2022-07-13 | End: 2022-07-13 | Stop reason: HOSPADM

## 2022-07-13 RX ORDER — OXYCODONE HYDROCHLORIDE 5 MG/1
5 TABLET ORAL PRN
Status: DISCONTINUED | OUTPATIENT
Start: 2022-07-13 | End: 2022-07-13 | Stop reason: HOSPADM

## 2022-07-13 RX ORDER — OXYCODONE HYDROCHLORIDE 5 MG/1
10 TABLET ORAL PRN
Status: DISCONTINUED | OUTPATIENT
Start: 2022-07-13 | End: 2022-07-13 | Stop reason: HOSPADM

## 2022-07-13 RX ORDER — HEPARIN SODIUM 10000 [USP'U]/100ML
5-30 INJECTION, SOLUTION INTRAVENOUS CONTINUOUS
Status: DISPENSED | OUTPATIENT
Start: 2022-07-13 | End: 2022-07-15

## 2022-07-13 RX ORDER — SODIUM CHLORIDE, SODIUM LACTATE, POTASSIUM CHLORIDE, CALCIUM CHLORIDE 600; 310; 30; 20 MG/100ML; MG/100ML; MG/100ML; MG/100ML
INJECTION, SOLUTION INTRAVENOUS CONTINUOUS PRN
Status: DISCONTINUED | OUTPATIENT
Start: 2022-07-13 | End: 2022-07-13 | Stop reason: SDUPTHER

## 2022-07-13 RX ORDER — ROCURONIUM BROMIDE 10 MG/ML
INJECTION, SOLUTION INTRAVENOUS PRN
Status: DISCONTINUED | OUTPATIENT
Start: 2022-07-13 | End: 2022-07-13 | Stop reason: SDUPTHER

## 2022-07-13 RX ORDER — SODIUM CHLORIDE 0.9 % (FLUSH) 0.9 %
5-40 SYRINGE (ML) INJECTION EVERY 12 HOURS SCHEDULED
Status: DISCONTINUED | OUTPATIENT
Start: 2022-07-13 | End: 2022-07-13 | Stop reason: HOSPADM

## 2022-07-13 RX ORDER — IPRATROPIUM BROMIDE AND ALBUTEROL SULFATE 2.5; .5 MG/3ML; MG/3ML
1 SOLUTION RESPIRATORY (INHALATION)
Status: DISCONTINUED | OUTPATIENT
Start: 2022-07-13 | End: 2022-07-13 | Stop reason: HOSPADM

## 2022-07-13 RX ORDER — OXYCODONE HYDROCHLORIDE 5 MG/1
10 TABLET ORAL EVERY 4 HOURS PRN
Status: CANCELLED | OUTPATIENT
Start: 2022-07-13

## 2022-07-13 RX ORDER — MIDAZOLAM HYDROCHLORIDE 1 MG/ML
2 INJECTION INTRAMUSCULAR; INTRAVENOUS
Status: DISCONTINUED | OUTPATIENT
Start: 2022-07-13 | End: 2022-07-13 | Stop reason: HOSPADM

## 2022-07-13 RX ORDER — ONDANSETRON 2 MG/ML
INJECTION INTRAMUSCULAR; INTRAVENOUS PRN
Status: DISCONTINUED | OUTPATIENT
Start: 2022-07-13 | End: 2022-07-13 | Stop reason: SDUPTHER

## 2022-07-13 RX ORDER — PHENYLEPHRINE HYDROCHLORIDE 10 MG/ML
INJECTION INTRAVENOUS PRN
Status: DISCONTINUED | OUTPATIENT
Start: 2022-07-13 | End: 2022-07-13 | Stop reason: SDUPTHER

## 2022-07-13 RX ORDER — LIDOCAINE HYDROCHLORIDE 20 MG/ML
INJECTION, SOLUTION INTRAVENOUS PRN
Status: DISCONTINUED | OUTPATIENT
Start: 2022-07-13 | End: 2022-07-13 | Stop reason: SDUPTHER

## 2022-07-13 RX ORDER — SUCCINYLCHOLINE/SOD CL,ISO/PF 200MG/10ML
SYRINGE (ML) INTRAVENOUS PRN
Status: DISCONTINUED | OUTPATIENT
Start: 2022-07-13 | End: 2022-07-13 | Stop reason: SDUPTHER

## 2022-07-13 RX ADMIN — PROPOFOL 120 MG: 10 INJECTION, EMULSION INTRAVENOUS at 13:37

## 2022-07-13 RX ADMIN — PHENYLEPHRINE HYDROCHLORIDE 200 MCG: 10 INJECTION INTRAVENOUS at 14:17

## 2022-07-13 RX ADMIN — ROCURONIUM BROMIDE 5 MG: 10 INJECTION INTRAVENOUS at 13:37

## 2022-07-13 RX ADMIN — FENTANYL CITRATE 50 MCG: 50 INJECTION, SOLUTION INTRAMUSCULAR; INTRAVENOUS at 18:44

## 2022-07-13 RX ADMIN — PHENYLEPHRINE HYDROCHLORIDE 150 MCG: 10 INJECTION INTRAVENOUS at 17:50

## 2022-07-13 RX ADMIN — SODIUM CHLORIDE, PRESERVATIVE FREE 10 ML: 5 INJECTION INTRAVENOUS at 23:27

## 2022-07-13 RX ADMIN — PHENYLEPHRINE HYDROCHLORIDE 150 MCG: 10 INJECTION INTRAVENOUS at 16:42

## 2022-07-13 RX ADMIN — SUGAMMADEX 200 MG: 100 INJECTION, SOLUTION INTRAVENOUS at 18:41

## 2022-07-13 RX ADMIN — GLYCOPYRROLATE 0.2 MG: 0.2 INJECTION INTRAMUSCULAR; INTRAVENOUS at 14:08

## 2022-07-13 RX ADMIN — ROCURONIUM BROMIDE 10 MG: 10 INJECTION INTRAVENOUS at 17:22

## 2022-07-13 RX ADMIN — CARVEDILOL 6.25 MG: 6.25 TABLET, FILM COATED ORAL at 23:25

## 2022-07-13 RX ADMIN — SODIUM CHLORIDE, POTASSIUM CHLORIDE, SODIUM LACTATE AND CALCIUM CHLORIDE: 600; 310; 30; 20 INJECTION, SOLUTION INTRAVENOUS at 03:23

## 2022-07-13 RX ADMIN — PHENYLEPHRINE HYDROCHLORIDE 150 MCG: 10 INJECTION INTRAVENOUS at 13:56

## 2022-07-13 RX ADMIN — ROCURONIUM BROMIDE 10 MG: 10 INJECTION INTRAVENOUS at 17:57

## 2022-07-13 RX ADMIN — GLYCOPYRROLATE 0.2 MG: 0.2 INJECTION INTRAMUSCULAR; INTRAVENOUS at 15:36

## 2022-07-13 RX ADMIN — PHENYLEPHRINE HYDROCHLORIDE 150 MCG: 10 INJECTION INTRAVENOUS at 13:43

## 2022-07-13 RX ADMIN — ATORVASTATIN CALCIUM 40 MG: 40 TABLET, FILM COATED ORAL at 23:25

## 2022-07-13 RX ADMIN — PHENYLEPHRINE HYDROCHLORIDE 120 MCG: 10 INJECTION INTRAVENOUS at 18:15

## 2022-07-13 RX ADMIN — HYDRALAZINE HYDROCHLORIDE 25 MG: 25 TABLET, FILM COATED ORAL at 05:35

## 2022-07-13 RX ADMIN — GABAPENTIN 300 MG: 300 CAPSULE ORAL at 23:25

## 2022-07-13 RX ADMIN — PHENYLEPHRINE HYDROCHLORIDE 100 MCG: 10 INJECTION INTRAVENOUS at 18:06

## 2022-07-13 RX ADMIN — FENTANYL CITRATE 50 MCG: 50 INJECTION, SOLUTION INTRAMUSCULAR; INTRAVENOUS at 15:24

## 2022-07-13 RX ADMIN — SODIUM CHLORIDE, POTASSIUM CHLORIDE, SODIUM LACTATE AND CALCIUM CHLORIDE: 600; 310; 30; 20 INJECTION, SOLUTION INTRAVENOUS at 20:31

## 2022-07-13 RX ADMIN — PHENYLEPHRINE HYDROCHLORIDE 50 MCG: 10 INJECTION INTRAVENOUS at 15:35

## 2022-07-13 RX ADMIN — ASPIRIN 81 MG: 81 TABLET, COATED ORAL at 09:43

## 2022-07-13 RX ADMIN — PHENYLEPHRINE HYDROCHLORIDE 50 MCG: 10 INJECTION INTRAVENOUS at 15:47

## 2022-07-13 RX ADMIN — HYDRALAZINE HYDROCHLORIDE 10 MG: 20 INJECTION INTRAMUSCULAR; INTRAVENOUS at 20:24

## 2022-07-13 RX ADMIN — SODIUM CHLORIDE, PRESERVATIVE FREE 10 ML: 5 INJECTION INTRAVENOUS at 09:45

## 2022-07-13 RX ADMIN — NIFEDIPINE 60 MG: 30 TABLET, EXTENDED RELEASE ORAL at 09:44

## 2022-07-13 RX ADMIN — HYDRALAZINE HYDROCHLORIDE 25 MG: 25 TABLET, FILM COATED ORAL at 23:47

## 2022-07-13 RX ADMIN — VANCOMYCIN HYDROCHLORIDE 750 MG: 10 INJECTION, POWDER, LYOPHILIZED, FOR SOLUTION INTRAVENOUS at 12:20

## 2022-07-13 RX ADMIN — PHENYLEPHRINE HYDROCHLORIDE 100 MCG: 10 INJECTION INTRAVENOUS at 17:56

## 2022-07-13 RX ADMIN — TAMSULOSIN HYDROCHLORIDE 0.4 MG: 0.4 CAPSULE ORAL at 23:27

## 2022-07-13 RX ADMIN — ROCURONIUM BROMIDE 20 MG: 10 INJECTION INTRAVENOUS at 15:58

## 2022-07-13 RX ADMIN — SODIUM CHLORIDE, SODIUM LACTATE, POTASSIUM CHLORIDE, AND CALCIUM CHLORIDE: .6; .31; .03; .02 INJECTION, SOLUTION INTRAVENOUS at 13:37

## 2022-07-13 RX ADMIN — HEPARIN SODIUM 3000 UNITS: 1000 INJECTION INTRAVENOUS; SUBCUTANEOUS at 17:05

## 2022-07-13 RX ADMIN — PHENYLEPHRINE HYDROCHLORIDE 150 MCG: 10 INJECTION INTRAVENOUS at 13:46

## 2022-07-13 RX ADMIN — HEPARIN SODIUM 5000 UNITS: 1000 INJECTION INTRAVENOUS; SUBCUTANEOUS at 16:51

## 2022-07-13 RX ADMIN — ROCURONIUM BROMIDE 45 MG: 10 INJECTION INTRAVENOUS at 13:45

## 2022-07-13 RX ADMIN — LIDOCAINE HYDROCHLORIDE 60 MG: 20 INJECTION, SOLUTION INTRAVENOUS at 13:37

## 2022-07-13 RX ADMIN — ROCURONIUM BROMIDE 10 MG: 10 INJECTION INTRAVENOUS at 16:43

## 2022-07-13 RX ADMIN — PHENYLEPHRINE HYDROCHLORIDE 150 MCG: 10 INJECTION INTRAVENOUS at 13:52

## 2022-07-13 RX ADMIN — ROCURONIUM BROMIDE 50 MG: 10 INJECTION INTRAVENOUS at 14:11

## 2022-07-13 RX ADMIN — SODIUM PHOSPHATE, MONOBASIC, MONOHYDRATE 10 MMOL: 276; 142 INJECTION, SOLUTION INTRAVENOUS at 13:11

## 2022-07-13 RX ADMIN — SODIUM ZIRCONIUM CYCLOSILICATE 10 G: 10 POWDER, FOR SUSPENSION ORAL at 12:22

## 2022-07-13 RX ADMIN — GLYCOPYRROLATE 0.2 MG: 0.2 INJECTION INTRAMUSCULAR; INTRAVENOUS at 13:56

## 2022-07-13 RX ADMIN — ALBUMIN (HUMAN) 250 ML: 12.5 INJECTION, SOLUTION INTRAVENOUS at 18:35

## 2022-07-13 RX ADMIN — PHENYLEPHRINE HYDROCHLORIDE 100 MCG: 10 INJECTION INTRAVENOUS at 16:14

## 2022-07-13 RX ADMIN — GLYCOPYRROLATE 0.2 MG: 0.2 INJECTION INTRAMUSCULAR; INTRAVENOUS at 14:17

## 2022-07-13 RX ADMIN — SODIUM CHLORIDE: 9 INJECTION, SOLUTION INTRAVENOUS at 05:40

## 2022-07-13 RX ADMIN — ROCURONIUM BROMIDE 20 MG: 10 INJECTION INTRAVENOUS at 15:20

## 2022-07-13 RX ADMIN — PHENYLEPHRINE HYDROCHLORIDE 100 MCG: 10 INJECTION INTRAVENOUS at 13:47

## 2022-07-13 RX ADMIN — PHENYLEPHRINE HYDROCHLORIDE 100 MCG: 10 INJECTION INTRAVENOUS at 14:33

## 2022-07-13 RX ADMIN — PHENYLEPHRINE HYDROCHLORIDE 10 MCG/MIN: 10 INJECTION, SOLUTION INTRAMUSCULAR; INTRAVENOUS; SUBCUTANEOUS at 14:39

## 2022-07-13 RX ADMIN — PHENYLEPHRINE HYDROCHLORIDE 80 MCG: 10 INJECTION INTRAVENOUS at 18:53

## 2022-07-13 RX ADMIN — CEFEPIME HYDROCHLORIDE 2000 MG: 2 INJECTION, POWDER, FOR SOLUTION INTRAVENOUS at 05:44

## 2022-07-13 RX ADMIN — SODIUM PHOSPHATE, MONOBASIC, MONOHYDRATE 10 MMOL: 276; 142 INJECTION, SOLUTION INTRAVENOUS at 13:29

## 2022-07-13 RX ADMIN — PHENYLEPHRINE HYDROCHLORIDE 100 MCG: 10 INJECTION INTRAVENOUS at 16:24

## 2022-07-13 RX ADMIN — ONDANSETRON 4 MG: 2 INJECTION INTRAMUSCULAR; INTRAVENOUS at 18:39

## 2022-07-13 RX ADMIN — GLYCOPYRROLATE 0.2 MG: 0.2 INJECTION INTRAMUSCULAR; INTRAVENOUS at 18:24

## 2022-07-13 RX ADMIN — GLYCOPYRROLATE 0.2 MG: 0.2 INJECTION INTRAMUSCULAR; INTRAVENOUS at 16:33

## 2022-07-13 RX ADMIN — FENTANYL CITRATE 50 MCG: 50 INJECTION, SOLUTION INTRAMUSCULAR; INTRAVENOUS at 13:37

## 2022-07-13 RX ADMIN — PHENYLEPHRINE HYDROCHLORIDE 100 MCG: 10 INJECTION INTRAVENOUS at 16:32

## 2022-07-13 RX ADMIN — Medication 140 MG: at 13:37

## 2022-07-13 RX ADMIN — CARVEDILOL 6.25 MG: 6.25 TABLET, FILM COATED ORAL at 09:45

## 2022-07-13 RX ADMIN — SODIUM CHLORIDE: 9 INJECTION, SOLUTION INTRAVENOUS at 13:37

## 2022-07-13 RX ADMIN — ALBUMIN (HUMAN) 250 ML: 12.5 INJECTION, SOLUTION INTRAVENOUS at 18:21

## 2022-07-13 RX ADMIN — FENTANYL CITRATE 50 MCG: 50 INJECTION, SOLUTION INTRAMUSCULAR; INTRAVENOUS at 21:27

## 2022-07-13 RX ADMIN — CETIRIZINE HYDROCHLORIDE 5 MG: 10 TABLET, FILM COATED ORAL at 09:44

## 2022-07-13 RX ADMIN — SODIUM CHLORIDE, SODIUM LACTATE, POTASSIUM CHLORIDE, AND CALCIUM CHLORIDE: .6; .31; .03; .02 INJECTION, SOLUTION INTRAVENOUS at 16:14

## 2022-07-13 ASSESSMENT — PAIN SCALES - GENERAL
PAINLEVEL_OUTOF10: 0
PAINLEVEL_OUTOF10: 10
PAINLEVEL_OUTOF10: 0
PAINLEVEL_OUTOF10: 4
PAINLEVEL_OUTOF10: 0
PAINLEVEL_OUTOF10: 6
PAINLEVEL_OUTOF10: 4

## 2022-07-13 ASSESSMENT — PAIN DESCRIPTION - ORIENTATION: ORIENTATION: LEFT

## 2022-07-13 ASSESSMENT — PAIN DESCRIPTION - LOCATION: LOCATION: FOOT

## 2022-07-13 ASSESSMENT — PAIN DESCRIPTION - PAIN TYPE: TYPE: ACUTE PAIN;SURGICAL PAIN

## 2022-07-13 ASSESSMENT — PAIN DESCRIPTION - DESCRIPTORS: DESCRIPTORS: BURNING;ACHING

## 2022-07-13 ASSESSMENT — PAIN DESCRIPTION - DIRECTION: RADIATING_TOWARDS: TOES

## 2022-07-13 ASSESSMENT — PAIN DESCRIPTION - FREQUENCY: FREQUENCY: CONTINUOUS

## 2022-07-13 NOTE — PROGRESS NOTES
Pharmacy Note - Renal Dosing and Extended Infusion Beta-Lactam Adjustment    Cefepime ordered for treatment of LLE OM/cellulitis. Per [facility] Renal Dose Adjustment Policy and Extended Infusion Beta-Lactam Policy, Cefepime 2086LP IV EI q12h will be changed to 1000mg IV EI q12h. Estimated Creatinine Clearance: Estimated Creatinine Clearance: 29 mL/min (A) (based on SCr of 2.1 mg/dL (H)). Dialysis Status, MO, CKD: MO on CKD - Baseline SCr is ~1.6. SCr 2.1 today. BMI: Body mass index is 22.62 kg/m². Rationale for Adjustment: Agent is renally eliminated and demonstrates time-dependent effect on bacterial eradication. Extended-infusion dosing strategy aims to enhance microbiologic and clinical efficacy. Adjusting dose as above based on indication and renal function. Pharmacy will continue to monitor renal function and adjust dose as necessary. Please call with any questions.     Rosi Lim PharmD, BCPS  Wireless: [id number]   7/13/2022 9:14 AM

## 2022-07-13 NOTE — PLAN OF CARE
Problem: Chronic Conditions and Co-morbidities  Goal: Patient's chronic conditions and co-morbidity symptoms are monitored and maintained or improved  Outcome: Progressing  Flowsheets (Taken 7/13/2022 0500)  Care Plan - Patient's Chronic Conditions and Co-Morbidity Symptoms are Monitored and Maintained or Improved: Monitor and assess patient's chronic conditions and comorbid symptoms for stability, deterioration, or improvement  Note: Pt's VSS. NSR on telemetry. Pt receiving IV antibiotics for infection. Will continue to monitor. Problem: Safety - Adult  Goal: Free from fall injury  Outcome: Progressing  Flowsheets (Taken 7/13/2022 0500)  Free From Fall Injury: Instruct family/caregiver on patient safety  Note: Fall precautions are in place. Bed alarm is on and in lowest position. Pt is using call light appropriately. Will continue to monitor. Problem: Skin/Tissue Integrity - Adult  Goal: Skin integrity remains intact  Outcome: Progressing  Flowsheets (Taken 7/13/2022 0500)  Skin Integrity Remains Intact:   Monitor for areas of redness and/or skin breakdown   Assess vascular access sites hourly  Note: Pt's dressing to left foot is clean, dry, and intact. No other skin breakdown noted. Will continue to monitor.

## 2022-07-13 NOTE — PROGRESS NOTES
Clinical Pharmacy Progress Note    Vancomycin - Management by Pharmacy    Consult Date(s):  7/5/22  Consulting Provider(s):  Dr. Mendez Lies / Plan:  1)  LLE osteomyelitis with cellulitis & ulcer  - Vancomycin   Concurrent Antimicrobials:   o Cefepime - Day #9   Day of Vanc Therapy / Ordered Duration: #9   Current Dosing Method: Bayesian-Guided AUC dosing   Therapeutic Goal: 400-600 mg/L*hr   Current Dose / Frequency:  750mg IV q24h    Plan / Rationale:    o Pt has hx of CKD - baseline SCr is ~1.6. SCr stable at 2.1 today.  o On 750mg IV q24h. o Level today = 17.1 mg/L - drawn ~16h after previous dose. - Calculated AUC = 475 mg/L*h with steady-state trough of ~16.3 mg/L.  o Will continue current dose for now. Will plan to recheck level in ~2-3 days to ensure pt is not accumulating vancomycin (AUC increased slightly from last check on 7/10).  Will continue to monitor clinical condition and make adjustments to regimen as appropriate. Please call with questions--  Jordi Li PharmD, BCPS  Wireless: [id number]   7/13/2022 9:03 AM        Interval update:  S/p LLE arteriogram and L popliteal/tibioperoneal trunk angioplasty (7/11). To OR for L SFA-peroneal bypass today. Subjective/Objective:   Panchito Gautam is a 81 y.o. y/o male with a PMHx that includes DM, CKD (baseline SCr ~ 1.6), gout, hypothyroidism, and seasonal allergies who is admitted with LLE osteomyelitis with cellulitis and ulcer. Pharmacy is consulted to dose Vancomycin.     Ht Readings from Last 1 Encounters:   07/05/22 6' 2\" (1.88 m)     Wt Readings from Last 1 Encounters:   07/13/22 176 lb 2.4 oz (79.9 kg)       Current and Prior Antimicrobials:  Cefepime (7/5-current)  Vancomycin - Pharmacy to dose    (7/5-7/8)   750mg IV q24h (7/8-current)    Vanc Level(s) / Doses:  Date Time Level / Type of Level Interpretation / Dose   7/5 22:39  · 1500mg IV x1 at 22:39  · Intermittent dosing   7/6 04:34 Random = 13.8 mcg/mL · Intermittent dosing  · 1000mg IV x1 at 14:46   7/7 04:58 Random = 14.6 mcg/mL · Intermittent dosing  · Order 750mg IV x1    7/8 0455 Random = 15.5 mcg/mL · Will begin scheduled dosing of 750mg IV q24h. · Predicted  mg/L*h.     7/10 0639 Random = 14 mcg/mL · Drawn ~22 hrs after previous dose  · Calculated AUC = 432 mg/L*h  · Continue 750mg IV q24h   7/13 0456 17.1 mg/L - Random · Drawn ~16h after previous dose  · Calculated AUC =475 mg/L*h with trough of ~16.3 mg/L  · Continue current dose    Note: Serum levels collected for AUC-based dosing may be high if collected in close proximity to the dose administered. This is not necessarily an indicator of toxicity. Recent Labs     07/11/22  1455 07/11/22  1455 07/11/22  1456 07/12/22  0902 07/13/22 0456   CREATININE 1.8*  --   --  2.1* 2.1*   BUN 35*  --   --  35* 38*   WBC 8.4   < > 8.4 8.8 8.5    < > = values in this interval not displayed. Estimated Creatinine Clearance: 29 mL/min (A) (based on SCr of 2.1 mg/dL (H)).     Cultures & Sensitivities:  Date Site Micro Susceptibility / Result   7/5 Toe wound Mixed skin cash    MRSA No further w/u    S=Vanc (BILLY=1), TCN.    R = Erythro, Bactrim   7/5 Foot wound Mixed skin cash    MRSA No further w/u    Same as above   7/5 Blood x2 No growth to date

## 2022-07-13 NOTE — PROGRESS NOTES
Unable to assess pedal pulse/PT pulse in left foot; pt wearing MD placed dressing. Toes warm.  Unable to assess cap refill, thickened toenails

## 2022-07-13 NOTE — PROGRESS NOTES
Podiatric Surgery Daily Progress Note      Admit Date: 7/5/2022      Code:Full Code    Patient seen and examined, labs and records reviewed    Subjective:     Patient seen at bedside this AM. Patient is aware he is going to be undergoing a bypass today. Patient denies any acute overnight events. Patient denies f/c/n/v/sob/cp. Review of Systems: A 12 point review of symptoms is unremarkable with the exception of the chief complaint. Patient specifically denies nausea, fever, vomiting, chills, shortness of breath, chest pain, abdominal pain, constipation or difficulty urinating. Objective     BP (!) 167/79   Pulse 61   Temp 98.1 °F (36.7 °C) (Oral)   Resp 18   Ht 6' 2\" (1.88 m)   Wt 176 lb 2.4 oz (79.9 kg)   SpO2 99%   BMI 22.62 kg/m²      I/O:    Intake/Output Summary (Last 24 hours) at 7/13/2022 0626  Last data filed at 7/13/2022 0323  Gross per 24 hour   Intake 1515.43 ml   Output 1470 ml   Net 45.43 ml              Wt Readings from Last 3 Encounters:   07/13/22 176 lb 2.4 oz (79.9 kg)   07/05/22 178 lb 3.2 oz (80.8 kg)   06/20/22 177 lb (80.3 kg)       LABS:    Recent Labs     07/11/22  1456 07/12/22  0902   WBC 8.4 8.8   HGB 12.6* 11.0*   HCT 37.7* 31.9*    140        Recent Labs     07/12/22  0902      K 5.4*      CO2 23   PHOS 3.2   BUN 35*   CREATININE 2.1*        Recent Labs     07/11/22  0450 07/11/22  1455   INR 1.01 1.16*   APTT  --  81.2*       LOWER EXTREMITY EXAMINATION    Dressing to left LE intact. No strikethrough noted to the external dressing. Mild serosanguineous drainage noted to the internal layers of the dressing. VASCULAR: DP and PT pulses are non-palpable 0/4 b/l. Upon hand-held Doppler examination DP and PT pulses were noted to be biphasic to the right lower extremity. Upon hand-held Doppler examination DP and PT pulses were noted to be monophasic to the left lower extremity. CFT is brisk to the digits of the right foot.   CFT is sluggish to the left foot. Skin temperature is warm to cool from proximal to distal with no focal calor noted. Nonpitting edema noted, left lower extremity-noted to be improving secondary to compressive therapy. No pain with calf compression b/l. NEUROLOGIC: Gross and epicritic sensation is diminished b/l. Protective sensation is diminished at all pedal sites b/l. DERMATOLOGIC:   Left lower extremity has a full-thickness ulceration noted to the plantar aspect of the left foot subfourth metatarsal head. Wound measures approximately 1.2 cm x 0.6 cm x 0.4 cm. Wound base is a mixture of fibrotic and granular tissue. The wound does probe to bone. Scant seropurulent drainage expressed with compression but no crepitus, fluctuance, or malodor noted. The wound does track medially about 1 cm but no tunneling or undermining noted. Patient provided verbal consent for pictures obtained today:      Right LE soft tissue envelope is closed without ulceration or acute signs of infection. MUSCULOSKELETAL: Muscle strength is 5/5 for all pedal groups tested. Pain with palpation to the periwound, left foot. Ankle joint ROM is decreased in dorsiflexion with the knee extended. History of multiple amputations to the left foot     IMAGING:  XR Left Foot (7/5/2022)  Narrative   LEFT FOOT ON 7/5/2022       HISTORY: Diabetic wound, rule out osteomyelitis. COMPARISON: None. FINDINGS:       3 views of the right foot show distal vascular calcification. There is been partial amputation of the second, third, fourth and fifth digits. No erosive changes at the base of the proximal phalanx of the second digit cannot be excluded. Degenerative changes are seen at the midfoot and sclerosis and subchondral cyst formation. Impression       Osteomyelitis is not excluded based on this exam particularly at the base of the proximal phalanx of the second digit.  If this is a clinical concern evaluation with MRI should be performed. MRI Left Foot (7/6/2022)  Narrative   MRI OF THE LEFT FOOT       INDICATION: Osteomyelitis. TECHNIQUE: Multiplanar and multisequence MRI images of the left foot were obtained. FINDINGS: Images are degraded by motion artifact. The patient is status post resection of the second, and third metatarsal heads. Patient is status post resection of the fifth metatarsal tarsal head and distal shaft as well as the proximal fifth phalanx. There is abnormal marrow edema identified within the fourth metatarsal head as well as the proximal fourth phalanx. There is deformity of the proximal first phalanx which may reflect the sequelae of a remote fracture or prior surgery. There is moderate osteoarthrosis identified at the first metatarsophalangeal joint. There is some mild osteoarthrosis identified at the third, fourth and fifth tarsometatarsal joints. No osseous erosion is identified. There is subcutaneous edema identified about the foot. There is severe atrophy involving the deep musculature of the foot. Correlate for chronic denervation injury. No soft tissue abscess is identified. Impression   1. Limited exam secondary to motion artifact. 2. There is marrow edema identified within the fourth metatarsal head and proximal fourth phalanx. In the setting of infection or open wound in this region, this is compatible with osteomyelitis. 3. Status post amputations of the second, third, and fifth metatarsals as detailed above. 4. Healed fracture deformities identified involving the proximal first phalanx. There is osteoarthrosis identified at the first metatarsophalangeal joint. 5. There is subcutaneous edema identified about the foot. This can be seen with cellulitis and/or venous insufficiency. No soft tissue abscess is identified. 6. There is severe fatty atrophy involving the deep muscular foot. Correlate for underlying neuropathy. Arterial Duplex;  Left Lower Extremity  Type of Study:        Extremities Arteries:Lower Extremities Arterial Duplex, VL LOWER EXTREMITY    ARTERIES DUPLEX LEFT. Tech Comments   Right   The right ankle/brachial index is non-compressible (PT->255 mmHg; DP->255   mmHg). Left   The left ankle/brachial index is non-compressible at the PTA (>255 mmHg); the   HAM is 0.32 at the DPA (62 mmHg). There is normal, multiphasic flow identified in the common femoral artery,   suggesting no evidence of significant aorto-iliac inflow disease. There is atherosclerotic plaque involving the superficial femoral artery with   velocities consistent with <50% stenosis. The popliteal artery is occluded. The tibio-peroneal trunk is occluded. The distal anterior tibial artery is occluded and reconstitutes at the   dorsalis pedis artery. Limited visualization of the peroneal artery due to calcific shadowing,   however, flow is visualized in some segments. Limited visualization at the ankle due to bandages. Incidental finding of partially occluding, chronic phlebitic changes involving   the left popliteal vein, consistent with previous DVT. The previous outside study on 5/5/21 was limited. Unable to make accurate   comparison. Previous ABIs were non-compressible bilaterally. ASSESSMENT/PLAN  1. Cellulitis, left lower extremity- resolved  2. Osteomyelitis, left foot  3. Full-thickness ulceration, plantar left foot (Huston stage III)  4. Critical Limb Ischemia 2/2 Peripheral Arterial Disease, left lower extremity  5. Type 2 diabetes mellitus with peripheral neuropathy  6.  History of multiple amputations    -Patient seen and examined at bedside this AM  -Hypertensive otherwise VSS, No leukocytosis noted (WBC 8.5)  -ESR 53 and CRP 10.4  -HbA1c 5.9  -Blood cultures 1 & 2  NGTD  -Imaging reviewed, impression noted above  -Wound culture: MRSA, diphtheroids  -Continue antibiotics per ID recommendations; vancomycin and cefepime  -Left lower extremity had wound packed with 1/4 iodoform packing then covered with a betadine soaked gauze, DSD, and Ace with gentle compression  -Prevalon boots reapplied. Patient is to wear at all times while in bed to prevent further deep tissue injury. -Vascular study shows that patient has critical limb ischemia to the left lower extremity. Surgical intervention will depend on vascular recommendations. -Vascular consulted angiogram done but could not fully resolve dissection. Patient to undergo a L SFA to peroneal bypass today; appreciate recommendations.   -Recommend patient be nonweightbearing to left lower extremity but weight bearing as tolerated to right lower extremity. DISPO: Full-thickness ulceration with cellulitis (resolved) and osteomyelitis, left foot. Recommend patient continue on antibiotics per ID recommendations, vancomycin and cefepime. Vascular consulted with bypass scheduled for today. Plan to discuss next steps from podiatric standpoint with patients son.      Patient assessment and plan was discussed with Dr. Vickie Duran DPM.    Jose Finch DPM  Podiatric Resident, PGY-2  Pager #: [phone] or Perfect Serve

## 2022-07-13 NOTE — PROGRESS NOTES
Vascular Surgery   Daily Progress Note  Patient: Esperanza Degroot    CC: Atherosclerosis native artery left lower extremity with ulceration    SUBJECTIVE:  HTN to 167/79 overnight. All other VSS. No acute complaints this am.     ROS: A 14 point review of systems was conducted, significant findings as noted above. All other systems negative. OBJECTIVE:   Infusions:   lactated ringers 75 mL/hr at 07/13/22 0323    sodium chloride Stopped (07/12/22 0100)    heparin (PORCINE) Infusion 12.05 Units/kg/hr (07/12/22 1536)    dextrose      sodium chloride 25 mL/hr at 07/13/22 0540      I/O:I/O last 3 completed shifts: In: 1070 [P.O.:1070]  Out: 2700 [Urine:2700]           Wt Readings from Last 1 Encounters:   07/13/22 176 lb 2.4 oz (79.9 kg)       Exam:  BP (!) 167/79   Pulse 61   Temp 98.1 °F (36.7 °C) (Oral)   Resp 18   Ht 6' 2\" (1.88 m)   Wt 176 lb 2.4 oz (79.9 kg)   SpO2 99%   BMI 22.62 kg/m²     General appearance: alert, no acute distress, grooming appropriate  Neuro: A&Ox3, no focal deficits, sensation intact  Eyes: PERRLA, no scleral icterus  Neck: trachea midline, no JVD, no lymphadenopathy  Chest/Lungs: normal effort  Cardiovascular: RRR  Abdomen: soft, non-tender, non-distended, no guarding/rigidity  Skin: warm and dry, no rashes  Extremities: no edema, no cyanosis, doplerable RLE, DP/PT, palpable pop fem, right groin is hemostat with no ecchymosis and small hematoma. LLE wrapped with ace wrapping. LABS:   Recent Labs     07/11/22  1456 07/12/22  0902   WBC 8.4 8.8   HGB 12.6* 11.0*   HCT 37.7* 31.9*   .7* 99.6    140        Recent Labs     07/11/22  1455 07/12/22  0902    139   K 5.6* 5.4*    109   CO2 22 23   PHOS 4.0 3.2   BUN 35* 35*   CREATININE 1.8* 2.1*      No results for input(s): AST, ALT, ALB, BILIDIR, BILITOT, ALKPHOS in the last 72 hours. No results for input(s): LIPASE, AMYLASE in the last 72 hours.      Recent Labs     07/11/22  5722 07/11/22  1455   INR 1.01 1.16*   APTT  --  81.2*      No results for input(s): CKTOTAL, CKMB, CKMBINDEX, TROPONINI in the last 72 hours. ASSESSMENT/PLAN:   This is a 81y.o. year old male status post U/S guided access of right common femoral artery, left lower extremity arteriogram, and left popliteal and tibioperoneal trunk angioplasty secondary to atherosclerosis of LLE with ulceration. (7/11). -To OR for L SFA-Peroneal bypass today.   -NPO since midnight  -Med management per primary team.     Pepe Zamora DO   PGY1, General Surgery  07/13/22  6:29 AM  Pager # [phone]

## 2022-07-13 NOTE — PROGRESS NOTES
ID Follow-up NOTE  RESIDENT NOTE - reviewed / edited, attending note at bottom    CC:   L foot infection   Antibiotics: Vanc + Cefepime     Admit Date: 7/5/2022  Hospital Day: 9    Subjective:     Patient is lying in bed resting. Still complains of mild L. foot pain. No acute overnight events reported. Scheduled for Left superficial femoral artery to peroneal artery bypass tomorrow (7/14)      Objective:     Patient Vitals for the past 8 hrs:   BP Temp Temp src Pulse Resp   07/13/22 1225 (!) 118/57 97.9 °F (36.6 °C) Oral 55 20   07/13/22 0754 (!) 161/72 98 °F (36.7 °C) Oral 58 18     I/O last 3 completed shifts: In: 2462.3 [P.O.:1310; I.V.:1152.3]  Out: 2020 [Urine:2020]  I/O this shift:  In: 103.7 [I.V.:53.7; IV Piggyback:50]  Out: 1075 [PEXDS:4619]    EXAM:  GENERAL: No apparent distress. HEENT: Membranes moist, no oral lesion  NECK:  Supple, no lymphadenopathy  LUNGS: Clear b/l, no rales, no dullness  CARDIAC: RRR, no murmur appreciated  ABD:  + BS, soft / NT  EXT:  L foot with dressing.  No edema, lesions on R foot  NEURO: No focal neurologic findings  PSYCH: Orientation, sensorium, mood normal  LINES:  Peripheral iv       Data Review:  Lab Results   Component Value Date    WBC 8.5 07/13/2022    HGB 10.9 (L) 07/13/2022    HCT 33.0 (L) 07/13/2022    .0 (H) 07/13/2022     (L) 07/13/2022     Lab Results   Component Value Date    CREATININE 2.1 (H) 07/13/2022    BUN 38 (H) 07/13/2022     07/13/2022    K 5.0 07/13/2022     (H) 07/13/2022    CO2 24 07/13/2022       Hepatic Function Panel:   Lab Results   Component Value Date/Time    ALKPHOS 59 09/27/2021 05:38 PM    ALT 11 09/27/2021 05:38 PM    AST 19 09/27/2021 05:38 PM    PROT 7.5 09/27/2021 05:38 PM    PROT 6.7 03/14/2013 09:30 AM    BILITOT 0.3 09/27/2021 05:38 PM    LABALBU 3.3 07/13/2022 04:56 AM       MICRO:  L foot Wound Culture: (7/5)  Gram stain: 2+ WBC, 3+ GPC  in chains and pairs, 1+ Small GPR   Organism: Staph aureus MRSA  Staph aureus mrsa (1)  Antibiotic Interpretation Microscan     ceFAZolin Resistant 16 mcg/mL   clindamycin Sensitive <=0.5 mcg/mL   erythromycin Resistant >4 mcg/mL   oxacillin Resistant >2 mcg/mL   tetracycline Sensitive <=4 mcg/mL   trimethoprim-sulfamethoxazole Resistant >2/38 mcg/mL   vancomycin Sensitive 1 mcg/mL      IMAGING:  Xray, L foot (7/5)  Osteomyelitis is not excluded based on this exam particularly at the base of the proximal phalanx of the second digit. If this is a clinical concern evaluation with MRI should be performed.     MRI, L foot wo contrast (7/5)  1. Limited exam secondary to motion artifact. 2. There is marrow edema identified within the fourth metatarsal head and proximal fourth phalanx. In the setting of infection or open wound in this region, this is compatible with osteomyelitis. 3. Status post amputations of the second, third, and fifth metatarsals as detailed above. 4. Healed fracture deformities identified involving the proximal first phalanx. There is osteoarthrosis identified at the first metatarsophalangeal joint. 5. There is subcutaneous edema identified about the foot. This can be seen with cellulitis and/or venous insufficiency. No soft tissue abscess is identified. 6. There is severe fatty atrophy involving the deep muscular foot. Correlate for underlying neuropathy.        Scheduled Meds:   cefepime  1,000 mg IntraVENous Q12H    magnesium sulfate  2,000 mg IntraVENous Once    sodium chloride flush  5-40 mL IntraVENous 2 times per day    aspirin  81 mg Oral Daily    NIFEdipine  60 mg Oral Daily    vancomycin  750 mg IntraVENous Q24H    atorvastatin  40 mg Oral Nightly    hydrALAZINE  25 mg Oral 3 times per day    carvedilol  6.25 mg Oral BID     gabapentin  300 mg Oral Nightly    insulin lispro  0-6 Units SubCUTAneous TID     insulin lispro  0-3 Units SubCUTAneous Nightly    cetirizine  5 mg Oral Daily    tamsulosin  0.4 mg Oral Nightly    sodium chloride flush  5-40 mL IntraVENous 2 times per day       Continuous Infusions:   lactated ringers 75 mL/hr at 07/13/22 0700    sodium chloride Stopped (07/12/22 0100)    heparin (PORCINE) Infusion Stopped (07/13/22 1428)    dextrose      sodium chloride 25 mL/hr at 07/13/22 0540       PRN Meds:  CeFAZolin irrigation, heparin irrigation, thrombin, iodixanol, sodium chloride flush, sodium chloride, cloNIDine, labetalol, heparin (porcine), heparin (porcine), traMADol, hydrALAZINE, methocarbamol, glucose, dextrose bolus **OR** dextrose bolus, glucagon (rDNA), dextrose, sodium chloride flush, sodium chloride, ondansetron **OR** ondansetron, polyethylene glycol, acetaminophen **OR** acetaminophen, morphine      Assessment:     80 yo PMH CKD, hypothyroidism, gout post U/S guided access of R common femoral artery, LLE arteriogram and L popliteal and tibioperoneal trunk angioplasty secondary to atherosclerosis os LLE with ulceration (7/11)  -L foot wound infection/osteomyelitis with L toe amputation 3/2020   -Wound culture: MRSA diphtheroids. Plan:     Cont Vanc/Cefepime   Wound care per podiatry   For L SFA-Peroneal bypass tomorrow (7/14)  NPO midnight   F/u echo    Discussed with pt, Dr Cherry Hodgkin, MD    Addendum to Resident Progress note:  Pt seen, examined and evaluated. I have reviewed the current history, physical findings, labs and assessment and plan and agree with note as documented by resident (Dr. Aly Grajeda).     80 yo CKD, hypothyroid, gout  Hx L foot toe amp 3/2020  L foot plantar wound, followed by Podiatry, Dr Kaila Loco     Referred to [facility] for admission 7/5   Afeb, WBC 10.4, Cr 2.3  X-ray - 'possible osteomyelitis at the base of the proximal phalanx of the second digit '  MRI 4th MT head and prox phalanx edema  Wound probes to bone per Podiatry note.   Cult - mixed skin cash, Diphtheroids, MRSA     7/11 Angio - unable to revascularize endovascularly  7/13 L SFA to peroneal a bypase with saphenous vein graft     IMP/  L foot wound infection / extension osteomyelitis  Cult gram positive organism including MRSA    PVD - angio 7/11, bypass today 7/13    REC/  Cont empiric Vanco / Cefepime    Vascular postop care  Wound care and surgical intervention per Podiatry (may need partial L 4th ray resection)     Medical Decision Making:   The following items were considered in medical decision making:  Discussion of patient care with other providers  Reviewed clinical lab tests  Reviewed radiology tests  Reviewed other diagnostic tests/interventions  Independent review of radiologic images - reviewed MRI w Radiologist 7/8  Microbiology cultures and other micro tests reviewed       Discussed with pt  Mario Doe MD - c/w Atorvastatin  - DASH-TLC diet

## 2022-07-13 NOTE — BRIEF OP NOTE
Brief Postoperative Note      Patient: Nicolas Fried  YOB: 1935  MRN: [id number]    Date of Procedure: 7/13/2022    Pre-Op Diagnosis: LLE PAD (peripheral artery disease) ([facility] [address] [I73.9]    Post-Op Diagnosis: Same       Procedure(s):  LEFT SUPERFICIAL FEMORAL ARTERY TO PERONEAL ARTERY BYPASS WITH REVERSED GREAT SAPHENOUS VEIN GRAFT    Surgeon(s):  Ga Holman MD    Assistant:  Resident: Marychuy Chilel MD    Anesthesia: General    Estimated Blood Loss (mL): 165     Complications: None    Specimens:   * No specimens in log *    Implants:  * No implants in log *      Drains:   Closed/Suction Drain Left Leg Bulb (Active)       Urinary Catheter Schumacher (Active)       [REMOVED] External Urinary Catheter (Removed)   Site Assessment Clean,dry & intact 07/13/22 1225   Securement Method Tape 07/13/22 1225   Perineal Care Yes 07/11/22 2055   Suction 40 mmgHg continuous 07/13/22 1225   Urine Color Yellow 07/13/22 1232   Urine Appearance Clear 07/13/22 1232   Urine Odor Malodorous 07/13/22 1232   Output (mL) 875 mL 07/13/22 1232       Findings:   Procedure as listed. Drain left in place. Palpable PT pulse at end of the case and good capillary refill to toes. No DP signal immediately post-op. Plan:  Admit to the ICU for q1h pulse checks. Heparin gtt to resume in 4 hours.     Electronically signed by Marychuy Chilel MD on 7/13/2022 at 7:48 PM

## 2022-07-13 NOTE — PROGRESS NOTES
Hospital Medicine  Progress Note    PCP: Jo Ann Meadows    Date of Admission: 7/5/2022      Chief Complaint: Worsening left foot pain and swelling      History Of Present Illness:    81 y.o. male with DM type 2, HTN and hypothyroidism, PAF (off pradaxa) and BPH who presented with worsening wound left foot. The wound started in the bottom of his left foot as a callus. Patient has been putting Vaseline and soaking the foot. When he saw his podiatrist (Dr. Orlando Austin ) last week he was recommended to start using Betadine to the left foot. Patient has not following the wound care recommendations, has been putting Betadine on the wound and has been wearing a sock over it and continue to walk bearing weight on the left foot. Patient started noticing mild yellow drainage coming from the wound. Patient denies fever, chills, nausea, vomiting, chest pain or shortness of breath    On presentation,, apart from elevated BP, he had fairly normal vitals. He had no leucocytosis. Hgb was 12 with . Cr was 2.3 (baseline about 1.6). Interim HPI  No new complaints  Denies chest pain    MRI shows osteo of left foot. Sitting OOB     S/p vascular studies with plans for superficial femoral artery to peroneal artery bypass of LLE      Medications: Reviewed        Allergies:  Patient has no known allergies. REVIEW OF SYSTEMS COMPLETED:   Pertinent positives as noted in the HPI. All other systems reviewed and negative. PHYSICAL EXAM PERFORMED:    BP (!) 161/72   Pulse 58   Temp 98 °F (36.7 °C) (Oral)   Resp 18   Ht 6' 2\" (1.88 m)   Wt 176 lb 2.4 oz (79.9 kg)   SpO2 99%   BMI 22.62 kg/m²     General appearance:  No apparent distress, appears stated age and cooperative. HEENT:  Normal cephalic, atraumatic without obvious deformity. .  Neck: Supple, with full range of motion. No jugular venous distention. Trachea midline. Respiratory:  Normal respiratory effort.  Clear to auscultation, bilaterally without Rales/Wheezes/Rhonchi. Cardiovascular:  Regular rate and rhythm with normal S1/S2 without murmurs, rubs or gallops. Abdomen: Soft, non-tender, non-distended with normal bowel sounds. Musculoskeletal:  No clubbing, cyanosis or edema bilaterally. Full range of motion without deformity. Skin: Dressing over left foot: C/D/I  Neurologic:  grossly non-focal.  Psychiatric:  Alert and oriented, thought content appropriate, normal insight  Capillary Refill: Brisk,3 seconds, normal  Peripheral Pulses: DP and PT pulses-nonpalpable bilaterally, biphasic with Doppler per podiatry on the right, monophasic on the left. Labs:     Recent Labs     07/11/22 1456 07/12/22 0902 07/13/22 0456   WBC 8.4 8.8 8.5   HGB 12.6* 11.0* 10.9*   HCT 37.7* 31.9* 33.0*    140 131*     Recent Labs     07/11/22 0450 07/11/22 0450 07/11/22 1455 07/12/22 0902 07/13/22 0456      < > 140 139 140   K 4.9   < > 5.6* 5.4* 5.0      < > 108 109 111*   CO2 23  --  22 23  --    BUN 41*  --  35* 35*  --    CREATININE 2.0*  --  1.8* 2.1*  --    CALCIUM 9.4  --  9.0 8.6  --    PHOS 2.9  --  4.0 3.2  --     < > = values in this interval not displayed. No results for input(s): AST, ALT, BILIDIR, BILITOT, ALKPHOS in the last 72 hours. Recent Labs     07/11/22 0450 07/11/22 1455 07/13/22 0456   INR 1.01 1.16* 1.14     No results for input(s): Norma Vasquez in the last 72 hours.     Urinalysis:      Lab Results   Component Value Date/Time    NITRU Negative 07/10/2022 05:02 PM    WBCUA 0-2 07/10/2022 05:02 PM    BACTERIA Rare 07/10/2022 05:02 PM    RBCUA 3-4 07/10/2022 05:02 PM    BLOODU Negative 07/10/2022 05:02 PM    SPECGRAV 1.020 07/10/2022 05:02 PM    GLUCOSEU Negative 07/10/2022 05:02 PM    GLUCOSEU Negative 07/05/2011 09:10 PM       Radiology:     CXR: I have reviewed the CXR with the following interpretation: NA  EKG:  I have reviewed the EKG with the following interpretation: NA    VL PRE OP VEIN MAPPING Final Result      XR CHEST PORTABLE   Final Result      No acute cardiopulmonary findings. VL DUP LOWER EXTREMITY ARTERIES LEFT   Final Result      MRI FOOT LEFT WO CONTRAST   Final Result   1. Limited exam secondary to motion artifact. 2. There is marrow edema identified within the fourth metatarsal head and proximal fourth phalanx. In the setting of infection or open wound in this region, this is compatible with osteomyelitis. 3. Status post amputations of the second, third, and fifth metatarsals as detailed above. 4. Healed fracture deformities identified involving the proximal first phalanx. There is osteoarthrosis identified at the first metatarsophalangeal joint. 5. There is subcutaneous edema identified about the foot. This can be seen with cellulitis and/or venous insufficiency. No soft tissue abscess is identified. 6. There is severe fatty atrophy involving the deep muscular foot. Correlate for underlying neuropathy. XR FOOT LEFT (MIN 3 VIEWS)   Final Result      Osteomyelitis is not excluded based on this exam particularly at the base of the proximal phalanx of the second digit. If this is a clinical concern evaluation with MRI should be performed. IR ANGIOGRAM EXTREMITY LEFT    (Results Pending)       Consults:    IP CONSULT TO PODIATRY  PHARMACY TO DOSE VANCOMYCIN  IP CONSULT TO HOSPITALIST  IP CONSULT TO PHARMACY  IP CONSULT TO PHARMACY  IP CONSULT TO INFECTIOUS DISEASES  IP CONSULT TO VASCULAR SURGERY  IP CONSULT TO NEPHROLOGY        ASSESSMENT/PLAN:  81 y.o. male with DM type 2, HTN and hypothyroidism, PAF (off pradaxa) and BPH who presented with worsening wound left foot. Cellulitis left lower extremity with full-thickness ulceration plantar surface with osteomyelitis. POA  - Possible diabetic ulcer. H/e, appears A1C is not elevated and Home med list does not have DM meds: verify home med list  - R/o sec to severe PAD   - Hx of Multiple amputations.     - Sed rate was 48 and CRP10  - MRI shows osteo  - Cx sent: mixed skin cash, Diphtheroids, MRSA; follow results  - Started on broad-spectrum IV antibiotics: ID consulted.    - Update A1C  - Arterial duplex will be needed to assess vascularity   - Planned podiatric surgical intervention: timing per Podiatry       DM-2 complicated by peripheral neuropathy: POA  - Verify home meds for accuracy as does not seem to have diabetic meds  - A1C too does not appear high  - We will monitor BGs closely for 1-2 days  - SSI      Peripheral vascular disease: POA  - On Plavix  - Vascular studies ordered  - Seen by Vascular Surgery: s/p angiogram of the LLE. Planned L SFA-Peroneal bypass    Pre-op risk stratification    He underwent angioplasty in the LLE at [facility] in 2013    No known hx of CAD  Neg stress test 2003 and 2014  Echo 7/12: Normal EF, no RWMA, grade II DD  Currently appears euvolemic  Denies chest pain, or TINSLEY    May proceed to the OR for limb saving procedure, with known risk: (RCRI Class IV risk with 15 % 30 day risk of death, MI or cardiac arrest) with no further w/u at this time    - Will likely benefit from updated ischemia eval. Oscar as the presence of PAD likely implies disease in the coronaries as well. - Continue BB and other cardiac meds as appropriate in the samreen-op period  - Keep on telemetry  - May benefit from period of ICU observation post op. CKD stage 3: POA  Mild hyperkalemia: POA  - Baseline Cr 1.6  - Was 2.3 on admission suggesting some renal insufficiency on his CKD stage 3: MO ruled out  - Monitor cr: appears improving  - Continue gentle IV hydration  - Holding lisinopril  - Monitor Cr, K  - Follows with Dr. Luciana Del Rosario: planned CO2 angio. Neprho consulted. Paroxysmal A. Fib: POA  - Not on rate control meds. - Appears was prev on Pradaxa. Appears was taken off after he wore a monitor and as \"couldn't afford it\" in 2014 .    - Started on metoprolol  - TSH, Mg: WNL  - Keep on telemetry       HTN: POA  - Started on metoprolol: Switched to coreg, decrease dose with some winston noted  - Started hydralazine PO as BP was really high  - Added CCB as BP remains high: increase dose to 60 mg dly Nifedipine  - Holding lisonopril for now inessa with hyperkalemia  - BP improved  - PRN hydralazine IV      Hypothyroidism: POA  - Updated TSH; WNL    Macrocytic anemia- POA  - Update B12, Folate: WNL      BPH: POA  - On  Flomax  - Monitor for retention: Bladder scans        Pxt tells me after discussing with his sons, he wants to be Full code.        DVT Prophylaxis: Lovenox     Diet: Diet NPO Exceptions are: Sips of Water with Meds  Code Status: Full Code    PT/OT Eval Status: As tolerated with no weightbearing on the left foot      Disposition -F with telemetry  Superficial femoral artery to peroneal artery bypass of LLE   Possible surgery: thereafter per Podiatry dependent on vascular recommendations  PT/OT eval after Surgery       Art Machuca MD

## 2022-07-13 NOTE — ANESTHESIA PRE PROCEDURE
Department of Anesthesiology  Preprocedure Note       Name:  José Kay   Age:  81 y.o.  :  1935                                          MRN:  [id number]         Date:  2022      Surgeon: Anthony Kay):  Marvin Miller MD    Procedure: Procedure(s):  LEFT SUPERFICIAL FEMORAL ARTERY TO PERONEAL ARTERY BYPASS    Medications prior to admission:   Prior to Admission medications    Medication Sig Start Date End Date Taking? Authorizing Provider   diclofenac sodium (VOLTAREN) 1 % GEL Apply 2 g topically 4 times daily as needed for Pain (wrist pain)   Yes Historical Provider, MD   ketoconazole (NIZORAL) 2 % cream Apply topically daily Apply topically daily.    Yes Historical Provider, MD   terbinafine (LAMISIL) 250 MG tablet Take 250 mg by mouth daily   Yes Historical Provider, MD   valACYclovir (VALTREX) 500 MG tablet Take 500 mg by mouth 2 times daily as needed   Yes Historical Provider, MD   fexofenadine (ALLEGRA) 180 MG tablet Take 180 mg by mouth daily as needed   Yes Historical Provider, MD   acetaminophen (TYLENOL) 325 MG tablet Take 650 mg by mouth every 6 hours as needed for Pain   Yes Historical Provider, MD   vitamin D (CHOLECALCIFEROL) 25 MCG (1000 UT) TABS tablet Take 1,000 Units by mouth daily   Yes Historical Provider, MD   Multiple Vitamins-Minerals (THERAPEUTIC MULTIVITAMIN-MINERALS) tablet Take 1 tablet by mouth daily   Yes Historical Provider, MD   polyethylene glycol (MIRALAX) 17 g PACK packet Take 17 g by mouth daily as needed   Yes Historical Provider, MD   clopidogrel (PLAVIX) 75 MG tablet Take 75 mg by mouth daily Take one tablet daily    Himanshu Brandon   fluticasone (FLONASE) 50 MCG/ACT nasal spray 1 spray by Each Nostril route daily as needed for Rhinitis or Allergies     Kandi Fall MD   lisinopril (PRINIVIL;ZESTRIL) 10 MG tablet Take 10 mg by mouth daily     Himanshu Brandon   tamsulosin (FLOMAX) 0.4 MG capsule Take 0.4 mg by mouth daily One tablet nightly    Historical Provider, MD   vitamin B-12 (CYANOCOBALAMIN) 1000 MCG tablet Take 1,000 mcg by mouth daily. Historical Provider, MD   aspirin 81 MG tablet Take 81 mg by mouth daily.     Historical Provider, MD       Current medications:    Current Facility-Administered Medications   Medication Dose Route Frequency Provider Last Rate Last Admin    cefepime (MAXIPIME) 1000 mg IVPB minibag  1,000 mg IntraVENous Q12H Chela Bigornia Beloy, DO        magnesium sulfate 2000 mg in 50 mL IVPB premix  2,000 mg IntraVENous Once [employer], DO        sodium phosphate 10 mmol in sodium chloride 0.9 % 250 mL IVPB  10 mmol IntraVENous Once [employer], DO        lactated ringers infusion   IntraVENous Continuous Dianne Humphries MD 75 mL/hr at 07/13/22 0700 Rate Verify at 07/13/22 0700    sodium chloride flush 0.9 % injection 5-40 mL  5-40 mL IntraVENous 2 times per day Marvin Miller MD   10 mL at 07/13/22 0945    sodium chloride flush 0.9 % injection 5-40 mL  5-40 mL IntraVENous PRN Marvin Miller MD        0.9 % sodium chloride infusion   IntraVENous PRN Marvin Miller MD   Stopped at 07/12/22 0100    cloNIDine (CATAPRES) tablet 0.1 mg  0.1 mg Oral Q2H PRN Marvin Miller MD   0.1 mg at 07/12/22 0429    labetalol (NORMODYNE;TRANDATE) injection 10 mg  10 mg IntraVENous Q2H PRN Marvin Miller MD        aspirin EC tablet 81 mg  81 mg Oral Daily Marvin Miller MD   81 mg at 07/13/22 0943    heparin (porcine) injection 4,000 Units  4,000 Units IntraVENous PRN Marvin Miller MD        heparin (porcine) injection 2,000 Units  2,000 Units IntraVENous PRN Marvin Miller MD        heparin 25,000 units in dextrose 5% 250 mL (premix) infusion  5-30 Units/kg/hr IntraVENous Continuous Marvin Miller MD 9.7 mL/hr at 07/13/22 0942 12.05 Units/kg/hr at 07/13/22 0942    NIFEdipine (ADALAT CC) extended release tablet 60 mg  60 mg Oral Daily Chela Bigornia Beloy, DO   60 mg at 07/13/22 0944    vancomycin (VANCOCIN) 750 mg in dextrose 5 % 250 mL IVPB  750 mg IntraVENous Q24H Chela Bigornia Beloy,  mL/hr at 07/13/22 1220 750 mg at 07/13/22 1220    atorvastatin (LIPITOR) tablet 40 mg  40 mg Oral Nightly Chela Bigornia Beloy, DO   40 mg at 07/12/22 2127    hydrALAZINE (APRESOLINE) tablet 25 mg  25 mg Oral 3 times per day [facility], DO   25 mg at 07/13/22 0535    carvedilol (COREG) tablet 6.25 mg  6.25 mg Oral BID  Chela Bigornia Beloy, DO   6.25 mg at 07/13/22 0945    traMADol (ULTRAM) tablet 50 mg  50 mg Oral Q6H PRN Chela Bigornia Beloy, DO   50 mg at 07/11/22 0554    hydrALAZINE (APRESOLINE) injection 10 mg  10 mg IntraVENous Q4H PRN Chela Bigornia Beloy, DO   10 mg at 07/07/22 1613    methocarbamol (ROBAXIN) tablet 750 mg  750 mg Oral 4x Daily PRN Chela Bigornia Beloy, DO   750 mg at 07/12/22 0429    gabapentin (NEURONTIN) capsule 300 mg  300 mg Oral Nightly Chela Bigornia Beloy, DO   300 mg at 07/12/22 2128    glucose chewable tablet 16 g  4 tablet Oral PRN Chela Bigornia Beloy, DO        dextrose bolus 10% 125 mL  125 mL IntraVENous PRN Chela Bigornia Beloy, DO        Or    dextrose bolus 10% 250 mL  250 mL IntraVENous PRN Chela Bigornia Beloy, DO        glucagon (rDNA) injection 1 mg  1 mg IntraMUSCular PRN Chela Bigornia Beloy, DO        dextrose 5 % solution  100 mL/hr IntraVENous PRN Chela Bigornia Beloy, DO        insulin lispro (1 Unit Dial) 0-6 Units  0-6 Units SubCUTAneous TID  Chela Bigornia Beloy, DO   1 Units at 07/10/22 1201    insulin lispro (1 Unit Dial) 0-3 Units  0-3 Units SubCUTAneous Nightly Chela Bigornia Beloy, DO   1 Units at 07/11/22 2050    cetirizine (ZYRTEC) tablet 5 mg  5 mg Oral Daily Chela Bigornia Beloy, DO   5 mg at 07/13/22 0944    tamsulosin (FLOMAX) capsule 0.4 mg  0.4 mg Oral Nightly Chela Molina DO   0.4 mg at 07/12/22 2125    sodium chloride flush 0.9 % injection 5-40 mL  5-40 mL IntraVENous 2 times per day [employer], DO 10 mL at 07/13/22 0945    sodium chloride flush 0.9 % injection 5-40 mL  5-40 mL IntraVENous PRN Chela Bigornia Beloy, DO        0.9 % sodium chloride infusion   IntraVENous PRN Chela Bigornia Beloy, DO 25 mL/hr at 07/13/22 0540 New Bag at 07/13/22 0540    ondansetron (ZOFRAN-ODT) disintegrating tablet 4 mg  4 mg Oral Q8H PRN Chela Bigornia Beloy, DO        Or    ondansetron (ZOFRAN) injection 4 mg  4 mg IntraVENous Q6H PRN Chela Bigornia Beloy, DO        polyethylene glycol (GLYCOLAX) packet 17 g  17 g Oral Daily PRN Chela Bigornia Beloy, DO        acetaminophen (TYLENOL) tablet 650 mg  650 mg Oral Q6H PRN Chela Bigornia Beloy, DO        Or    acetaminophen (TYLENOL) suppository 650 mg  650 mg Rectal Q6H PRN Chela Bigornia Beloy, DO        morphine (PF) injection 2 mg  2 mg IntraVENous Q3H PRN Chela Bigornia Beloy, DO   2 mg at 07/09/22 2063       Allergies:  No Known Allergies    Problem List:    Patient Active Problem List   Diagnosis Code    Gout M10.9    Chronic right shoulder pain M25.511, G89.29    A-fib ([facility]) I48.91    DM (diabetes mellitus) ([facility]) E11.9    CKD (chronic kidney disease) stage 3, GFR 30-59 ml/min ([facility]) N18.30    Hypothyroidism E03.9    Syncope and collapse R55    Toe osteomyelitis, left ([facility]) M86.9    Cat bite of hand, right, initial encounter S61.451A, W55. 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 Toni Khanna DM (diabetes mellitus) ([facility] 1/13/2014    Gout     Hypothyroidism 4/18/2016    Seasonal allergic rhinitis        Past Surgical History:        Procedure Laterality Date    COLONOSCOPY  2009 appx     normal     COLONOSCOPY N/A 6/20/2022    COLONOSCOPY POLYPECTOMY SNARE/COLD BIOPSY performed by Marlene Schmid MD at [place] [address] 05:38 PM    ALT 11 09/27/2021 05:38 PM       POC Tests:   Recent Labs     07/13/22  0751   POCGLU 91       Coags:   Lab Results   Component Value Date/Time    PROTIME 14.5 07/13/2022 04:56 AM    PROTIME . 06/20/2011 04:00 PM    INR 1.14 07/13/2022 04:56 AM    APTT 81.2 07/11/2022 02:55 PM       HCG (If Applicable): No results found for: PREGTESTUR, PREGSERUM, HCG, HCGQUANT     ABGs: No results found for: PHART, PO2ART, PXA4FLO, UUK2UZD, BEART, O8UCRXZX     Type & Screen (If Applicable):  No results found for: LABABO, LABRH    Drug/Infectious Status (If Applicable):  No results found for: HIV, HEPCAB    COVID-19 Screening (If Applicable): No results found for: COVID19        Anesthesia Evaluation    Airway: Mallampati: II  TM distance: >3 FB   Neck ROM: full  Mouth opening: > = 3 FB   Dental:    (+) upper dentures and partials      Pulmonary: breath sounds clear to auscultation                             Cardiovascular:    (+) dysrhythmias: atrial fibrillation,         Rhythm: regular  Rate: normal                 ROS comment: Left ventricular cavity size is normal. There is moderately increased left   ventricular wall thickness. Ejection fraction is visually estimated to be   55-60%. No regional wall motion abnormalities are noted. Grade II diastolic   dysfunction with elevated LV filling pressures. Mitral annular calcification is present. Thickening of leaflets of mitral   valve. Mild to moderate mitral regurgitation. The mitral valve pressure   half-time is calculated at 1.47 cm2. The aortic valve is thickened/calcified with decreased leaflet mobility   consistent with aortic stenosis. Moderate aortic stenosis with a peak velocity of 3.08 m/s and a mean   pressure gradient of 20 mmHg. There is moderate aortic insufficiency noted. The right atrium is mildly dilated. The aortic valve is thickened/calcified with decreased leaflet mobility   consistent with aortic stenosis.    Moderate aortic stenosis with a peak velocity of 3.08 m/s and a mean   pressure gradient of 20 mmHg. There is moderate aortic insufficiency noted. IVC size is normal (<2.1 cm) but collapses < 50% with respiration consistent   with elevated RA pressure (8 mmHg). Estimated pulmonary artery systolic pressure is mildly elevated at 43 mmHg   assuming a right atrial pressure of 8 mmHg. Neuro/Psych:               GI/Hepatic/Renal:   (+) renal disease: CRI,           Endo/Other:    (+) DiabetesType II DM, , hypothyroidism::., .                  ROS comment: Gout  Toe osteomyelitis Abdominal:             Vascular:   + PVD, aortic or cerebral, DVT, . Other Findings:           Anesthesia Plan      general     ASA 3       Induction: intravenous. arterial line  MIPS: Postoperative opioids intended and Prophylactic antiemetics administered. Anesthetic plan and risks discussed with patient. Plan discussed with CRNA.                     Janis Solis MD   7/13/2022

## 2022-07-13 NOTE — ANESTHESIA PROCEDURE NOTES
Arterial Line:    An arterial line was placed using surface landmarks, in the OR for the following indication(s): continuous blood pressure monitoring and blood sampling needed. A 20 gauge (size), 5 cm (length), Arrow (type) catheter was placed, Seldinger technique used, into the left brachial artery, secured by suture and Tegaderm. Anesthesia type: General    Events:  patient tolerated procedure well with no complications and EBL 0mL.   Anesthesiologist: Stephen Ureña MD  Performed: Anesthesiologist   Preanesthetic Checklist  Completed: patient identified, IV checked, site marked, risks and benefits discussed, surgical/procedural consents, equipment checked, pre-op evaluation, timeout performed, anesthesia consent given, oxygen available and monitors applied/VS acknowledged

## 2022-07-14 LAB
ALBUMIN SERPL-MCNC: 3.2 G/DL (ref 3.4–5)
ALBUMIN SERPL-MCNC: 3.4 G/DL (ref 3.4–5)
ANION GAP SERPL CALCULATED.3IONS-SCNC: 11 MMOL/L (ref 3–16)
ANION GAP SERPL CALCULATED.3IONS-SCNC: 6 MMOL/L (ref 3–16)
ANTI-XA UNFRAC HEPARIN: 0.15 IU/ML (ref 0.3–0.7)
ANTI-XA UNFRAC HEPARIN: 0.17 IU/ML (ref 0.3–0.7)
BASOPHILS ABSOLUTE: 0 K/UL (ref 0–0.2)
BASOPHILS ABSOLUTE: 0 K/UL (ref 0–0.2)
BASOPHILS RELATIVE PERCENT: 0.3 %
BASOPHILS RELATIVE PERCENT: 0.4 %
BUN BLDV-MCNC: 33 MG/DL (ref 7–20)
BUN BLDV-MCNC: 33 MG/DL (ref 7–20)
CALCIUM IONIZED: 1.14 MMOL/L (ref 1.12–1.32)
CALCIUM SERPL-MCNC: 8.3 MG/DL (ref 8.3–10.6)
CALCIUM SERPL-MCNC: 8.4 MG/DL (ref 8.3–10.6)
CHLORIDE BLD-SCNC: 106 MMOL/L (ref 99–110)
CHLORIDE BLD-SCNC: 111 MMOL/L (ref 99–110)
CO2: 19 MMOL/L (ref 21–32)
CO2: 21 MMOL/L (ref 21–32)
CREAT SERPL-MCNC: 1.8 MG/DL (ref 0.8–1.3)
CREAT SERPL-MCNC: 2.1 MG/DL (ref 0.8–1.3)
EKG ATRIAL RATE: 111 BPM
EKG DIAGNOSIS: NORMAL
EKG P AXIS: 52 DEGREES
EKG P-R INTERVAL: 176 MS
EKG Q-T INTERVAL: 324 MS
EKG QRS DURATION: 74 MS
EKG QTC CALCULATION (BAZETT): 440 MS
EKG R AXIS: -17 DEGREES
EKG T AXIS: 34 DEGREES
EKG VENTRICULAR RATE: 111 BPM
EOSINOPHILS ABSOLUTE: 0.1 K/UL (ref 0–0.6)
EOSINOPHILS ABSOLUTE: 0.2 K/UL (ref 0–0.6)
EOSINOPHILS RELATIVE PERCENT: 0.8 %
EOSINOPHILS RELATIVE PERCENT: 1.5 %
GFR AFRICAN AMERICAN: 36
GFR AFRICAN AMERICAN: 44
GFR NON-AFRICAN AMERICAN: 30
GFR NON-AFRICAN AMERICAN: 36
GLUCOSE BLD-MCNC: 113 MG/DL (ref 70–99)
GLUCOSE BLD-MCNC: 121 MG/DL (ref 70–99)
GLUCOSE BLD-MCNC: 152 MG/DL (ref 70–99)
GLUCOSE BLD-MCNC: 158 MG/DL (ref 70–99)
GLUCOSE BLD-MCNC: 168 MG/DL (ref 70–99)
GLUCOSE BLD-MCNC: 173 MG/DL (ref 70–99)
GLUCOSE BLD-MCNC: 187 MG/DL (ref 70–99)
HCT VFR BLD CALC: 26.4 % (ref 40.5–52.5)
HCT VFR BLD CALC: 27.1 % (ref 40.5–52.5)
HCT VFR BLD CALC: 28.2 % (ref 40.5–52.5)
HEMOGLOBIN: 8.7 G/DL (ref 13.5–17.5)
HEMOGLOBIN: 9 G/DL (ref 13.5–17.5)
HEMOGLOBIN: 9.3 G/DL (ref 13.5–17.5)
HEMOGLOBIN: 9.4 G/DL (ref 13.5–17.5)
LYMPHOCYTES ABSOLUTE: 0.8 K/UL (ref 1–5.1)
LYMPHOCYTES ABSOLUTE: 1.3 K/UL (ref 1–5.1)
LYMPHOCYTES RELATIVE PERCENT: 11.9 %
LYMPHOCYTES RELATIVE PERCENT: 8.5 %
MAGNESIUM: 1.6 MG/DL (ref 1.8–2.4)
MAGNESIUM: 2.6 MG/DL (ref 1.8–2.4)
MCH RBC QN AUTO: 33.7 PG (ref 26–34)
MCH RBC QN AUTO: 33.8 PG (ref 26–34)
MCHC RBC AUTO-ENTMCNC: 33.1 G/DL (ref 31–36)
MCHC RBC AUTO-ENTMCNC: 33.4 G/DL (ref 31–36)
MCV RBC AUTO: 101.1 FL (ref 80–100)
MCV RBC AUTO: 101.8 FL (ref 80–100)
MONOCYTES ABSOLUTE: 1 K/UL (ref 0–1.3)
MONOCYTES ABSOLUTE: 1.1 K/UL (ref 0–1.3)
MONOCYTES RELATIVE PERCENT: 11.6 %
MONOCYTES RELATIVE PERCENT: 8.9 %
NEUTROPHILS ABSOLUTE: 7.8 K/UL (ref 1.7–7.7)
NEUTROPHILS ABSOLUTE: 8.4 K/UL (ref 1.7–7.7)
NEUTROPHILS RELATIVE PERCENT: 77.3 %
NEUTROPHILS RELATIVE PERCENT: 78.8 %
PDW BLD-RTO: 12.7 % (ref 12.4–15.4)
PDW BLD-RTO: 12.8 % (ref 12.4–15.4)
PERFORMED ON: ABNORMAL
PH VENOUS: 7.38 (ref 7.35–7.45)
PHOSPHORUS: 3.2 MG/DL (ref 2.5–4.9)
PHOSPHORUS: 3.2 MG/DL (ref 2.5–4.9)
PLATELET # BLD: 133 K/UL (ref 135–450)
PLATELET # BLD: 142 K/UL (ref 135–450)
PMV BLD AUTO: 8.1 FL (ref 5–10.5)
PMV BLD AUTO: 8.2 FL (ref 5–10.5)
POTASSIUM SERPL-SCNC: 5.1 MMOL/L (ref 3.5–5.1)
POTASSIUM SERPL-SCNC: 5.3 MMOL/L (ref 3.5–5.1)
RBC # BLD: 2.59 M/UL (ref 4.2–5.9)
RBC # BLD: 2.79 M/UL (ref 4.2–5.9)
SODIUM BLD-SCNC: 133 MMOL/L (ref 136–145)
SODIUM BLD-SCNC: 141 MMOL/L (ref 136–145)
WBC # BLD: 10.9 K/UL (ref 4–11)
WBC # BLD: 9.9 K/UL (ref 4–11)

## 2022-07-14 PROCEDURE — 2580000003 HC RX 258: Performed by: SURGERY

## 2022-07-14 PROCEDURE — 2580000003 HC RX 258: Performed by: INTERNAL MEDICINE

## 2022-07-14 PROCEDURE — 51798 US URINE CAPACITY MEASURE: CPT

## 2022-07-14 PROCEDURE — 85014 HEMATOCRIT: CPT

## 2022-07-14 PROCEDURE — 93010 ELECTROCARDIOGRAM REPORT: CPT | Performed by: INTERNAL MEDICINE

## 2022-07-14 PROCEDURE — 6360000002 HC RX W HCPCS: Performed by: SURGERY

## 2022-07-14 PROCEDURE — 36415 COLL VENOUS BLD VENIPUNCTURE: CPT

## 2022-07-14 PROCEDURE — 99232 SBSQ HOSP IP/OBS MODERATE 35: CPT | Performed by: INTERNAL MEDICINE

## 2022-07-14 PROCEDURE — 6360000002 HC RX W HCPCS

## 2022-07-14 PROCEDURE — 85018 HEMOGLOBIN: CPT

## 2022-07-14 PROCEDURE — 85520 HEPARIN ASSAY: CPT

## 2022-07-14 PROCEDURE — 6370000000 HC RX 637 (ALT 250 FOR IP)

## 2022-07-14 PROCEDURE — 2580000003 HC RX 258

## 2022-07-14 PROCEDURE — 83735 ASSAY OF MAGNESIUM: CPT

## 2022-07-14 PROCEDURE — 6370000000 HC RX 637 (ALT 250 FOR IP): Performed by: SURGERY

## 2022-07-14 PROCEDURE — 82330 ASSAY OF CALCIUM: CPT

## 2022-07-14 PROCEDURE — 6360000002 HC RX W HCPCS: Performed by: STUDENT IN AN ORGANIZED HEALTH CARE EDUCATION/TRAINING PROGRAM

## 2022-07-14 PROCEDURE — 2060000000 HC ICU INTERMEDIATE R&B

## 2022-07-14 PROCEDURE — 93005 ELECTROCARDIOGRAM TRACING: CPT | Performed by: STUDENT IN AN ORGANIZED HEALTH CARE EDUCATION/TRAINING PROGRAM

## 2022-07-14 PROCEDURE — 80069 RENAL FUNCTION PANEL: CPT

## 2022-07-14 PROCEDURE — 85025 COMPLETE CBC W/AUTO DIFF WBC: CPT

## 2022-07-14 RX ORDER — CARVEDILOL 6.25 MG/1
12.5 TABLET ORAL 2 TIMES DAILY WITH MEALS
Status: DISCONTINUED | OUTPATIENT
Start: 2022-07-14 | End: 2022-07-18

## 2022-07-14 RX ORDER — HYDRALAZINE HYDROCHLORIDE 50 MG/1
50 TABLET, FILM COATED ORAL EVERY 8 HOURS SCHEDULED
Status: DISCONTINUED | OUTPATIENT
Start: 2022-07-14 | End: 2022-07-21

## 2022-07-14 RX ORDER — MIDAZOLAM HYDROCHLORIDE 1 MG/ML
10 INJECTION INTRAMUSCULAR; INTRAVENOUS ONCE
Status: DISCONTINUED | OUTPATIENT
Start: 2022-07-14 | End: 2022-07-14

## 2022-07-14 RX ORDER — SODIUM CHLORIDE, SODIUM LACTATE, POTASSIUM CHLORIDE, CALCIUM CHLORIDE 600; 310; 30; 20 MG/100ML; MG/100ML; MG/100ML; MG/100ML
INJECTION, SOLUTION INTRAVENOUS CONTINUOUS
Status: ACTIVE | OUTPATIENT
Start: 2022-07-14 | End: 2022-07-15

## 2022-07-14 RX ORDER — MAGNESIUM SULFATE IN WATER 40 MG/ML
4000 INJECTION, SOLUTION INTRAVENOUS ONCE
Status: COMPLETED | OUTPATIENT
Start: 2022-07-14 | End: 2022-07-14

## 2022-07-14 RX ADMIN — SODIUM CHLORIDE, PRESERVATIVE FREE 10 ML: 5 INJECTION INTRAVENOUS at 19:46

## 2022-07-14 RX ADMIN — CETIRIZINE HYDROCHLORIDE 5 MG: 10 TABLET, FILM COATED ORAL at 08:06

## 2022-07-14 RX ADMIN — TAMSULOSIN HYDROCHLORIDE 0.4 MG: 0.4 CAPSULE ORAL at 19:43

## 2022-07-14 RX ADMIN — HYDRALAZINE HYDROCHLORIDE 25 MG: 25 TABLET, FILM COATED ORAL at 05:59

## 2022-07-14 RX ADMIN — INSULIN LISPRO 1 UNITS: 100 INJECTION, SOLUTION INTRAVENOUS; SUBCUTANEOUS at 16:51

## 2022-07-14 RX ADMIN — SODIUM CHLORIDE, POTASSIUM CHLORIDE, SODIUM LACTATE AND CALCIUM CHLORIDE: 600; 310; 30; 20 INJECTION, SOLUTION INTRAVENOUS at 16:57

## 2022-07-14 RX ADMIN — MAGNESIUM SULFATE 4000 MG: 4 INJECTION INTRAVENOUS at 02:56

## 2022-07-14 RX ADMIN — GABAPENTIN 300 MG: 300 CAPSULE ORAL at 19:43

## 2022-07-14 RX ADMIN — VANCOMYCIN HYDROCHLORIDE 750 MG: 10 INJECTION, POWDER, LYOPHILIZED, FOR SOLUTION INTRAVENOUS at 08:08

## 2022-07-14 RX ADMIN — Medication 12 UNITS/KG/HR: at 01:38

## 2022-07-14 RX ADMIN — TRAMADOL HYDROCHLORIDE 50 MG: 50 TABLET, COATED ORAL at 02:58

## 2022-07-14 RX ADMIN — CEFEPIME HYDROCHLORIDE 1000 MG: 1 INJECTION, POWDER, FOR SOLUTION INTRAMUSCULAR; INTRAVENOUS at 12:48

## 2022-07-14 RX ADMIN — CARVEDILOL 6.25 MG: 6.25 TABLET, FILM COATED ORAL at 08:06

## 2022-07-14 RX ADMIN — SODIUM CHLORIDE, PRESERVATIVE FREE 10 ML: 5 INJECTION INTRAVENOUS at 08:07

## 2022-07-14 RX ADMIN — ASPIRIN 81 MG: 81 TABLET, COATED ORAL at 08:06

## 2022-07-14 RX ADMIN — INSULIN LISPRO 1 UNITS: 100 INJECTION, SOLUTION INTRAVENOUS; SUBCUTANEOUS at 19:43

## 2022-07-14 RX ADMIN — CEFEPIME HYDROCHLORIDE 1000 MG: 1 INJECTION, POWDER, FOR SOLUTION INTRAMUSCULAR; INTRAVENOUS at 00:43

## 2022-07-14 RX ADMIN — HEPARIN SODIUM 2000 UNITS: 1000 INJECTION INTRAVENOUS; SUBCUTANEOUS at 20:29

## 2022-07-14 RX ADMIN — ATORVASTATIN CALCIUM 40 MG: 40 TABLET, FILM COATED ORAL at 19:43

## 2022-07-14 RX ADMIN — NIFEDIPINE 60 MG: 30 TABLET, EXTENDED RELEASE ORAL at 08:06

## 2022-07-14 RX ADMIN — INSULIN LISPRO 1 UNITS: 100 INJECTION, SOLUTION INTRAVENOUS; SUBCUTANEOUS at 12:34

## 2022-07-14 ASSESSMENT — PAIN DESCRIPTION - ORIENTATION: ORIENTATION: LEFT

## 2022-07-14 ASSESSMENT — PAIN SCALES - GENERAL
PAINLEVEL_OUTOF10: 4
PAINLEVEL_OUTOF10: 0

## 2022-07-14 ASSESSMENT — PAIN DESCRIPTION - LOCATION: LOCATION: LEG

## 2022-07-14 NOTE — PROGRESS NOTES
ID Follow-up NOTE    CC:   L foot wound infection/extension osteomyelitis    Antibiotics: Vancomycin + Cefepime     Admit Date: 7/5/2022  Hospital Day: 10    Subjective:     Patient seen sitting up on the chair. He is s/p left superficial femoral artery to peroneal artery bypass with reversed great saphenous vein graft (left) (7/13). Reports mild foot pain. Objective:     Patient Vitals for the past 8 hrs:   BP Temp Temp src Pulse Resp SpO2 Weight   07/14/22 0800 135/69 98.7 °F (37.1 °C) Oral 84 17 100 % --   07/14/22 0600 (!) 145/104 -- -- 96 18 100 % 178 lb 5.6 oz (80.9 kg)   07/14/22 0559 (!) 147/55 -- -- -- -- -- --   07/14/22 0500 (!) 155/59 -- -- (!) 102 18 100 % --   07/14/22 0400 132/67 -- -- (!) 108 19 100 % --   07/14/22 0300 (!) 148/78 -- -- 93 23 99 % --   07/14/22 0200 137/72 -- -- 91 16 -- --   07/14/22 0100 (!) 141/84 -- -- (!) 112 10 -- --     I/O last 3 completed shifts: In: 3556.1 [P.O.:300; I.V.:3206.1; IV Piggyback:50]  Out: 3350 [Urine:2930; Drains:170; Blood:250]  No intake/output data recorded. EXAM:  GENERAL: No apparent distress.     HEENT: Membranes moist, no oral lesion  NECK:  Supple, no lymphadenopathy  LUNGS: Clear b/l, no rales, no dullness  CARDIAC:  no murmur appreciated  ABD:  + BS, soft / NT  EXT:  LLE wrapped with drain exiting  NEURO: No focal neurologic findings  PSYCH: Orientation, sensorium, mood normal  LINES:  Peripheral iv       Data Review:  Lab Results   Component Value Date    WBC 10.9 07/14/2022    HGB 9.4 (L) 07/14/2022    HCT 28.2 (L) 07/14/2022    .1 (H) 07/14/2022     (L) 07/14/2022     Lab Results   Component Value Date    CREATININE 1.8 (H) 07/14/2022    BUN 33 (H) 07/14/2022     07/14/2022    K 5.1 07/14/2022     (H) 07/14/2022    CO2 19 (L) 07/14/2022       Hepatic Function Panel:   Lab Results   Component Value Date/Time    ALKPHOS 59 09/27/2021 05:38 PM    ALT 11 09/27/2021 05:38 PM    AST 19 09/27/2021 05:38 PM    PROT 7.5 09/27/2021 05:38 PM    PROT 6.7 03/14/2013 09:30 AM    BILITOT 0.3 09/27/2021 05:38 PM    LABALBU 3.4 07/14/2022 12:42 AM       MICRO:  L foot Wound Culture: (7/5)   Gram stain: 2+ WBC, 3+ GPC  in chains and pairs, 1+ Small GPR   Organism: Staph aureus MRSA  Staph aureus mrsa (1)  Antibiotic Interpretation Microscan     ceFAZolin Resistant 16 mcg/mL   clindamycin Sensitive <=0.5 mcg/mL   erythromycin Resistant >4 mcg/mL   oxacillin Resistant >2 mcg/mL   tetracycline Sensitive <=4 mcg/mL   trimethoprim-sulfamethoxazole Resistant >2/38 mcg/mL   vancomycin Sensitive 1 mcg/mL      IMAGING:  Xray, L foot (7/5)  Osteomyelitis is not excluded based on this exam particularly at the base of the proximal phalanx of the second digit. If this is a clinical concern evaluation with MRI should be performed.     MRI, L foot wo contrast (7/5)  1. Limited exam secondary to motion artifact. 2. There is marrow edema identified within the fourth metatarsal head and proximal fourth phalanx. In the setting of infection or open wound in this region, this is compatible with osteomyelitis. 3. Status post amputations of the second, third, and fifth metatarsals as detailed above. 4. Healed fracture deformities identified involving the proximal first phalanx. There is osteoarthrosis identified at the first metatarsophalangeal joint. 5. There is subcutaneous edema identified about the foot. This can be seen with cellulitis and/or venous insufficiency. No soft tissue abscess is identified. 6. There is severe fatty atrophy involving the deep muscular foot. Correlate for underlying neuropathy.          VL LOWER EXTREMITY ARTERIES DUPLEX LEFT. (07/07)   -Nondiagnostic bilateral ABIs due to vessel noncompressibility.    -Left popliteal & tibioperoneal artery occlusion with associated tibial occlusive disease.    -Chronic phlebitic changes involving the left popliteal vein, consistent with previous DVT.      Transthoracic Echocardiogram (TTE) (7/12)   Summary   Left ventricular cavity size is normal. There is moderately increased left  ventricular wall thickness. Ejection fraction is visually estimated to be55-60%. No regional wall motion abnormalities are noted. Grade II diastolic dysfunction with elevated LV filling pressures. Mitral annular calcification is present. Thickening of leaflets of mitral valve. Mild to moderate mitral regurgitation. The mitral valve pressure half-time is calculated at 1.47 cm2. The aortic valve is thickened/calcified with decreased leaflet mobility consistent with aortic stenosis. Moderate aortic stenosis with a peak velocity of 3.08 m/s and a mean pressure gradient of 20 mmHg. There is moderate aortic insufficiency noted. The right atrium is mildly dilated. The aortic valve is thickened/calcified with decreased leaflet mobility consistent with aortic stenosis. Moderate aortic stenosis with a peak velocity of 3.08 m/s and a mean pressure gradient of 20 mmHg. There is moderate aortic insufficiency noted. IVC size is normal (<2.1 cm) but collapses < 50% with respiration consistent with elevated RA pressure (8 mmHg). Estimated pulmonary artery systolic pressure is mildly elevated at 43 mmHg assuming a right atrial pressure of 8 mmHg.        Scheduled Meds:   carvedilol  12.5 mg Oral BID     hydrALAZINE  50 mg Oral 3 times per day    cefepime  1,000 mg IntraVENous Q12H    sodium chloride flush  5-40 mL IntraVENous 2 times per day    aspirin  81 mg Oral Daily    NIFEdipine  60 mg Oral Daily    vancomycin  750 mg IntraVENous Q24H    atorvastatin  40 mg Oral Nightly    gabapentin  300 mg Oral Nightly    insulin lispro  0-6 Units SubCUTAneous TID WC    insulin lispro  0-3 Units SubCUTAneous Nightly    cetirizine  5 mg Oral Daily    tamsulosin  0.4 mg Oral Nightly    sodium chloride flush  5-40 mL IntraVENous 2 times per day       Continuous Infusions:   heparin (PORCINE) Infusion 12 Units/kg/hr (07/14/22 0138)    sodium chloride Stopped (07/12/22 0100)    dextrose      sodium chloride 25 mL/hr at 07/13/22 0540       PRN Meds:  HYDROmorphone **OR** HYDROmorphone, sodium chloride flush, sodium chloride, cloNIDine, labetalol, heparin (porcine), heparin (porcine), traMADol, hydrALAZINE, methocarbamol, glucose, dextrose bolus **OR** dextrose bolus, glucagon (rDNA), dextrose, sodium chloride flush, sodium chloride, ondansetron **OR** ondansetron, polyethylene glycol, acetaminophen **OR** acetaminophen, morphine      Assessment:     85 yo PMH CKD, hypothyroidism, gout     1. L foot wound infection/osteomyelitis  2. H/o multiple toe amputation (2020)    -MRI L foot showed marrow edema identified within the fourth metatarsal head and proximal fourth phalanx compatible with osteomyelitis.   -Wound culture (7/5): S.aureus (MRSA). - s/p U/S guided access of R common femoral artery, LLE arteriogram and L popliteal and tibioperoneal trunk angioplasty secondary to atherosclerosis os LLE with ulceration (7/11)  -Left superficial femoral artery to peroneal artery bypass with reversed great saphenous vein graft done 7/13. Plan:     Cont Vanc/Cefepime   Wound care per podiatry   Vascular post up care   -Podiatry planning to take pt to OR (7/15) for L foot TMA with FIDENCIO (pending OR availability)   -NPO at midnight     Discussed with pt, Dr Jacoby Tovar MD     Addendum to Resident Progress note:  Pt seen, examined and evaluated.  I have reviewed the current history, physical findings, labs and assessment and plan and agree with note as documented by resident (Dr. Gimenez).     82 yo CKD, hypothyroid, gout  Hx L foot toe amp 3/2020  L foot plantar wound, followed by Podiatry, Dr De Jesus Foot     Referred to [facility] for admission 7/5   Afeb, WBC 10.4, Cr 2.3  X-ray - 'possible osteomyelitis at the base of the proximal phalanx of the second digit '  MRI 4th MT head and prox phalanx edema  Wound probes to bone per Podiatry note. Cult - mixed skin cash, Diphtheroids, MRSA     7/11 Angio - unable to revascularize endovascularly  7/13 L SFA to peroneal a bypase with saphenous vein graft     IMP/  L foot wound infection / extension osteomyelitis  Cult gram positive organism including MRSA     PVD - angio 7/11, bypass 7/13     REC/  Cont empiric Vanco / Cefepime     Vascular postop care  Wound care per Podiatry    OR 7/15 - TMA / FIDENCIO    Medical Decision Making:   The following items were considered in medical decision making:  Discussion of patient care with other providers  Reviewed clinical lab tests  Reviewed radiology tests  Reviewed other diagnostic tests/interventions  Independent review of radiologic images - reviewed MRI w Radiologist 7/8  Microbiology cultures and other micro tests reviewed       Discussed with pt, RN  Lor Whitehead MD

## 2022-07-14 NOTE — PROGRESS NOTES
Department of Surgery:  Post-op Note    CC: LLE PAD    Subjective: Patient denies any pain, overall feels he is doing well. Only complains of being cold. Denies numbness/pain/weakness of lower extremities. Denies nausea or other complaints.      Objective:  Anesthesia type: General      I/O    Intra op    Post op     Fluids  2000mL 300 mL      mL 0 mL     Urine 450 mL 240 mL     Physical Exam:  Vitals:    07/13/22 2300 07/13/22 2325 07/13/22 2345 07/14/22 0000   BP:  (!) 170/55 (!) 156/78 (!) 145/65   Pulse: (!) 112 97 (!) 107 (!) 107   Resp:   17 14   Temp:   98 °F (36.7 °C)    TempSrc:   Oral    SpO2:   100%    Weight:       Height:           General appearance: alert, sitting up in bed, in NAD  Chest/Lungs: normal effort, no adventitious breath sounds, on 2L NC  Cardiovascular: tachycardia with intermittent irregular rhythm and PVCs  Abdomen: soft, non-distended, nontender  : turner in place draining clear yellow urine  Extremities: mepilex covering medial aspect of left leg with sanguinous strikethrough on small portion of dressing, drain with sanguinous output and some clot in the bulb, biopatch stained with sanguinous drainage, faintly palpable L DP, L PT signal with doppler, sensation and motor intact, palpable L femoral pulse, compartments soft  Neuro: A&Ox3, no focal deficits, sensation intact    Assessment and Plan  This is a 81y.o. year old male with a diagnosis of LLE PAD s/p Left Superficial Femoral Artery to Peroneal Artery Bypass with Reversed Greater Saphenous Vein  POD #0    Pain management: Pain well controlled  PRN Tylenol, Robaxin, Tramadol  Cardiovascular: EKG ordered given irregular rhythm  Rate controlled   Heparin gtt to re-start at 0130  [facility] neurovascular checks  Respiratory: extubated, encourage hourly incentive spirometry and deep breathing  Wean oxygen as tolerated  FEN:  Fluids: None, Diet: NPO in case of podiatry inervention  : Turner to remain for strict I/O, Urine output is adequate  Wound: Monitor dressings for worsening strike through/drainage or any active bleeding once Heparin is re-started, neurovascular checks as above  Ambulation: OOB to chair, encourage ambulation tomorrow  Prophylaxis: Heparin jorge Nielsen DO  07/14/22  12:18 AM

## 2022-07-14 NOTE — FLOWSHEET NOTE
Patient received from the OR to pACU #7 post LEFT SUPERFICIAL FEMORAL ARTERY TO PERONEAL ARTERY BYPASS WITH REVERSED GREAT SAPHENOUS VEIN GRAFT - Left of Dr. Bob Kan. Placed on PACU monitoring equipment. Report given per Dr. Hanna Velarde and Dr. Sandra Guzman. Per report, patient was hypotensive intra op and treated with albumin and pressors. On arrival, patient is restless, impulsive and trying to get out of bed relentlessly. Denies pain. Dr. Sandra Guzman remains at bedside, unable to doppler left DP pulse, able to doppler left PT. Aware that left leg dressing and JOHN site are oozing. States that he expects patient to be \"oozey\".

## 2022-07-14 NOTE — PROGRESS NOTES
Vascular Surgery   Daily Progress Note  Patient: Fadia Zhou    CC: Atherosclerosis native artery left lower extremity with ulceration    SUBJECTIVE: no acute complaints this am. LLE numb,has full motor L foot. Pain is controlled. ROS: A 14 point review of systems was conducted, significant findings as noted above. All other systems negative. OBJECTIVE:   Infusions:   heparin (PORCINE) Infusion 12 Units/kg/hr (07/14/22 0138)    lactated ringers 75 mL/hr at 07/13/22 2031    sodium chloride Stopped (07/12/22 0100)    dextrose      sodium chloride 25 mL/hr at 07/13/22 0540      I/O:I/O last 3 completed shifts: In: 4116.1 [P.O.:1010; I.V.:3056.1;  IV Piggyback:50]  Out: 6645 [Urine:2790; Blood:250]           Wt Readings from Last 1 Encounters:   07/14/22 178 lb 5.6 oz (80.9 kg)       Exam:  BP (!) 145/104   Pulse 96   Temp 98 °F (36.7 °C) (Oral)   Resp 18   Ht 6' 2\" (1.88 m)   Wt 178 lb 5.6 oz (80.9 kg)   SpO2 100%   BMI 22.90 kg/m²     General appearance: alert, in bed, in NAD  Chest/Lungs: normal effort, no adventitious breath sounds, on 2L NC  Cardiovascular: tachycardia with intermittent irregular rhythm and PVCs  Abdomen: soft, non-distended, nontender  : turner in place draining clear yellow urine  Extremities: mepilex covering medial aspect of left leg with sanguinous strikethrough on small portion of dressing, drain with sanguinous output and some clot in the bulb, biopatch stained with sanguinous drainage, dopperable L DP, L PT, sensation and motor intact, palpable L femoral pulse, compartments soft  Neuro: A&Ox3, no focal deficits, sensation intact            Vascular Exam:    femoral  DP  PT    LEFT  +  +/- +/-   RIGHT  +  +  +       LABS:   Recent Labs     07/13/22 0456 07/14/22  0042   WBC 8.5 10.9   HGB 10.9* 9.4*   HCT 33.0* 28.2*   .0* 101.1*   * 133*        Recent Labs     07/13/22 0456 07/14/22  0042    141   K 5.0 5.1   * 111*   CO2 24 19*   PHOS 2.9 3.2   BUN 38* 33*   CREATININE 2.1* 1.8*      No results for input(s): AST, ALT, ALB, BILIDIR, BILITOT, ALKPHOS in the last 72 hours. No results for input(s): LIPASE, AMYLASE in the last 72 hours. Recent Labs     07/11/22  1455 07/13/22  0456   INR 1.16* 1.14   APTT 81.2*  --       No results for input(s): CKTOTAL, CKMB, CKMBINDEX, TROPONINI in the last 72 hours. ASSESSMENT/PLAN:   This is a 81y.o. year old male with a diagnosis of LLE PAD s/p Left Superficial Femoral Artery to Peroneal Artery Bypass with Reversed Greater Saphenous Vein.   POD #1    - ok for general diet from vasc standpoint  - Q4H neurovasc checks   - oob/ambulation with assistance  - aggressive IS and O2 wean  - remove turner, void check  - will change mepilex today vs tomorrow  - ID on board; cont vanco and cefepime  - pod following; will follow up recs this AM.     Linda Cason, CNP  7/14/2022  7:02 AM  halie

## 2022-07-14 NOTE — PROGRESS NOTES
Hospital Medicine  Progress Note    PCP: Lilia Patrick    Date of Admission: 7/5/2022      Chief Complaint: Worsening left foot pain and swelling      History Of Present Illness:    81 y.o. male with DM type 2, HTN and hypothyroidism, PAF (off pradaxa) and BPH who presented with worsening wound left foot. The wound started in the bottom of his left foot as a callus. Patient has been putting Vaseline and soaking the foot. When he saw his podiatrist (Dr. Boris Rm ) last week he was recommended to start using Betadine to the left foot. Patient has not following the wound care recommendations, has been putting Betadine on the wound and has been wearing a sock over it and continue to walk bearing weight on the left foot. Patient started noticing mild yellow drainage coming from the wound. Patient denies fever, chills, nausea, vomiting, chest pain or shortness of breath    On presentation,, apart from elevated BP, he had fairly normal vitals. He had no leucocytosis. Hgb was 12 with . Cr was 2.3 (baseline about 1.6). Interim HPI  No new complaints  Denies chest pain    Underwent a left superficial femoral to peroneal artery bypass yesterday afternoon. Patient denies any acute overnight events    He is sitting OOB    Planned OR tomorrow for a TMA. Discussed with RN and appears earlier today. Pt had a rapid response called after getting up to chair due to BP drop & possible seizure. Per RN, His BP was noted to drop at that time, and his eyes rolled to one side and he was not fully responsive. No jerking or falls. Medications: Reviewed        Allergies:  Patient has no known allergies. REVIEW OF SYSTEMS COMPLETED:   Pertinent positives as noted in the HPI. All other systems reviewed and negative.     PHYSICAL EXAM PERFORMED:    /69   Pulse 84   Temp 98.7 °F (37.1 °C) (Oral)   Resp 17   Ht 6' 2\" (1.88 m)   Wt 178 lb 5.6 oz (80.9 kg)   SpO2 100%   BMI 22.90 kg/m² General appearance:  No apparent distress, appears stated age and cooperative. HEENT:  Normal cephalic, atraumatic without obvious deformity. .  Neck: Supple, with full range of motion. No jugular venous distention. Trachea midline. Respiratory:  Normal respiratory effort. Clear to auscultation, bilaterally without Rales/Wheezes/Rhonchi. Cardiovascular:  Regular rate and rhythm with normal S1/S2 without murmurs, rubs or gallops. Abdomen: Soft, non-tender, non-distended with normal bowel sounds. Musculoskeletal:  No clubbing, cyanosis or edema bilaterally. Full range of motion without deformity. Skin: Dressing over left foot: C/D/I  Neurologic:  grossly non-focal.  Psychiatric:  Alert and oriented, thought content appropriate, normal insight  Capillary Refill: Brisk,3 seconds, normal  Peripheral Pulses: DP and PT pulses-nonpalpable bilaterally, biphasic with Doppler per podiatry on the right, monophasic on the left. Labs:     Recent Labs     07/12/22  0902 07/13/22  0456 07/14/22  0042   WBC 8.8 8.5 10.9   HGB 11.0* 10.9* 9.4*   HCT 31.9* 33.0* 28.2*    131* 133*     Recent Labs     07/12/22  0902 07/13/22  0456 07/14/22  0042    140 141   K 5.4* 5.0 5.1    111* 111*   CO2 23 24 19*   BUN 35* 38* 33*   CREATININE 2.1* 2.1* 1.8*   CALCIUM 8.6 8.9 8.4   PHOS 3.2 2.9 3.2     No results for input(s): AST, ALT, BILIDIR, BILITOT, ALKPHOS in the last 72 hours. Recent Labs     07/11/22  1455 07/13/22  0456   INR 1.16* 1.14     No results for input(s): Nai Mould in the last 72 hours.     Urinalysis:      Lab Results   Component Value Date/Time    NITRU Negative 07/10/2022 05:02 PM    WBCUA 0-2 07/10/2022 05:02 PM    BACTERIA Rare 07/10/2022 05:02 PM    RBCUA 3-4 07/10/2022 05:02 PM    BLOODU Negative 07/10/2022 05:02 PM    SPECGRAV 1.020 07/10/2022 05:02 PM    GLUCOSEU Negative 07/10/2022 05:02 PM    GLUCOSEU Negative 07/05/2011 09:10 PM       Radiology:     CXR: I have reviewed the CXR with the following interpretation: NA  EKG:  I have reviewed the EKG with the following interpretation: NA    XR TIBIA FIBULA LEFT (2 VIEWS)   Final Result      Intraoperative fluoroscopy provided as above. See operative report for details. FLUORO FOR SURGICAL PROCEDURES   Final Result      Intraoperative fluoroscopy provided as above. See operative report for details. VL PRE OP VEIN MAPPING   Final Result      XR CHEST PORTABLE   Final Result      No acute cardiopulmonary findings. VL DUP LOWER EXTREMITY ARTERIES LEFT   Final Result      MRI FOOT LEFT WO CONTRAST   Final Result   1. Limited exam secondary to motion artifact. 2. There is marrow edema identified within the fourth metatarsal head and proximal fourth phalanx. In the setting of infection or open wound in this region, this is compatible with osteomyelitis. 3. Status post amputations of the second, third, and fifth metatarsals as detailed above. 4. Healed fracture deformities identified involving the proximal first phalanx. There is osteoarthrosis identified at the first metatarsophalangeal joint. 5. There is subcutaneous edema identified about the foot. This can be seen with cellulitis and/or venous insufficiency. No soft tissue abscess is identified. 6. There is severe fatty atrophy involving the deep muscular foot. Correlate for underlying neuropathy. XR FOOT LEFT (MIN 3 VIEWS)   Final Result      Osteomyelitis is not excluded based on this exam particularly at the base of the proximal phalanx of the second digit. If this is a clinical concern evaluation with MRI should be performed.       IR ANGIOGRAM EXTREMITY LEFT    (Results Pending)       Consults:    IP CONSULT TO PODIATRY  PHARMACY TO DOSE VANCOMYCIN  IP CONSULT TO HOSPITALIST  IP CONSULT TO PHARMACY  IP CONSULT TO PHARMACY  IP CONSULT TO INFECTIOUS DISEASES  IP CONSULT TO VASCULAR SURGERY  IP CONSULT TO NEPHROLOGY        ASSESSMENT/PLAN:  81 y.o. male with DM type 2, HTN and hypothyroidism, PAF (off pradaxa) and BPH who presented with worsening wound left foot. Cellulitis left lower extremity with full-thickness ulceration plantar surface with osteomyelitis. POA  - Possible diabetic ulcer. H/e, appears A1C is not elevated and Home med list does not have DM meds: verify home med list  - R/o sec to severe PAD   - Hx of Multiple amputations. - Sed rate was 53 and CRP10  - MRI shows osteo  - Cx sent: mixed skin cash, Diphtheroids, MRSA; follow results  - Started on broad-spectrum IV antibiotics: ID consulted.    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 Planned L SFA-Peroneal bypass    Pre-op risk stratification  He underwent angioplasty in the LLE at [facility] in 2013  No known hx of CAD  Neg stress test 2003 and 2014  Echo 7/12: Normal EF, no RWMA, grade II DD  Currently appears euvolemic  Denies chest pain, or TINSLEY    May proceed to the OR for limb saving procedure, with known risk: (RCRI Class IV risk with 15 % 30 day risk of death, MI or cardiac arrest) with no further w/u at this time    - Will likely benefit from updated ischemia eval. Oscar as the presence of PAD likely implies disease in the coronaries as well. - Continue BB and other cardiac meds as appropriate in the samreen-op period; but - avoid hypotension to prevent compromise of coronary circulation  - Keep on telemetry   - May benefit from period of ICU observation post op.        Mental status changes 7/14/2022  - Episode of near syncope/syncopal episode  - Possiby orthostatic with hx of HTN, PAD, and new meds for BP control as well as recent vascular procedure with \" Estimated Blood Loss (mL): 300\"  - Hgb dropped this AM to 8.7, from 12 7/11/2022  - Gregory Baker is also possible, but no apparent hx of seizures  - Will check orthostatics; recheck and monitor hgb more closely until stable  - Will type and screen blood and hold some scheduled BP meds for now  - Seizure precautions  - Neuro-checks  - If recurs, EEG and Neurology eval      Acute Blood loss anemia 7/14/2022  Post op/samreen-op blood loss from vascular surgery  \" Estimated Blood Loss (mL): 300\"  - Pxt has also been on heparin gtt  - Hgb dropped this AM to 8.7, from 12 7/11/2022  - Will type and screen blood and hold some scheduled BP meds for now        HTN: POA  - Started on metoprolol: Switched to coreg, decrease dose with some winston noted  - Started hydralazine PO as BP was really high  - Added CCB as BP remains high: increase dose to 60 mg dly Nifedipine;- Holding lisonopril for now inessa with hyperkalemia  - BP improved prior to OR of 7/13. With hypotension and blood loss, hold more meds for now until BP improved. - PRN hydralazine IV      CKD stage 3: POA  Mild hyperkalemia: POA  - Baseline Cr 1.6  - Was 2.3 on admission suggesting some renal insufficiency on his CKD stage 3: MO ruled out  - Monitor cr: appears improving  - Continue gentle IV hydration  - Holding lisinopril  - Monitor Cr, K  - Follows with Dr. Lou Humphries: planned CO2 angio. Neprho consulted. Paroxysmal A. Fib: POA  - Not on rate control meds. - Appears was prev on Pradaxa. Appears was taken off after he wore a monitor and as \"couldn't afford it\" in 2014 . - Started on metoprolol  - TSH, Mg: WNL  - Keep on telemetry       Hypothyroidism: POA  - Updated TSH; WNL    Chronic Macrocytic anemia- POA  - Updated B12, Folate:  WNL      BPH: POA  - On  Flomax  - Monitor for retention: Bladder scans        Pxt tells me after discussing with his sons, he wants to be Full code. DVT Prophylaxis: Lovenox     Diet: ADULT DIET;  Regular; 4 carb choices (60 gm/meal)  Code Status: Full Code    PT/OT Eval Status: As tolerated with no weightbearing on the left foot      Disposition -F with telemetry  S/P Superficial femoral artery to peroneal artery bypass of LLE   Possible surgery per Podiatry: Planned for tomorrow  PT/OT eval after Surgery       Sarah Woo MD

## 2022-07-14 NOTE — PROGRESS NOTES
Interval History and plan:      BP is stable   Cr is near baseline   Made 2010 mL urine yesterday    SP  Fem / pop bypass on 7/13/2022      Plan:    Renal function stable  Increase Hydralazine and Coreg. Continue other BP medicines . Stop IVF   On ABX and monitor vancomycin levels                          Assessment :     CKD Stage 3b  Likely due to DM and hypertension  Cr is 1.8- eGFR of 36 ml/min  UA- bland, wbc 6-9,  No recent one in the system      Hypertension   BP: (132-155)/()  Heart Rate:  []   BP goal inpatient [id number] systolic inpatient  Home regimen: lisinopril 10 mg daily only  Current inpatient regimen: carvedilol 6.25 mg BID, hydralazine 25 mg TID, nifedipine 60 mg daily. Also has Afib    Anemia of CKD             sepsis  DM  PVD    [facility] would like to thank Renate Somers MD   for opportunity to serve this patient      Please call with questions at-   24 Hrs Answering service [phone] or  7 am- 5 pm via Perfect serve or cell phone  To Abel MD          CC/reason for consult :     MO     HPI :     Lissett Woo is a 81 y.o. male presented to   the hospital on 7/5/2022 with wound infection in the left leg. He is followed outpatient by podiatry,for wound, which continued to   Get worse despite the treatment. Also has discharge from the wound  Brought to hospital admitted  Also found to have CKD  because of which we are consulted.          ROS:     Seen with- no family    positives in bold   Constitutional:  fever, chills, weakness, weight change, fatigue  Skin:  rash, pruritus, hair loss, bruising, dry skin, petechiae  Head, Face, Neck   headaches, swelling,  cervical adenopathy  Respiratory: shortness of breath, cough, or wheezing  Cardiovascular: chest pain, palpitations, dizzy, edema  Gastrointestinal: nausea, vomiting, diarrhea, constipation,belly pain    Yellow skin, blood in stool  Musculoskeletal:  back pain, muscle weakness, gait problems,       joint pain or swelling. Genitourinary:  dysuria, poor urine flow, flank pain, blood in urine  Neurologic:  vertigo, TIA'S, syncope, seizures, focal weakness  Psychosocial:  insomnia, anxiety, or depression. Additional positive findings:                    All other remaining systems are negative or unable to obtain        PMH/PSH/SH/Family History:     Past Medical History:   Diagnosis Date    Diabetic eye exam ([facility] [address] 1/29/14    [facility] Angela Santoyo [facility] 1/13/2014    Gout     Hypothyroidism 4/18/2016    Seasonal allergic rhinitis        Past Surgical History:   Procedure Laterality Date    COLONOSCOPY  2009 appx     normal     COLONOSCOPY N/A 6/20/2022    COLONOSCOPY POLYPECTOMY SNARE/COLD BIOPSY performed by Rico Ruano MD at [facility] [place] 7/13/2022    LEFT SUPERFICIAL FEMORAL ARTERY TO PERONEAL ARTERY BYPASS WITH REVERSED GREAT SAPHENOUS VEIN GRAFT performed by Loyda Lopez MD at [address] [place] [address],[address], bilateral    INCISIONAL HERNIA REPAIR      bilateral        reports that he quit smoking about 63 years ago. He quit after 5.00 years of use. He has never used smokeless tobacco. He reports that he does not drink alcohol and does not use drugs. family history is not on file.          Medication:     Current Facility-Administered Medications: HYDROmorphone (DILAUDID) injection 0.25 mg, 0.25 mg, IntraVENous, Q3H PRN **OR** HYDROmorphone (DILAUDID) injection 0.5 mg, 0.5 mg, IntraVENous, Q3H PRN  cefepime (MAXIPIME) 1000 mg IVPB minibag, 1,000 mg, IntraVENous, Q12H  heparin 25,000 units in dextrose 5% 250 mL (premix) infusion, 5-30 Units/kg/hr, IntraVENous, Continuous  lactated ringers infusion, , IntraVENous, Continuous  sodium chloride flush 0.9 % injection 5-40 mL, 5-40 mL, IntraVENous, 2 times per day  sodium chloride flush 0.9 % injection 5-40 mL, 5-40 mL, IntraVENous, PRN  0.9 % sodium chloride infusion, , IntraVENous, PRN  cloNIDine (CATAPRES) tablet 0.1 mg, 0.1 mg, Oral, Q2H PRN  labetalol (NORMODYNE;TRANDATE) injection 10 mg, 10 mg, IntraVENous, Q2H PRN  aspirin EC tablet 81 mg, 81 mg, Oral, Daily  heparin (porcine) injection 4,000 Units, 4,000 Units, IntraVENous, PRN  heparin (porcine) injection 2,000 Units, 2,000 Units, IntraVENous, PRN  NIFEdipine (ADALAT CC) extended release tablet 60 mg, 60 mg, Oral, Daily  vancomycin (VANCOCIN) 750 mg in dextrose 5 % 250 mL IVPB, 750 mg, IntraVENous, Q24H  atorvastatin (LIPITOR) tablet 40 mg, 40 mg, Oral, Nightly  hydrALAZINE (APRESOLINE) tablet 25 mg, 25 mg, Oral, 3 times per day  carvedilol (COREG) tablet 6.25 mg, 6.25 mg, Oral, BID WC  traMADol (ULTRAM) tablet 50 mg, 50 mg, Oral, Q6H PRN  hydrALAZINE (APRESOLINE) injection 10 mg, 10 mg, IntraVENous, Q4H PRN  methocarbamol (ROBAXIN) tablet 750 mg, 750 mg, Oral, 4x Daily PRN  gabapentin (NEURONTIN) capsule 300 mg, 300 mg, Oral, Nightly  glucose chewable tablet 16 g, 4 tablet, Oral, PRN  dextrose bolus 10% 125 mL, 125 mL, IntraVENous, PRN **OR** dextrose bolus 10% 250 mL, 250 mL, IntraVENous, PRN  glucagon (rDNA) injection 1 mg, 1 mg, IntraMUSCular, PRN  dextrose 5 % solution, 100 mL/hr, IntraVENous, PRN  insulin lispro (1 Unit Dial) 0-6 Units, 0-6 Units, SubCUTAneous, TID WC  insulin lispro (1 Unit Dial) 0-3 Units, 0-3 Units, SubCUTAneous, Nightly  cetirizine (ZYRTEC) tablet 5 mg, 5 mg, Oral, Daily  tamsulosin (FLOMAX) capsule 0.4 mg, 0.4 mg, Oral, Nightly  sodium chloride flush 0.9 % injection 5-40 mL, 5-40 mL, IntraVENous, 2 times per day  sodium chloride flush 0.9 % injection 5-40 mL, 5-40 mL, IntraVENous, PRN  0.9 % sodium chloride infusion, , IntraVENous, PRN  ondansetron (ZOFRAN-ODT) disintegrating tablet 4 mg, 4 mg, Oral, Q8H PRN **OR** ondansetron (ZOFRAN) injection 4 mg, 4 mg, IntraVENous, Q6H PRN  polyethylene glycol (GLYCOLAX) packet 17 g, 17 g, Oral, Daily PRN  acetaminophen (TYLENOL) tablet 650 mg, 650 mg, Oral,

## 2022-07-14 NOTE — PROGRESS NOTES
-Patient will be going to the OR tomorrow for a left foot transmetatarsal amputation with tendoachilles lengthening  -Discussed surgical intervention with patient at bedside. All potential risks, benefits, and complications were discussed with the patient prior to the scheduling of surgery. No guarantees or promises were made to what the outcomes will be. The patient wished to proceed with surgery, and informed written consent was obtained, signed, and placed on the patient's chart.    -NPO at midnight    -Will hold heparin gtt tomorrow at 2:00 am in anticipation for OR       Patients plan of care was discussed with YOHAN OrozcoM

## 2022-07-14 NOTE — PLAN OF CARE
Problem: ABCDS Injury Assessment  Goal: Absence of physical injury  Outcome: Progressing     Problem: Skin/Tissue Integrity - Adult  Goal: Skin integrity remains intact  Outcome: Progressing  Goal: Incisions, wounds, or drain sites healing without S/S of infection  Outcome: Progressing  Goal: Oral mucous membranes remain intact  Outcome: Progressing     Problem: Discharge Planning  Goal: Discharge to home or other facility with appropriate resources  Outcome: Progressing     Problem: Safety - Adult  Goal: Free from fall injury  Outcome: Progressing     Problem: Chronic Conditions and Co-morbidities  Goal: Patient's chronic conditions and co-morbidity symptoms are monitored and maintained or improved  Outcome: Progressing     Problem: Pain  Goal: Verbalizes/displays adequate comfort level or baseline comfort level  Outcome: Progressing     Problem: Skin/Tissue Integrity  Goal: Absence of new skin breakdown  Description: 1. Monitor for areas of redness and/or skin breakdown  2. Assess vascular access sites hourly  3. Every 4-6 hours minimum:  Change oxygen saturation probe site  4. Every 4-6 hours:  If on nasal continuous positive airway pressure, respiratory therapy assess nares and determine need for appliance change or resting period.   Outcome: Progressing

## 2022-07-14 NOTE — FLOWSHEET NOTE
Attempted to reach son, Ramakrishna Alejandre for update. There is no answer and no one in the [facility].

## 2022-07-14 NOTE — PROGRESS NOTES
edema noted, left lower extremity-noted to be improving secondary to compressive therapy. No pain with calf compression b/l. NEUROLOGIC: Gross and epicritic sensation is diminished b/l. Protective sensation is diminished at all pedal sites b/l. DERMATOLOGIC:   Left lower extremity has a full-thickness ulceration noted to the plantar aspect of the left foot subfourth metatarsal head. Wound measures approximately 1.2 cm x 0.6 cm x 0.4 cm. Wound base is a mixture of fibrotic and granular tissue. The wound does probe to bone. Scant seropurulent drainage expressed with compression but no crepitus, fluctuance, or malodor noted. The wound does track medially about 1 cm but no tunneling or undermining noted. Patient provided verbal consent for pictures obtained today:      Right LE soft tissue envelope is closed without ulceration or acute signs of infection. MUSCULOSKELETAL: Muscle strength is 5/5 for all pedal groups tested. Pain with palpation to the periwound, left foot. Ankle joint ROM is decreased in dorsiflexion with the knee extended. History of multiple amputations to the left foot     IMAGING:  XR Left Foot (7/5/2022)  Narrative   LEFT FOOT ON 7/5/2022       HISTORY: Diabetic wound, rule out osteomyelitis. COMPARISON: None. FINDINGS:       3 views of the right foot show distal vascular calcification. There is been partial amputation of the second, third, fourth and fifth digits. No erosive changes at the base of the proximal phalanx of the second digit cannot be excluded. Degenerative changes are seen at the midfoot and sclerosis and subchondral cyst formation. Impression       Osteomyelitis is not excluded based on this exam particularly at the base of the proximal phalanx of the second digit. If this is a clinical concern evaluation with MRI should be performed.      MRI Left Foot (7/6/2022)  Narrative   MRI OF THE LEFT FOOT       INDICATION: Osteomyelitis. TECHNIQUE: Multiplanar and multisequence MRI images of the left foot were obtained. FINDINGS: Images are degraded by motion artifact. The patient is status post resection of the second, and third metatarsal heads. Patient is status post resection of the fifth metatarsal tarsal head and distal shaft as well as the proximal fifth phalanx. There is abnormal marrow edema identified within the fourth metatarsal head as well as the proximal fourth phalanx. There is deformity of the proximal first phalanx which may reflect the sequelae of a remote fracture or prior surgery. There is moderate osteoarthrosis identified at the first metatarsophalangeal joint. There is some mild osteoarthrosis identified at the third, fourth and fifth tarsometatarsal joints. No osseous erosion is identified. There is subcutaneous edema identified about the foot. There is severe atrophy involving the deep musculature of the foot. Correlate for chronic denervation injury. No soft tissue abscess is identified. Impression   1. Limited exam secondary to motion artifact. 2. There is marrow edema identified within the fourth metatarsal head and proximal fourth phalanx. In the setting of infection or open wound in this region, this is compatible with osteomyelitis. 3. Status post amputations of the second, third, and fifth metatarsals as detailed above. 4. Healed fracture deformities identified involving the proximal first phalanx. There is osteoarthrosis identified at the first metatarsophalangeal joint. 5. There is subcutaneous edema identified about the foot. This can be seen with cellulitis and/or venous insufficiency. No soft tissue abscess is identified. 6. There is severe fatty atrophy involving the deep muscular foot. Correlate for underlying neuropathy. Arterial Duplex;  Left Lower Extremity  Type of Study:        Extremities Arteries:Lower Extremities Arterial Duplex, VL LOWER EXTREMITY    ARTERIES DUPLEX LEFT. Tech Comments   Right   The right ankle/brachial index is non-compressible (PT->255 mmHg; DP->255   mmHg). Left   The left ankle/brachial index is non-compressible at the PTA (>255 mmHg); the   HAM is 0.32 at the DPA (62 mmHg). There is normal, multiphasic flow identified in the common femoral artery,   suggesting no evidence of significant aorto-iliac inflow disease. There is atherosclerotic plaque involving the superficial femoral artery with   velocities consistent with <50% stenosis. The popliteal artery is occluded. The tibio-peroneal trunk is occluded. The distal anterior tibial artery is occluded and reconstitutes at the   dorsalis pedis artery. Limited visualization of the peroneal artery due to calcific shadowing,   however, flow is visualized in some segments. Limited visualization at the ankle due to bandages. Incidental finding of partially occluding, chronic phlebitic changes involving   the left popliteal vein, consistent with previous DVT. The previous outside study on 5/5/21 was limited. Unable to make accurate   comparison. Previous ABIs were non-compressible bilaterally. ASSESSMENT/PLAN  1. Cellulitis, left lower extremity- resolved  2. Osteomyelitis, left foot  3. Full-thickness ulceration, plantar left foot (Huston stage III)  4. Critical Limb Ischemia 2/2 Peripheral Arterial Disease, left lower extremity  5. Type 2 diabetes mellitus with peripheral neuropathy  6. History of multiple amputations    -Patient seen and examined at bedside this AM  -VSS, No leukocytosis noted (WBC 10.9)  -ESR 53 and CRP 10.4  -HbA1c 5.9  -Blood cultures 1 & 2  NGTD  -Imaging reviewed, impression noted above  -Wound culture: MRSA, diphtheroids  -Continue antibiotics per ID recommendations; vancomycin and cefepime  -Left lower extremity had wound packed with 1/4 iodoform packing then covered with DSD. -Prevalon boots reapplied.  Patient is to wear at all times while in bed to prevent further deep tissue injury. -Vascular consulted, angiogram done but could not fully resolve dissection. Patient underwent a left SFA to peroneal bypass yesterday. -Recommend patient be nonweightbearing to left lower extremity but weight bearing as tolerated to right lower extremity.   -Plan to take patient to the OR tomorrow for a left foot TMA with FIDENCIO pending OR availability.   -Will get consent later today   -NPO at midnight   -Will hold heparin gtt tomorrow in anticipation for OR    DISPO: Full-thickness ulceration with cellulitis (resolved) and osteomyelitis, left foot. Recommend patient continue on antibiotics per ID recommendations, vancomycin and cefepime. Plan to take patient to the OR tomorrow for a left foot TMA with FIDENCIO pending OR availability.      Patient assessment and plan was discussed with Dr. Vickie Duran DPM.    Jose Finch DPM  Podiatric Resident, PGY-2  Pager #: [phone] or Perfect Serve

## 2022-07-14 NOTE — PROGRESS NOTES
Schumacher cath removed, pt assisted to chair with walker, once pt sat down, blood pressure dropped on arterial line, pt had distant stare to left, would not respond. Staff assist called, heparin gtt stopped per MD, surgery notified. After 5 minutes pt alert and oriented x 4. VSS no further issues. Will monitor.

## 2022-07-14 NOTE — PROGRESS NOTES
PACU Transfer Note    Vitals:    07/13/22 2230   BP: 153/67   Pulse: 78   Resp: 11   Temp: 97.9 °F (36.6 °C)   SpO2: 100%       In: 150 [I.V.:150]  Out: 390 [Urine:240; Drains:150]    Pain assessment:  present - adequately treated, dozing at intervals when not disturbed  Pain Level: 4    Report given to Receiving unit Bartolo HUDSON by phone. Transferred in bed to ready room #4519 on monitor x 2 pacu RNs and in house transport. Belongings moved from PCU to room already. Partial plate taken to room as well. There is no family to update at this time.     7/13/2022 11:06 PM

## 2022-07-14 NOTE — CARE COORDINATION
Case management continues to follow for discharge planning. The chart was reviewed. Pt transferred to the ICU after a rapid response for hypotension and seizure. Pt to go to the or tomorrow for a TMA. IV ABX. ID following. Pt is from home and would prefer to return home with Lady Sheets. However, if PT/OT recommend SNF, he would like to go to [facility]. Buck pre-cert.     Electronically signed by JARRETT Burgos RN-[facility]  Case Management  [phone]

## 2022-07-14 NOTE — PROGRESS NOTES
Pre-Operative Note  Resident Note     Patient: Juma Mahmood     Procedure: Left Foot transmetatarsal amputation with tendo-achilles lengthening     Consent: In chart     Labs:      Recent Labs     07/14/22  0042 07/14/22  1023   WBC 10.9 9.9   HGB 9.4* 8.7*   HCT 28.2* 26.4*   .1* 101.8*   * 142        Recent Labs     07/14/22  0042 07/14/22  1023    133*   K 5.1 5.3*   * 106   CO2 19* 21   PHOS 3.2 3.2   BUN 33* 33*   CREATININE 1.8* 2.1*      No results for input(s): AST, ALT, ALB, BILIDIR, BILITOT, ALKPHOS in the last 72 hours. No results for input(s): LIPASE, AMYLASE in the last 72 hours. Recent Labs     07/13/22  0456   INR 1.14      No results for input(s): CKTOTAL, CKMB, CKMBINDEX, TROPONINI in the last 72 hours.     Saline lock/IV access: Yes    Clearance for surgery: Yes per medicine     Diet: NPO after midnight    CXR: No acute cardiopulmomary findings    EKG:  See Cardio section of EMR       Echocardiogram:  See Cardio section of EMR    PT/INR: 14.5/1.14    Abx: Vancomycin and Cefepime    Type and Screen: B Negative/Negative    Pregnancy test: Not appropriate    Known risk factors for perioperative complications: Diabetes mellitus      Anesthesia to see patient    Kofi Rahman DPM  Podiatric Resident, PGY-2  Pager #: [phone] or Perfect Serve

## 2022-07-14 NOTE — PROGRESS NOTES
Physical Therapy/Occupational Therapy  HOLD  Will hold PT/OT today after discussion with RN earlier this AM.  Pt with rapid response after getting up to chair due to BP drop & possible seizure. Patient is also supposed to go to the OR tomorrow for a TMA. If does have surgery, will need new therapy orders afterward. Will follow up.   Ara Mcgowan, [address] [place] [address] OTR/L #[id number]

## 2022-07-14 NOTE — PLAN OF CARE
Problem: Discharge Planning  Goal: Discharge to home or other facility with appropriate resources    Outcome: Progressing

## 2022-07-14 NOTE — PROGRESS NOTES
Clinical Pharmacy Progress Note    Vancomycin - Management by Pharmacy    Consult Date(s):  7/5/22  Consulting Provider(s):  Dr. Amador Roach / Plan:    LLE Osteomyelitis with Cellulitis & Ulcer  - Vancomycin   Concurrent Antimicrobials:   o Cefepime - Day 10   Day of Vanc Therapy: Day 10   Current Dosing Method: Bayesian-Guided AUC dosing   Therapeutic Goal: 400-600 mg/L*hr   Current Dose / Frequency: 750 mg IV Q24h    Plan / Rationale:  o Pt with h/o CKD - baseline SCr ~ 1.6 mg/dL. Creatinine down to 1.8 this AM from 2.1 yesterday. Stable. o Calculated AUC(ss) on current regimen approx. 435 mg/L*hr with an associated trough ~ 14.5 mg/Lmg/L*h.  o Will continue current dosing for the time being. Plan to recheck level in 1-2 days hence, or sooner if clinically warranted.  Will continue to monitor clinical condition and make adjustments to regimen as appropriate. Thank you for allowing us to participate in the care of this patient. Please reach out with any questions. Lakeshia Moran, QuentinD, BCPS  7/14/2022  Wireless: [phone]      Interval update: s/p L superficial femoral to peroneal artery bypass with reversed saphenous vein graft yesterday afternoon. Plan to take to OR tomorrow for L foot TMA with FIDENCIO pending OR availability. Subjective/Objective:   Ronnie Sol is a 81 y.o. y/o male with a PMHx that includes DM, CKD (baseline SCr ~ 1.6), gout, hypothyroidism, and seasonal allergies who is admitted with LLE osteomyelitis with cellulitis and ulcer. Pharmacy is consulted to dose Vancomycin.     Ht Readings from Last 1 Encounters:   07/05/22 6' 2\" (1.88 m)     Wt Readings from Last 1 Encounters:   07/14/22 178 lb 5.6 oz (80.9 kg)     Current and Prior Antimicrobials:  Cefepime (7/5 - Present)  Vancomycin - Pharmacy to dose    (7/5-7/8)   750 mg IV Q24h (7/8 - Present)    Vanc Level(s) / Doses:  Date Time Level / Type of Level Interpretation / Dose   7/5 22:39  · 1500mg IV x1 at 22:39  · Intermittent dosing   7/6 04:34 Random = 13.8 mcg/mL · Intermittent dosing  · 1000mg IV x1 at 14:46   7/7 04:58 Random = 14.6 mcg/mL · Intermittent dosing  · Order 750mg IV x1    7/8 04:55 Random = 15.5 mcg/mL · Will begin scheduled dosing of 750mg IV q24h. · Predicted  mg/L*h.     7/10 06:39 Random = 14 mcg/mL · Drawn ~22 hrs after previous dose  · Calculated AUC = 432 mg/L*h  · Continue 750mg IV q24h   7/13 04:56 Random = 17.1 mcg/mL · Drawn ~16h after previous dose  · Calculated AUC =475 mg/L*h with trough of ~16.3 mg/L  · Continue current dose    Note: Serum levels collected for AUC-based dosing may be high if collected in close proximity to the dose administered. This is not necessarily an indicator of toxicity. Recent Labs     07/12/22  0902 07/13/22  0456 07/14/22  0042   CREATININE 2.1* 2.1* 1.8*   BUN 35* 38* 33*   WBC 8.8 8.5 10.9     Estimated Creatinine Clearance: 34 mL/min (A) (based on SCr of 1.8 mg/dL (H)).     Cultures & Sensitivities:  Date Site Micro Susceptibility / Result   7/5 Toe wound Mixed skin cash    MRSA No further w/u    S=Vanc (BILLY=1), TCN.    R = Erythro, Bactrim   7/5 Foot wound Mixed skin cash    MRSA No further w/u    Same as above   7/5 Blood x2 No growth to date

## 2022-07-14 NOTE — FLOWSHEET NOTE
Page placed to Dr. Alvarez May to inform of HTN, to notify of arterial line not correlating with cuff pressures. Okay with systolic to 708 per arterial line. Made aware of continued oozing at left leg JOHN site. Clarified that heparin gtt not to restart until 0030 7/14/2022. Able to doppler left DP 2nd assessment.

## 2022-07-14 NOTE — PROGRESS NOTES
Pt was bladder scanned for 270 ml or urine, pt is attempting to use urinal, will continue to monitor.

## 2022-07-14 NOTE — ANESTHESIA POSTPROCEDURE EVALUATION
Department of Anesthesiology  Postprocedure Note    Patient: Es Guaman  MRN: [id number]  YOB: 1935  Date of evaluation: 7/13/2022      Procedure Summary     Date: 07/13/22 Room / Location: [facility]    Anesthesia Start: 1329 Anesthesia Stop:     Procedure: LEFT SUPERFICIAL FEMORAL ARTERY TO PERONEAL ARTERY BYPASS WITH REVERSED GREAT SAPHENOUS VEIN GRAFT (Left ) Diagnosis:       PAD (peripheral artery disease) ([facility])      (LLE PAD (peripheral artery disease) ([facility] [address] [I73.9])    Surgeons: Duc Coleman MD Responsible Provider: Tiffanie Barrientos MD    Anesthesia Type: general ASA Status: 3          Anesthesia Type: No value filed.     Hui Phase I: Hui Score: 9    Hui Phase II:        Anesthesia Post Evaluation    Patient location during evaluation: PACU  Patient participation: complete - patient participated  Level of consciousness: awake and alert  Airway patency: patent  Nausea & Vomiting: no nausea and no vomiting  Complications: no  Cardiovascular status: hemodynamically stable  Respiratory status: acceptable  Hydration status: euvolemic  Multimodal analgesia pain management approach

## 2022-07-15 ENCOUNTER — ANESTHESIA (OUTPATIENT)
Dept: OPERATING ROOM | Age: 87
DRG: 617 | End: 2022-07-15
Payer: MEDICARE

## 2022-07-15 ENCOUNTER — APPOINTMENT (OUTPATIENT)
Dept: GENERAL RADIOLOGY | Age: 87
DRG: 617 | End: 2022-07-15
Payer: MEDICARE

## 2022-07-15 ENCOUNTER — ANESTHESIA EVENT (OUTPATIENT)
Dept: OPERATING ROOM | Age: 87
DRG: 617 | End: 2022-07-15
Payer: MEDICARE

## 2022-07-15 LAB
ABO/RH: NORMAL
ALBUMIN SERPL-MCNC: 3 G/DL (ref 3.4–5)
ANION GAP SERPL CALCULATED.3IONS-SCNC: 7 MMOL/L (ref 3–16)
ANTIBODY SCREEN: NORMAL
BASOPHILS ABSOLUTE: 0 K/UL (ref 0–0.2)
BASOPHILS RELATIVE PERCENT: 0.5 %
BUN BLDV-MCNC: 46 MG/DL (ref 7–20)
CALCIUM SERPL-MCNC: 8.3 MG/DL (ref 8.3–10.6)
CHLORIDE BLD-SCNC: 106 MMOL/L (ref 99–110)
CO2: 22 MMOL/L (ref 21–32)
CREAT SERPL-MCNC: 3.2 MG/DL (ref 0.8–1.3)
EOSINOPHILS ABSOLUTE: 0.1 K/UL (ref 0–0.6)
EOSINOPHILS RELATIVE PERCENT: 1.1 %
GFR AFRICAN AMERICAN: 22
GFR NON-AFRICAN AMERICAN: 19
GLUCOSE BLD-MCNC: 111 MG/DL (ref 70–99)
GLUCOSE BLD-MCNC: 114 MG/DL (ref 70–99)
GLUCOSE BLD-MCNC: 140 MG/DL (ref 70–99)
GLUCOSE BLD-MCNC: 145 MG/DL (ref 70–99)
GLUCOSE BLD-MCNC: 150 MG/DL (ref 70–99)
HCT VFR BLD CALC: 18.2 % (ref 40.5–52.5)
HCT VFR BLD CALC: 21.1 % (ref 40.5–52.5)
HCT VFR BLD CALC: 23.5 % (ref 40.5–52.5)
HEMOGLOBIN: 6.1 G/DL (ref 13.5–17.5)
HEMOGLOBIN: 7.3 G/DL (ref 13.5–17.5)
HEMOGLOBIN: 8 G/DL (ref 13.5–17.5)
LYMPHOCYTES ABSOLUTE: 1.1 K/UL (ref 1–5.1)
LYMPHOCYTES RELATIVE PERCENT: 15.1 %
MAGNESIUM: 2.5 MG/DL (ref 1.8–2.4)
MCH RBC QN AUTO: 34.2 PG (ref 26–34)
MCHC RBC AUTO-ENTMCNC: 34.4 G/DL (ref 31–36)
MCV RBC AUTO: 99.4 FL (ref 80–100)
MONOCYTES ABSOLUTE: 1.2 K/UL (ref 0–1.3)
MONOCYTES RELATIVE PERCENT: 16.3 %
NEUTROPHILS ABSOLUTE: 5.1 K/UL (ref 1.7–7.7)
NEUTROPHILS RELATIVE PERCENT: 67 %
PDW BLD-RTO: 12.8 % (ref 12.4–15.4)
PERFORMED ON: ABNORMAL
PHOSPHORUS: 3.5 MG/DL (ref 2.5–4.9)
PLATELET # BLD: 94 K/UL (ref 135–450)
PMV BLD AUTO: 8.5 FL (ref 5–10.5)
POTASSIUM SERPL-SCNC: 5 MMOL/L (ref 3.5–5.1)
RBC # BLD: 2.13 M/UL (ref 4.2–5.9)
SODIUM BLD-SCNC: 135 MMOL/L (ref 136–145)
VANCOMYCIN RANDOM: 16.7 UG/ML
WBC # BLD: 7.5 K/UL (ref 4–11)

## 2022-07-15 PROCEDURE — 6370000000 HC RX 637 (ALT 250 FOR IP): Performed by: STUDENT IN AN ORGANIZED HEALTH CARE EDUCATION/TRAINING PROGRAM

## 2022-07-15 PROCEDURE — 0Y6N0ZF DETACHMENT AT LEFT FOOT, PARTIAL 5TH RAY, OPEN APPROACH: ICD-10-PCS | Performed by: PODIATRIST

## 2022-07-15 PROCEDURE — 2500000003 HC RX 250 WO HCPCS: Performed by: NURSE ANESTHETIST, CERTIFIED REGISTERED

## 2022-07-15 PROCEDURE — 3700000000 HC ANESTHESIA ATTENDED CARE: Performed by: PODIATRIST

## 2022-07-15 PROCEDURE — 7100000000 HC PACU RECOVERY - FIRST 15 MIN: Performed by: PODIATRIST

## 2022-07-15 PROCEDURE — 73630 X-RAY EXAM OF FOOT: CPT

## 2022-07-15 PROCEDURE — 0Y6N0ZC DETACHMENT AT LEFT FOOT, PARTIAL 3RD RAY, OPEN APPROACH: ICD-10-PCS | Performed by: PODIATRIST

## 2022-07-15 PROCEDURE — 6360000002 HC RX W HCPCS: Performed by: SURGERY

## 2022-07-15 PROCEDURE — 6360000002 HC RX W HCPCS: Performed by: NURSE ANESTHETIST, CERTIFIED REGISTERED

## 2022-07-15 PROCEDURE — 2709999900 HC NON-CHARGEABLE SUPPLY: Performed by: PODIATRIST

## 2022-07-15 PROCEDURE — 88311 DECALCIFY TISSUE: CPT

## 2022-07-15 PROCEDURE — 0Y6N0ZB DETACHMENT AT LEFT FOOT, PARTIAL 2ND RAY, OPEN APPROACH: ICD-10-PCS | Performed by: PODIATRIST

## 2022-07-15 PROCEDURE — 6360000002 HC RX W HCPCS: Performed by: PODIATRIST

## 2022-07-15 PROCEDURE — A4217 STERILE WATER/SALINE, 500 ML: HCPCS | Performed by: PODIATRIST

## 2022-07-15 PROCEDURE — 3600000014 HC SURGERY LEVEL 4 ADDTL 15MIN: Performed by: PODIATRIST

## 2022-07-15 PROCEDURE — 86900 BLOOD TYPING SEROLOGIC ABO: CPT

## 2022-07-15 PROCEDURE — 6360000002 HC RX W HCPCS: Performed by: STUDENT IN AN ORGANIZED HEALTH CARE EDUCATION/TRAINING PROGRAM

## 2022-07-15 PROCEDURE — P9041 ALBUMIN (HUMAN),5%, 50ML: HCPCS | Performed by: NURSE ANESTHETIST, CERTIFIED REGISTERED

## 2022-07-15 PROCEDURE — 85025 COMPLETE CBC W/AUTO DIFF WBC: CPT

## 2022-07-15 PROCEDURE — P9016 RBC LEUKOCYTES REDUCED: HCPCS

## 2022-07-15 PROCEDURE — 2580000003 HC RX 258: Performed by: STUDENT IN AN ORGANIZED HEALTH CARE EDUCATION/TRAINING PROGRAM

## 2022-07-15 PROCEDURE — 86850 RBC ANTIBODY SCREEN: CPT

## 2022-07-15 PROCEDURE — 83735 ASSAY OF MAGNESIUM: CPT

## 2022-07-15 PROCEDURE — 86923 COMPATIBILITY TEST ELECTRIC: CPT

## 2022-07-15 PROCEDURE — 36430 TRANSFUSION BLD/BLD COMPNT: CPT

## 2022-07-15 PROCEDURE — 1200000000 HC SEMI PRIVATE

## 2022-07-15 PROCEDURE — 2580000003 HC RX 258: Performed by: INTERNAL MEDICINE

## 2022-07-15 PROCEDURE — 36415 COLL VENOUS BLD VENIPUNCTURE: CPT

## 2022-07-15 PROCEDURE — 2580000003 HC RX 258: Performed by: SURGERY

## 2022-07-15 PROCEDURE — 80202 ASSAY OF VANCOMYCIN: CPT

## 2022-07-15 PROCEDURE — 88305 TISSUE EXAM BY PATHOLOGIST: CPT

## 2022-07-15 PROCEDURE — 85018 HEMOGLOBIN: CPT

## 2022-07-15 PROCEDURE — 99232 SBSQ HOSP IP/OBS MODERATE 35: CPT | Performed by: INTERNAL MEDICINE

## 2022-07-15 PROCEDURE — 3700000001 HC ADD 15 MINUTES (ANESTHESIA): Performed by: PODIATRIST

## 2022-07-15 PROCEDURE — 88307 TISSUE EXAM BY PATHOLOGIST: CPT

## 2022-07-15 PROCEDURE — 51702 INSERT TEMP BLADDER CATH: CPT

## 2022-07-15 PROCEDURE — 80069 RENAL FUNCTION PANEL: CPT

## 2022-07-15 PROCEDURE — 0Y6N0Z9 DETACHMENT AT LEFT FOOT, PARTIAL 1ST RAY, OPEN APPROACH: ICD-10-PCS | Performed by: PODIATRIST

## 2022-07-15 PROCEDURE — 3600000004 HC SURGERY LEVEL 4 BASE: Performed by: PODIATRIST

## 2022-07-15 PROCEDURE — 85014 HEMATOCRIT: CPT

## 2022-07-15 PROCEDURE — 7100000001 HC PACU RECOVERY - ADDTL 15 MIN: Performed by: PODIATRIST

## 2022-07-15 PROCEDURE — 2580000003 HC RX 258: Performed by: PODIATRIST

## 2022-07-15 PROCEDURE — 86901 BLOOD TYPING SEROLOGIC RH(D): CPT

## 2022-07-15 PROCEDURE — 0Y6N0ZD DETACHMENT AT LEFT FOOT, PARTIAL 4TH RAY, OPEN APPROACH: ICD-10-PCS | Performed by: PODIATRIST

## 2022-07-15 PROCEDURE — 2500000003 HC RX 250 WO HCPCS: Performed by: PODIATRIST

## 2022-07-15 PROCEDURE — 6370000000 HC RX 637 (ALT 250 FOR IP): Performed by: INTERNAL MEDICINE

## 2022-07-15 PROCEDURE — C1889 IMPLANT/INSERT DEVICE, NOC: HCPCS | Performed by: PODIATRIST

## 2022-07-15 PROCEDURE — 0L8P0ZZ DIVISION OF LEFT LOWER LEG TENDON, OPEN APPROACH: ICD-10-PCS | Performed by: PODIATRIST

## 2022-07-15 DEVICE — PATCH AMNION 2 LAYR PROTCT 4 X 4CM STERISHIELD II: Type: IMPLANTABLE DEVICE | Site: FOOT | Status: FUNCTIONAL

## 2022-07-15 RX ORDER — HEPARIN SODIUM 10000 [USP'U]/100ML
5-30 INJECTION, SOLUTION INTRAVENOUS CONTINUOUS
Status: DISPENSED | OUTPATIENT
Start: 2022-07-15 | End: 2022-07-16

## 2022-07-15 RX ORDER — ONDANSETRON 2 MG/ML
4 INJECTION INTRAMUSCULAR; INTRAVENOUS
Status: DISCONTINUED | OUTPATIENT
Start: 2022-07-15 | End: 2022-07-15 | Stop reason: HOSPADM

## 2022-07-15 RX ORDER — LINEZOLID 600 MG/1
600 TABLET, FILM COATED ORAL EVERY 12 HOURS SCHEDULED
Status: DISCONTINUED | OUTPATIENT
Start: 2022-07-15 | End: 2022-07-22 | Stop reason: HOSPADM

## 2022-07-15 RX ORDER — SODIUM CHLORIDE 9 MG/ML
INJECTION, SOLUTION INTRAVENOUS PRN
Status: DISCONTINUED | OUTPATIENT
Start: 2022-07-15 | End: 2022-07-17

## 2022-07-15 RX ORDER — SODIUM CHLORIDE 9 MG/ML
INJECTION, SOLUTION INTRAVENOUS CONTINUOUS
Status: DISCONTINUED | OUTPATIENT
Start: 2022-07-15 | End: 2022-07-16

## 2022-07-15 RX ORDER — MEPERIDINE HYDROCHLORIDE 25 MG/ML
12.5 INJECTION INTRAMUSCULAR; INTRAVENOUS; SUBCUTANEOUS AS NEEDED
Status: DISCONTINUED | OUTPATIENT
Start: 2022-07-15 | End: 2022-07-15 | Stop reason: HOSPADM

## 2022-07-15 RX ORDER — BUPIVACAINE HYDROCHLORIDE 5 MG/ML
INJECTION, SOLUTION EPIDURAL; INTRACAUDAL PRN
Status: DISCONTINUED | OUTPATIENT
Start: 2022-07-15 | End: 2022-07-15 | Stop reason: ALTCHOICE

## 2022-07-15 RX ORDER — FENTANYL CITRATE 50 UG/ML
INJECTION, SOLUTION INTRAMUSCULAR; INTRAVENOUS PRN
Status: DISCONTINUED | OUTPATIENT
Start: 2022-07-15 | End: 2022-07-15 | Stop reason: SDUPTHER

## 2022-07-15 RX ORDER — ALBUMIN, HUMAN INJ 5% 5 %
SOLUTION INTRAVENOUS PRN
Status: DISCONTINUED | OUTPATIENT
Start: 2022-07-15 | End: 2022-07-15 | Stop reason: SDUPTHER

## 2022-07-15 RX ORDER — DIPHENHYDRAMINE HYDROCHLORIDE 50 MG/ML
12.5 INJECTION INTRAMUSCULAR; INTRAVENOUS
Status: DISCONTINUED | OUTPATIENT
Start: 2022-07-15 | End: 2022-07-15 | Stop reason: HOSPADM

## 2022-07-15 RX ORDER — SODIUM CHLORIDE 0.9 % (FLUSH) 0.9 %
5-40 SYRINGE (ML) INJECTION EVERY 12 HOURS SCHEDULED
Status: DISCONTINUED | OUTPATIENT
Start: 2022-07-15 | End: 2022-07-15 | Stop reason: HOSPADM

## 2022-07-15 RX ORDER — PHENYLEPHRINE HYDROCHLORIDE 10 MG/ML
INJECTION INTRAVENOUS PRN
Status: DISCONTINUED | OUTPATIENT
Start: 2022-07-15 | End: 2022-07-15 | Stop reason: SDUPTHER

## 2022-07-15 RX ORDER — SODIUM CHLORIDE 9 MG/ML
INJECTION, SOLUTION INTRAVENOUS PRN
Status: DISCONTINUED | OUTPATIENT
Start: 2022-07-15 | End: 2022-07-15 | Stop reason: HOSPADM

## 2022-07-15 RX ORDER — LIDOCAINE HYDROCHLORIDE 20 MG/ML
INJECTION, SOLUTION EPIDURAL; INFILTRATION; INTRACAUDAL; PERINEURAL PRN
Status: DISCONTINUED | OUTPATIENT
Start: 2022-07-15 | End: 2022-07-15 | Stop reason: HOSPADM

## 2022-07-15 RX ORDER — SODIUM CHLORIDE 0.9 % (FLUSH) 0.9 %
5-40 SYRINGE (ML) INJECTION PRN
Status: DISCONTINUED | OUTPATIENT
Start: 2022-07-15 | End: 2022-07-15 | Stop reason: HOSPADM

## 2022-07-15 RX ORDER — LIDOCAINE HYDROCHLORIDE 20 MG/ML
INJECTION, SOLUTION EPIDURAL; INFILTRATION; INTRACAUDAL; PERINEURAL PRN
Status: DISCONTINUED | OUTPATIENT
Start: 2022-07-15 | End: 2022-07-15 | Stop reason: SDUPTHER

## 2022-07-15 RX ORDER — ONDANSETRON 2 MG/ML
INJECTION INTRAMUSCULAR; INTRAVENOUS PRN
Status: DISCONTINUED | OUTPATIENT
Start: 2022-07-15 | End: 2022-07-15 | Stop reason: SDUPTHER

## 2022-07-15 RX ORDER — EPHEDRINE SULFATE 50 MG/ML
INJECTION INTRAVENOUS PRN
Status: DISCONTINUED | OUTPATIENT
Start: 2022-07-15 | End: 2022-07-15 | Stop reason: SDUPTHER

## 2022-07-15 RX ORDER — PROCHLORPERAZINE EDISYLATE 5 MG/ML
5 INJECTION INTRAMUSCULAR; INTRAVENOUS
Status: DISCONTINUED | OUTPATIENT
Start: 2022-07-15 | End: 2022-07-15 | Stop reason: HOSPADM

## 2022-07-15 RX ORDER — PROPOFOL 10 MG/ML
INJECTION, EMULSION INTRAVENOUS PRN
Status: DISCONTINUED | OUTPATIENT
Start: 2022-07-15 | End: 2022-07-15 | Stop reason: SDUPTHER

## 2022-07-15 RX ORDER — HYDRALAZINE HYDROCHLORIDE 20 MG/ML
10 INJECTION INTRAMUSCULAR; INTRAVENOUS
Status: DISCONTINUED | OUTPATIENT
Start: 2022-07-15 | End: 2022-07-15 | Stop reason: HOSPADM

## 2022-07-15 RX ADMIN — ATORVASTATIN CALCIUM 40 MG: 40 TABLET, FILM COATED ORAL at 21:09

## 2022-07-15 RX ADMIN — INSULIN LISPRO 1 UNITS: 100 INJECTION, SOLUTION INTRAVENOUS; SUBCUTANEOUS at 13:50

## 2022-07-15 RX ADMIN — PROPOFOL 150 MG: 10 INJECTION, EMULSION INTRAVENOUS at 07:57

## 2022-07-15 RX ADMIN — SODIUM CHLORIDE, PRESERVATIVE FREE 10 ML: 5 INJECTION INTRAVENOUS at 21:09

## 2022-07-15 RX ADMIN — FENTANYL CITRATE 50 MCG: 50 INJECTION, SOLUTION INTRAMUSCULAR; INTRAVENOUS at 07:54

## 2022-07-15 RX ADMIN — LINEZOLID 600 MG: 600 TABLET, FILM COATED ORAL at 21:09

## 2022-07-15 RX ADMIN — GABAPENTIN 300 MG: 300 CAPSULE ORAL at 21:09

## 2022-07-15 RX ADMIN — ASPIRIN 81 MG: 81 TABLET, COATED ORAL at 12:14

## 2022-07-15 RX ADMIN — CEFEPIME HYDROCHLORIDE 1000 MG: 1 INJECTION, POWDER, FOR SOLUTION INTRAMUSCULAR; INTRAVENOUS at 00:34

## 2022-07-15 RX ADMIN — TAMSULOSIN HYDROCHLORIDE 0.4 MG: 0.4 CAPSULE ORAL at 21:09

## 2022-07-15 RX ADMIN — EPHEDRINE SULFATE 5 MG: 50 INJECTION INTRAVENOUS at 08:16

## 2022-07-15 RX ADMIN — EPHEDRINE SULFATE 10 MG: 50 INJECTION INTRAVENOUS at 08:21

## 2022-07-15 RX ADMIN — SODIUM CHLORIDE, PRESERVATIVE FREE 10 ML: 5 INJECTION INTRAVENOUS at 12:29

## 2022-07-15 RX ADMIN — LIDOCAINE HYDROCHLORIDE 50 MG: 20 INJECTION, SOLUTION EPIDURAL; INFILTRATION; INTRACAUDAL; PERINEURAL at 07:56

## 2022-07-15 RX ADMIN — MORPHINE SULFATE 2 MG: 2 INJECTION, SOLUTION INTRAMUSCULAR; INTRAVENOUS at 21:28

## 2022-07-15 RX ADMIN — ALBUMIN (HUMAN) 12.5 G: 12.5 INJECTION, SOLUTION INTRAVENOUS at 08:35

## 2022-07-15 RX ADMIN — EPHEDRINE SULFATE 5 MG: 50 INJECTION INTRAVENOUS at 08:14

## 2022-07-15 RX ADMIN — PHENYLEPHRINE HYDROCHLORIDE 100 MCG: 10 INJECTION INTRAVENOUS at 08:14

## 2022-07-15 RX ADMIN — CEFEPIME HYDROCHLORIDE 1000 MG: 1 INJECTION, POWDER, FOR SOLUTION INTRAMUSCULAR; INTRAVENOUS at 12:28

## 2022-07-15 RX ADMIN — METHOCARBAMOL 750 MG: 750 TABLET ORAL at 21:28

## 2022-07-15 RX ADMIN — INSULIN LISPRO 1 UNITS: 100 INJECTION, SOLUTION INTRAVENOUS; SUBCUTANEOUS at 21:31

## 2022-07-15 RX ADMIN — EPHEDRINE SULFATE 10 MG: 50 INJECTION INTRAVENOUS at 08:28

## 2022-07-15 RX ADMIN — PHENYLEPHRINE HYDROCHLORIDE 50 MCG: 10 INJECTION INTRAVENOUS at 08:00

## 2022-07-15 RX ADMIN — INSULIN LISPRO 1 UNITS: 100 INJECTION, SOLUTION INTRAVENOUS; SUBCUTANEOUS at 18:46

## 2022-07-15 RX ADMIN — PHENYLEPHRINE HYDROCHLORIDE 100 MCG: 10 INJECTION INTRAVENOUS at 08:06

## 2022-07-15 RX ADMIN — SODIUM CHLORIDE: 9 INJECTION, SOLUTION INTRAVENOUS at 12:13

## 2022-07-15 RX ADMIN — SODIUM CHLORIDE, PRESERVATIVE FREE 10 ML: 5 INJECTION INTRAVENOUS at 12:14

## 2022-07-15 RX ADMIN — ALBUMIN (HUMAN) 12.5 G: 12.5 INJECTION, SOLUTION INTRAVENOUS at 08:26

## 2022-07-15 RX ADMIN — CETIRIZINE HYDROCHLORIDE 5 MG: 10 TABLET, FILM COATED ORAL at 12:14

## 2022-07-15 RX ADMIN — ONDANSETRON 4 MG: 2 INJECTION INTRAMUSCULAR; INTRAVENOUS at 08:02

## 2022-07-15 ASSESSMENT — PAIN DESCRIPTION - LOCATION: LOCATION: LEG

## 2022-07-15 ASSESSMENT — PAIN DESCRIPTION - ORIENTATION: ORIENTATION: LEFT

## 2022-07-15 ASSESSMENT — PAIN SCALES - GENERAL
PAINLEVEL_OUTOF10: 0
PAINLEVEL_OUTOF10: 7
PAINLEVEL_OUTOF10: 0
PAINLEVEL_OUTOF10: 0

## 2022-07-15 NOTE — PROGRESS NOTES
ID Follow-up NOTE    CC:   L foot wound infection/extension osteomyelitis    Antibiotics: Vancomycin + Cefepime     Admit Date: 7/5/2022  Hospital Day: 11    Subjective:     S/p L TMA  Returned to room from PACU, still lethargic    Objective:     Patient Vitals for the past 8 hrs:   BP Temp Temp src Pulse Resp SpO2   07/15/22 1110 (!) 118/48 98.8 °F (37.1 °C) Oral 69 24 98 %   07/15/22 1045 120/60 98.2 °F (36.8 °C) Temporal 73 17 93 %   07/15/22 1030 112/63 -- -- 77 18 --   07/15/22 1015 (!) 126/58 -- -- 80 15 --   07/15/22 1000 (!) 117/49 -- -- 74 13 98 %   07/15/22 0945 (!) 112/57 -- -- 75 15 100 %   07/15/22 0944 -- -- -- 68 14 --   07/15/22 0943 119/62 (!) 96.5 °F (35.8 °C) Temporal 88 12 92 %   07/15/22 0726 (!) 130/52 98.6 °F (37 °C) Oral 90 16 97 %   07/15/22 0551 -- -- -- 66 13 --       I/O last 3 completed shifts: In: 790 [P.O.:300; I.V.:240; IV Piggyback:250]  Out: 1400 [Urine:1215; Drains:185]  I/O this shift:  In: 1462 [P.O.:240; I.V.:915; IV Piggyback:500]  Out: 660 [Urine:400; Drains:10; Blood:250]    EXAM:  GENERAL: No apparent distress.     HEENT: Membranes moist, no oral lesion  NECK:  Supple, no lymphadenopathy  LUNGS: Clear b/l, no rales, no dullness  CARDIAC:  no murmur appreciated  ABD:  + BS, soft / NT  EXT:  L foot with dressing / ACE from OR  NEURO: No focal neurologic findings  PSYCH: Orientation, sensorium, mood normal  LINES:  Peripheral iv       Data Review:  Lab Results   Component Value Date    WBC 7.5 07/15/2022    HGB 7.3 (L) 07/15/2022    HCT 21.1 (L) 07/15/2022    MCV 99.4 07/15/2022    PLT 94 (L) 07/15/2022     Lab Results   Component Value Date    CREATININE 3.2 (H) 07/15/2022    BUN 46 (H) 07/15/2022     (L) 07/15/2022    K 5.0 07/15/2022     07/15/2022    CO2 22 07/15/2022       Hepatic Function Panel:   Lab Results   Component Value Date/Time    ALKPHOS 59 09/27/2021 05:38 PM    ALT 11 09/27/2021 05:38 PM    AST 19 09/27/2021 05:38 PM    PROT 7.5 09/27/2021 05:38 PM PROT 6.7 03/14/2013 09:30 AM    BILITOT 0.3 09/27/2021 05:38 PM    LABALBU 3.0 07/15/2022 05:30 AM       MICRO:  L foot Wound Culture: (7/5)   Gram stain: 2+ WBC, 3+ GPC  in chains and pairs, 1+ Small GPR   Organism: Staph aureus MRSA  Staph aureus mrsa (1)  Antibiotic Interpretation Microscan     ceFAZolin Resistant 16 mcg/mL   clindamycin Sensitive <=0.5 mcg/mL   erythromycin Resistant >4 mcg/mL   oxacillin Resistant >2 mcg/mL   tetracycline Sensitive <=4 mcg/mL   trimethoprim-sulfamethoxazole Resistant >2/38 mcg/mL   vancomycin Sensitive 1 mcg/mL      IMAGING:  Xray, L foot (7/5)  Osteomyelitis is not excluded based on this exam particularly at the base of the proximal phalanx of the second digit. If this is a clinical concern evaluation with MRI should be performed. MRI, L foot wo contrast (7/5)  1. Limited exam secondary to motion artifact. 2. There is marrow edema identified within the fourth metatarsal head and proximal fourth phalanx. In the setting of infection or open wound in this region, this is compatible with osteomyelitis. 3. Status post amputations of the second, third, and fifth metatarsals as detailed above. 4. Healed fracture deformities identified involving the proximal first phalanx. There is osteoarthrosis identified at the first metatarsophalangeal joint. 5. There is subcutaneous edema identified about the foot. This can be seen with cellulitis and/or venous insufficiency. No soft tissue abscess is identified. 6. There is severe fatty atrophy involving the deep muscular foot. Correlate for underlying neuropathy. VL LOWER EXTREMITY ARTERIES DUPLEX LEFT. (07/07)   -Nondiagnostic bilateral ABIs due to vessel noncompressibility.    -Left popliteal & tibioperoneal artery occlusion with associated tibial occlusive disease.    -Chronic phlebitic changes involving the left popliteal vein, consistent with previous DVT.      Transthoracic Echocardiogram (TTE) (7/12)   Summary Left ventricular cavity size is normal. There is moderately increased left  ventricular wall thickness. Ejection fraction is visually estimated to be55-60%. No regional wall motion abnormalities are noted. Grade II diastolic dysfunction with elevated LV filling pressures. Mitral annular calcification is present. Thickening of leaflets of mitral valve. Mild to moderate mitral regurgitation. The mitral valve pressure half-time is calculated at 1.47 cm2. The aortic valve is thickened/calcified with decreased leaflet mobility consistent with aortic stenosis. Moderate aortic stenosis with a peak velocity of 3.08 m/s and a mean pressure gradient of 20 mmHg. There is moderate aortic insufficiency noted. The right atrium is mildly dilated. The aortic valve is thickened/calcified with decreased leaflet mobility consistent with aortic stenosis. Moderate aortic stenosis with a peak velocity of 3.08 m/s and a mean pressure gradient of 20 mmHg. There is moderate aortic insufficiency noted. IVC size is normal (<2.1 cm) but collapses < 50% with respiration consistent with elevated RA pressure (8 mmHg). Estimated pulmonary artery systolic pressure is mildly elevated at 43 mmHg assuming a right atrial pressure of 8 mmHg.        Scheduled Meds:   vancomycin (VANCOCIN) intermittent dosing (placeholder)   Other See Admin Instructions    vancomycin  750 mg IntraVENous Once    [Held by provider] carvedilol  12.5 mg Oral BID WC    [Held by provider] hydrALAZINE  50 mg Oral 3 times per day    cefepime  1,000 mg IntraVENous Q12H    sodium chloride flush  5-40 mL IntraVENous 2 times per day    aspirin  81 mg Oral Daily    [Held by provider] NIFEdipine  60 mg Oral Daily    atorvastatin  40 mg Oral Nightly    gabapentin  300 mg Oral Nightly    insulin lispro  0-6 Units SubCUTAneous TID WC    insulin lispro  0-3 Units SubCUTAneous Nightly    cetirizine  5 mg Oral Daily    tamsulosin  0.4 mg Oral Nightly    sodium chloride flush 5-40 mL IntraVENous 2 times per day       Continuous Infusions:   heparin (PORCINE) Infusion      sodium chloride 100 mL/hr at 07/15/22 1213    sodium chloride Stopped (07/12/22 0100)    dextrose      sodium chloride 25 mL/hr at 07/15/22 0743       PRN Meds:  HYDROmorphone **OR** HYDROmorphone, sodium chloride flush, sodium chloride, cloNIDine, labetalol, heparin (porcine), heparin (porcine), traMADol, hydrALAZINE, methocarbamol, glucose, dextrose bolus **OR** dextrose bolus, glucagon (rDNA), dextrose, sodium chloride flush, sodium chloride, ondansetron **OR** ondansetron, polyethylene glycol, acetaminophen **OR** acetaminophen, morphine      Assessment:     80 yo CKD, hypothyroid, gout  Hx L foot toe amp 3/2020  L foot plantar wound, followed by Podiatry, Dr Milena Wong     Referred to [facility] for admission 7/5   Afeb, WBC 10.4, Cr 2.3  X-ray - 'possible osteomyelitis at the base of the proximal phalanx of the second digit '  MRI 4th MT head and prox phalanx edema  Wound probes to bone per Podiatry note. Cult - mixed skin cash, Diphtheroids, MRSA     7/11 Angio - unable to revascularize endovascularly  7/13 L SFA to peroneal a bypase with saphenous vein graft    7/15 L TMA, 'felt to be definitive'     IMP/  L foot wound infection / extension osteomyelitis  - Cult gram positive organism including MRSA  - L TMA 7/15     PVD - angio 7/11, bypass 7/13    Plan:     Change to Linezolid 600 mg po bid     Home on Linezolid 600 mg po bid x 10 days   Postop care and follow-up per Podiatry / Vascular   No ID f/u necessary    Medical Decision Making:   The following items were considered in medical decision making:  Discussion of patient care with other providers  Reviewed clinical lab tests  Reviewed radiology tests  Reviewed other diagnostic tests/interventions  Independent review of radiologic images - reviewed MRI w Radiologist 7/8  Microbiology cultures and other micro tests reviewed       Discussed with pt, RN  Bryce Balbuena, MD

## 2022-07-15 NOTE — PROGRESS NOTES
Physical/ Occupational Therapy Sign Off     Orders received, chart reviewed. Pt currently in OR for Left Foot transmetatarsal amputation with tendo-achilles lengthening. Will sign off due to change in medical status and await new orders post op. Lon Hill, PT, DPT  VIDAL Egan, [address] [place] [address]

## 2022-07-15 NOTE — PLAN OF CARE
Problem: Skin/Tissue Integrity - Adult  Goal: Incisions, wounds, or drain sites healing without S/S of infection  Outcome: Progressing  Flowsheets  Taken 7/14/2022 1705 by Tasha Baptiste RN  Incisions, Wounds, or Drain Sites Healing Without Sign and Symptoms of Infection:   ADMISSION and DAILY: Assess and document risk factors for pressure ulcer development   TWICE DAILY: Assess and document skin integrity   Implement wound care per orders   TWICE DAILY: Assess and document dressing/incision, wound bed, drain sites and surrounding tissue   Initiate isolation precautions as appropriate   Initiate pressure ulcer prevention bundle as indicated  Taken 7/14/2022 1037 by Winston Do RN  Incisions, Wounds, or Drain Sites Healing Without Sign and Symptoms of Infection: ADMISSION and DAILY: Assess and document risk factors for pressure ulcer development  Note: JOHN drain to L leg. Old drainage noted. Will continue monitor for bleeding and s/s infection. Problem: Safety - Adult  Goal: Free from fall injury  5/[id number]/[id number] by Aga Stanton RN  Outcome: Progressing  Flowsheets (Taken 7/14/2022 1705 by Tasha Baptiste RN)  Free From Fall Injury:   Instruct family/caregiver on patient safety   Based on caregiver fall risk screen, instruct family/caregiver to ask for assistance with transferring infant if caregiver noted to have fall risk factors  Note: Pt is a Fall Risk. See [place] Fall Risk Score. Pt bed in low position and side rails up. Call light and belongings in reach. Pt encouraged to call for assistance. Will continue with hourly rounds for PO intake, pain needs, toileting, and repositioning as needed.

## 2022-07-15 NOTE — PROGRESS NOTES
Pt has yet to void since turner catheter removal at 1040 AM. Bladder scanned 440ml . Surgical resident notified with order for straight cath. Straight cathed at this time with 325 ml urine. Will continue assessing for s/s urinary retention.     Electronically signed by Aga Stanton RN on 1/62/3720 at 12:21 AM

## 2022-07-15 NOTE — PROGRESS NOTES
Clinical Pharmacy Progress Note    Vancomycin - Management by Pharmacy    Consult Date(s):  7/5/22  Consulting Provider(s):  Dr. Dinesh Johnson / Plan:    LLE Osteomyelitis with Cellulitis & Ulcer  - Vancomycin  Concurrent Antimicrobials:   Cefepime - Day 11  Day of Vanc Therapy: Day 11  Current Dosing Method: Intermittent dosing by levels  Therapeutic Goal: ~ 15 mg/L  Current Dose / Frequency: 750 mg IV Q24h   Plan / Rationale:  Pt with h/o CKD - baseline SCr ~ 1.6 mg/dL. Creatinine down to 1.8 yesterday AM, but with notable rise to 2.1 late yesterday morning, and to 3.2 this morning. Will proceed cautiously with intermittent dosing in the interim given rise in creatinine. Random level this morning back at 16.7 mg/L. Plan to administer conservative dose of 750 mg IV x 1 early this afternoon. Random level scheduled for tomorrow morning to reassess. Will re-dose accordingly. Will continue to monitor clinical condition and make adjustments to regimen as appropriate. Thank you for allowing us to participate in the care of this patient. Please reach out with any questions. Janey Spring, QuentinD, BCPS  7/15/2022  Wireless: [phone]      Interval update: Rising creatinine into this morning with worsening UOP. Pt currently in OR for transmetatarsal amputation with tendo achilles lengthening (L foot). Subjective/Objective:   Marlen Moser is a 81 y.o. y/o male with a PMHx that includes DM, CKD (baseline SCr ~ 1.6), gout, hypothyroidism, and seasonal allergies who is admitted with LLE osteomyelitis with cellulitis and ulcer. Pharmacy is consulted to dose Vancomycin.     Ht Readings from Last 1 Encounters:   07/05/22 6' 2\" (1.88 m)     Wt Readings from Last 1 Encounters:   07/15/22 182 lb 15.7 oz (83 kg)     Current and Prior Antimicrobials:  Cefepime (7/5 - Present)  Vancomycin - Pharmacy to dose    (7/5-7/8)   750 mg IV Q24h (7/8 - Present)    Vanc Level(s) / Doses:  Date Time Level / Type of Level Interpretation / Dose   7/5 22:39  1500mg IV x1 at 22:39  Intermittent dosing   7/6 04:34 Random = 13.8 mcg/mL Intermittent dosing  1000mg IV x1 at 14:46   7/7 04:58 Random = 14.6 mcg/mL Intermittent dosing  Order 750mg IV x1    7/8 04:55 Random = 15.5 mcg/mL Will begin scheduled dosing of 750mg IV q24h. Predicted  mg/L*h.     7/10 06:39 Random = 14 mcg/mL Drawn ~22 hrs after previous dose  Calculated AUC = 432 mg/L*h  Continue 750mg IV q24h   7/13 04:56 Random = 17.1 mcg/mL Drawn ~16h after previous dose  Calculated AUC =475 mg/L*h with trough of ~16.3 mg/L  Continue current dose    7/15 05:30 Random = 16.7 mcg/mL Switching to intermittent dosing 2/2 rise in creatinine (See below). \"Once\" dose of 750 mg IV early this afternoon. Note: Serum levels collected for AUC-based dosing may be high if collected in close proximity to the dose administered. This is not necessarily an indicator of toxicity. Recent Labs     07/14/22  0042 07/14/22  1023 07/15/22  0530   CREATININE 1.8* 2.1* 3.2*   BUN 33* 33* 46*   WBC 10.9 9.9 7.5       Estimated Creatinine Clearance: 20 mL/min (A) (based on SCr of 3.2 mg/dL (H)). Cultures & Sensitivities:  Date Site Micro Susceptibility / Result   7/5 Toe wound Mixed skin cash    MRSA    Dermabacter sp.  No further w/u    S=Vanc (BILLY=1), TCN.    R = Erythro, Bactrim   7/5 Foot wound Mixed skin cash    MRSA No further w/u    Same as above   7/5 Blood x2 No growth to date

## 2022-07-15 NOTE — PROGRESS NOTES
Hospital Medicine  Progress Note    PCP: Jona Sebastian    Date of Admission: 7/5/2022      Chief Complaint: Worsening left foot pain and swelling      History Of Present Illness:    81 y.o. male with DM type 2, HTN and hypothyroidism, PAF (off pradaxa) and BPH who presented with worsening wound left foot. The wound started in the bottom of his left foot as a callus. Patient has been putting Vaseline and soaking the foot. When he saw his podiatrist (Dr. Emerald Lehman ) last week he was recommended to start using Betadine to the left foot. Patient has not following the wound care recommendations, has been putting Betadine on the wound and has been wearing a sock over it and continue to walk bearing weight on the left foot. Patient started noticing mild yellow drainage coming from the wound. Patient denies fever, chills, nausea, vomiting, chest pain or shortness of breath    On presentation,, apart from elevated BP, he had fairly normal vitals. He had no leucocytosis. Hgb was 12 with . Cr was 2.3 (baseline about 1.6). Interim HPI  No new complaints  Denies chest pain    Underwent a left superficial femoral to peroneal artery bypass yesterday afternoon. Patient denies any acute overnight events    He is sitting OOB    Was in OR today for TRANSMETATARSAL AMPUTATION WITH TENDO ACHILLES LENGTHENING LEFT FOOT    Estimated Blood Loss (mL): 250 mL            Medications: Reviewed        Allergies:  Patient has no known allergies. REVIEW OF SYSTEMS COMPLETED:   Pertinent positives as noted in the HPI. All other systems reviewed and negative. PHYSICAL EXAM PERFORMED:    BP (!) 120/45   Pulse 84   Temp 98.8 °F (37.1 °C) (Oral)   Resp 11   Ht 6' 2\" (1.88 m)   Wt 182 lb 15.7 oz (83 kg)   SpO2 100%   BMI 23.49 kg/m²     General appearance:  No apparent distress, appears stated age and cooperative. HEENT:  Normal cephalic, atraumatic without obvious deformity. .  Neck: Supple, with full range of motion. No jugular venous distention. Trachea midline. Respiratory:  Normal respiratory effort. Clear to auscultation, bilaterally without Rales/Wheezes/Rhonchi. Cardiovascular:  Regular rate and rhythm with normal S1/S2 without murmurs, rubs or gallops. Abdomen: Soft, non-tender, non-distended with normal bowel sounds. Musculoskeletal:  No clubbing, cyanosis or edema bilaterally. Full range of motion without deformity. Skin: Dressing over left foot: C/D/I  Neurologic:  grossly non-focal.  Psychiatric:  Alert and oriented, thought content appropriate, normal insight  Capillary Refill: Brisk,3 seconds, normal  Peripheral Pulses: DP and PT pulses-nonpalpable bilaterally, biphasic with Doppler per podiatry on the right, monophasic on the left. Labs:     Recent Labs     07/14/22  0042 07/14/22  1023 07/14/22  1926 07/15/22  0119 07/15/22  0530   WBC 10.9 9.9  --   --  7.5   HGB 9.4* 8.7* 9.3* 8.0* 7.3*   HCT 28.2* 26.4* 27.1* 23.5* 21.1*   * 142  --   --  94*       Recent Labs     07/14/22  0042 07/14/22  1023 07/15/22  0530    133* 135*   K 5.1 5.3* 5.0   * 106 106   CO2 19* 21 22   BUN 33* 33* 46*   CREATININE 1.8* 2.1* 3.2*   CALCIUM 8.4 8.3 8.3   PHOS 3.2 3.2 3.5       No results for input(s): AST, ALT, BILIDIR, BILITOT, ALKPHOS in the last 72 hours. Recent Labs     07/13/22  0456   INR 1.14       No results for input(s): Mariel Joseph in the last 72 hours.     Urinalysis:      Lab Results   Component Value Date/Time    NITRU Negative 07/10/2022 05:02 PM    WBCUA 0-2 07/10/2022 05:02 PM    BACTERIA Rare 07/10/2022 05:02 PM    RBCUA 3-4 07/10/2022 05:02 PM    BLOODU Negative 07/10/2022 05:02 PM    SPECGRAV 1.020 07/10/2022 05:02 PM    GLUCOSEU Negative 07/10/2022 05:02 PM    GLUCOSEU Negative 07/05/2011 09:10 PM       Radiology:     CXR: I have reviewed the CXR with the following interpretation: NA  EKG:  I have reviewed the EKG with the following interpretation: POORNIMA    XR FOOT LEFT (MIN 3 VIEWS)   Final Result   1. Transmetatarsal amputation with mild soft tissue swelling likely due to recent surgery. XR TIBIA FIBULA LEFT (2 VIEWS)   Final Result      Intraoperative fluoroscopy provided as above. See operative report for details. FLUORO FOR SURGICAL PROCEDURES   Final Result      Intraoperative fluoroscopy provided as above. See operative report for details. VL PRE OP VEIN MAPPING   Final Result      XR CHEST PORTABLE   Final Result      No acute cardiopulmonary findings. VL DUP LOWER EXTREMITY ARTERIES LEFT   Final Result      MRI FOOT LEFT WO CONTRAST   Final Result   1. Limited exam secondary to motion artifact. 2. There is marrow edema identified within the fourth metatarsal head and proximal fourth phalanx. In the setting of infection or open wound in this region, this is compatible with osteomyelitis. 3. Status post amputations of the second, third, and fifth metatarsals as detailed above. 4. Healed fracture deformities identified involving the proximal first phalanx. There is osteoarthrosis identified at the first metatarsophalangeal joint. 5. There is subcutaneous edema identified about the foot. This can be seen with cellulitis and/or venous insufficiency. No soft tissue abscess is identified. 6. There is severe fatty atrophy involving the deep muscular foot. Correlate for underlying neuropathy. XR FOOT LEFT (MIN 3 VIEWS)   Final Result      Osteomyelitis is not excluded based on this exam particularly at the base of the proximal phalanx of the second digit. If this is a clinical concern evaluation with MRI should be performed.       IR ANGIOGRAM EXTREMITY LEFT    (Results Pending)       Consults:    IP CONSULT TO PODIATRY  PHARMACY TO DOSE VANCOMYCIN  IP CONSULT TO HOSPITALIST  IP CONSULT TO PHARMACY  IP CONSULT TO INFECTIOUS DISEASES  IP CONSULT TO VASCULAR SURGERY  IP CONSULT TO NEPHROLOGY        ASSESSMENT/PLAN:  81 y.o. male with DM type 2, HTN and hypothyroidism, PAF (off pradaxa) and BPH who presented with worsening wound left foot. Cellulitis left lower extremity with full-thickness ulceration plantar surface with osteomyelitis. POA  - Possible diabetic ulcer. H/e, appears A1C is not elevated and Home med list does not have DM meds: verify home med list  - R/o sec to severe PAD   - Hx of Multiple amputations. - Sed rate was 53 and CRP10  - MRI shows osteo  - Cx sent: mixed skin cash, Diphtheroids, MRSA; follow results  - Started on broad-spectrum IV antibiotics: ID consulted. - S/p  TRANSMETATARSAL AMPUTATION WITH TENDO ACHILLES LENGTHENING LEFT FOOT 7/15/2022  - PT/OT eval      DM-2 complicated by peripheral neuropathy: POA  - Verify home meds for accuracy as does not seem to have diabetic meds  - A1C too does not appear high  - We will monitor BGs closely for 1-2 days  - SSI      Peripheral vascular disease: POA  - On Plavix  - Vascular studies ordered  - Seen by Vascular Surgery: s/p angiogram of the LLE. Planned L SFA-Peroneal bypass    Pre-op risk stratification  He underwent angioplasty in the LLE at [facility] in 2013  No known hx of CAD  Neg stress test 2003 and 2014  Echo 7/12: Normal EF, no RWMA, grade II DD  Currently appears euvolemic  Denies chest pain, or TINSLEY    May proceed to the OR for limb saving procedure, with known risk: (RCRI Class IV risk with 15 % 30 day risk of death, MI or cardiac arrest) with no further w/u at this time    - Will likely benefit from updated ischemia eval. Oscar as the presence of PAD likely implies disease in the coronaries as well.     - Continue BB and other cardiac meds as appropriate in the samreen-op period; but - avoid hypotension to prevent compromise of coronary circulation  - Keep on telemetry       Mental status changes 7/14/2022  - Episode of near syncope/syncopal episode  - Possiby orthostatic with hx of HTN, PAD, and new meds for BP control as well as recent vascular procedure with \" Estimated Blood Loss (mL): 300\"  - Hgb dropped this AM to 8.7, from 12 7/11/2022  - Fredda Cater is also possible, but no apparent hx of seizures  - Monitor orthostatics; recheck and monitor hgb more closely until stable  - Will type and screen blood and hold some scheduled BP meds for now  - Seizure precautions  - Neuro-checks  - If recurs, EEG and Neurology eval      Acute Blood loss anemia 7/14/2022  Post op/samreen-op blood loss from vascular surgery  \" Estimated Blood Loss (mL): 300\" + Estimated Blood Loss (mL): 250 mL at his 2 procedures  - Was also on heparin gtt  - Hgb dropped this AM to 7.3 this AM, from 12 7/11/2022  - Typed and screen blood and held some scheduled BP meds for - - - Will transfuse PRBCs: 2 units        HTN: POA  - Started on metoprolol: Switched to coreg, decrease dose with some winston noted  - Started hydralazine PO as BP was really high  - Added CCB as BP remains high: increase dose to 60 mg dly Nifedipine;- Holding lisonopril for now inessa with hyperkalemia  - BP improved prior to OR of 7/13. With hypotension and blood loss, hold more meds for now until BP improved. - PRN hydralazine IV      CKD stage 3: POA  Mild hyperkalemia: POA  - Baseline Cr 1.6  - Was 2.3 on admission suggesting some renal insufficiency on his CKD stage 3: MO ruled out  - Monitor cr: appears improving  - Continue gentle IV hydration  - Holding lisinopril  - Monitor Cr, K  - Follows with Dr. Edouard Reed: planned CO2 angio. Neprho consulted. Paroxysmal A. Fib: POA  - Not on rate control meds. - Appears was prev on Pradaxa. Appears was taken off after he wore a monitor and as \"couldn't afford it\" in 2014 .   - BB when able to tolerate  - TSH, Mg: WNL  - Keep on telemetry       Hypothyroidism: POA  - Updated TSH; WNL    Chronic Macrocytic anemia- POA  - Updated B12, Folate:  WNL      BPH: POA  - On  Flomax  - Monitor for retention: Bladder scans        Pxt tells me after discussing with his sons, he wants to be Full code.       DVT Prophylaxis: Lovenox     Diet: ADULT DIET; Regular; 4 carb choices (60 gm/meal)  Code Status: Full Code    PT/OT Eval Status: As tolerated with no weightbearing on the left foot      Disposition -GMF with telemetry  S/P Superficial femoral artery to peroneal artery bypass of LLE   And TMA left foot.  7/15/2022  PT/OT tala Krause MD

## 2022-07-15 NOTE — CARE COORDINATION
Patient is from home alone, prefers to return home with Lady Sheets if able. Will need PT/OT post-op for d/c recs. If pt needs SNF, would like to go to [facility]. Will need anthem pre-cert.       Carmen Christy, RN, BSN,   4th Floor Progressive Care Unit  [phone]

## 2022-07-15 NOTE — ANESTHESIA PRE PROCEDURE
Department of Anesthesiology  Preprocedure Note       Name:  Oh Garcia   Age:  81 y.o.  :  1935                                          MRN:  [id number]         Date:  7/15/2022      Surgeon: Nathanael Velázquez):  Jt Rodriguez DPM    Procedure: Procedure(s):  TRANSMETATARSAL AMPUTATION WITH TENDO ACHILLES LENGTHENING LEFT FOOT    Medications prior to admission:   Prior to Admission medications    Medication Sig Start Date End Date Taking? Authorizing Provider   diclofenac sodium (VOLTAREN) 1 % GEL Apply 2 g topically 4 times daily as needed for Pain (wrist pain)    Historical Provider, MD   ketoconazole (NIZORAL) 2 % cream Apply topically daily Apply topically daily.     Historical Provider, MD   terbinafine (LAMISIL) 250 MG tablet Take 250 mg by mouth daily    Historical Provider, MD   valACYclovir (VALTREX) 500 MG tablet Take 500 mg by mouth 2 times daily as needed    Historical Provider, MD   fexofenadine (ALLEGRA) 180 MG tablet Take 180 mg by mouth daily as needed    Historical Provider, MD   acetaminophen (TYLENOL) 325 MG tablet Take 650 mg by mouth every 6 hours as needed for Pain    Historical Provider, MD   vitamin D (CHOLECALCIFEROL) 25 MCG (1000 UT) TABS tablet Take 1,000 Units by mouth daily    Historical Provider, MD   Multiple Vitamins-Minerals (THERAPEUTIC MULTIVITAMIN-MINERALS) tablet Take 1 tablet by mouth daily    Historical Provider, MD   polyethylene glycol (MIRALAX) 17 g PACK packet Take 17 g by mouth daily as needed    Historical Provider, MD   clopidogrel (PLAVIX) 75 MG tablet Take 75 mg by mouth daily Take one tablet daily    Cate [place]   fluticasone (FLONASE) 50 MCG/ACT nasal spray 1 spray by Each Nostril route daily as needed for Rhinitis or Allergies     Bruno Zimmerman MD   lisinopril (PRINIVIL;ZESTRIL) 10 MG tablet Take 10 mg by mouth daily     Cate Rodriguez   tamsulosin (FLOMAX) 0.4 MG capsule Take 0.4 mg by mouth daily One tablet nightly    Historical Provider, MD   vitamin B-12 (CYANOCOBALAMIN) 1000 MCG tablet Take 1,000 mcg by mouth daily. Historical Provider, MD   aspirin 81 MG tablet Take 81 mg by mouth daily. Historical Provider, MD       Current medications:    No current facility-administered medications for this visit. No current outpatient medications on file.      Facility-Administered Medications Ordered in Other Visits   Medication Dose Route Frequency Provider Last Rate Last Admin    fentaNYL (SUBLIMAZE) injection   IntraVENous PRN Seth Kill, APRN - CRNA   50 mcg at 07/15/22 0754    lidocaine PF 2 % injection   IntraVENous PRN Seth Kill, APRN - CRNA   50 mg at 07/15/22 0756    ondansetron (ZOFRAN) injection   IntraVENous PRN Seth Kill, APRN - CRNA   4 mg at 07/15/22 0802    propofol injection   IntraVENous PRN Seth Kill, APRN - CRNA   150 mg at 07/15/22 0757    ePHEDrine injection   IntraVENous PRN Seth Kill, APRN - CRNA   10 mg at 07/15/22 0828    phenylephrine (VAZCULEP) injection   IntraVENous PRN Seth Kill, APRN - CRNA   100 mcg at 07/15/22 0814    albumin human 5 % IV solution   IntraVENous PRN Seth Kill, APRN - CRNA   12.5 g at 07/15/22 0835    vancomycin (VANCOCIN) intermittent dosing (placeholder)   Other RX Placeholder Louie Bauman MD        HYDROmorphone (DILAUDID) injection 0.25 mg  0.25 mg IntraVENous Q3H PRN Marylu Turcios MD        Or   Kishan Mcmullen HYDROmorphone (DILAUDID) injection 0.5 mg  0.5 mg IntraVENous Q3H PRN MD Kishan Sutton [facility] by provider] carvedilol (COREG) tablet 12.5 mg  12.5 mg Oral BID WC MD Kishan Sutton [facility] by provider] hydrALAZINE (APRESOLINE) tablet 50 mg  50 mg Oral 3 times per day Marylu Turcios MD        cefepime (MAXIPIME) 1000 mg IVPB minibag  1,000 mg IntraVENous Q12H Marylu Turcios MD   Stopped at 07/15/22 0506    sodium chloride flush 0.9 % injection 5-40 mL  5-40 mL IntraVENous 2 times per day Marylu Turcios MD   10 mL at 07/14/22 1946    sodium chloride flush 0.9 % injection 5-40 mL  5-40 mL IntraVENous BETHANY Lockhart MD        0.9 % sodium chloride infusion   IntraVENous BETHANY Lockhart MD   Stopped at 07/12/22 0100    cloNIDine (CATAPRES) tablet 0.1 mg  0.1 mg Oral Q2H BETHANY Lockhart MD   0.1 mg at 07/12/22 0429    labetalol (NORMODYNE;TRANDATE) injection 10 mg  10 mg IntraVENous Q2H BETHANY Lockhart MD        aspirin EC tablet 81 mg  81 mg Oral Daily Camelia Lockhart MD   81 mg at 07/14/22 0806    heparin (porcine) injection 4,000 Units  4,000 Units IntraVENous BETHANY Lockhart MD        heparin (porcine) injection 2,000 Units  2,000 Units IntraVENous BETHANY Lockhart MD   2,000 Units at 07/14/22 2029    [Held by provider] NIFEdipine (ADALAT CC) extended release tablet 60 mg  60 mg Oral Daily Camelia Lockhart MD   60 mg at 07/14/22 0806    atorvastatin (LIPITOR) tablet 40 mg  40 mg Oral Nightmichel Lockhart MD   40 mg at 07/14/22 1943    traMADol (ULTRAM) tablet 50 mg  50 mg Oral Q6H BETHANY Lockhart MD   50 mg at 07/14/22 0258    hydrALAZINE (APRESOLINE) injection 10 mg  10 mg IntraVENous Q4H BETHANY Lockhart MD   10 mg at 07/13/22 2024    methocarbamol (ROBAXIN) tablet 750 mg  750 mg Oral 4x Daily BETHANY Lockhart MD   750 mg at 07/12/22 0429    gabapentin (NEURONTIN) capsule 300 mg  300 mg Oral Sary Lockhart MD   300 mg at 07/14/22 1943    glucose chewable tablet 16 g  4 tablet Oral BETHANY Lockhart MD        dextrose bolus 10% 125 mL  125 mL IntraVENous BETHANY Lockhart MD        Or    dextrose bolus 10% 250 mL  250 mL IntraVENous BETHANY Lockhart MD        glucagon (rDNA) injection 1 mg  1 mg IntraMUSCular BETHANY Lockhart MD        dextrose 5 % solution  100 mL/hr IntraVENous BETHANY Lockhart MD        insulin lispro (1 Unit Dial) 0-6 Units  0-6 Units SubCUTAneous TID CADENCE Lockhart MD   1 Units at 07/14/22 1651    insulin lispro (1 Unit Dial) 0-3 Units  0-3 Units SubCUTAneous Nightly Camelia Lockhart MD   1 Units at 07/14/22 1943    cetirizine (ZYRTEC) tablet 5 mg  5 mg Oral Daily Kelin Gaines MD   5 mg at 07/14/22 0806    tamsulosin (FLOMAX) capsule 0.4 mg  0.4 mg Oral Nightly Kelin Gaines MD   0.4 mg at 07/14/22 1943    sodium chloride flush 0.9 % injection 5-40 mL  5-40 mL IntraVENous 2 times per day Kelin Gaines MD   10 mL at 07/14/22 1946    sodium chloride flush 0.9 % injection 5-40 mL  5-40 mL IntraVENous PRN Kelin Gaines MD        0.9 % sodium chloride infusion   IntraVENous PRN Kelin Gaines MD 25 mL/hr at 07/15/22 0743 NoRateChange at 07/15/22 0743    ondansetron (ZOFRAN-ODT) disintegrating tablet 4 mg  4 mg Oral Q8H PRN Kelin Gaines MD        Or    ondansetron [facility]) injection 4 mg  4 mg IntraVENous Q6H PRN Kelin Gaines MD        polyethylene glycol (GLYCOLAX) packet 17 g  17 g Oral Daily PRN Kelin Gaines MD        acetaminophen (TYLENOL) tablet 650 mg  650 mg Oral Q6H PRN Kelin Gaines MD        Or   Rodríguez acetaminophen (TYLENOL) suppository 650 mg  650 mg Rectal Q6H PRN Kelin Gaines MD        morphine (PF) injection 2 mg  2 mg IntraVENous Q3H PRN Kelin Gaines MD   2 mg at 07/09/22 6857       Allergies:  No Known Allergies    Problem List:    Patient Active Problem List   Diagnosis Code    Gout M10.9    Chronic right shoulder pain M25.511, G89.29    A-fib ([facility] I48.91    DM (diabetes mellitus) ([facility] E11.9    CKD (chronic kidney disease) stage 3, GFR 30-59 ml/min ([facility]) N18.30    Hypothyroidism E03.9    Syncope and collapse R55    Toe osteomyelitis, left ([facility] M86.9    Cat bite of hand, right, initial encounter S61.451A, W55. 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 Alina Woo DM (diabetes mellitus) ([facility] 1/13/2014    Gout     Hypothyroidism 4/18/2016    Seasonal allergic rhinitis        Past Surgical History: Procedure Laterality Date    COLONOSCOPY   appx     normal     COLONOSCOPY N/A 2022    COLONOSCOPY POLYPECTOMY SNARE/COLD BIOPSY performed by Marlene Schmid MD at [place] Left 2022    LEFT SUPERFICIAL FEMORAL ARTERY TO PERONEAL ARTERY BYPASS WITH REVERSED GREAT SAPHENOUS VEIN GRAFT performed by Aliyah Tineo MD at [address] [place], bilateral    INCISIONAL HERNIA REPAIR      bilateral       Social History:    Social History     Tobacco Use    Smoking status: Former     Years: 5.00     Types: Cigarettes     Quit date: 6/3/1959     Years since quittin.1    Smokeless tobacco: Never   Substance Use Topics    Alcohol use: No                                Counseling given: Not Answered      Vital Signs (Current): There were no vitals filed for this visit.                                            BP Readings from Last 3 Encounters:   07/15/22 (!) 130/52   22 (!) 124/91   21 (!) 156/77       NPO Status:                                                                                 BMI:   Wt Readings from Last 3 Encounters:   07/15/22 182 lb 15.7 oz (83 kg)   22 178 lb 3.2 oz (80.8 kg)   22 177 lb (80.3 kg)     There is no height or weight on file to calculate BMI.    CBC:   Lab Results   Component Value Date/Time    WBC 7.5 07/15/2022 05:30 AM    RBC 2.13 07/15/2022 05:30 AM    HGB 7.3 07/15/2022 05:30 AM    HCT 21.1 07/15/2022 05:30 AM    MCV 99.4 07/15/2022 05:30 AM    RDW 12.8 07/15/2022 05:30 AM    PLT 94 07/15/2022 05:30 AM       CMP:   Lab Results   Component Value Date/Time     07/15/2022 05:30 AM    K 5.0 07/15/2022 05:30 AM    K 4.6 2022 04:55 AM     07/15/2022 05:30 AM    CO2 22 07/15/2022 05:30 AM    BUN 46 07/15/2022 05:30 AM    CREATININE 3.2 07/15/2022 05:30 AM    GFRAA 22 07/15/2022 05:30 AM    GFRAA 59 2013 09:30 AM    AGRATIO 1.0 2021 05:38 PM    LABGLOM 19 07/15/2022 05:30 AM    GLUCOSE 111 07/15/2022 05:30 AM    PROT 7.5 09/27/2021 05:38 PM    PROT 6.7 03/14/2013 09:30 AM    CALCIUM 8.3 07/15/2022 05:30 AM    BILITOT 0.3 09/27/2021 05:38 PM    ALKPHOS 59 09/27/2021 05:38 PM    AST 19 09/27/2021 05:38 PM    ALT 11 09/27/2021 05:38 PM       POC Tests:   Recent Labs     07/14/22 1939   POCGLU 168*       Coags:   Lab Results   Component Value Date/Time    PROTIME 14.5 07/13/2022 04:56 AM    PROTIME . 06/20/2011 04:00 PM    INR 1.14 07/13/2022 04:56 AM    APTT 81.2 07/11/2022 02:55 PM       HCG (If Applicable): No results found for: PREGTESTUR, PREGSERUM, HCG, HCGQUANT     ABGs: No results found for: PHART, PO2ART, TQX5VGU, TIU8HOZ, BEART, W0RPIQAE     Type & Screen (If Applicable):  No results found for: LABABO, LABRH    Drug/Infectious Status (If Applicable):  No results found for: HIV, HEPCAB    COVID-19 Screening (If Applicable): No results found for: COVID19        Anesthesia Evaluation  Patient summary reviewed and Nursing notes reviewed no history of anesthetic complications:   Airway: Mallampati: II  TM distance: >3 FB   Neck ROM: full  Mouth opening: > = 3 FB   Dental:    (+) upper dentures and partials      Pulmonary: breath sounds clear to auscultation                             Cardiovascular:  Exercise tolerance: poor (<4 METS),   (+) dysrhythmias: atrial fibrillation,     (-) past MI    NYHA Classification: II  ECG reviewed  Rhythm: regular  Rate: normal  Echocardiogram reviewed         Beta Blocker:  Not on Beta Blocker      ROS comment: Left ventricular cavity size is normal. There is moderately increased left   ventricular wall thickness. Ejection fraction is visually estimated to be   55-60%. No regional wall motion abnormalities are noted. Grade II diastolic   dysfunction with elevated LV filling pressures. Mitral annular calcification is present. Thickening of leaflets of mitral   valve. Mild to moderate mitral regurgitation.  The mitral valve pressure   half-time is calculated at 1.47 cm2. The aortic valve is thickened/calcified with decreased leaflet mobility   consistent with aortic stenosis. Moderate aortic stenosis with a peak velocity of 3.08 m/s and a mean   pressure gradient of 20 mmHg. There is moderate aortic insufficiency noted. The right atrium is mildly dilated. The aortic valve is thickened/calcified with decreased leaflet mobility   consistent with aortic stenosis. Moderate aortic stenosis with a peak velocity of 3.08 m/s and a mean   pressure gradient of 20 mmHg. There is moderate aortic insufficiency noted. IVC size is normal (<2.1 cm) but collapses < 50% with respiration consistent   with elevated RA pressure (8 mmHg). Estimated pulmonary artery systolic pressure is mildly elevated at 43 mmHg   assuming a right atrial pressure of 8 mmHg. Neuro/Psych:               GI/Hepatic/Renal:   (+) renal disease: CRI,      (-) GERD       Endo/Other:    (+) DiabetesType II DM, , hypothyroidism::., .                  ROS comment: Gout  Toe osteomyelitis Abdominal:             Vascular:   + PVD, aortic or cerebral, DVT, .        ROS comment: Gangrene left foot . Other Findings:             Anesthesia Plan      general     ASA 3       Induction: intravenous. arterial line  MIPS: Postoperative opioids intended and Prophylactic antiemetics administered. Anesthetic plan and risks discussed with patient. Plan discussed with CRNA.     Attending anesthesiologist reviewed and agrees with Preprocedure content                Candice Lu DO   7/15/2022

## 2022-07-15 NOTE — ANESTHESIA POSTPROCEDURE EVALUATION
Department of Anesthesiology  Postprocedure Note    Patient: Fran Woo  MRN: [id number]  YOB: 1935  Date of evaluation: 7/15/2022      Procedure Summary     Date: 07/15/22 Room / Location: [address] [place] [address] [address] [place] [address] / [facility]    Anesthesia Start: 7114 Anesthesia Stop: 7859    Procedure: TRANSMETATARSAL AMPUTATION WITH TENDO ACHILLES LENGTHENING LEFT FOOT (Left) Diagnosis:       Osteomyelitis of left foot, unspecified type ([address]      (Osteomyelitis of left foot, unspecified type ([address] [M86.9])    Surgeons: Tamara Hernandez DPM Responsible Provider: Brian Tesfaye DO    Anesthesia Type: General ASA Status: 3          Anesthesia Type: General    Hui Phase I: Hui Score: 9    Hui Phase II:        Anesthesia Post Evaluation    Patient location during evaluation: PACU  Patient participation: complete - patient participated  Level of consciousness: awake and alert  Pain score: 0  Airway patency: patent  Nausea & Vomiting: no nausea and no vomiting  Complications: no  Cardiovascular status: hemodynamically stable  Respiratory status: acceptable  Hydration status: stable

## 2022-07-15 NOTE — PROGRESS NOTES
Vascular Surgery   Daily Progress Note  Patient: Juma Mahmood    CC: Atherosclerosis native artery left lower extremity with ulceration    SUBJECTIVE:   Pt has no complaints this am. He required a straight cath after failing an initial void check. Reporting no new LLE pain or numbness. ROS: A 14 point review of systems was conducted, significant findings as noted above. All other systems negative. OBJECTIVE:   Infusions:   sodium chloride Stopped (07/12/22 0100)    dextrose      sodium chloride 25 mL/hr at 07/13/22 0540      I/O:I/O last 3 completed shifts: In: 2803.8 [P.O.:360; I.V.:2143.8; IV Piggyback:300]  Out: 2640 [Urine:2210; Drains:180; Blood:250]           Wt Readings from Last 1 Encounters:   07/15/22 182 lb 15.7 oz (83 kg)       Exam:  /65   Pulse 66   Temp 98.3 °F (36.8 °C) (Axillary)   Resp 13   Ht 6' 2\" (1.88 m)   Wt 182 lb 15.7 oz (83 kg)   SpO2 99%   BMI 23.49 kg/m²     General appearance: alert, in bed, in NAD  Neuro: A&Ox3, no focal deficits, sensation intact  Chest/Lungs: normal effort, no adventitious breath sounds, on 2L NC  Cardiovascular: RRR  Abdomen: soft, non-distended, non-tender  Extremities: mepilex covering medial aspect of left leg with slight sanguinous strikethrough on small portion of dressing, drain with minimal sanguinous output, dopperable L DP, palpable PT, sensation and motor intact, palpable L femoral pulse, compartments soft              Vascular Exam:    femoral  DP  PT    LEFT  +  -/+ +   RIGHT  +  +  +       LABS:   Recent Labs     07/14/22  1023 07/14/22  1926 07/15/22  0119 07/15/22  0530   WBC 9.9  --   --  7.5   HGB 8.7*   < > 8.0* 7.3*   HCT 26.4*   < > 23.5* 21.1*   .8*  --   --  99.4     --   --  94*    < > = values in this interval not displayed.           Recent Labs     07/14/22  0042 07/14/22  1023    133*   K 5.1 5.3*   * 106   CO2 19* 21   PHOS 3.2 3.2   BUN 33* 33*   CREATININE 1.8* 2.1*        No results for input(s): AST, ALT, ALB, BILIDIR, BILITOT, ALKPHOS in the last 72 hours. No results for input(s): LIPASE, AMYLASE in the last 72 hours. Recent Labs     07/13/22  0456   INR 1.14        No results for input(s): CKTOTAL, CKMB, CKMBINDEX, TROPONINI in the last 72 hours. ASSESSMENT/PLAN:   This is a 81y.o. year old male with a diagnosis of LLE PAD s/p Left Superficial Femoral Artery to Peroneal Artery Bypass with Reversed Greater Saphenous Vein on 7/13/22. Pt will be undergoing left foot transmetatarsal amputation with tendoachilles lengthening today with podiatry. -NPO since midnight  -Second void check at 0800.   - ID on board; cont vanco and cefepime  -Heparin gtt held by podiatry for intervention at this time. Resume heparin gtt when OK from podiatric standpoint. Recommending d/c on eliquis vs xarelto.        Zander Fenton DO   PGY1, General Surgery  07/15/22  6:47 AM  Pager # [phone]

## 2022-07-15 NOTE — OP NOTE
Operative Note      Patient: Esperanza Degroot  YOB: 1935  MRN: [id number]    Date of Procedure: 7/15/2022    Pre-Op Diagnosis: Osteomyelitis of left foot, unspecified type ([facility] [M86.9]    Post-Op Diagnosis: Same       Procedure(s): Transmetatarsal Amputation, Left Foot    Surgeon(s):  Nacho Hansen DPM    Assistant:  Resident: Karol Light DPM; Jak Lopez DPM  Student: SONYA White     Anesthesia: General     Hemostasis: Electrocautery and Anatomic Dissection     Injectables: Pre-Op 20 cc of 2% Lidocaine plain and Post-Op 30 cc of 0.5 % Marcaine plain     Materials: 3-0 Vicryl, 2-0 Nylon, 3-0 Nylon, and Blue Vessel Loop       Estimated Blood Loss (mL): 156 mL    Complications: None    Specimens:   ID Type Source Tests Collected by Time Destination   A : LEFT FOREFOOT Specimen Foot SURGICAL PATHOLOGY Nacho Hansen DPM 7/15/2022 0830    B : LEFT FOOT DIGITS Specimen Foot SURGICAL PATHOLOGY Nacho Hansen DPM 7/15/2022 0914        Implants:  Implant Name Type Inv.  Item Serial No.  Lot No. LRB No. Used Action   PATCH AMNION 2 LAYR PROTCT 4 X 4CM STERISHIELD II - [id number]  PATCH AMNION 2 LAYR PROTCT 4 X 4CM STERISHIELD II [id number] [employer]  Left 1 Implanted   PATCH AMNION 2 LAYR PROTCT 4 X 4CM STERISHIELD II - [id number]  PATCH AMNION 2 LAYR PROTCT 4 X 4CM STERISHIELD II [id number] [employer]  Left 1 Implanted   PATCH AMNION 2 LAYR PROTCT 4 X 4CM STERISHIELD II - [id number]  PATCH AMNION 2 LAYR PROTCT 4 X 4CM STERISHIELD II [id number] [employer]  Left 1 Implanted   [facility] AMNION 2 LAYR PROTCT 4 X 4CM STERISHIELD II - [id number]  PATCH AMNION 2 LAYR PROTCT 4 X 4CM STERISHIELD II [id number] [employer]  Left 1 Implanted         Drains:   Closed/Suction Drain Left Leg Bulb (Active)   Site Description Unable to view 07/15/22 0553   Dressing Status Intact 07/15/22 0553   Drainage Appearance Bloody 07/15/22 0553   Drain block was preformed consisting of 20 cc of 2% lidocaine plain. The left lower extremity was then scrubbed, prepped, and draped in the usual sterile fashion. A time-out was performed. The patient, procedure, and operative site were confirmed, and the following procedure was performed. PROCEDURE #1: TRANSMETATARSAL AMPUTATION, LEFT FOOT    Attention was then directed towards the left foot where using a #15 blade, a full thickness modified fishmouth incision was made just proximal to the dorsal sulcus of the toes with the plantar lip extending further distally. The plantar aspect of the incision was done to excise the plantar foot wound. Using sharp dissection the extensor tendons were transected and the the incision was carried down to the level of bone until the dorsal aspect of the metatarsals were encountered. All bleeders were ligated using electrocautery as encountered. Prior to using the sagittal saw attention was directed to sharply disarticulating the remaining digits. At the level of the metatarsophalangeal joint (MTPJ), a #15 blade was used to sharply disarticulate the remaining digits. The #15 blade was used to release the medial and lateral collateral ligaments present at the MTPJ. The flexor tendons were then severed plantarly at the level of the MTPJ joint and the digits were removed distally and passed from the operative field and placed on the back table. The digits were then sent as one single specimen to pathology for analysis. Next, using a sagittal saw and #138 blade, the distal 2/3  of metatarsals 1-5 were resected with care being taken to maintain a normal metatarsal parabola. These portions of metatarsals and sesamoids were then sharply dissected free from their soft tissue attachments and passed from the operating field. The resected metatarsals were then sent to pathology as a single specimen for analysis.       At this time, the plantar flap was examined and noted to be excellent and adequate for closure. Next, the remaining extensor and flexor tendons were grasped and cut as far proximal as possible to help prevent the tissue from serving as a possible nidus for future infection. Finally, the plantar plates were identified and dissected free and passed from the operative field as well with great care being taken to preserve as much plantar subcutaneous tissue as possible. The surgical wound was then irrigated using copious amounts of normal saline. Again, the plantar flap from the modified fish-mouth incision was inspected and noted to be free of necortic soft tissue. The soft tissue was healthy in appearance and the skin edges to the dorsal and plantar flap were noted to be bleeding adequately. The plantar flap was then rotated dorsal laterally to help cover the deficit left from the wound. and the redundant and extra tissue removed using a #15 blade. PROCEDURE #2: APPLICATION OF STERISHIELD, LEFT FOOT     Next, according to 's recommendations; 4 SteriShield's (4x4 cm) amniotic grafts were placed within the surgical site to aid in healing potential while helping to decrease adhesions and scarring. The subcutaneous tissues were re-approximated using 3-0 vicryl. The skin was then closed using 2-0 and 3-0 nylon in a simple interrupted suture technique. A small blue vessel loop was inserted to allow the the amputation site to drain in order to prevent hematoma formation. At this time, a local anesthetic was injected about the incision sites consisting of 30 cc of 0.5% marcaine plain, for the patient's postoperative comfort. A soft sterile dressing was applied consisting of betadine ointment, adaptic, dry gauze, Webril, and ACE.      END OF PROCEDURE: The patient tolerated the procedure and anesthesia well and was transported from the operating room to the PACU with vital signs stable and vascular status intact to all aspects of the patient's left lower extremity and

## 2022-07-15 NOTE — PROGRESS NOTES
Attempt to provide shift updates to Diony Eliud, pt's son, but sent directly to voicemail. Voicemail left with call back number.     Electronically signed by Aga Stanton RN on 6/42/3301 at 6:28 AM

## 2022-07-15 NOTE — PROGRESS NOTES
.PACU Transfer Note    Vitals:    07/15/22 1045   BP: 120/60   Pulse: 73   Resp: 17   Temp: 98.2 °F (36.8 °C)   SpO2: 94       In: 1655 [P.O.:240;  I.V.:915]  Out: 250     Pain assessment:  none  Pain Level: 0    Report given to Receiving unit RN.    7/15/2022 10:54 AM

## 2022-07-15 NOTE — PROGRESS NOTES
Vancomycin has been discontinued. Pharmacy will sign off consult. If medication dosing is resumed, please re-consult pharmacy. Please call with any questions.   Fabiana Burr PharmD, BCPS  Main pharmacy: [id number]  7/15/2022 1:47 PM

## 2022-07-15 NOTE — PROGRESS NOTES
Interval History and plan:      BP is stable   Creatinine worsening 1.8 > 2.1 > 3.2  Made 525 mL urine yesterday    SP  Fem / pop bypass on 7/13/2022  For surgery today ( TMA) left foot       Plan:    Worsening renal function with decrease urine out put. Place turner catheter  Start IVF   Vanco level is 17  On ABX and monitor vancomycin levels                          Assessment :     CKD Stage 3b  Likely due to DM and hypertension  Cr is 1.8- eGFR of 36 ml/min  UA- bland, wbc 6-9,  No recent one in the system      Hypertension   BP: (128-130)/(52-65)  Heart Rate:  [62-90]   BP goal inpatient [phone] systolic inpatient  Home regimen: lisinopril 10 mg daily only  Current inpatient regimen: carvedilol 6.25 mg BID, hydralazine 25 mg TID, nifedipine 60 mg daily. Also has Afib    Anemia of CKD             sepsis  DM  PVD    [facility] would like to thank Juan King MD   for opportunity to serve this patient      Please call with questions at-   24 Hrs Answering service [phone] or  7 am- 5 pm via Perfect serve or cell phone  Kady Charles MD          CC/reason for consult :     MO     HPI :     Keyona Aggarwal is a 81 y.o. male presented to   the hospital on 7/5/2022 with wound infection in the left leg. He is followed outpatient by podiatry,for wound, which continued to   Get worse despite the treatment. Also has discharge from the wound  Brought to hospital admitted  Also found to have CKD  because of which we are consulted.          ROS:     Seen with- no family    positives in bold   Constitutional:  fever, chills, weakness, weight change, fatigue  Skin:  rash, pruritus, hair loss, bruising, dry skin, petechiae  Head, Face, Neck   headaches, swelling,  cervical adenopathy  Respiratory: shortness of breath, cough, or wheezing  Cardiovascular: chest pain, palpitations, dizzy, edema  Gastrointestinal: nausea, vomiting, diarrhea, constipation,belly pain    Yellow skin, blood in stool  Musculoskeletal:  back pain, muscle weakness, gait problems,       joint pain or swelling. Genitourinary:  dysuria, poor urine flow, flank pain, blood in urine  Neurologic:  vertigo, TIA'S, syncope, seizures, focal weakness  Psychosocial:  insomnia, anxiety, or depression. Additional positive findings:                    All other remaining systems are negative or unable to obtain        PMH/PSH/SH/Family History:     Past Medical History:   Diagnosis Date    Diabetic eye exam ([facility] [address] 1/29/14    Dr. Beryle Frees    DM (diabetes mellitus) ([facility] [address] 1/13/2014    Gout     Hypothyroidism 4/18/2016    Seasonal allergic rhinitis        Past Surgical History:   Procedure Laterality Date    COLONOSCOPY  2009 appx     normal     COLONOSCOPY N/A 6/20/2022    COLONOSCOPY POLYPECTOMY SNARE/COLD BIOPSY performed by Fransisco Tabor MD at [address] [place] [address] Left 7/13/2022    LEFT SUPERFICIAL FEMORAL ARTERY TO PERONEAL ARTERY BYPASS WITH REVERSED GREAT SAPHENOUS VEIN GRAFT performed by Marylou López MD at [address] [facility], bilateral    INCISIONAL HERNIA REPAIR      bilateral        reports that he quit smoking about 63 years ago. He has never used smokeless tobacco. He reports that he does not drink alcohol and does not use drugs. family history is not on file.          Medication:     Current Facility-Administered Medications: vancomycin (VANCOCIN) intermittent dosing (placeholder), , Other, See Admin Instructions  vancomycin (VANCOCIN) 750 mg in dextrose 5 % 250 mL IVPB, 750 mg, IntraVENous, Once  HYDROmorphone (DILAUDID) injection 0.25 mg, 0.25 mg, IntraVENous, Q3H PRN **OR** HYDROmorphone (DILAUDID) injection 0.5 mg, 0.5 mg, IntraVENous, Q3H PRN  [Held by provider] carvedilol (COREG) tablet 12.5 mg, 12.5 mg, Oral, BID WC  [Held by provider] hydrALAZINE (APRESOLINE) tablet 50 mg, 50 mg, Oral, 3 times per day  cefepime (MAXIPIME) 1000 mg IVPB minibag, 1,000 mg, IntraVENous, Q12H  sodium chloride flush 0.9 % injection 5-40 mL, 5-40 mL, IntraVENous, 2 times per day  sodium chloride flush 0.9 % injection 5-40 mL, 5-40 mL, IntraVENous, PRN  0.9 % sodium chloride infusion, , IntraVENous, PRN  cloNIDine (CATAPRES) tablet 0.1 mg, 0.1 mg, Oral, Q2H PRN  labetalol (NORMODYNE;TRANDATE) injection 10 mg, 10 mg, IntraVENous, Q2H PRN  aspirin EC tablet 81 mg, 81 mg, Oral, Daily  heparin (porcine) injection 4,000 Units, 4,000 Units, IntraVENous, PRN  heparin (porcine) injection 2,000 Units, 2,000 Units, IntraVENous, PRN  [Held by provider] NIFEdipine (ADALAT CC) extended release tablet 60 mg, 60 mg, Oral, Daily  atorvastatin (LIPITOR) tablet 40 mg, 40 mg, Oral, Nightly  traMADol (ULTRAM) tablet 50 mg, 50 mg, Oral, Q6H PRN  hydrALAZINE (APRESOLINE) injection 10 mg, 10 mg, IntraVENous, Q4H PRN  methocarbamol (ROBAXIN) tablet 750 mg, 750 mg, Oral, 4x Daily PRN  gabapentin (NEURONTIN) capsule 300 mg, 300 mg, Oral, Nightly  glucose chewable tablet 16 g, 4 tablet, Oral, PRN  dextrose bolus 10% 125 mL, 125 mL, IntraVENous, PRN **OR** dextrose bolus 10% 250 mL, 250 mL, IntraVENous, PRN  glucagon (rDNA) injection 1 mg, 1 mg, IntraMUSCular, PRN  dextrose 5 % solution, 100 mL/hr, IntraVENous, PRN  insulin lispro (1 Unit Dial) 0-6 Units, 0-6 Units, SubCUTAneous, TID WC  insulin lispro (1 Unit Dial) 0-3 Units, 0-3 Units, SubCUTAneous, Nightly  cetirizine (ZYRTEC) tablet 5 mg, 5 mg, Oral, Daily  tamsulosin (FLOMAX) capsule 0.4 mg, 0.4 mg, Oral, Nightly  sodium chloride flush 0.9 % injection 5-40 mL, 5-40 mL, IntraVENous, 2 times per day  sodium chloride flush 0.9 % injection 5-40 mL, 5-40 mL, IntraVENous, PRN  0.9 % sodium chloride infusion, , IntraVENous, PRN  ondansetron (ZOFRAN-ODT) disintegrating tablet 4 mg, 4 mg, Oral, Q8H PRN **OR** ondansetron (ZOFRAN) injection 4 mg, 4 mg, IntraVENous, Q6H PRN  polyethylene glycol (GLYCOLAX) packet 17 g, 17 g, Oral, Daily PRN  acetaminophen (TYLENOL) tablet 650 mg, 650 mg, Oral, Q6H PRN **OR** acetaminophen (TYLENOL) suppository 650 mg, 650 mg, Rectal, Q6H PRN  morphine (PF) injection 2 mg, 2 mg, IntraVENous, Q3H PRN       Vitals :     Vitals:    07/15/22 0726   BP: (!) 130/52   Pulse: 90   Resp: 16   Temp: 98.6 °F (37 °C)   SpO2: 97%       I & O :       Intake/Output Summary (Last 24 hours) at 7/15/2022 1002  Last data filed at 7/15/2022 0948  Gross per 24 hour   Intake 1565 ml   Output 580 ml   Net 985 ml          Physical Examination :     General appearance: Anxious- no, distressed- no, in good spirits-    Alert, pleasant  HEENT: Lips- normal, teeth- ok , oral mucosa- moist  Neck : Mass- no, appears symmetrical, JVD- not visible  Respiratory: Respiratory effort-  normal, wheeze- no, crackles -   Cardiovascular:  Ausculation- No M/R/G, Edema left leg   Abdomen: visible mass- no, distention- no, scar- no, tenderness- no                            hepatosplenomegaly-  no  Musculoskeletal:  clubbing no,cyanosis- no , digital ischemia- no                           muscle strength- grossly normal , tone - grossly normal  Skin: rashes- no , ulcers- no, induration- no, tightening - no  Psychiatric:  Judgement and insight- normal           AAO X 3  Additional finding:   Has left leg wound      LABS:     Recent Labs     07/14/22  0042 07/14/22  1023 07/14/22  1926 07/15/22  0119 07/15/22  0530   WBC 10.9 9.9  --   --  7.5   HGB 9.4* 8.7* 9.3* 8.0* 7.3*   HCT 28.2* 26.4* 27.1* 23.5* 21.1*   * 142  --   --  94*       Recent Labs     07/14/22  0042 07/14/22  1023 07/15/22  0530    133* 135*   K 5.1 5.3* 5.0   * 106 106   CO2 19* 21 22   BUN 33* 33* 46*   CREATININE 1.8* 2.1* 3.2*   GLUCOSE 121* 158* 111*   MG 1.60* 2.60* 2.50*   PHOS 3.2 3.2 3.5

## 2022-07-15 NOTE — PROGRESS NOTES
Pt arrived to 4452. VSS at this time. Four eye skin assessment below. Fall precautions in place. Call light within reach. Pt denies any further needs at this time. Electronically signed by Jacob Bro RN on 9/20/6214 at 10:39 PM      4 Eyes Skin Assessment     The patient is being assess for  Transfer to New Unit    I agree that 2 RN's have performed a thorough Head to Toe Skin Assessment on the patient. ALL assessment sites listed below have been assessed. Areas assessed by both nurses:   [x]   Head, Face, and Ears   [x]   Shoulders, Back, and Chest  [x]   Arms, Elbows, and Hands   [x]   Coccyx, Sacrum, and Ischum  [x]   Legs, Feet, and Heels         Surgical incision/ drain in place L inner leg   L ft packing pedal    Does the Patient have Skin Breakdown?   Yes a wound was noted on the Admission Assessment and an WOUND LDA was Initiated documentation include the Francesca-wound, Wound Assessment, Measurements, Dressing Treatment, Drainage, and Color\",         Greyson Prevention initiated:  Yes   Wound Care Orders initiated:  yes      [facility] nurse consulted for Pressure Injury (Stage 3,4, Unstageable, DTI, NWPT, and Complex wounds):  yes  Nurse 1 eSignature: Electronically signed by Jacob Bro RN on 2/62/04 at 9:35 PM EDT    **SHARE this note so that the co-signing nurse is able to place an eSignature**    Nurse 2 eSignature: Electronically signed by Mark Childers RN on 7/14/22 at 9:36 PM EDT

## 2022-07-16 LAB
ALBUMIN SERPL-MCNC: 2.9 G/DL (ref 3.4–5)
ANION GAP SERPL CALCULATED.3IONS-SCNC: 11 MMOL/L (ref 3–16)
BASOPHILS ABSOLUTE: 0 K/UL (ref 0–0.2)
BASOPHILS ABSOLUTE: 0 K/UL (ref 0–0.2)
BASOPHILS RELATIVE PERCENT: 0.2 %
BASOPHILS RELATIVE PERCENT: 0.3 %
BLOOD BANK DISPENSE STATUS: NORMAL
BLOOD BANK DISPENSE STATUS: NORMAL
BLOOD BANK PRODUCT CODE: NORMAL
BLOOD BANK PRODUCT CODE: NORMAL
BPU ID: NORMAL
BPU ID: NORMAL
BUN BLDV-MCNC: 58 MG/DL (ref 7–20)
CALCIUM SERPL-MCNC: 7.9 MG/DL (ref 8.3–10.6)
CHLORIDE BLD-SCNC: 108 MMOL/L (ref 99–110)
CO2: 17 MMOL/L (ref 21–32)
CREAT SERPL-MCNC: 4.1 MG/DL (ref 0.8–1.3)
DESCRIPTION BLOOD BANK: NORMAL
DESCRIPTION BLOOD BANK: NORMAL
EOSINOPHILS ABSOLUTE: 0 K/UL (ref 0–0.6)
EOSINOPHILS ABSOLUTE: 0.1 K/UL (ref 0–0.6)
EOSINOPHILS RELATIVE PERCENT: 0.3 %
EOSINOPHILS RELATIVE PERCENT: 0.6 %
GFR AFRICAN AMERICAN: 17
GFR NON-AFRICAN AMERICAN: 14
GLUCOSE BLD-MCNC: 120 MG/DL (ref 70–99)
GLUCOSE BLD-MCNC: 129 MG/DL (ref 70–99)
GLUCOSE BLD-MCNC: 141 MG/DL (ref 70–99)
GLUCOSE BLD-MCNC: 178 MG/DL (ref 70–99)
GLUCOSE BLD-MCNC: 220 MG/DL (ref 70–99)
HCT VFR BLD CALC: 18.7 % (ref 40.5–52.5)
HCT VFR BLD CALC: 22.3 % (ref 40.5–52.5)
HCT VFR BLD CALC: 23.3 % (ref 40.5–52.5)
HEMOGLOBIN: 6.3 G/DL (ref 13.5–17.5)
HEMOGLOBIN: 7.4 G/DL (ref 13.5–17.5)
HEMOGLOBIN: 7.7 G/DL (ref 13.5–17.5)
LYMPHOCYTES ABSOLUTE: 0.9 K/UL (ref 1–5.1)
LYMPHOCYTES ABSOLUTE: 0.9 K/UL (ref 1–5.1)
LYMPHOCYTES RELATIVE PERCENT: 10.2 %
LYMPHOCYTES RELATIVE PERCENT: 9.7 %
MAGNESIUM: 2.2 MG/DL (ref 1.8–2.4)
MAGNESIUM: 2.2 MG/DL (ref 1.8–2.4)
MCH RBC QN AUTO: 31.7 PG (ref 26–34)
MCH RBC QN AUTO: 32.7 PG (ref 26–34)
MCHC RBC AUTO-ENTMCNC: 32.9 G/DL (ref 31–36)
MCHC RBC AUTO-ENTMCNC: 33.7 G/DL (ref 31–36)
MCV RBC AUTO: 96.4 FL (ref 80–100)
MCV RBC AUTO: 96.9 FL (ref 80–100)
MONOCYTES ABSOLUTE: 1.4 K/UL (ref 0–1.3)
MONOCYTES ABSOLUTE: 1.4 K/UL (ref 0–1.3)
MONOCYTES RELATIVE PERCENT: 14.7 %
MONOCYTES RELATIVE PERCENT: 15 %
NEUTROPHILS ABSOLUTE: 6.9 K/UL (ref 1.7–7.7)
NEUTROPHILS ABSOLUTE: 7.1 K/UL (ref 1.7–7.7)
NEUTROPHILS RELATIVE PERCENT: 74.4 %
NEUTROPHILS RELATIVE PERCENT: 74.6 %
PDW BLD-RTO: 16.3 % (ref 12.4–15.4)
PDW BLD-RTO: 16.9 % (ref 12.4–15.4)
PERFORMED ON: ABNORMAL
PHOSPHORUS: 3.7 MG/DL (ref 2.5–4.9)
PLATELET # BLD: 85 K/UL (ref 135–450)
PLATELET # BLD: 90 K/UL (ref 135–450)
PMV BLD AUTO: 8.2 FL (ref 5–10.5)
PMV BLD AUTO: 8.6 FL (ref 5–10.5)
POTASSIUM SERPL-SCNC: 5 MMOL/L (ref 3.5–5.1)
RBC # BLD: 1.93 M/UL (ref 4.2–5.9)
RBC # BLD: 2.42 M/UL (ref 4.2–5.9)
SODIUM BLD-SCNC: 136 MMOL/L (ref 136–145)
WBC # BLD: 9.3 K/UL (ref 4–11)
WBC # BLD: 9.5 K/UL (ref 4–11)

## 2022-07-16 PROCEDURE — 36430 TRANSFUSION BLD/BLD COMPNT: CPT

## 2022-07-16 PROCEDURE — 80069 RENAL FUNCTION PANEL: CPT

## 2022-07-16 PROCEDURE — 2580000003 HC RX 258: Performed by: INTERNAL MEDICINE

## 2022-07-16 PROCEDURE — 85014 HEMATOCRIT: CPT

## 2022-07-16 PROCEDURE — 36415 COLL VENOUS BLD VENIPUNCTURE: CPT

## 2022-07-16 PROCEDURE — 6370000000 HC RX 637 (ALT 250 FOR IP): Performed by: STUDENT IN AN ORGANIZED HEALTH CARE EDUCATION/TRAINING PROGRAM

## 2022-07-16 PROCEDURE — 85025 COMPLETE CBC W/AUTO DIFF WBC: CPT

## 2022-07-16 PROCEDURE — 6370000000 HC RX 637 (ALT 250 FOR IP): Performed by: INTERNAL MEDICINE

## 2022-07-16 PROCEDURE — 2580000003 HC RX 258: Performed by: STUDENT IN AN ORGANIZED HEALTH CARE EDUCATION/TRAINING PROGRAM

## 2022-07-16 PROCEDURE — 1200000000 HC SEMI PRIVATE

## 2022-07-16 PROCEDURE — 2500000003 HC RX 250 WO HCPCS: Performed by: INTERNAL MEDICINE

## 2022-07-16 PROCEDURE — 85018 HEMOGLOBIN: CPT

## 2022-07-16 PROCEDURE — 83735 ASSAY OF MAGNESIUM: CPT

## 2022-07-16 RX ORDER — SODIUM CHLORIDE 9 MG/ML
INJECTION, SOLUTION INTRAVENOUS PRN
Status: DISCONTINUED | OUTPATIENT
Start: 2022-07-16 | End: 2022-07-17

## 2022-07-16 RX ADMIN — SODIUM BICARBONATE: 84 INJECTION, SOLUTION INTRAVENOUS at 10:09

## 2022-07-16 RX ADMIN — ACETAMINOPHEN 650 MG: 325 TABLET ORAL at 04:09

## 2022-07-16 RX ADMIN — SODIUM BICARBONATE: 84 INJECTION, SOLUTION INTRAVENOUS at 20:43

## 2022-07-16 RX ADMIN — INSULIN LISPRO 2 UNITS: 100 INJECTION, SOLUTION INTRAVENOUS; SUBCUTANEOUS at 18:35

## 2022-07-16 RX ADMIN — LINEZOLID 600 MG: 600 TABLET, FILM COATED ORAL at 20:16

## 2022-07-16 RX ADMIN — CETIRIZINE HYDROCHLORIDE 5 MG: 10 TABLET, FILM COATED ORAL at 10:10

## 2022-07-16 RX ADMIN — GABAPENTIN 300 MG: 300 CAPSULE ORAL at 20:16

## 2022-07-16 RX ADMIN — SODIUM CHLORIDE, PRESERVATIVE FREE 10 ML: 5 INJECTION INTRAVENOUS at 10:12

## 2022-07-16 RX ADMIN — INSULIN LISPRO 1 UNITS: 100 INJECTION, SOLUTION INTRAVENOUS; SUBCUTANEOUS at 13:50

## 2022-07-16 RX ADMIN — INSULIN LISPRO 1 UNITS: 100 INJECTION, SOLUTION INTRAVENOUS; SUBCUTANEOUS at 20:23

## 2022-07-16 RX ADMIN — ASPIRIN 81 MG: 81 TABLET, COATED ORAL at 10:10

## 2022-07-16 RX ADMIN — SODIUM CHLORIDE: 9 INJECTION, SOLUTION INTRAVENOUS at 03:57

## 2022-07-16 RX ADMIN — LINEZOLID 600 MG: 600 TABLET, FILM COATED ORAL at 09:00

## 2022-07-16 RX ADMIN — TAMSULOSIN HYDROCHLORIDE 0.4 MG: 0.4 CAPSULE ORAL at 20:16

## 2022-07-16 RX ADMIN — SODIUM CHLORIDE, PRESERVATIVE FREE 10 ML: 5 INJECTION INTRAVENOUS at 20:35

## 2022-07-16 RX ADMIN — ATORVASTATIN CALCIUM 40 MG: 40 TABLET, FILM COATED ORAL at 20:16

## 2022-07-16 ASSESSMENT — PAIN DESCRIPTION - LOCATION
LOCATION: LEG
LOCATION: LEG

## 2022-07-16 ASSESSMENT — PAIN SCALES - GENERAL
PAINLEVEL_OUTOF10: 4
PAINLEVEL_OUTOF10: 2
PAINLEVEL_OUTOF10: 2

## 2022-07-16 ASSESSMENT — PAIN DESCRIPTION - ORIENTATION: ORIENTATION: LEFT

## 2022-07-16 NOTE — PROGRESS NOTES
Interval History and plan:      BP is stable   UOP picking up a bit    SP  Fem / pop bypass on 7/13/2022  For surgery today ( TMA) left foot       Plan:    Cont IVF   Change to bicarb   Creatinine worse but UOP pciking up  BP are stable   Follow with trend closely  Follow with UOP  Closely watch anbx dosing and levels  On ABX and monitor vancomycin levels   Watch closely for RRT need                       Assessment :     CKD Stage 3b  Likely due to DM and hypertension  Cr is 1.8- eGFR of 36 ml/min  UA- bland, wbc 6-9,  No recent one in the system      Hypertension   BP: (120-128)/(43-62)  Heart Rate:  [82-94]   BP goal inpatient [phone] systolic inpatient  Home regimen: lisinopril 10 mg daily only  Current inpatient regimen: carvedilol 6.25 mg BID, hydralazine 25 mg TID, nifedipine 60 mg daily. Also has Afib    Anemia of CKD             sepsis  DM  PVD    [facility] would like to thank Deanna Krishnamurthy MD   for opportunity to serve this patient      Please call with questions at-   24 Hrs Answering service [phone] or  7 am- 5 pm via Perfect serve or cell phone  Emre Zamora MD          CC/reason for consult :     MO     HPI :     Toni Pinto is a 81 y.o. male presented to   the hospital on 7/5/2022 with wound infection in the left leg. He is followed outpatient by podiatry,for wound, which continued to   Get worse despite the treatment. Also has discharge from the wound  Brought to hospital admitted  Also found to have CKD  because of which we are consulted.          ROS:     Seen with- no family    positives in bold   Constitutional:  fever, chills, weakness, weight change, fatigue  Skin:  rash, pruritus, hair loss, bruising, dry skin, petechiae  Head, Face, Neck   headaches, swelling,  cervical adenopathy  Respiratory: shortness of breath, cough, or wheezing  Cardiovascular: chest pain, palpitations, dizzy, edema  Gastrointestinal: nausea, vomiting, diarrhea, constipation,belly pain    Yellow skin, blood in stool  Musculoskeletal:  back pain, muscle weakness, gait problems,       joint pain or swelling. Genitourinary:  dysuria, poor urine flow, flank pain, blood in urine  Neurologic:  vertigo, TIA'S, syncope, seizures, focal weakness  Psychosocial:  insomnia, anxiety, or depression. Additional positive findings:                    All other remaining systems are negative or unable to obtain        PMH/PSH/SH/Family History:     Past Medical History:   Diagnosis Date    Diabetic eye exam ([facility] [address] 1/29/14    Dr. Lou Euceda    DM (diabetes mellitus) ([facility] [address] 1/13/2014    Gout     Hypothyroidism 4/18/2016    Seasonal allergic rhinitis        Past Surgical History:   Procedure Laterality Date    COLONOSCOPY  2009 appx     normal     COLONOSCOPY N/A 6/20/2022    COLONOSCOPY POLYPECTOMY SNARE/COLD BIOPSY performed by Cindi Elizalde MD at [address] [facility] Left 7/13/2022    LEFT SUPERFICIAL FEMORAL ARTERY TO PERONEAL ARTERY BYPASS WITH REVERSED GREAT SAPHENOUS VEIN GRAFT performed by Rafael Garcia MD at [address] [place] [address]. Left 7/15/2022    TRANSMETATARSAL AMPUTATION WITH TENDO ACHILLES LENGTHENING LEFT FOOT performed by Mercedes Lyle DPM at [address] [facility], bilateral    INCISIONAL HERNIA REPAIR      bilateral        reports that he quit smoking about 63 years ago. He has never used smokeless tobacco. He reports that he does not drink alcohol and does not use drugs. family history is not on file.          Medication:     Current Facility-Administered Medications: 0.9 % sodium chloride infusion, , IntraVENous, PRN  [Held by provider] heparin 25,000 units in dextrose 5% 250 mL (premix) infusion, 5-30 Units/kg/hr, IntraVENous, Continuous  0.9 % sodium chloride infusion, , IntraVENous, Continuous  linezolid (ZYVOX) tablet 600 mg, 600 mg, Oral, 2 times per day  0.9 % sodium chloride infusion, , IntraVENous, PRN  HYDROmorphone (DILAUDID) injection 0.25 mg, 0.25 mg, IntraVENous, Q3H PRN **OR** HYDROmorphone (DILAUDID) injection 0.5 mg, 0.5 mg, IntraVENous, Q3H PRN  [Held by provider] carvedilol (COREG) tablet 12.5 mg, 12.5 mg, Oral, BID WC  [Held by provider] hydrALAZINE (APRESOLINE) tablet 50 mg, 50 mg, Oral, 3 times per day  sodium chloride flush 0.9 % injection 5-40 mL, 5-40 mL, IntraVENous, 2 times per day  sodium chloride flush 0.9 % injection 5-40 mL, 5-40 mL, IntraVENous, PRN  0.9 % sodium chloride infusion, , IntraVENous, PRN  cloNIDine (CATAPRES) tablet 0.1 mg, 0.1 mg, Oral, Q2H PRN  labetalol (NORMODYNE;TRANDATE) injection 10 mg, 10 mg, IntraVENous, Q2H PRN  aspirin EC tablet 81 mg, 81 mg, Oral, Daily  [Held by provider] heparin (porcine) injection 4,000 Units, 4,000 Units, IntraVENous, PRN  [Held by provider] heparin (porcine) injection 2,000 Units, 2,000 Units, IntraVENous, PRN  [Held by provider] NIFEdipine (ADALAT CC) extended release tablet 60 mg, 60 mg, Oral, Daily  atorvastatin (LIPITOR) tablet 40 mg, 40 mg, Oral, Nightly  traMADol (ULTRAM) tablet 50 mg, 50 mg, Oral, Q6H PRN  hydrALAZINE (APRESOLINE) injection 10 mg, 10 mg, IntraVENous, Q4H PRN  methocarbamol (ROBAXIN) tablet 750 mg, 750 mg, Oral, 4x Daily PRN  gabapentin (NEURONTIN) capsule 300 mg, 300 mg, Oral, Nightly  glucose chewable tablet 16 g, 4 tablet, Oral, PRN  dextrose bolus 10% 125 mL, 125 mL, IntraVENous, PRN **OR** dextrose bolus 10% 250 mL, 250 mL, IntraVENous, PRN  glucagon (rDNA) injection 1 mg, 1 mg, IntraMUSCular, PRN  dextrose 5 % solution, 100 mL/hr, IntraVENous, PRN  insulin lispro (1 Unit Dial) 0-6 Units, 0-6 Units, SubCUTAneous, TID WC  insulin lispro (1 Unit Dial) 0-3 Units, 0-3 Units, SubCUTAneous, Nightly  cetirizine (ZYRTEC) tablet 5 mg, 5 mg, Oral, Daily  tamsulosin (FLOMAX) capsule 0.4 mg, 0.4 mg, Oral, Nightly  sodium chloride flush 0.9 % injection 5-40 mL, 5-40 mL, IntraVENous, 2 times per day  sodium chloride flush 0.9 % injection 5-40 mL, 5-40 mL, IntraVENous, PRN  0.9 % sodium chloride infusion, , IntraVENous, PRN  ondansetron (ZOFRAN-ODT) disintegrating tablet 4 mg, 4 mg, Oral, Q8H PRN **OR** ondansetron (ZOFRAN) injection 4 mg, 4 mg, IntraVENous, Q6H PRN  polyethylene glycol (GLYCOLAX) packet 17 g, 17 g, Oral, Daily PRN  acetaminophen (TYLENOL) tablet 650 mg, 650 mg, Oral, Q6H PRN **OR** acetaminophen (TYLENOL) suppository 650 mg, 650 mg, Rectal, Q6H PRN  morphine (PF) injection 2 mg, 2 mg, IntraVENous, Q3H PRN       Vitals :     Vitals:    07/16/22 0800   BP: (!) 125/53   Pulse: 82   Resp: 18   Temp: 98 °F (36.7 °C)   SpO2: 97%       I & O :       Intake/Output Summary (Last 24 hours) at 7/16/2022 0840  Last data filed at 7/16/2022 0404  Gross per 24 hour   Intake 1974 ml   Output 1510 ml   Net 464 ml        Physical Examination :     General appearance: Anxious- no, distressed- no, in good spirits-    Alert, pleasant  HEENT: Lips- normal, teeth- ok , oral mucosa- moist  Neck : Mass- no, appears symmetrical, JVD- not visible  Respiratory: Respiratory effort-  normal, wheeze- no, crackles -   Cardiovascular: Ausculation- No M/R/G, Edema left leg   Abdomen: visible mass- no, distention- no, scar- no, tenderness- no                            hepatosplenomegaly-  no  Musculoskeletal:  clubbing no,cyanosis- no , digital ischemia- no                           muscle strength- grossly normal , tone - grossly normal  Skin: rashes- no , ulcers- no, induration- no, tightening - no  Psychiatric:  Judgement and insight- normal           AAO X 3  Additional finding:   Has left leg wound      LABS:     Recent Labs     07/14/22  1023 07/14/22  1926 07/15/22  0530 07/15/22  1602 07/16/22  0006 07/16/22  0450   WBC 9.9  --  7.5  --   --  9.3   HGB 8.7*   < > 7.3* 6.1* 7.4* 6.3*   HCT 26.4*   < > 21.1* 18.2* 22.3* 18.7*     --  94*  --   --  85*    < > = values in this interval not displayed.      Recent Labs     07/14/22  1023 07/15/22  0530 07/16/22  0450   NA 133* 135* 136   K 5.3* 5.0 5.0    106 108   CO2 21 22 17*   BUN 33* 46* 58*   CREATININE 2.1* 3.2* 4.1*   GLUCOSE 158* 111* 129*   MG 2.60* 2.50* 2.20   PHOS 3.2 3.5 3.7

## 2022-07-16 NOTE — PROGRESS NOTES
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 He has no complaints this am. Voiding via turner. S/p  1xu pRBC's w/ appropriate response, however Hgb dropped to 6.3 this am. Pt now receiving another unit of pRBCs. ROS: A 14 point review of systems was conducted, significant findings as noted above. All other systems negative. OBJECTIVE:   Infusions:   sodium chloride      [Held by provider] heparin (PORCINE) Infusion      sodium chloride 100 mL/hr at 07/16/22 0357    sodium chloride      sodium chloride Stopped (07/12/22 0100)    dextrose      sodium chloride 25 mL/hr at 07/15/22 0743      I/O:I/O last 3 completed shifts: In: 1974 [P.O.:390; I.V.:915; Blood:169; IV Piggyback:500]  Out: 6751 [Urine:1550; Drains:40; Blood:250]           Wt Readings from Last 1 Encounters:   07/16/22 187 lb 6.3 oz (85 kg)       Exam:  BP (!) 124/46   Pulse 94   Temp 98.1 °F (36.7 °C)   Resp 22   Ht 6' 2\" (1.88 m)   Wt 187 lb 6.3 oz (85 kg)   SpO2 96%   BMI 24.06 kg/m²     General appearance: NAD  Neuro: A&Ox3, no focal deficits, sensation intact. [place]  Chest/Lungs: normal effort, no adventitious breath sounds, on RA  Cardiovascular: RRR  Abdomen: soft, non-distended, non-tender  Extremities: mepilex covering on medial aspect of left leg with slight sanguinous strikethrough on small portion of dressing, drain with minimal sanguinous output, sensation and motor intact, compartments soft         Vascular Exam:    femoral  DP  PT    LEFT  +  -/+ +   RIGHT  +  +  +       LABS:   Recent Labs     07/15/22  0530 07/15/22  1602 07/16/22  0006 07/16/22  0450   WBC 7.5  --   --  9.3   HGB 7.3*   < > 7.4* 6.3*   HCT 21.1*   < > 22.3* 18.7*   MCV 99.4  --   --  96.9   PLT 94*  --   --  85*    < > = values in this interval not displayed.           Recent Labs     07/15/22  0530 07/16/22  0450   * 136   K 5.0 5.0    108   CO2 22 17*   PHOS 3.5 3.7   BUN 46* 58*   CREATININE 3.2* 4.1*        No results for input(s): AST, ALT, ALB, BILIDIR, BILITOT, ALKPHOS in the last 72 hours. No results for input(s): LIPASE, AMYLASE in the last 72 hours. No results for input(s): PROT, INR, APTT in the last 72 hours. No results for input(s): CKTOTAL, CKMB, CKMBINDEX, TROPONINI in the last 72 hours. ASSESSMENT/PLAN:   This is a 81y.o. year old male with a diagnosis of LLE PAD s/p Left Superficial Femoral Artery to Peroneal Artery Bypass with Reversed Greater Saphenous Vein on 7/13/22. Pt now s/p left foot transmetatarsal amputation on 7/15.     -Heparin gtt held currently 2/2 concern for hematoma.  Pt is OK to transition to 2.5 mg eliquis at primary teams discretion.   -Minimal output from LLE JOHN drain, will discuss removal prior to d/c.  - No signs of bleeding from bypass incision.   - ID on board; cont vanco and cefepime    Qing Verdugo DO   PGY1, General Surgery  07/16/22  7:56 AM  Pager # [phone]

## 2022-07-16 NOTE — PROGRESS NOTES
Podiatric Surgery Daily Progress Note  Miko Boo      Subjective :   Patient seen and examined this am at the bedside. Patient denies any acute overnight events. Patient denies N/V/F/C/SOB. Patient denies calf pain, thigh pain, chest pain. States he is feeling well overall. Review of Systems: A 12 point review of symptoms is unremarkable with the exception of the chief complaint. Patient specifically denies nausea, fever, vomiting, chills, shortness of breath, chest pain, abdominal pain, constipation or difficulty urinating. Objective     BP (!) 124/46   Pulse 94   Temp 98.1 °F (36.7 °C)   Resp 22   Ht 6' 2\" (1.88 m)   Wt 187 lb 6.3 oz (85 kg)   SpO2 96%   BMI 24.06 kg/m²     I/O:  Intake/Output Summary (Last 24 hours) at 7/16/2022 0725  Last data filed at 7/16/2022 0404  Gross per 24 hour   Intake 1974 ml   Output 1510 ml   Net 464 ml              Wt Readings from Last 3 Encounters:   07/16/22 187 lb 6.3 oz (85 kg)   07/05/22 178 lb 3.2 oz (80.8 kg)   06/20/22 177 lb (80.3 kg)       LABS:    Recent Labs     07/15/22  0530 07/15/22  1602 07/16/22  0006 07/16/22  0450   WBC 7.5  --   --  9.3   HGB 7.3*   < > 7.4* 6.3*   HCT 21.1*   < > 22.3* 18.7*   PLT 94*  --   --  85*    < > = values in this interval not displayed. Recent Labs     07/16/22  0450      K 5.0      CO2 17*   PHOS 3.7   BUN 58*   CREATININE 4.1*      No results for input(s): PROT, INR, APTT in the last 72 hours. LOWER EXTREMITY EXAMINATION    Dressing to left lower extremity clean, dry, intact. No strikethrough drainage noted to external layers of the dressing. No pain with calf compression. Right lower extremity soft tissue envelope is closed and without acute signs infection. IMAGING:  XR Left foot 7/15/22  Narrative   EXAM: XR FOOT LEFT (MIN 3 VIEWS)       INDICATION: s/p left foot TMA,        COMPARISON: MRI July 6, 2022, radiograph July 5, 2022       FINDINGS:       Bones:  There is been interval amputation of the first through fourth metatarsals. There is been a reamputation of the fifth metatarsal.       Joints: The joint spaces are well maintained. No joint effusion. Soft tissues: Mild soft tissue swelling is present likely due to recent surgery. Impression   1. Transmetatarsal amputation with mild soft tissue swelling likely due to recent surgery. ASSESSMENT/PLAN  -s/p transmetatarsal amputation; left LE 2/2 osteomyelitis  -Peripheral arterial disease; b/l LE  -Type 2 diabetes mellitus with peripheral neuropathy  -Acute blood loss anemia     -Patient seen and examined at bedside this AM  -Hypotensive, otherwise VSS, No leukocytosis noted (WBC 9.3)  -ESR 53, CRP 10.4  -HbA1c 5.9%  -Blood cultures x2; NGTD  -Wound culture: MRSA, diphtheroids  -Imaging reviewed, impression noted above  -ID following; continue antibiotics per their recommendations  -Vascular surgery following; s/p left SFA to peroneal bypass (7/13/22)  -Dressing to left LE left clean, dry, intact  -Strict nonweightbearing to left lower extremity  -Acute blood loss anemia postoperatively; received 2 units of pRBC yesterday; receiving 1 unit this AM. No strikethrough drainage to left LE dressing; unlikely post-op bleeding at surgical site.   -Patient would likely benefit from skilled nursing facility placement at discharge. DISPO: S/p transmetatarsal amputation; left LE 2/2 osteomyelitis. Labs and imaging reviewed. Continue antibiotics per ID recommendations. Vascular surgery following. No further surgical intervention from podiatric standpoint.      Discussed assessment and plan with Dr. Chavez Carrera DPM.    Nathaniel Stallworth DPM  Podiatric Resident, PGY-3  Pager #: [phone] or Perfect Serve

## 2022-07-16 NOTE — PROGRESS NOTES
Hospital Medicine  Progress Note    PCP: Kim Conner    Date of Admission: 7/5/2022      Chief Complaint: Worsening left foot pain and swelling      History Of Present Illness:    81 y.o. male with DM type 2, HTN and hypothyroidism, PAF (off pradaxa) and BPH who presented with worsening wound left foot. The wound started in the bottom of his left foot as a callus. Patient has been putting Vaseline and soaking the foot. When he saw his podiatrist (Dr. Mercedes Urbina ) last week he was recommended to start using Betadine to the left foot. Patient has not following the wound care recommendations, has been putting Betadine on the wound and has been wearing a sock over it and continue to walk bearing weight on the left foot. Patient started noticing mild yellow drainage coming from the wound. Patient denies fever, chills, nausea, vomiting, chest pain or shortness of breath    On presentation,, apart from elevated BP, he had fairly normal vitals. He had no leucocytosis. Hgb was 12 with . Cr was 2.3 (baseline about 1.6). Interim HPI  No new complaints  Denies chest pain    Underwent a left superficial femoral to peroneal artery bypass yesterday afternoon. Patient denies any acute overnight events    Was in OR 7/15 for TRANSMETATARSAL AMPUTATION WITH TENDO ACHILLES LENGTHENING LEFT FOOT    Estimated Blood Loss (mL): 250 mL (300cc at previous vascular surgery)    Transfusing PRBCs        Medications: Reviewed        Allergies:  Patient has no known allergies. REVIEW OF SYSTEMS COMPLETED:   Pertinent positives as noted in the HPI. All other systems reviewed and negative. PHYSICAL EXAM PERFORMED:    BP (!) 118/52   Pulse 88   Temp 98.1 °F (36.7 °C) (Oral)   Resp 18   Ht 6' 2\" (1.88 m)   Wt 187 lb 6.3 oz (85 kg)   SpO2 97%   BMI 24.06 kg/m²     General appearance:  No apparent distress, appears stated age and cooperative.   HEENT:  Normal cephalic, atraumatic without obvious deformity. .  Neck: Supple, with full range of motion. No jugular venous distention. Trachea midline. Respiratory:  Normal respiratory effort. Clear to auscultation, bilaterally without Rales/Wheezes/Rhonchi. Cardiovascular:  Regular rate and rhythm with normal S1/S2 without murmurs, rubs or gallops. Abdomen: Soft, non-tender, non-distended with normal bowel sounds. Musculoskeletal:  No clubbing, cyanosis or edema bilaterally. Full range of motion without deformity. Skin: Dressing over left foot: C/D/I  Neurologic:  grossly non-focal.  Psychiatric:  Alert and oriented, thought content appropriate, normal insight  Capillary Refill: Brisk,3 seconds, normal  Peripheral Pulses: DP and PT pulses-nonpalpable bilaterally, biphasic with Doppler per podiatry on the right, monophasic on the left. Labs:     Recent Labs     07/15/22  0530 07/15/22  1602 07/16/22  0006 07/16/22  0450 07/16/22  1011   WBC 7.5  --   --  9.3 9.5   HGB 7.3*   < > 7.4* 6.3* 7.7*   HCT 21.1*   < > 22.3* 18.7* 23.3*   PLT 94*  --   --  85* 90*    < > = values in this interval not displayed. Recent Labs     07/14/22  1023 07/15/22  0530 07/16/22  0450   * 135* 136   K 5.3* 5.0 5.0    106 108   CO2 21 22 17*   BUN 33* 46* 58*   CREATININE 2.1* 3.2* 4.1*   CALCIUM 8.3 8.3 7.9*   PHOS 3.2 3.5 3.7       No results for input(s): AST, ALT, BILIDIR, BILITOT, ALKPHOS in the last 72 hours. No results for input(s): INR in the last 72 hours. No results for input(s): Kathyrn Belch in the last 72 hours.     Urinalysis:      Lab Results   Component Value Date/Time    NITRU Negative 07/10/2022 05:02 PM    WBCUA 0-2 07/10/2022 05:02 PM    BACTERIA Rare 07/10/2022 05:02 PM    RBCUA 3-4 07/10/2022 05:02 PM    BLOODU Negative 07/10/2022 05:02 PM    SPECGRAV 1.020 07/10/2022 05:02 PM    GLUCOSEU Negative 07/10/2022 05:02 PM    GLUCOSEU Negative 07/05/2011 09:10 PM       Radiology:     CXR: I have reviewed the CXR with the following interpretation: NA  EKG:  I have reviewed the EKG with the following interpretation: NA    XR FOOT LEFT (MIN 3 VIEWS)   Final Result   1. Transmetatarsal amputation with mild soft tissue swelling likely due to recent surgery. XR TIBIA FIBULA LEFT (2 VIEWS)   Final Result      Intraoperative fluoroscopy provided as above. See operative report for details. FLUORO FOR SURGICAL PROCEDURES   Final Result      Intraoperative fluoroscopy provided as above. See operative report for details. VL PRE OP VEIN MAPPING   Final Result      XR CHEST PORTABLE   Final Result      No acute cardiopulmonary findings. VL DUP LOWER EXTREMITY ARTERIES LEFT   Final Result      MRI FOOT LEFT WO CONTRAST   Final Result   1. Limited exam secondary to motion artifact. 2. There is marrow edema identified within the fourth metatarsal head and proximal fourth phalanx. In the setting of infection or open wound in this region, this is compatible with osteomyelitis. 3. Status post amputations of the second, third, and fifth metatarsals as detailed above. 4. Healed fracture deformities identified involving the proximal first phalanx. There is osteoarthrosis identified at the first metatarsophalangeal joint. 5. There is subcutaneous edema identified about the foot. This can be seen with cellulitis and/or venous insufficiency. No soft tissue abscess is identified. 6. There is severe fatty atrophy involving the deep muscular foot. Correlate for underlying neuropathy. XR FOOT LEFT (MIN 3 VIEWS)   Final Result      Osteomyelitis is not excluded based on this exam particularly at the base of the proximal phalanx of the second digit. If this is a clinical concern evaluation with MRI should be performed.       IR ANGIOGRAM EXTREMITY LEFT    (Results Pending)       Consults:    IP CONSULT TO PODIATRY  PHARMACY TO DOSE VANCOMYCIN  IP CONSULT TO HOSPITALIST  IP CONSULT TO PHARMACY  IP CONSULT TO INFECTIOUS DISEASES  IP CONSULT TO VASCULAR SURGERY  IP CONSULT TO NEPHROLOGY        ASSESSMENT/PLAN:  81 y.o. male with DM type 2, HTN and hypothyroidism, PAF (off pradaxa) and BPH who presented with worsening wound left foot. Cellulitis left lower extremity with full-thickness ulceration plantar surface with osteomyelitis. POA  - Possible diabetic ulcer. H/e, appears A1C is not elevated and Home med list does not have DM meds: verify home med list  - R/o sec to severe PAD   - Hx of Multiple amputations. - Sed rate was 53 and CRP10  - MRI shows osteo  - Cx sent: mixed skin cash, Diphtheroids, MRSA; follow results  - Started on broad-spectrum IV antibiotics: ID consulted. - S/p  TRANSMETATARSAL AMPUTATION WITH TENDO ACHILLES LENGTHENING LEFT FOOT 7/15/2022  - PT/OT eval      DM-2 complicated by peripheral neuropathy: POA  - Verify home meds for accuracy as does not seem to have diabetic meds  - A1C too does not appear high  - We will monitor BGs closely for 1-2 days  - SSI      Peripheral vascular disease: POA  - On Plavix  - Vascular studies ordered  - Seen by Vascular Surgery: s/p angiogram of the LLE. Planned L SFA-Peroneal bypass    Pre-op risk stratification  He underwent angioplasty in the LLE [facility] in 2013  No known hx of CAD  Neg stress test 2003 and 2014  Echo 7/12: Normal EF, no RWMA, grade II DD  Currently appears euvolemic  Denies chest pain, or TINSLEY    May proceed to the OR for limb saving procedure, with known risk: (RCRI Class IV risk with 15 % 30 day risk of death, MI or cardiac arrest) with no further w/u at this time    - Will likely benefit from updated ischemia eval. Oscar as the presence of PAD likely implies disease in the coronaries as well.     - Continue BB and other cardiac meds as appropriate in the samreen-op period; but - avoid hypotension to prevent compromise of coronary circulation  - Keep on telemetry       Mental status changes 7/14/2022  - Episode of near syncope/syncopal episode  - Possiby Bladder scans        Pxt tells me after discussing with his sons, he wants to be Full code. DVT Prophylaxis: Lovenox     Diet: ADULT DIET; Regular; 4 carb choices (60 gm/meal)  Code Status: Full Code    PT/OT Eval Status: As tolerated with no weightbearing on the left foot      Disposition -GMF with telemetry  S/P Superficial femoral artery to peroneal artery bypass of LLE   And TMA left foot. 7/15/2022  Post op anemia, receiving blood.    PT/OT eval when hgb stable       Uriah Diana MD

## 2022-07-17 LAB
ALBUMIN SERPL-MCNC: 2.9 G/DL (ref 3.4–5)
ALBUMIN SERPL-MCNC: 3.1 G/DL (ref 3.4–5)
ANION GAP SERPL CALCULATED.3IONS-SCNC: 11 MMOL/L (ref 3–16)
ANION GAP SERPL CALCULATED.3IONS-SCNC: 13 MMOL/L (ref 3–16)
ANISOCYTOSIS: ABNORMAL
BASOPHILS ABSOLUTE: 0 K/UL (ref 0–0.2)
BASOPHILS RELATIVE PERCENT: 0 %
BUN BLDV-MCNC: 62 MG/DL (ref 7–20)
BUN BLDV-MCNC: 66 MG/DL (ref 7–20)
CALCIUM SERPL-MCNC: 7.7 MG/DL (ref 8.3–10.6)
CALCIUM SERPL-MCNC: 7.8 MG/DL (ref 8.3–10.6)
CHLORIDE BLD-SCNC: 105 MMOL/L (ref 99–110)
CHLORIDE BLD-SCNC: 107 MMOL/L (ref 99–110)
CO2: 23 MMOL/L (ref 21–32)
CO2: 23 MMOL/L (ref 21–32)
CREAT SERPL-MCNC: 4.1 MG/DL (ref 0.8–1.3)
CREAT SERPL-MCNC: 4.3 MG/DL (ref 0.8–1.3)
EOSINOPHILS ABSOLUTE: 0 K/UL (ref 0–0.6)
EOSINOPHILS RELATIVE PERCENT: 0 %
GFR AFRICAN AMERICAN: 16
GFR AFRICAN AMERICAN: 17
GFR NON-AFRICAN AMERICAN: 13
GFR NON-AFRICAN AMERICAN: 14
GLUCOSE BLD-MCNC: 136 MG/DL (ref 70–99)
GLUCOSE BLD-MCNC: 144 MG/DL (ref 70–99)
GLUCOSE BLD-MCNC: 148 MG/DL (ref 70–99)
GLUCOSE BLD-MCNC: 159 MG/DL (ref 70–99)
GLUCOSE BLD-MCNC: 196 MG/DL (ref 70–99)
GLUCOSE BLD-MCNC: 217 MG/DL (ref 70–99)
HCT VFR BLD CALC: 21 % (ref 40.5–52.5)
HCT VFR BLD CALC: 22.8 % (ref 40.5–52.5)
HEMOGLOBIN: 7.2 G/DL (ref 13.5–17.5)
HEMOGLOBIN: 7.8 G/DL (ref 13.5–17.5)
LYMPHOCYTES ABSOLUTE: 1.1 K/UL (ref 1–5.1)
LYMPHOCYTES RELATIVE PERCENT: 11 %
MCH RBC QN AUTO: 32.2 PG (ref 26–34)
MCHC RBC AUTO-ENTMCNC: 34.2 G/DL (ref 31–36)
MCV RBC AUTO: 94.3 FL (ref 80–100)
MONOCYTES ABSOLUTE: 1.1 K/UL (ref 0–1.3)
MONOCYTES RELATIVE PERCENT: 11 %
NEUTROPHILS ABSOLUTE: 7.8 K/UL (ref 1.7–7.7)
NEUTROPHILS RELATIVE PERCENT: 78 %
OVALOCYTES: ABNORMAL
PDW BLD-RTO: 16.5 % (ref 12.4–15.4)
PERFORMED ON: ABNORMAL
PHOSPHORUS: 3.2 MG/DL (ref 2.5–4.9)
PHOSPHORUS: 3.6 MG/DL (ref 2.5–4.9)
PLATELET # BLD: 81 K/UL (ref 135–450)
PLATELET SLIDE REVIEW: ABNORMAL
PMV BLD AUTO: 9.2 FL (ref 5–10.5)
POTASSIUM SERPL-SCNC: 4.3 MMOL/L (ref 3.5–5.1)
POTASSIUM SERPL-SCNC: 5.1 MMOL/L (ref 3.5–5.1)
RBC # BLD: 2.23 M/UL (ref 4.2–5.9)
SODIUM BLD-SCNC: 141 MMOL/L (ref 136–145)
SODIUM BLD-SCNC: 141 MMOL/L (ref 136–145)
WBC # BLD: 10 K/UL (ref 4–11)

## 2022-07-17 PROCEDURE — 97530 THERAPEUTIC ACTIVITIES: CPT

## 2022-07-17 PROCEDURE — 2580000003 HC RX 258: Performed by: STUDENT IN AN ORGANIZED HEALTH CARE EDUCATION/TRAINING PROGRAM

## 2022-07-17 PROCEDURE — 1200000000 HC SEMI PRIVATE

## 2022-07-17 PROCEDURE — 2500000003 HC RX 250 WO HCPCS: Performed by: INTERNAL MEDICINE

## 2022-07-17 PROCEDURE — 6370000000 HC RX 637 (ALT 250 FOR IP): Performed by: STUDENT IN AN ORGANIZED HEALTH CARE EDUCATION/TRAINING PROGRAM

## 2022-07-17 PROCEDURE — 85018 HEMOGLOBIN: CPT

## 2022-07-17 PROCEDURE — 6370000000 HC RX 637 (ALT 250 FOR IP): Performed by: INTERNAL MEDICINE

## 2022-07-17 PROCEDURE — 97166 OT EVAL MOD COMPLEX 45 MIN: CPT

## 2022-07-17 PROCEDURE — 6360000002 HC RX W HCPCS: Performed by: STUDENT IN AN ORGANIZED HEALTH CARE EDUCATION/TRAINING PROGRAM

## 2022-07-17 PROCEDURE — 85014 HEMATOCRIT: CPT

## 2022-07-17 PROCEDURE — 80069 RENAL FUNCTION PANEL: CPT

## 2022-07-17 PROCEDURE — 85730 THROMBOPLASTIN TIME PARTIAL: CPT

## 2022-07-17 PROCEDURE — 97162 PT EVAL MOD COMPLEX 30 MIN: CPT

## 2022-07-17 PROCEDURE — 2580000003 HC RX 258: Performed by: INTERNAL MEDICINE

## 2022-07-17 PROCEDURE — 85025 COMPLETE CBC W/AUTO DIFF WBC: CPT

## 2022-07-17 PROCEDURE — 36415 COLL VENOUS BLD VENIPUNCTURE: CPT

## 2022-07-17 RX ORDER — HEPARIN SODIUM 1000 [USP'U]/ML
4000 INJECTION, SOLUTION INTRAVENOUS; SUBCUTANEOUS ONCE
Status: COMPLETED | OUTPATIENT
Start: 2022-07-17 | End: 2022-07-18

## 2022-07-17 RX ORDER — MORPHINE SULFATE 2 MG/ML
2 INJECTION, SOLUTION INTRAMUSCULAR; INTRAVENOUS EVERY 4 HOURS PRN
Status: DISCONTINUED | OUTPATIENT
Start: 2022-07-17 | End: 2022-07-18

## 2022-07-17 RX ORDER — CARVEDILOL 6.25 MG/1
12.5 TABLET ORAL 2 TIMES DAILY WITH MEALS
Status: CANCELLED | OUTPATIENT
Start: 2022-07-18

## 2022-07-17 RX ORDER — HEPARIN SODIUM 1000 [USP'U]/ML
4000 INJECTION, SOLUTION INTRAVENOUS; SUBCUTANEOUS PRN
Status: DISCONTINUED | OUTPATIENT
Start: 2022-07-17 | End: 2022-07-18

## 2022-07-17 RX ORDER — HEPARIN SODIUM 10000 [USP'U]/100ML
5-30 INJECTION, SOLUTION INTRAVENOUS CONTINUOUS
Status: DISCONTINUED | OUTPATIENT
Start: 2022-07-17 | End: 2022-07-18

## 2022-07-17 RX ORDER — HEPARIN SODIUM 1000 [USP'U]/ML
2000 INJECTION, SOLUTION INTRAVENOUS; SUBCUTANEOUS PRN
Status: DISCONTINUED | OUTPATIENT
Start: 2022-07-17 | End: 2022-07-18

## 2022-07-17 RX ADMIN — MORPHINE SULFATE 2 MG: 2 INJECTION, SOLUTION INTRAMUSCULAR; INTRAVENOUS at 14:37

## 2022-07-17 RX ADMIN — LINEZOLID 600 MG: 600 TABLET, FILM COATED ORAL at 20:37

## 2022-07-17 RX ADMIN — SODIUM CHLORIDE, PRESERVATIVE FREE 10 ML: 5 INJECTION INTRAVENOUS at 10:14

## 2022-07-17 RX ADMIN — ATORVASTATIN CALCIUM 40 MG: 40 TABLET, FILM COATED ORAL at 20:37

## 2022-07-17 RX ADMIN — INSULIN LISPRO 1 UNITS: 100 INJECTION, SOLUTION INTRAVENOUS; SUBCUTANEOUS at 20:38

## 2022-07-17 RX ADMIN — TAMSULOSIN HYDROCHLORIDE 0.4 MG: 0.4 CAPSULE ORAL at 20:37

## 2022-07-17 RX ADMIN — INSULIN LISPRO 1 UNITS: 100 INJECTION, SOLUTION INTRAVENOUS; SUBCUTANEOUS at 18:00

## 2022-07-17 RX ADMIN — GABAPENTIN 300 MG: 300 CAPSULE ORAL at 20:37

## 2022-07-17 RX ADMIN — SODIUM CHLORIDE, PRESERVATIVE FREE 10 ML: 5 INJECTION INTRAVENOUS at 00:37

## 2022-07-17 RX ADMIN — SODIUM BICARBONATE: 84 INJECTION, SOLUTION INTRAVENOUS at 21:18

## 2022-07-17 RX ADMIN — MORPHINE SULFATE 2 MG: 2 INJECTION, SOLUTION INTRAMUSCULAR; INTRAVENOUS at 05:16

## 2022-07-17 RX ADMIN — ASPIRIN 81 MG: 81 TABLET, COATED ORAL at 10:13

## 2022-07-17 RX ADMIN — CETIRIZINE HYDROCHLORIDE 5 MG: 10 TABLET, FILM COATED ORAL at 10:13

## 2022-07-17 RX ADMIN — INSULIN LISPRO 2 UNITS: 100 INJECTION, SOLUTION INTRAVENOUS; SUBCUTANEOUS at 13:43

## 2022-07-17 RX ADMIN — LINEZOLID 600 MG: 600 TABLET, FILM COATED ORAL at 09:00

## 2022-07-17 ASSESSMENT — PAIN DESCRIPTION - LOCATION
LOCATION: LEG

## 2022-07-17 ASSESSMENT — PAIN DESCRIPTION - ORIENTATION
ORIENTATION: LEFT

## 2022-07-17 ASSESSMENT — PAIN SCALES - GENERAL
PAINLEVEL_OUTOF10: 2
PAINLEVEL_OUTOF10: 3
PAINLEVEL_OUTOF10: 7
PAINLEVEL_OUTOF10: 7
PAINLEVEL_OUTOF10: 3
PAINLEVEL_OUTOF10: 2

## 2022-07-17 ASSESSMENT — PAIN DESCRIPTION - DESCRIPTORS
DESCRIPTORS: ACHING

## 2022-07-17 NOTE — PLAN OF CARE
Problem: ABCDS Injury Assessment  Goal: Absence of physical injury  7/17/2022 1718 by Monica Woo RN  Outcome: Progressing  7/17/2022 0603 by Roberta Alonzo RN  Outcome: Progressing  Flowsheets (Taken 7/16/2022 2000)  Absence of Physical Injury: Implement safety measures based on patient assessment     Problem: Skin/Tissue Integrity - Adult  Goal: Skin integrity remains intact  7/17/2022 1718 by Monica Woo RN  Outcome: Progressing  7/17/2022 0603 by Roberta Alonzo RN  Outcome: Progressing  Flowsheets  Taken 7/16/2022 2000  Skin Integrity Remains Intact: Monitor for areas of redness and/or skin breakdown  Taken 7/16/2022 1900  Skin Integrity Remains Intact:   Monitor for areas of redness and/or skin breakdown   Assess vascular access sites hourly  Goal: Incisions, wounds, or drain sites healing without S/S of infection  7/17/2022 1718 by Monica Woo RN  Outcome: Progressing  7/17/2022 0603 by Roberta Alonzo RN  Outcome: Progressing  Flowsheets (Taken 7/16/2022 1900)  Incisions, Wounds, or Drain Sites Healing Without Sign and Symptoms of Infection: ADMISSION and DAILY: Assess and document risk factors for pressure ulcer development  Goal: Oral mucous membranes remain intact  7/17/2022 1718 by Monica Woo RN  Outcome: Progressing  7/17/2022 0603 by Roberta Alonzo RN  Outcome: Progressing  Flowsheets (Taken 7/16/2022 1900)  Oral Mucous Membranes Remain Intact: Assess oral mucosa and hygiene practices     Problem: Discharge Planning  Goal: Discharge to home or other facility with appropriate resources  7/17/2022 1718 by Monica Woo RN  Outcome: Progressing  7/17/2022 0603 by Roberta Alonzo RN  Outcome: Progressing     Problem: Safety - Adult  Goal: Free from fall injury  7/17/2022 1718 by Monica Woo RN  Outcome: Progressing  7/17/2022 0603 by Roberta Alonzo RN  Outcome: Progressing     Problem: Chronic Conditions and Co-morbidities  Goal: Patient's chronic conditions and co-morbidity symptoms are monitored and maintained or improved  7/17/2022 1718 by Joanna Gaines RN  Outcome: Progressing  7/17/2022 0603 by Abdirashid Lacy RN  Outcome: Progressing     Problem: Pain  Goal: Verbalizes/displays adequate comfort level or baseline comfort level  7/17/2022 1718 by Joanna Gaines RN  Outcome: Progressing  7/17/2022 0603 by Abdirashid Lacy RN  Outcome: Progressing     Problem: Skin/Tissue Integrity  Goal: Absence of new skin breakdown  Description: 1. Monitor for areas of redness and/or skin breakdown  2. Assess vascular access sites hourly  3. Every 4-6 hours minimum:  Change oxygen saturation probe site  4. Every 4-6 hours:  If on nasal continuous positive airway pressure, respiratory therapy assess nares and determine need for appliance change or resting period.   7/17/2022 1718 by Joanna Gaines RN  Outcome: Progressing  7/17/2022 0603 by Abdirashid Lacy RN  Outcome: Progressing

## 2022-07-17 NOTE — PROGRESS NOTES
Vascular Surgery   Daily Progress Note  Patient: Maura Lopez    CC: Atherosclerosis native artery left lower extremity with ulceration    SUBJECTIVE:   Pt rested well overnight with no acute events. Denies pain. HDS. ROS: A 14 point review of systems was conducted, significant findings as noted above. All other systems negative. OBJECTIVE:   Infusions:   sodium chloride      sodium bicarbonate infusion 100 mL/hr at 07/16/22 2043    sodium chloride      sodium chloride Stopped (07/12/22 0100)    dextrose      sodium chloride 25 mL/hr at 07/15/22 0743      I/O:I/O last 3 completed shifts: In: 826 [P.O.:430; Blood:396]  Out: 2470 [Urine:2450; Drains:20]           Wt Readings from Last 1 Encounters:   07/16/22 187 lb 6.3 oz (85 kg)       Exam:  BP (!) 141/55   Pulse 77   Temp 99.5 °F (37.5 °C) (Oral)   Resp 18   Ht 6' 2\" (1.88 m)   Wt 187 lb 6.3 oz (85 kg)   SpO2 100%   BMI 24.06 kg/m²     General appearance: NAD  Neuro: A&Ox3, no focal deficits, sensation intact. [place]  Chest/Lungs: normal effort, no adventitious breath sounds, on RA  Cardiovascular: RRR  Abdomen: soft, non-distended, non-tender  Extremities: mepilex covering was taken down. Two draining bullae were noted in the posterior aspect of the calf incision with smaller bullae around the calf incision. No associated ecchymosis or hematoma. Drain with minimal sanguinous output, sensation and motor intact, compartments soft         Vascular Exam:    femoral  DP  PT    LEFT  +  -/+ -/+   RIGHT  +  +  +       LABS:   Recent Labs     07/16/22  0450 07/16/22  1011   WBC 9.3 9.5   HGB 6.3* 7.7*   HCT 18.7* 23.3*   MCV 96.9 96.4   PLT 85* 90*          Recent Labs     07/16/22  0450 07/17/22  0456    141   K 5.0 5.1    107   CO2 17* 23   PHOS 3.7 3.6   BUN 58* 66*   CREATININE 4.1* 4.3*        No results for input(s): AST, ALT, ALB, BILIDIR, BILITOT, ALKPHOS in the last 72 hours.    No results for input(s): LIPASE, AMYLASE in the last 72 hours. No results for input(s): PROT, INR, APTT in the last 72 hours. No results for input(s): CKTOTAL, CKMB, CKMBINDEX, TROPONINI in the last 72 hours. ASSESSMENT/PLAN:   This is a 81y.o. year old male with a diagnosis of LLE PAD s/p Left Superficial Femoral Artery to Peroneal Artery Bypass with Reversed Greater Saphenous Vein on 7/13/22. Pt now s/p left foot transmetatarsal amputation on 7/15.     -Heparin gtt held currently 2/2 concern for hematoma. Pt is OK to transition to 2.5 mg eliquis at primary teams discretion.   -Minimal output from LLE JOHN drain, will remove prior to d/c. Dressing changed this am.   - No signs of bleeding from bypass incision.   - ID on board, cont vanco and cefepime    Ally Herrera DO  PGY1, General Surgery  07/17/22  7:27 AM    I am post-call today and will not be on campus. Please contact Dr. Jon Peoples at [phone] or Dr. Rosa Joseph at [phone] for questions or concerns regarding this patient.

## 2022-07-17 NOTE — PROGRESS NOTES
Interval History and plan:      BP is stable   UOP picking up a bit    SP  Fem / pop bypass on 7/13/2022  For surgery today ( TMA) left foot       Plan:    Cont IVF decreased rate  Changed bicarb fluids yesterday   BP better  Creatinine pleatued likely should see improvement   Electrolytes better   Follow with UOP  Good UOP  Closely watch anbx dosing and levels  On ABX and monitor vancomycin levels   Watch closely for RRT need                       Assessment :     CKD Stage 3b  Likely due to DM and hypertension  Cr is 1.8- eGFR of 36 ml/min  UA- bland, wbc 6-9,  No recent one in the system      Hypertension   BP: (112-124)/(53-60)  Heart Rate:  [83-92]   BP goal inpatient [phone] systolic inpatient  Home regimen: lisinopril 10 mg daily only  Current inpatient regimen: carvedilol 6.25 mg BID, hydralazine 25 mg TID, nifedipine 60 mg daily. Also has Afib    Anemia of CKD             sepsis  DM  PVD    [facility] would like to thank Jesse Alexander MD   for opportunity to serve this patient      Please call with questions at-   24 Hrs Answering service [phone] or  7 am- 5 pm via Perfect serve or cell phone  Smith Carrasco MD          CC/reason for consult :     MO     HPI :     José Kay is a 81 y.o. male presented to   the hospital on 7/5/2022 with wound infection in the left leg. He is followed outpatient by podiatry,for wound, which continued to   Get worse despite the treatment. Also has discharge from the wound  Brought to hospital admitted  Also found to have CKD  because of which we are consulted.          ROS:     Seen with- no family    positives in bold   Constitutional:  fever, chills, weakness, weight change, fatigue  Skin:  rash, pruritus, hair loss, bruising, dry skin, petechiae  Head, Face, Neck   headaches, swelling,  cervical adenopathy  Respiratory: shortness of breath, cough, or wheezing  Cardiovascular: chest pain, palpitations, dizzy, edema  Gastrointestinal: nausea, vomiting, diarrhea, constipation,belly pain    Yellow skin, blood in stool  Musculoskeletal:  back pain, muscle weakness, gait problems,       joint pain or swelling. Genitourinary:  dysuria, poor urine flow, flank pain, blood in urine  Neurologic:  vertigo, TIA'S, syncope, seizures, focal weakness  Psychosocial:  insomnia, anxiety, or depression. Additional positive findings:                    All other remaining systems are negative or unable to obtain        PMH/PSH/SH/Family History:     Past Medical History:   Diagnosis Date    Diabetic eye exam ([facility] [address] 1/29/14    Dr. Sulma Hernandez    DM (diabetes mellitus) ([facility] [address] 1/13/2014    Gout     Hypothyroidism 4/18/2016    Seasonal allergic rhinitis        Past Surgical History:   Procedure Laterality Date    COLONOSCOPY  2009 appx     normal     COLONOSCOPY N/A 6/20/2022    COLONOSCOPY POLYPECTOMY SNARE/COLD BIOPSY performed by Terri Ortiz MD at [facility] Left 7/13/2022    LEFT SUPERFICIAL FEMORAL ARTERY TO PERONEAL ARTERY BYPASS WITH REVERSED GREAT SAPHENOUS VEIN GRAFT performed by Zee Kumari MD at [address] [place] [address]. Left 7/15/2022    TRANSMETATARSAL AMPUTATION WITH TENDO ACHILLES LENGTHENING LEFT FOOT performed by Nito Rocha DPM at [address] [facility], bilateral    INCISIONAL HERNIA REPAIR      bilateral        reports that he quit smoking about 63 years ago. He has never used smokeless tobacco. He reports that he does not drink alcohol and does not use drugs. family history is not on file.          Medication:     Current Facility-Administered Medications: 0.9 % sodium chloride infusion, , IntraVENous, PRN  sodium bicarbonate 150 mEq in dextrose 5 % 1,000 mL infusion, , IntraVENous, Continuous  linezolid (ZYVOX) tablet 600 mg, 600 mg, Oral, 2 times per day  0.9 % sodium chloride infusion, , IntraVENous, PRN  HYDROmorphone (DILAUDID) injection 0.25 mg, 0.25 mg, IntraVENous, Q3H PRN **OR** HYDROmorphone (DILAUDID) injection 0.5 mg, 0.5 mg, IntraVENous, Q3H PRN  [Held by provider] carvedilol (COREG) tablet 12.5 mg, 12.5 mg, Oral, BID WC  [Held by provider] hydrALAZINE (APRESOLINE) tablet 50 mg, 50 mg, Oral, 3 times per day  sodium chloride flush 0.9 % injection 5-40 mL, 5-40 mL, IntraVENous, 2 times per day  sodium chloride flush 0.9 % injection 5-40 mL, 5-40 mL, IntraVENous, PRN  0.9 % sodium chloride infusion, , IntraVENous, PRN  cloNIDine (CATAPRES) tablet 0.1 mg, 0.1 mg, Oral, Q2H PRN  labetalol (NORMODYNE;TRANDATE) injection 10 mg, 10 mg, IntraVENous, Q2H PRN  aspirin EC tablet 81 mg, 81 mg, Oral, Daily  [Held by provider] heparin (porcine) injection 4,000 Units, 4,000 Units, IntraVENous, PRN  [Held by provider] heparin (porcine) injection 2,000 Units, 2,000 Units, IntraVENous, PRN  [Held by provider] NIFEdipine (ADALAT CC) extended release tablet 60 mg, 60 mg, Oral, Daily  atorvastatin (LIPITOR) tablet 40 mg, 40 mg, Oral, Nightly  traMADol (ULTRAM) tablet 50 mg, 50 mg, Oral, Q6H PRN  hydrALAZINE (APRESOLINE) injection 10 mg, 10 mg, IntraVENous, Q4H PRN  methocarbamol (ROBAXIN) tablet 750 mg, 750 mg, Oral, 4x Daily PRN  gabapentin (NEURONTIN) capsule 300 mg, 300 mg, Oral, Nightly  glucose chewable tablet 16 g, 4 tablet, Oral, PRN  dextrose bolus 10% 125 mL, 125 mL, IntraVENous, PRN **OR** dextrose bolus 10% 250 mL, 250 mL, IntraVENous, PRN  glucagon (rDNA) injection 1 mg, 1 mg, IntraMUSCular, PRN  dextrose 5 % solution, 100 mL/hr, IntraVENous, PRN  insulin lispro (1 Unit Dial) 0-6 Units, 0-6 Units, SubCUTAneous, TID WC  insulin lispro (1 Unit Dial) 0-3 Units, 0-3 Units, SubCUTAneous, Nightly  cetirizine (ZYRTEC) tablet 5 mg, 5 mg, Oral, Daily  tamsulosin (FLOMAX) capsule 0.4 mg, 0.4 mg, Oral, Nightly  sodium chloride flush 0.9 % injection 5-40 mL, 5-40 mL, IntraVENous, 2 times per day  sodium chloride flush 0.9 % injection 5-40 mL, 5-40 mL, IntraVENous, PRN  0.9 % sodium chloride infusion, , IntraVENous, PRN  ondansetron (ZOFRAN-ODT) disintegrating tablet 4 mg, 4 mg, Oral, Q8H PRN **OR** ondansetron (ZOFRAN) injection 4 mg, 4 mg, IntraVENous, Q6H PRN  polyethylene glycol (GLYCOLAX) packet 17 g, 17 g, Oral, Daily PRN  acetaminophen (TYLENOL) tablet 650 mg, 650 mg, Oral, Q6H PRN **OR** acetaminophen (TYLENOL) suppository 650 mg, 650 mg, Rectal, Q6H PRN  morphine (PF) injection 2 mg, 2 mg, IntraVENous, Q3H PRN       Vitals :     Vitals:    07/16/22 1900   BP: 112/60   Pulse: 92   Resp: 20   Temp: 98.8 °F (37.1 °C)   SpO2:        I & O :       Intake/Output Summary (Last 24 hours) at 7/16/2022 2109  Last data filed at 7/16/2022 2007  Gross per 24 hour   Intake 676 ml   Output 1570 ml   Net -894 ml          Physical Examination :     General appearance: Anxious- no, distressed- no, in good spirits-    Alert, pleasant  HEENT: Lips- normal, teeth- ok , oral mucosa- moist  Neck : Mass- no, appears symmetrical, JVD- not visible  Respiratory: Respiratory effort-  normal, wheeze- no, crackles -   Cardiovascular: Ausculation- No M/R/G, Edema left leg   Abdomen: visible mass- no, distention- no, scar- no, tenderness- no                            hepatosplenomegaly-  no  Musculoskeletal:  clubbing no,cyanosis- no , digital ischemia- no                           muscle strength- grossly normal , tone - grossly normal  Skin: rashes- no , ulcers- no, induration- no, tightening - no  Psychiatric:  Judgement and insight- normal           AAO X 3  Additional finding:   Has left leg wound      LABS:     Recent Labs     07/15/22  0530 07/15/22  1602 07/16/22  0006 07/16/22  0450 07/16/22  1011   WBC 7.5  --   --  9.3 9.5   HGB 7.3*   < > 7.4* 6.3* 7.7*   HCT 21.1*   < > 22.3* 18.7* 23.3*   PLT 94*  --   --  85* 90*    < > = values in this interval not displayed.        Recent Labs     07/14/22  1023 07/15/22  0530 07/16/22  0450 07/16/22  1011   * 135* 136  --    K 5.3* 5.0 5.0  --     106 108  --    CO2 21 22 17*  --    BUN 33* 46* 58*  --    CREATININE 2.1* 3.2* 4.1*  --    GLUCOSE 158* 111* 129*  --    MG 2.60* 2.50* 2.20 2.20   PHOS 3.2 3.5 3.7  --

## 2022-07-17 NOTE — PROGRESS NOTES
Occupational Therapy  Facility/Department: [place] [address] [facility]U  Occupational Therapy Initial Assessment and Treatment      Name: Panchito Gautam  : 1935  MRN: [id number]  Date of Service: 2022    Discharge Recommendations:  Panchito Gautam scored a 14/24 on the AM-PAC ADL Inpatient form. Current research shows that an AM-PAC score of 17 or less is typically not associated with a discharge to the patient's home setting. Based on the patient's AM-PAC score and their current ADL deficits, it is recommended that the patient have 3-5 sessions per week of Occupational Therapy at d/c to increase the patient's independence. Please see assessment section for further patient specific details. If patient discharges prior to next session this note will serve as a discharge summary. Please see below for the latest assessment towards goals. OT Equipment Recommendations  Equipment Needed: No (defer recommendations to discharge faciltiy)       Patient Diagnosis(es): The primary encounter diagnosis was Toe osteomyelitis, left ([facility] [address] Diagnoses of MO (acute kidney injury) ([facility] [address] and Osteomyelitis of left foot, unspecified type [facility]) were also pertinent to this visit. Past Medical History:  has a past medical history of Diabetic eye exam ([facility] [address] DM (diabetes mellitus) ([facility] [address] Gout, Hypothyroidism, and Seasonal allergic rhinitis. Past Surgical History:  has a past surgical history that includes Incisional hernia repair; hernia repair (); Colonoscopy ( appx ); Colonoscopy (N/A, 2022); femoral bypass (Left, 2022); and Foot Amputation (Left, 7/15/2022). Treatment Diagnosis: impaired ADLs/transfers s/p L transmet amputation      Assessment   Performance deficits / Impairments: Decreased functional mobility ; Decreased ADL status; Decreased endurance;Decreased strength  Assessment: Presenting from home alone w/ c/o L foot/toe pain - now s/p transmet amputation and NWB LLE.  At baseline, reports independent w/ ADLs and functional mobility (does receive assist w/ IADLs). Presently, unable to stand to walker despite Max A of 2. Demo good awareness of NWB status. Pt will require further IP OT upon discharge. Continue per POC. Treatment Diagnosis: impaired ADLs/transfers s/p L transmet amputation  REQUIRES OT FOLLOW-UP: Yes  Activity Tolerance  Activity Tolerance: Patient Tolerated treatment well        Plan   Plan  Times per Week: 2-5  Times per Day: Daily  Current Treatment Recommendations: Strengthening, Functional mobility training, Endurance training, Safety education & training, Self-Care / ADL     Restrictions  Position Activity Restriction  Other position/activity restrictions: NWB LLE    Subjective   General  Chart Reviewed: Yes  Additional Pertinent Hx: 81 y.o. to ED 7/5 for c/o left foot/toe pain. Hospital Course:  OR 7/15 for Transmetatarsal Amputation, Left Foot. PMH: DM type 2, HTN and hypothyroidism, PAF (off pradaxa) and BPH. Family / Caregiver Present: No  Referring Practitioner: Ghislaine Felix DPM  Diagnosis: Osteomyelitis    Subjective  Subjective: In bed on arrival. \"I don't know if I'll get back there (independent living). \" Does report he is having difficulty w/ R hand function (decreased dexterity and impaired coordination)- denies h/o CVA. Nurse informed.     surgical pain -not rated, nurse aware    Social/Functional History  Social/Functional History  Lives With: Alone  Type of Home: Senior housing apartment  Home Layout: One level  Home Access: Elevator  Bathroom Shower/Tub: Walk-in shower  Bathroom Toilet: Handicap height  Bathroom Equipment: Grab bars in shower, Shower chair, Hand-held shower, Grab bars around toilet  [address], [employer]  Has the patient had two or more falls in the past year or any fall with injury in the past year?: No  ADL Assistance: Independent  Homemaking Assistance: Needs assistance (cleaning, laundry, meals on wheels)  Ambulation Assistance: Independent (uses rollator outside of apartment and \"when I'm in a crisis. \")  Transfer Assistance: Independent  Active : Yes  Occupation: Retired (Manager)  Additional Comments: Pt manages own medications       Objective               Observation/Palpation  Observation: drain LLE w/ drainage observed on pillow    Safety Devices  Type of Devices: Nurse notified;Call light within reach; Bed alarm in place; Left in bed (LLE elevated on pillows; discussed w/ nurse recommendation to utilize [employer] for OOB)    Balance  Sitting:  (SBA at EOB; demo good awareness of NWB status)  Standing:  (attempted w/o success)       AROM:  (shoulder flex WFL; elbow flex -30 degrees)  Strength:  (LUE -WFL; shoulder flex 3-/5, elbow 4-/5)  Coordination:  (finger opposition intact bilaterally; decreased dexterity/coordination R hand. Pt reports R hand is typically more dexterous (R hand observation - low tone R palm; several fingers trigger and then release; unable to fully extend fingers R))    ADL  Feeding: Setup (assist to cut food; decreased coordination/dexterity R-dominant hand observed)  Toileting:  (turner catheter)          Bed mobility  Rolling to Left: Maximum assistance  Rolling to Right: Maximum assistance  Supine to Sit: 2 Person assistance; Moderate assistance  Sit to Supine:  Moderate assistance;2 Person assistance  Scootin Person assistance;Maximal assistance (to scoot up in bed)  Bed Mobility Comments: NOTE: simulated slide board transfer at EOB - lateral scooting w/ Max A of 2 (pt demo good awareness of NWB precaution)    Transfers  Transfer Comments: Note: attempted sit-stand from elevated bed - unsuccessful despite Max A of 2  Note: may benefit from slide board transfer training       Cognition  Overall Cognitive Status: WFL  Orientation  Overall Orientation Status: Within Functional Limits                      Education Given To: Patient  Education Provided: Role of Therapy;Plan of Care;Precautions;Transfer Training  Education Method: Verbal  Barriers to Learning: None  Education Outcome: Verbalized understanding;Continued education needed             Hand Dominance  Hand Dominance: Right          Pt seen by OT for eval and treat.  Treatment included: bed mobility, unsupported sitting, transfer, pt education                                                      AM-PAC Score        AM-PAC Inpatient Daily Activity Raw Score: 14 (07/17/22 1007)  AM-PAC Inpatient ADL T-Scale Score : 33.39 (07/17/22 1007)  ADL Inpatient CMS 0-100% Score: 59.67 (07/17/22 1007)  ADL Inpatient CMS G-Code Modifier : CK (07/17/22 1007)    Goals  Short Term Goals  Time Frame for Short term goals: Discharge  Short Term Goal 1: supine to sit w/ Min A  Short Term Goal 2: chair pushups x 5 w/ Min A, NWB LLE  Short Term Goal 3: lateral scooting at EOB w/ Mod A, NWB LLE  Patient Goals   Patient goals : to regain independence       Therapy Time   Individual Concurrent Group Co-treatment   Time In 0859         Time Out 0952         Minutes 53          Timed Code Treatment Minutes:   38    Total Treatment Minutes:  [address] [facility] OTR/L #[id number]

## 2022-07-17 NOTE — CONSENT
Informed Consent for Blood Component Transfusion Note    I have discussed with the patient the rationale for blood component transfusion; its benefits in treating or preventing fatigue, organ damage, or death; and its risk which includes mild transfusion reactions, rare risk of blood borne infection, or more serious but rare reactions. I have discussed the alternatives to transfusion, including the risk and consequences of not receiving transfusion. The patient had an opportunity to ask questions and had agreed to proceed with transfusion of blood components.     Electronically signed by Benea Michele MD on 7/17/22 at 10:25 AM EDT

## 2022-07-17 NOTE — PROGRESS NOTES
Hospital Medicine  Progress Note    PCP: Cortney Dixon    Date of Admission: 7/5/2022      Chief Complaint: Worsening left foot pain and swelling      History Of Present Illness:    81 y.o. male with DM type 2, HTN and hypothyroidism, PAF (off pradaxa) and BPH who presented with worsening wound left foot. The wound started in the bottom of his left foot as a callus. Patient has been putting Vaseline and soaking the foot. When he saw his podiatrist (Dr. Ulysses Knapp ) last week he was recommended to start using Betadine to the left foot. Patient has not following the wound care recommendations, has been putting Betadine on the wound and has been wearing a sock over it and continue to walk bearing weight on the left foot. Patient started noticing mild yellow drainage coming from the wound. Patient denies fever, chills, nausea, vomiting, chest pain or shortness of breath    On presentation,, apart from elevated BP, he had fairly normal vitals. He had no leucocytosis. Hgb was 12 with . Cr was 2.3 (baseline about 1.6). Interim HPI  No new complaints  Denies chest pain    Underwent a left superficial femoral to peroneal artery bypass yesterday afternoon. Patient denies any acute overnight events    Was in OR 7/15 for TRANSMETATARSAL AMPUTATION WITH TENDO ACHILLES LENGTHENING LEFT FOOT    Estimated Blood Loss (mL): 250 mL (300cc at previous vascular surgery)    Transfused PRBCs    AM CBC pending        Medications: Reviewed        Allergies:  Patient has no known allergies. REVIEW OF SYSTEMS COMPLETED:   Pertinent positives as noted in the HPI. All other systems reviewed and negative. PHYSICAL EXAM PERFORMED:    BP (!) 136/55   Pulse 77   Temp 98.6 °F (37 °C) (Oral)   Resp 18   Ht 6' 2\" (1.88 m)   Wt 187 lb 6.3 oz (85 kg)   SpO2 100%   BMI 24.06 kg/m²     General appearance:  No apparent distress, appears stated age and cooperative.   HEENT:  Normal cephalic, atraumatic without obvious deformity. .  Neck: Supple, with full range of motion. No jugular venous distention. Trachea midline. Respiratory:  Normal respiratory effort. Clear to auscultation, bilaterally without Rales/Wheezes/Rhonchi. Cardiovascular:  Regular rate and rhythm with normal S1/S2 without murmurs, rubs or gallops. Abdomen: Soft, non-tender, non-distended with normal bowel sounds. Musculoskeletal:  No clubbing, cyanosis or edema bilaterally. Full range of motion without deformity. Skin: Dressing over left foot: C/D/I  Neurologic:  grossly non-focal.  Psychiatric:  Alert and oriented, thought content appropriate, normal insight  Capillary Refill: Brisk,3 seconds, normal  Peripheral Pulses: DP and PT pulses-nonpalpable bilaterally, biphasic with Doppler per podiatry on the right, monophasic on the left. Labs:     Recent Labs     07/15/22  0530 07/15/22  1602 07/16/22  0006 07/16/22  0450 07/16/22  1011   WBC 7.5  --   --  9.3 9.5   HGB 7.3*   < > 7.4* 6.3* 7.7*   HCT 21.1*   < > 22.3* 18.7* 23.3*   PLT 94*  --   --  85* 90*    < > = values in this interval not displayed. Recent Labs     07/15/22  0530 07/16/22  0450 07/17/22  0456   * 136 141   K 5.0 5.0 5.1    108 107   CO2 22 17* 23   BUN 46* 58* 66*   CREATININE 3.2* 4.1* 4.3*   CALCIUM 8.3 7.9* 7.7*   PHOS 3.5 3.7 3.6       No results for input(s): AST, ALT, BILIDIR, BILITOT, ALKPHOS in the last 72 hours. No results for input(s): INR in the last 72 hours. No results for input(s): Luz Kaska in the last 72 hours.     Urinalysis:      Lab Results   Component Value Date/Time    NITRU Negative 07/10/2022 05:02 PM    WBCUA 0-2 07/10/2022 05:02 PM    BACTERIA Rare 07/10/2022 05:02 PM    RBCUA 3-4 07/10/2022 05:02 PM    BLOODU Negative 07/10/2022 05:02 PM    SPECGRAV 1.020 07/10/2022 05:02 PM    GLUCOSEU Negative 07/10/2022 05:02 PM    GLUCOSEU Negative 07/05/2011 09:10 PM       Radiology:     CXR: I have reviewed the CXR with the following interpretation: NA  EKG:  I have reviewed the EKG with the following interpretation: NA    XR FOOT LEFT (MIN 3 VIEWS)   Final Result   1. Transmetatarsal amputation with mild soft tissue swelling likely due to recent surgery. XR TIBIA FIBULA LEFT (2 VIEWS)   Final Result      Intraoperative fluoroscopy provided as above. See operative report for details. FLUORO FOR SURGICAL PROCEDURES   Final Result      Intraoperative fluoroscopy provided as above. See operative report for details. VL PRE OP VEIN MAPPING   Final Result      XR CHEST PORTABLE   Final Result      No acute cardiopulmonary findings. VL DUP LOWER EXTREMITY ARTERIES LEFT   Final Result      MRI FOOT LEFT WO CONTRAST   Final Result   1. Limited exam secondary to motion artifact. 2. There is marrow edema identified within the fourth metatarsal head and proximal fourth phalanx. In the setting of infection or open wound in this region, this is compatible with osteomyelitis. 3. Status post amputations of the second, third, and fifth metatarsals as detailed above. 4. Healed fracture deformities identified involving the proximal first phalanx. There is osteoarthrosis identified at the first metatarsophalangeal joint. 5. There is subcutaneous edema identified about the foot. This can be seen with cellulitis and/or venous insufficiency. No soft tissue abscess is identified. 6. There is severe fatty atrophy involving the deep muscular foot. Correlate for underlying neuropathy. XR FOOT LEFT (MIN 3 VIEWS)   Final Result      Osteomyelitis is not excluded based on this exam particularly at the base of the proximal phalanx of the second digit. If this is a clinical concern evaluation with MRI should be performed.       IR ANGIOGRAM EXTREMITY LEFT    (Results Pending)       Consults:    IP CONSULT TO PODIATRY  PHARMACY TO DOSE VANCOMYCIN  IP CONSULT TO HOSPITALIST  IP CONSULT TO PHARMACY  IP CONSULT TO INFECTIOUS DISEASES  IP CONSULT TO VASCULAR SURGERY  IP CONSULT TO NEPHROLOGY        ASSESSMENT/PLAN:  81 y.o. male with DM type 2, HTN and hypothyroidism, PAF (off pradaxa) and BPH who presented with worsening wound left foot. Cellulitis left lower extremity with full-thickness ulceration plantar surface with osteomyelitis. POA  - Possible diabetic ulcer. H/e, appears A1C is not elevated and Home med list does not have DM meds: verify home med list  - R/o sec to severe PAD   - Hx of Multiple amputations. - Sed rate was 53 and CRP10  - MRI shows osteo  - Cx sent: mixed skin cash, Diphtheroids, MRSA; follow results  - Started on broad-spectrum IV antibiotics: ID consulted. - S/p  TRANSMETATARSAL AMPUTATION WITH TENDO ACHILLES LENGTHENING LEFT FOOT 7/15/2022  - PT/OT eval: SNF      DM-2 complicated by peripheral neuropathy: POA  - Verify home meds for accuracy as does not seem to have diabetic meds  - A1C too does not appear high  - We will monitor BGs closely for 1-2 days  - SSI      Peripheral vascular disease: POA  - On Plavix  - Vascular studies ordered  - Seen by Vascular Surgery: s/p angiogram of the LLE. Planned L SFA-Peroneal bypass  - S/p TMA by podiatry  - Tolerated procedures      Mental status changes 7/14/2022  - Episode of near syncope/syncopal episode  - Possiby orthostatic with hx of HTN, PAD, and new meds for BP control as well as recent vascular procedure with \" Estimated Blood Loss (mL): 300\"  - Hgb dropped this AM to 8.7, from 12 7/11/2022  - Sezure is also possible, but no apparent hx of seizures. Seizure ruled out  - Episode was likely sec to hypotension, anemia. Non recurrent  - Avoiding hypotension, correcting anemia      Acute Blood loss anemia 7/14/2022  Post op/samreen-op blood loss from vascular surgery  \" Estimated Blood Loss (mL): 300\" + Estimated Blood Loss (mL): 250 mL at his 2 procedures  - Hgb dropped post sec procedure to 6 range  - Was also on heparin gtt: Held.  Defer to Vascular as to resumption. Discussed with Vascular Surgery  - Transfused PRBCs. Monitor hgb and keep > 8      HTN: POA  - Was started on metoprolol: Switched to coreg, decrease dose with some winston noted. Then started hydralazine PO as BP was really high. Added CCB as BP remained high: increased dose to 60 mg dly Nifedipine: all BP meds held in setting of blood loss anemia./hypotension  - Lisonopril held with MO/hyperkalemia  - Gradually resume meds as BP stabilizized and possibly rising now. - PRN hydralazine IV      MO on CKD stage 3  - Baseline Cr 1.6  - Developed renal insuff prior to surgery, and developed MO post angio procedure, and hypotension related to blood loss/surgery  - Monitor cr: hopefully plateauxed and will start improving  - Continue gentle IV hydration  - Holding lisinopril  - Follows with Dr. Tameka Hatfield: Aramis Alvarez consulted. Paroxysmal A. Fib: POA  - Not on rate control meds. - Appears was prev on Pradaxa. Appears was taken off after he wore a monitor and as \"couldn't afford it\" in 2014 .   - TSH, Mg: WNL  - Will be on [address] [place] [address] for PAD  - BB       Hypothyroidism: POA  - TSH; WNL    Chronic Macrocytic anemia- POA  - B12, Folate: WNL      BPH: POA  - On  Flomax  - Monitor for retention: Bladder scans        Pxt tells me after discussing with his sons, he wants to be Full code. DVT Prophylaxis: Lovenox     Diet: ADULT DIET; Regular; 4 carb choices (60 gm/meal)  Code Status: Full Code    PT/OT Eval Status: As tolerated with no weightbearing on the left foot      Disposition -[facility] with telemetry  S/P Superficial femoral artery to peroneal artery bypass of LLE   And TMA left foot. 7/15/2022  Post op anemia, receiving blood.    PT/OT eval: SNF: possibly in about 2 days pending stability of hgb, and improvement in renal fxn       Dalene Pallas, MD

## 2022-07-17 NOTE — PROGRESS NOTES
Podiatric Surgery Daily Progress Note  Panchito Gautam      Subjective :   Patient seen and examined this am at the bedside. Patient denies any acute overnight events. Patient denies N/V/F/C/SOB. Patient denies calf pain, thigh pain, chest pain. States he is feeling well overall. Reports some burning and tingling pain to the left foot. Review of Systems: A 12 point review of symptoms is unremarkable with the exception of the chief complaint. Patient specifically denies nausea, fever, vomiting, chills, shortness of breath, chest pain, abdominal pain, constipation or difficulty urinating. Objective     BP (!) 141/55   Pulse 77   Temp 99.5 °F (37.5 °C) (Oral)   Resp 18   Ht 6' 2\" (1.88 m)   Wt 187 lb 6.3 oz (85 kg)   SpO2 100%   BMI 24.06 kg/m²     I/O:  Intake/Output Summary (Last 24 hours) at 7/17/2022 0619  Last data filed at 7/17/2022 0521  Gross per 24 hour   Intake 507 ml   Output 1855 ml   Net -1348 ml              Wt Readings from Last 3 Encounters:   07/16/22 187 lb 6.3 oz (85 kg)   07/05/22 178 lb 3.2 oz (80.8 kg)   06/20/22 177 lb (80.3 kg)       LABS:    Recent Labs     07/16/22  0450 07/16/22  1011   WBC 9.3 9.5   HGB 6.3* 7.7*   HCT 18.7* 23.3*   PLT 85* 90*        Recent Labs     07/16/22  0450      K 5.0      CO2 17*   PHOS 3.7   BUN 58*   CREATININE 4.1*      No results for input(s): PROT, INR, APTT in the last 72 hours. LOWER EXTREMITY EXAMINATION    Dressing to left lower extremity clean, dry, intact. No strikethrough drainage noted to external layers of the dressing. No pain with calf compression. Right lower extremity soft tissue envelope is closed and without acute signs infection. IMAGING:  XR Left foot 7/15/22  Narrative   EXAM: XR FOOT LEFT (MIN 3 VIEWS)       INDICATION: s/p left foot TMA,        COMPARISON: MRI July 6, 2022, radiograph July 5, 2022       FINDINGS:       Bones:  There is been interval amputation of the first through fourth metatarsals. There is been a reamputation of the fifth metatarsal.       Joints: The joint spaces are well maintained. No joint effusion. Soft tissues: Mild soft tissue swelling is present likely due to recent surgery. Impression   1. Transmetatarsal amputation with mild soft tissue swelling likely due to recent surgery. ASSESSMENT/PLAN  -s/p transmetatarsal amputation; left LE 2/2 osteomyelitis  -Peripheral arterial disease; b/l LE  -Type 2 diabetes mellitus with peripheral neuropathy  -Acute blood loss anemia     -Patient seen and examined at bedside this AM  -Hypertensive, otherwise VSS, No leukocytosis noted (WBC 9.5)  -ESR 53, CRP 10.4  -HbA1c 5.9%  -Blood cultures x2; NGTD  -Wound culture: MRSA, diphtheroids  -Imaging reviewed, impression noted above  -ID following; continue antibiotics per their recommendations  -Vascular surgery following; s/p left SFA to peroneal bypass (7/13/22)  -Dressing to left LE left clean, dry, intact  -Strict nonweightbearing to left lower extremity  -Acute blood loss anemia postoperatively; received 2 units of pRBC 7/15, 1 unit 7/16. No strikethrough drainage to left LE dressing; unlikely post-op bleeding at surgical site. Will continue to monitor CBC. -Patient would likely benefit from skilled nursing facility placement at discharge. DISPO: S/p transmetatarsal amputation; left LE 2/2 osteomyelitis. Labs and imaging reviewed. Continue antibiotics per ID recommendations. Vascular surgery following. No further surgical intervention from podiatric standpoint.      Discussed assessment and plan with Dr. Rodrigo Marcum DPM.    Vilma Hood DPM  Podiatric Resident, PGY-3  Pager #: [phone] or Perfect Serve None

## 2022-07-17 NOTE — PROGRESS NOTES
Physical Therapy  Facility/Department: [facility] [address] [facility]  Physical Therapy Initial Assessment and Treatment    Name: Layo Junior  : 1935  MRN: [id number]  Date of Service: 2022    Discharge Recommendations:  Layo Junior scored a 7/24 on the AM-PAC short mobility form. Current research shows that an AM-PAC score of 17 or less is typically not associated with a discharge to the patient's home setting. Based on the patient's AM-PAC score and their current functional mobility deficits, it is recommended that the patient have 3-5 sessions per week of Physical Therapy at d/c to increase the patient's independence. Please see assessment section for further patient specific details. PT Equipment Recommendations  Equipment Needed: No          Assessment   Assessment: Pt from home with L foot wound, now s/p TMA. Pt is limited by weakness and NWB status. R UE and LE strength is decreased compared to L side. Pt reports this is new and RN notified. Pt demonstrates good understanding of NWB status, but unable to perform activity due to decreased strength. Recommend lili for out of bed at this time. Pt will need SNF at d/c to increase strength and maximize mobility/transfers. Will follow. Treatment Diagnosis: Decreased mobility associated with osteomyelitis. Decision Making: Medium Complexity  Requires PT Follow-Up: Yes     Plan   Plan:  (2-5)  Current Treatment Recommendations: Transfer training, Functional mobility training, Strengthening, Gait training    Safety Devices  Type of Devices: Nurse notified, Call light within reach, Bed alarm in place, Left in bed (L LE elevated)     Restrictions  Position Activity Restriction  Other position/activity restrictions: NWB LLE     Subjective   General  Additional Pertinent Hx: Pt to ED  with L foot pain from recent toe amputation. Pt admitted for Osteomyelitis. Podiatry consult.   Surgery consult for arteriogram on .   s/p LEFT SUPERFICIAL Transfers: 2 Person Assistance;Maximum Assistance (simulated slide board along EOB, pt with good attempts for NWB L Foot)      Balance  Sitting - Static: Good  Sitting - Dynamic: Fair  Standing - Static:  (Unable to assess)  Standing - Dynamic:  (Unable to assess)        AM-PAC Score  AM-PAC Inpatient Mobility Raw Score : 7 (07/17/22 1004)  AM-PAC Inpatient T-Scale Score : 26.42 (07/17/22 1004)  Mobility Inpatient CMS 0-100% Score: 92.36 (07/17/22 1004)  Mobility Inpatient CMS G-Code Modifier : CM (07/17/22 1004)          Goals  Short Term Goals  Time Frame for Short term goals: Discharge  Short term goal 1: Rolling with Min A  Short term goal 2: supine <> sit Min A  Short term goal 3: sit <> stand Mod A x2 to full stance NWB L LE  Patient Goals   Patient goals : Get stronger       Education  Patient Education  Education Given To: Patient  Education Provided: Role of Therapy  Education Method: Verbal  Education Outcome: Verbalized understanding;Demonstrated understanding      Therapy Time   Individual Concurrent Group Co-treatment   Time In 8959         Time Out 0952         Minutes 53         Timed Code Treatment Minutes:  40  Total Treatment Minutes:  [address] Juan Antonio, PT

## 2022-07-17 NOTE — PLAN OF CARE
Problem: ABCDS Injury Assessment  Goal: Absence of physical injury  Outcome: Progressing  Flowsheets (Taken 7/16/2022 2000)  Absence of Physical Injury: Implement safety measures based on patient assessment     Problem: Skin/Tissue Integrity - Adult  Goal: Skin integrity remains intact  Outcome: Progressing  Flowsheets  Taken 7/16/2022 2000  Skin Integrity Remains Intact: Monitor for areas of redness and/or skin breakdown  Taken 7/16/2022 1900  Skin Integrity Remains Intact:   Monitor for areas of redness and/or skin breakdown   Assess vascular access sites hourly  Goal: Incisions, wounds, or drain sites healing without S/S of infection  Outcome: Progressing  Flowsheets (Taken 7/16/2022 1900)  Incisions, Wounds, or Drain Sites Healing Without Sign and Symptoms of Infection: ADMISSION and DAILY: Assess and document risk factors for pressure ulcer development  Goal: Oral mucous membranes remain intact  Outcome: Progressing  Flowsheets (Taken 7/16/2022 1900)  Oral Mucous Membranes Remain Intact: Assess oral mucosa and hygiene practices     Problem: Discharge Planning  Goal: Discharge to home or other facility with appropriate resources  Outcome: Progressing     Problem: Safety - Adult  Goal: Free from fall injury  Outcome: Progressing     Problem: Chronic Conditions and Co-morbidities  Goal: Patient's chronic conditions and co-morbidity symptoms are monitored and maintained or improved  Outcome: Progressing     Problem: Pain  Goal: Verbalizes/displays adequate comfort level or baseline comfort level  Outcome: Progressing     Problem: Skin/Tissue Integrity  Goal: Absence of new skin breakdown  Description: 1. Monitor for areas of redness and/or skin breakdown  2. Assess vascular access sites hourly  3. Every 4-6 hours minimum:  Change oxygen saturation probe site  4.   Every 4-6 hours:  If on nasal continuous positive airway pressure, respiratory therapy assess nares and determine need for appliance change or resting period.   Outcome: Progressing

## 2022-07-18 LAB
ALBUMIN SERPL-MCNC: 2.9 G/DL (ref 3.4–5)
ANION GAP SERPL CALCULATED.3IONS-SCNC: 10 MMOL/L (ref 3–16)
ANION GAP SERPL CALCULATED.3IONS-SCNC: 10 MMOL/L (ref 3–16)
ANTI-XA UNFRAC HEPARIN: 0.24 IU/ML (ref 0.3–0.7)
APTT: 41.9 SEC (ref 23–34.3)
BASOPHILS ABSOLUTE: 0 K/UL (ref 0–0.2)
BASOPHILS RELATIVE PERCENT: 0.2 %
BUN BLDV-MCNC: 62 MG/DL (ref 7–20)
BUN BLDV-MCNC: 63 MG/DL (ref 7–20)
CALCIUM SERPL-MCNC: 8.1 MG/DL (ref 8.3–10.6)
CALCIUM SERPL-MCNC: 8.3 MG/DL (ref 8.3–10.6)
CHLORIDE BLD-SCNC: 103 MMOL/L (ref 99–110)
CHLORIDE BLD-SCNC: 104 MMOL/L (ref 99–110)
CO2: 26 MMOL/L (ref 21–32)
CO2: 27 MMOL/L (ref 21–32)
CREAT SERPL-MCNC: 4 MG/DL (ref 0.8–1.3)
CREAT SERPL-MCNC: 4 MG/DL (ref 0.8–1.3)
EOSINOPHILS ABSOLUTE: 0.2 K/UL (ref 0–0.6)
EOSINOPHILS RELATIVE PERCENT: 1.8 %
GFR AFRICAN AMERICAN: 17
GFR AFRICAN AMERICAN: 17
GFR NON-AFRICAN AMERICAN: 14
GFR NON-AFRICAN AMERICAN: 14
GLUCOSE BLD-MCNC: 150 MG/DL (ref 70–99)
GLUCOSE BLD-MCNC: 152 MG/DL (ref 70–99)
GLUCOSE BLD-MCNC: 157 MG/DL (ref 70–99)
GLUCOSE BLD-MCNC: 167 MG/DL (ref 70–99)
GLUCOSE BLD-MCNC: 195 MG/DL (ref 70–99)
GLUCOSE BLD-MCNC: 198 MG/DL (ref 70–99)
HCT VFR BLD CALC: 22.7 % (ref 40.5–52.5)
HCT VFR BLD CALC: 23.8 % (ref 40.5–52.5)
HEMOGLOBIN: 7.8 G/DL (ref 13.5–17.5)
HEMOGLOBIN: 7.9 G/DL (ref 13.5–17.5)
LYMPHOCYTES ABSOLUTE: 1.2 K/UL (ref 1–5.1)
LYMPHOCYTES RELATIVE PERCENT: 10.7 %
MAGNESIUM: 2.1 MG/DL (ref 1.8–2.4)
MAGNESIUM: 2.1 MG/DL (ref 1.8–2.4)
MCH RBC QN AUTO: 32.3 PG (ref 26–34)
MCHC RBC AUTO-ENTMCNC: 34.5 G/DL (ref 31–36)
MCV RBC AUTO: 93.7 FL (ref 80–100)
MONOCYTES ABSOLUTE: 1.1 K/UL (ref 0–1.3)
MONOCYTES RELATIVE PERCENT: 9.6 %
NEUTROPHILS ABSOLUTE: 8.8 K/UL (ref 1.7–7.7)
NEUTROPHILS RELATIVE PERCENT: 77.7 %
PDW BLD-RTO: 16.8 % (ref 12.4–15.4)
PERFORMED ON: ABNORMAL
PHOSPHORUS: 3.8 MG/DL (ref 2.5–4.9)
PLATELET # BLD: 114 K/UL (ref 135–450)
PMV BLD AUTO: 8.7 FL (ref 5–10.5)
POTASSIUM SERPL-SCNC: 4.4 MMOL/L (ref 3.5–5.1)
POTASSIUM SERPL-SCNC: 4.5 MMOL/L (ref 3.5–5.1)
RBC # BLD: 2.43 M/UL (ref 4.2–5.9)
SODIUM BLD-SCNC: 140 MMOL/L (ref 136–145)
SODIUM BLD-SCNC: 140 MMOL/L (ref 136–145)
TROPONIN: 0.05 NG/ML
WBC # BLD: 11.3 K/UL (ref 4–11)

## 2022-07-18 PROCEDURE — 1200000000 HC SEMI PRIVATE

## 2022-07-18 PROCEDURE — 6370000000 HC RX 637 (ALT 250 FOR IP): Performed by: STUDENT IN AN ORGANIZED HEALTH CARE EDUCATION/TRAINING PROGRAM

## 2022-07-18 PROCEDURE — 2500000003 HC RX 250 WO HCPCS

## 2022-07-18 PROCEDURE — 6370000000 HC RX 637 (ALT 250 FOR IP): Performed by: INTERNAL MEDICINE

## 2022-07-18 PROCEDURE — 97530 THERAPEUTIC ACTIVITIES: CPT

## 2022-07-18 PROCEDURE — 85025 COMPLETE CBC W/AUTO DIFF WBC: CPT

## 2022-07-18 PROCEDURE — 80069 RENAL FUNCTION PANEL: CPT

## 2022-07-18 PROCEDURE — 6360000002 HC RX W HCPCS: Performed by: INTERNAL MEDICINE

## 2022-07-18 PROCEDURE — 2580000003 HC RX 258: Performed by: STUDENT IN AN ORGANIZED HEALTH CARE EDUCATION/TRAINING PROGRAM

## 2022-07-18 PROCEDURE — 93005 ELECTROCARDIOGRAM TRACING: CPT | Performed by: INTERNAL MEDICINE

## 2022-07-18 PROCEDURE — 6360000002 HC RX W HCPCS: Performed by: STUDENT IN AN ORGANIZED HEALTH CARE EDUCATION/TRAINING PROGRAM

## 2022-07-18 PROCEDURE — 36415 COLL VENOUS BLD VENIPUNCTURE: CPT

## 2022-07-18 PROCEDURE — 83735 ASSAY OF MAGNESIUM: CPT

## 2022-07-18 PROCEDURE — 2580000003 HC RX 258: Performed by: INTERNAL MEDICINE

## 2022-07-18 PROCEDURE — 85520 HEPARIN ASSAY: CPT

## 2022-07-18 PROCEDURE — 84484 ASSAY OF TROPONIN QUANT: CPT

## 2022-07-18 PROCEDURE — 97535 SELF CARE MNGMENT TRAINING: CPT

## 2022-07-18 RX ORDER — SODIUM CHLORIDE 9 MG/ML
INJECTION, SOLUTION INTRAVENOUS CONTINUOUS
Status: DISCONTINUED | OUTPATIENT
Start: 2022-07-18 | End: 2022-07-18

## 2022-07-18 RX ORDER — SODIUM CHLORIDE, SODIUM LACTATE, POTASSIUM CHLORIDE, AND CALCIUM CHLORIDE .6; .31; .03; .02 G/100ML; G/100ML; G/100ML; G/100ML
500 INJECTION, SOLUTION INTRAVENOUS ONCE
Status: COMPLETED | OUTPATIENT
Start: 2022-07-18 | End: 2022-07-18

## 2022-07-18 RX ORDER — OXYCODONE HYDROCHLORIDE 5 MG/1
5 TABLET ORAL ONCE
Status: DISCONTINUED | OUTPATIENT
Start: 2022-07-18 | End: 2022-07-22 | Stop reason: HOSPADM

## 2022-07-18 RX ORDER — METOPROLOL TARTRATE 5 MG/5ML
INJECTION INTRAVENOUS
Status: COMPLETED
Start: 2022-07-18 | End: 2022-07-18

## 2022-07-18 RX ORDER — METOPROLOL TARTRATE 50 MG/1
50 TABLET, FILM COATED ORAL 2 TIMES DAILY
Status: DISCONTINUED | OUTPATIENT
Start: 2022-07-18 | End: 2022-07-22 | Stop reason: HOSPADM

## 2022-07-18 RX ORDER — METOPROLOL TARTRATE 5 MG/5ML
2.5 INJECTION INTRAVENOUS ONCE
Status: COMPLETED | OUTPATIENT
Start: 2022-07-18 | End: 2022-07-18

## 2022-07-18 RX ADMIN — SODIUM CHLORIDE, POTASSIUM CHLORIDE, SODIUM LACTATE AND CALCIUM CHLORIDE 500 ML: 600; 310; 30; 20 INJECTION, SOLUTION INTRAVENOUS at 11:14

## 2022-07-18 RX ADMIN — INSULIN LISPRO 1 UNITS: 100 INJECTION, SOLUTION INTRAVENOUS; SUBCUTANEOUS at 18:44

## 2022-07-18 RX ADMIN — LINEZOLID 600 MG: 600 TABLET, FILM COATED ORAL at 20:50

## 2022-07-18 RX ADMIN — ATORVASTATIN CALCIUM 40 MG: 40 TABLET, FILM COATED ORAL at 20:50

## 2022-07-18 RX ADMIN — METOPROLOL TARTRATE 2.5 MG: 5 INJECTION INTRAVENOUS at 01:21

## 2022-07-18 RX ADMIN — SODIUM CHLORIDE, PRESERVATIVE FREE 10 ML: 5 INJECTION INTRAVENOUS at 20:50

## 2022-07-18 RX ADMIN — GABAPENTIN 300 MG: 300 CAPSULE ORAL at 20:50

## 2022-07-18 RX ADMIN — INSULIN LISPRO 1 UNITS: 100 INJECTION, SOLUTION INTRAVENOUS; SUBCUTANEOUS at 13:55

## 2022-07-18 RX ADMIN — CARVEDILOL 12.5 MG: 6.25 TABLET, FILM COATED ORAL at 06:17

## 2022-07-18 RX ADMIN — HYDROMORPHONE HYDROCHLORIDE 0.25 MG: 1 INJECTION, SOLUTION INTRAMUSCULAR; INTRAVENOUS; SUBCUTANEOUS at 09:13

## 2022-07-18 RX ADMIN — METOPROLOL TARTRATE 50 MG: 50 TABLET, FILM COATED ORAL at 20:50

## 2022-07-18 RX ADMIN — INSULIN LISPRO 1 UNITS: 100 INJECTION, SOLUTION INTRAVENOUS; SUBCUTANEOUS at 20:51

## 2022-07-18 RX ADMIN — APIXABAN 2.5 MG: 2.5 TABLET, FILM COATED ORAL at 09:13

## 2022-07-18 RX ADMIN — INSULIN LISPRO 1 UNITS: 100 INJECTION, SOLUTION INTRAVENOUS; SUBCUTANEOUS at 10:22

## 2022-07-18 RX ADMIN — LINEZOLID 600 MG: 600 TABLET, FILM COATED ORAL at 08:33

## 2022-07-18 RX ADMIN — ASPIRIN 81 MG: 81 TABLET, COATED ORAL at 08:33

## 2022-07-18 RX ADMIN — CETIRIZINE HYDROCHLORIDE 5 MG: 10 TABLET, FILM COATED ORAL at 08:33

## 2022-07-18 RX ADMIN — APIXABAN 2.5 MG: 2.5 TABLET, FILM COATED ORAL at 20:50

## 2022-07-18 RX ADMIN — TAMSULOSIN HYDROCHLORIDE 0.4 MG: 0.4 CAPSULE ORAL at 20:50

## 2022-07-18 RX ADMIN — HEPARIN SODIUM 4000 UNITS: 1000 INJECTION INTRAVENOUS; SUBCUTANEOUS at 00:48

## 2022-07-18 RX ADMIN — HEPARIN SODIUM 11 UNITS/KG/HR: 5000 INJECTION, SOLUTION INTRAVENOUS; SUBCUTANEOUS at 00:52

## 2022-07-18 ASSESSMENT — PAIN SCALES - GENERAL
PAINLEVEL_OUTOF10: 0
PAINLEVEL_OUTOF10: 10
PAINLEVEL_OUTOF10: 0
PAINLEVEL_OUTOF10: 10

## 2022-07-18 ASSESSMENT — PAIN DESCRIPTION - FREQUENCY
FREQUENCY: CONTINUOUS
FREQUENCY: CONTINUOUS

## 2022-07-18 ASSESSMENT — PAIN DESCRIPTION - LOCATION
LOCATION: FOOT
LOCATION: LEG

## 2022-07-18 ASSESSMENT — PAIN DESCRIPTION - ORIENTATION
ORIENTATION: LEFT;LOWER
ORIENTATION: LEFT;LOWER

## 2022-07-18 ASSESSMENT — PAIN DESCRIPTION - ONSET
ONSET: ON-GOING
ONSET: ON-GOING

## 2022-07-18 ASSESSMENT — PAIN DESCRIPTION - DESCRIPTORS
DESCRIPTORS: ACHING;SORE;DISCOMFORT
DESCRIPTORS: ACHING;SORE;DISCOMFORT

## 2022-07-18 ASSESSMENT — PAIN DESCRIPTION - PAIN TYPE
TYPE: ACUTE PAIN;SURGICAL PAIN
TYPE: ACUTE PAIN;SURGICAL PAIN

## 2022-07-18 ASSESSMENT — PAIN - FUNCTIONAL ASSESSMENT
PAIN_FUNCTIONAL_ASSESSMENT: PREVENTS OR INTERFERES SOME ACTIVE ACTIVITIES AND ADLS
PAIN_FUNCTIONAL_ASSESSMENT: PREVENTS OR INTERFERES SOME ACTIVE ACTIVITIES AND ADLS

## 2022-07-18 NOTE — PROGRESS NOTES
Spoke with pt's son Lanre Gilmore and gave updates.  All questions answered to the best of my ability

## 2022-07-18 NOTE — PROGRESS NOTES
Vascular Surgery   Daily Progress Note  Patient: Miko Boo    CC: Atherosclerosis native artery left lower extremity with ulceration    SUBJECTIVE:   Pt tachycardic to 142 overnight. HR normalized after IV metoprolol. Tolerating diet. Bed rest per medicine. Hep gtt restarted this am.    ROS: A 14 point review of systems was conducted, significant findings as noted above. All other systems negative. OBJECTIVE:   Infusions:   heparin (PORCINE) Infusion 11 Units/kg/hr (07/18/22 0052)    sodium bicarbonate infusion 75 mL/hr at 07/17/22 2118    sodium chloride Stopped (07/12/22 0100)    dextrose      sodium chloride 25 mL/hr at 07/15/22 0743      I/O:I/O last 3 completed shifts: In: 507 [P.O.:280; Blood:227]  Out: 8697 [Urine:3200; Drains:35]           Wt Readings from Last 1 Encounters:   07/16/22 187 lb 6.3 oz (85 kg)       Exam:  BP (!) 131/59   Pulse 97   Temp 98.6 °F (37 °C) (Oral)   Resp 14   Ht 6' 2\" (1.88 m)   Wt 187 lb 6.3 oz (85 kg)   SpO2 94%   BMI 24.06 kg/m²     General appearance: NAD  Neuro: A&Ox3, no focal deficits, sensation intact. [place]  Chest/Lungs: normal effort, no adventitious breath sounds, on RA  Cardiovascular: RRR  Abdomen: soft, non-distended, non-tender  Extremities: Incision line C/D/I with staples in place. No hematoma noted. Soft. Drain with minimal output and old coagulated blood in drain   Vascular Exam:    femoral  DP  PT    LEFT  +  -/+ -/+   RIGHT  +  +  +       LABS:   Recent Labs     07/16/22  1011 07/17/22  0456 07/17/22  1624 07/17/22  2338   WBC 9.5 10.0  --   --    HGB 7.7* 7.2* 7.8* 7.9*   HCT 23.3* 21.0* 22.8* 23.8*   MCV 96.4 94.3  --   --    PLT 90* 81*  --   --           Recent Labs     07/17/22  0456 07/17/22  1028    141   K 5.1 4.3    105   CO2 23 23   PHOS 3.6 3.2   BUN 66* 62*   CREATININE 4.3* 4.1*        No results for input(s): AST, ALT, ALB, BILIDIR, BILITOT, ALKPHOS in the last 72 hours.    No results for input(s): LIPASE, AMYLASE in the last 72 hours. Recent Labs     07/17/22  2338   APTT 41.9*        No results for input(s): CKTOTAL, CKMB, CKMBINDEX, TROPONINI in the last 72 hours. ASSESSMENT/PLAN:   This is a 81y.o. year old male with a diagnosis of LLE PAD s/p Left Superficial Femoral Artery to Peroneal Artery Bypass with Reversed Greater Saphenous Vein on 7/13/22. Pt now s/p left foot transmetatarsal amputation on 7/15.     -Hbg stable  - Heparin gtt restarted. Will transition to 2.5 mg eliquis today.   -OOB ambulating today, no bed rest.   - Minimal output from LLE JOHN drain, will remove prior to d/c.   - No signs of bleeding from bypass incision.   - ID on board, cont linezolid (7/15-7/25)    Alfonso Garcia DO   PGY1, General Surgery  07/18/22  7:02 AM  Pager # [phone]

## 2022-07-18 NOTE — PROGRESS NOTES
Interval History and plan:      BP is stable   UOP is 2050 ml    SP  Fem / pop bypass on 7/13/2022  SP ( TMA) left foot       Plan:    Creatinine is slowly improving 2.1 > 4.3 > 4.0  Creatinine pleatued likely should see improvement   Electrolytes better   Good UOP  Closely watch anbx dosing and levels  Will monitor for the need for dialysis   Change IVF                       Assessment :     CKD Stage 3b  Likely due to DM and hypertension  Cr is 1.8- eGFR of 36 ml/min  UA- bland, wbc 6-9,  No recent one in the system      Hypertension   BP: (117-142)/(59-85)  Heart Rate:  []   BP goal inpatient [phone] systolic inpatient  Home regimen: lisinopril 10 mg daily only  Current inpatient regimen: carvedilol 6.25 mg BID, hydralazine 25 mg TID, nifedipine 60 mg daily. Also has Afib    Anemia of CKD             sepsis  DM  PVD    [facility] would like to thank Gracie Brittle, MD   for opportunity to serve this patient      Please call with questions at-   24 Hrs Answering service [phone] or  7 am- 5 pm via Perfect serve or cell phone  Yon Vance MD          CC/reason for consult :     MO     HPI :     Esperanza Degroot is a 81 y.o. male presented to   the hospital on 7/5/2022 with wound infection in the left leg. He is followed outpatient by podiatry,for wound, which continued to   Get worse despite the treatment. Also has discharge from the wound  Brought to hospital admitted  Also found to have CKD  because of which we are consulted.          ROS:     Seen with- no family    positives in bold   Constitutional:  fever, chills, weakness, weight change, fatigue  Skin:  rash, pruritus, hair loss, bruising, dry skin, petechiae  Head, Face, Neck   headaches, swelling,  cervical adenopathy  Respiratory: shortness of breath, cough, or wheezing  Cardiovascular: chest pain, palpitations, dizzy, edema  Gastrointestinal: nausea, vomiting, diarrhea, constipation,belly pain    Yellow skin, blood in stool  Musculoskeletal:  back pain, muscle weakness, gait problems,       joint pain or swelling. Genitourinary:  dysuria, poor urine flow, flank pain, blood in urine  Neurologic:  vertigo, TIA'S, syncope, seizures, focal weakness  Psychosocial:  insomnia, anxiety, or depression. Additional positive findings:                    All other remaining systems are negative or unable to obtain        PMH/PSH/SH/Family History:     Past Medical History:   Diagnosis Date    Diabetic eye exam ([facility] [address] 1/29/14    Dr. Jakob Rodriguez    DM (diabetes mellitus) ([facility] [address] 1/13/2014    Gout     Hypothyroidism 4/18/2016    Seasonal allergic rhinitis        Past Surgical History:   Procedure Laterality Date    COLONOSCOPY  2009 appx     normal     COLONOSCOPY N/A 6/20/2022    COLONOSCOPY POLYPECTOMY SNARE/COLD BIOPSY performed by Patience Barnes MD at [facility] Left 7/13/2022    LEFT SUPERFICIAL FEMORAL ARTERY TO PERONEAL ARTERY BYPASS WITH REVERSED GREAT SAPHENOUS VEIN GRAFT performed by Sonny Kennedy MD at [address] [place] [address]. Left 7/15/2022    TRANSMETATARSAL AMPUTATION WITH TENDO ACHILLES LENGTHENING LEFT FOOT performed by Jennifer Monk DPM at [address], bilateral    INCISIONAL HERNIA REPAIR      bilateral        reports that he quit smoking about 63 years ago. He has never used smokeless tobacco. He reports that he does not drink alcohol and does not use drugs. family history is not on file.          Medication:     Current Facility-Administered Medications: morphine (PF) injection 2 mg, 2 mg, IntraVENous, Q4H PRN  heparin (porcine) injection 4,000 Units, 4,000 Units, IntraVENous, PRN  heparin (porcine) injection 2,000 Units, 2,000 Units, IntraVENous, PRN  heparin 25,000 units in dextrose 5% 250 mL (premix) infusion, 5-30 Units/kg/hr, IntraVENous, Continuous  sodium bicarbonate 150 mEq in dextrose 5 % 1,000 mL infusion, , IntraVENous, Continuous  linezolid (ZYVOX) tablet 600 mg, 600 mg, Oral, 2 times per day  carvedilol (COREG) tablet 12.5 mg, 12.5 mg, Oral, BID WC  [Held by provider] hydrALAZINE (APRESOLINE) tablet 50 mg, 50 mg, Oral, 3 times per day  sodium chloride flush 0.9 % injection 5-40 mL, 5-40 mL, IntraVENous, 2 times per day  sodium chloride flush 0.9 % injection 5-40 mL, 5-40 mL, IntraVENous, PRN  0.9 % sodium chloride infusion, , IntraVENous, PRN  cloNIDine (CATAPRES) tablet 0.1 mg, 0.1 mg, Oral, Q2H PRN  labetalol (NORMODYNE;TRANDATE) injection 10 mg, 10 mg, IntraVENous, Q2H PRN  aspirin EC tablet 81 mg, 81 mg, Oral, Daily  [Held by provider] NIFEdipine (ADALAT CC) extended release tablet 60 mg, 60 mg, Oral, Daily  atorvastatin (LIPITOR) tablet 40 mg, 40 mg, Oral, Nightly  traMADol (ULTRAM) tablet 50 mg, 50 mg, Oral, Q6H PRN  hydrALAZINE (APRESOLINE) injection 10 mg, 10 mg, IntraVENous, Q4H PRN  methocarbamol (ROBAXIN) tablet 750 mg, 750 mg, Oral, 4x Daily PRN  gabapentin (NEURONTIN) capsule 300 mg, 300 mg, Oral, Nightly  glucose chewable tablet 16 g, 4 tablet, Oral, PRN  dextrose bolus 10% 125 mL, 125 mL, IntraVENous, PRN **OR** dextrose bolus 10% 250 mL, 250 mL, IntraVENous, PRN  glucagon (rDNA) injection 1 mg, 1 mg, IntraMUSCular, PRN  dextrose 5 % solution, 100 mL/hr, IntraVENous, PRN  insulin lispro (1 Unit Dial) 0-6 Units, 0-6 Units, SubCUTAneous, TID WC  insulin lispro (1 Unit Dial) 0-3 Units, 0-3 Units, SubCUTAneous, Nightly  cetirizine (ZYRTEC) tablet 5 mg, 5 mg, Oral, Daily  tamsulosin (FLOMAX) capsule 0.4 mg, 0.4 mg, Oral, Nightly  sodium chloride flush 0.9 % injection 5-40 mL, 5-40 mL, IntraVENous, 2 times per day  sodium chloride flush 0.9 % injection 5-40 mL, 5-40 mL, IntraVENous, PRN  0.9 % sodium chloride infusion, , IntraVENous, PRN  ondansetron (ZOFRAN-ODT) disintegrating tablet 4 mg, 4 mg, Oral, Q8H PRN **OR** ondansetron (ZOFRAN) injection 4 mg, 4 mg, IntraVENous, Q6H PRN  polyethylene glycol (GLYCOLAX) packet 17 g, 17 g, Oral, Daily PRN  acetaminophen (TYLENOL) tablet 650 mg, 650 mg, Oral, Q6H PRN **OR** acetaminophen (TYLENOL) suppository 650 mg, 650 mg, Rectal, Q6H PRN       Vitals :     Vitals:    07/18/22 0614   BP:    Pulse: (!) 101   Resp: 16   Temp:    SpO2:        I & O :       Intake/Output Summary (Last 24 hours) at 7/18/2022 0820  Last data filed at 7/18/2022 2394  Gross per 24 hour   Intake 200 ml   Output 2080 ml   Net -1880 ml          Physical Examination :     General appearance: Anxious- no, distressed- no, in good spirits-    Alert, pleasant  HEENT: Lips- normal, teeth- ok , oral mucosa- moist  Neck : Mass- no, appears symmetrical, JVD- not visible  Respiratory: Respiratory effort-  normal, wheeze- no, crackles -   Cardiovascular:  Ausculation- No M/R/G, Edema left leg   Abdomen: visible mass- no, distention- no, scar- no, tenderness- no                            hepatosplenomegaly-  no  Musculoskeletal:  clubbing no,cyanosis- no , digital ischemia- no                           muscle strength- grossly normal , tone - grossly normal  Skin: rashes- no , ulcers- no, induration- no, tightening - no  Psychiatric:  Judgement and insight- normal           AAO X 3  Additional finding:   Has left leg wound      LABS:     Recent Labs     07/16/22  1011 07/17/22  0456 07/17/22  1624 07/17/22  2338 07/18/22  0606   WBC 9.5 10.0  --   --  11.3*   HGB 7.7* 7.2* 7.8* 7.9* 7.8*   HCT 23.3* 21.0* 22.8* 23.8* 22.7*   PLT 90* 81*  --   --  114*       Recent Labs     07/16/22  0450 07/16/22  1011 07/17/22  0456 07/17/22  1028 07/18/22  0606     --  141 141 140   K 5.0  --  5.1 4.3 4.4     --  107 105 104   CO2 17*  --  23 23 26   BUN 58*  --  66* 62* 62*   CREATININE 4.1*  --  4.3* 4.1* 4.0*   GLUCOSE 129*  --  144* 196* 157*   MG 2.20 2.20  --   --  2.10   PHOS 3.7  --  3.6 3.2 3.8

## 2022-07-18 NOTE — PROGRESS NOTES
Podiatric Surgery Daily Progress Note  Kanwal Yañez      Subjective :   Patient seen and examined this am at the bedside. Patient denies any acute overnight events. Patient denies N/V/F/C/SOB. Patient denies calf pain, thigh pain, chest pain. Review of Systems: A 12 point review of symptoms is unremarkable with the exception of the chief complaint. Patient specifically denies nausea, fever, vomiting, chills, shortness of breath, chest pain, abdominal pain, constipation or difficulty urinating. Objective     BP (!) 131/59   Pulse (!) 101   Temp 98.6 °F (37 °C) (Oral)   Resp 16   Ht 6' 2\" (1.88 m)   Wt 187 lb 13.3 oz (85.2 kg)   SpO2 94%   BMI 24.12 kg/m²     I/O:  Intake/Output Summary (Last 24 hours) at 7/18/2022 0754  Last data filed at 7/18/2022 0452  Gross per 24 hour   Intake 200 ml   Output 2080 ml   Net -1880 ml              Wt Readings from Last 3 Encounters:   07/18/22 187 lb 13.3 oz (85.2 kg)   07/05/22 178 lb 3.2 oz (80.8 kg)   06/20/22 177 lb (80.3 kg)       LABS:    Recent Labs     07/17/22  0456 07/17/22  1624 07/17/22  2338 07/18/22  0606   WBC 10.0  --   --  11.3*   HGB 7.2*   < > 7.9* 7.8*   HCT 21.0*   < > 23.8* 22.7*   PLT 81*  --   --  114*    < > = values in this interval not displayed. Recent Labs     07/18/22  0606      K 4.4      CO2 26   PHOS 3.8   BUN 62*   CREATININE 4.0*        Recent Labs     07/17/22 2338   APTT 41.9*           LOWER EXTREMITY EXAMINATION    Dressing to left lower extremity clean, dry, intact. No strikethrough drainage noted to external layers of the dressing. No pain with calf compression. Right lower extremity soft tissue envelope is closed and without acute signs infection. IMAGING:  XR Left foot 7/15/22  Narrative   EXAM: XR FOOT LEFT (MIN 3 VIEWS)       INDICATION: s/p left foot TMA,        COMPARISON: MRI July 6, 2022, radiograph July 5, 2022       FINDINGS:       Bones:  There is been interval amputation of the first through fourth metatarsals. There is been a reamputation of the fifth metatarsal.       Joints: The joint spaces are well maintained. No joint effusion. Soft tissues: Mild soft tissue swelling is present likely due to recent surgery. Impression   1. Transmetatarsal amputation with mild soft tissue swelling likely due to recent surgery. ASSESSMENT/PLAN  -s/p transmetatarsal amputation; left LE 2/2 osteomyelitis  -Peripheral arterial disease; b/l LE  -Type 2 diabetes mellitus with peripheral neuropathy  -Acute blood loss anemia     -Patient seen and examined at bedside this AM  -Hypertensive, tachycardic, otherwise VSS, leukocytosis noted (WBC 11.3)  -ESR 53, CRP 10.4  -HbA1c 5.9%  -Blood cultures x2; NGTD  -Wound culture: MRSA, diphtheroids  -Surgical pathology pending  -Imaging reviewed, impression noted above  -ID following; continue antibiotics per their recommendations  -Vascular surgery following; s/p left SFA to peroneal bypass (7/13/22)  -Dressing to left LE left clean, dry, intact  -Strict nonweightbearing to left lower extremity  -Acute blood loss anemia postoperatively; received 2 units of pRBC 7/15, 1 unit 7/16. No strikethrough drainage to left LE dressing; unlikely post-op bleeding at surgical site. Will continue to monitor CBC. -Patient would likely benefit from skilled nursing facility placement at discharge. DISPO: S/p transmetatarsal amputation; left LE 2/2 osteomyelitis. Labs and imaging reviewed. Continue antibiotics per ID recommendations. Vascular surgery following. No further surgical intervention from podiatric standpoint.      Discussed assessment and plan with Dr. Kaya Wu DPM.    Sandra Gomez DPM  Podiatric Resident, PGY-3  Pager #: [phone] or Perfect Serve

## 2022-07-18 NOTE — CARE COORDINATION
ADDENDUM:  RAMU spoke to Pt and he is okay w/isaias gipson. [facility] Is denying pt. RAMU spoke to Brent orantes at [facility] and she will review and call SW back, she is familiar w/Pt. Electronically signed by PETER Acosta LSW on 7/18/2022 at 12:00 PM    SW met w/Pt at bedside. Pt said he is not able to pay half of it he would only be able to pay some of it. RAMU LVM for [facility] at [facility]. Electronically signed by PETER Acosta LSW on 7/18/2022 at 10:58 AM      Patient is from home alone, prefers to return home with [facility] if able. If pt needs SNF, would like to go to [facility]. Will need anthem pre-cert. RAMU spoke to [facility], Pt has a unpaid balance of $825 and would need to at least pay half before they will accept him. RAMU will discuss w/Pt.      Electronically signed by PETER Acosta LSW on 7/18/2022 at 9:51 AM  [phone]

## 2022-07-18 NOTE — PROGRESS NOTES
Physical Therapy  Facility/Department: [place] [address] [facility]  Physical Therapy treatment note    Name: Corina Ellis  : 1935  MRN: [id number]  Date of Service: 2022    Discharge Recommendations:Guero Wang scored a  on the AM-PAC short mobility form. Current research shows that an AM-PAC score of 17 or less is typically not associated with a discharge to the patient's home setting. Based on the patient's AM-PAC score and their current functional mobility deficits, it is recommended that the patient have 3-5 sessions per week of Physical Therapy at d/c to increase the patient's independence. Please see assessment section for further patient specific details. If patient discharges prior to next session this note will serve as a discharge summary. Please see below for the latest assessment towards goals. PT Equipment Recommendations  Equipment Needed: No      Patient Diagnosis(es): The primary encounter diagnosis was Toe osteomyelitis, left ([facility] [address] Diagnoses of MO (acute kidney injury) ([facility] [address] and Osteomyelitis of left foot, unspecified type [facility]) were also pertinent to this visit. Past Medical History:  has a past medical history of Diabetic eye exam ([facility] [address] DM (diabetes mellitus) ([facility] [address] Gout, Hypothyroidism, and Seasonal allergic rhinitis. Past Surgical History:  has a past surgical history that includes Incisional hernia repair; hernia repair (); Colonoscopy ( appx ); Colonoscopy (N/A, 2022); femoral bypass (Left, 2022); and Foot Amputation (Left, 7/15/2022). Assessment   Assessment: Pt continues to demo limited mobility and now NWB L LE. Pt demo LOB to R in sitting. RN aware. Pt planning to pursue SNF at discharge due to him limits with L LE. Pt would benefit from continued skilled IP PT at discharge to maximize his functional independence priot to d/c home. Recommend maximover PRN and often to assist pt OOB NWB L. RN aware.   Treatment Diagnosis: Decreased mobility associated with osteomyelitis. Requires PT Follow-Up: Yes  Activity Tolerance  Activity Tolerance: Patient limited by fatigue;Patient limited by pain; Patient limited by endurance     Plan   Plan  Plan:  (2-5)  Current Treatment Recommendations: Transfer training, Functional mobility training, Strengthening, Gait training  Safety Devices  Type of Devices: Nurse notified, Call light within reach, Bed alarm in place, Left in bed (L LE elevated)     Restrictions  Position Activity Restriction  Other position/activity restrictions: NWB LLE; up with assist     Subjective   General  Chart Reviewed: Yes  Additional Pertinent Hx: Pt to ED 7/5 with L foot pain from recent toe amputation. Pt admitted for Osteomyelitis. Podiatry consult. Surgery consult for arteriogram on 7/11.  7/13 s/p LEFT SUPERFICIAL FEMORAL ARTERY TO PERONEAL ARTERY BYPASS WITH REVERSED GREAT SAPHENOUS VEIN GRAFT. Rapid response. To ICU. Back to OR 7/14 for Left foot TMA. PMH:  Gout, DM, HTN  Family / Caregiver Present: No  Diagnosis: Osteomyelitis  Follows Commands: Within Functional Limits  Subjective  Subjective: Pt found supine in bed and agreeable to PT. Pt c/o L LE pain 8/10, RN aware. Social/Functional History  Social/Functional History  Lives With: Alone  Type of Home: Senior housing apartment  Home Layout: One level  Home Access: Elevator  Bathroom Shower/Tub: Walk-in shower  Bathroom Toilet: Handicap height  Bathroom Equipment: Grab bars in shower, Shower chair, Hand-held shower, Grab bars around toilet  Home Equipment: [address], Rollator  Has the patient had two or more falls in the past year or any fall with injury in the past year?: No  ADL Assistance: [facility]: Needs assistance (cleaning, laundry, meals on wheels)  Ambulation Assistance: Independent (uses rollator outside of apartment and \"when I'm in a crisis. \")  Transfer Assistance: Independent  Active : Yes  Occupation: Retired (Manager)  Additional Comments: Pt manages own medications    Vision/Hearing  Vision  Vision: Impaired  Vision Exceptions: Wears glasses at all times  Hearing  Hearing: Exceptions to [facility]  Hearing Exceptions: Hard of hearing/hearing concerns;Bilateral hearing aid      Cognition   Orientation  Overall Orientation Status: Within Functional Limits     Objective                 Bed mobility  Rolling to Right: Maximum assistance  Supine to Sit: 2 Person assistance;Maximum assistance (HOB up and use of rail; max vc)  Sit to Supine: 2 Person assistance;Maximum assistance (with max vc)  Scootin Person assistance;Maximal assistance (up in bed)  Bed Mobility Comments: NOTE: simulated slide board transfer at EOB - lateral scooting w/ Max A of 2 (pt demo good awareness of NWB precaution)    Transfers  Sit to Stand:  (unable to stand and maintain NWB L)  Lateral Transfers: 2 Person Assistance;Maximum Assistance (simulated slide board; pt slid up in bed)        Balance  Sitting - Static: Fair  Sitting - Dynamic: Fair  Comments: Pt sat EOB approx 10 mins with R lean (able to correct with max verbal and tactile cues but unable to maintain).   Exercise Treatment: supine x5 B LE: HS in limited ROM; AP in limited ROM          AM-PAC Score  AM-PAC Inpatient Mobility Raw Score : 7 (22 1025)  AM-PAC Inpatient T-Scale Score : 26.42 (22 1025)  Mobility Inpatient CMS 0-100% Score: 92.36 (22 1025)  Mobility Inpatient CMS G-Code Modifier : CM (22 1025)          Goals  Short Term Goals  Time Frame for Short term goals: Discharge  Short term goal 1: Rolling with Min A   ongoing  Short term goal 2: supine <> sit Min A  ongoing  Short term goal 3: sit <> stand Mod A x2 to full stance NWB L LE   ongoing  Patient Goals   Patient goals : Get stronger       Education  Patient Education  Education Given To: Patient  Education Provided: Role of Therapy (NWB L)  Education Method: Verbal  Barriers to Learning: Hearing  Education Outcome: Verbalized understanding;Demonstrated understanding;Continued education needed      Therapy Time   Individual Concurrent Group Co-treatment   Time In 0945         Time Out 1010         Minutes 25          Timed Code Treatment Minutes: 25      Total Treatment Minutes:   25       Timothy Ferguson, PT

## 2022-07-18 NOTE — PLAN OF CARE
Progressing  7/17/2022 1718 by Wilian Uriarte RN  Outcome: Progressing     Problem: Chronic Conditions and Co-morbidities  Goal: Patient's chronic conditions and co-morbidity symptoms are monitored and maintained or improved  Recent Flowsheet Documentation  Taken 7/17/2022 2029 by Larry Argueta RN  Care Plan - Patient's Chronic Conditions and Co-Morbidity Symptoms are Monitored and Maintained or Improved:   Monitor and assess patient's chronic conditions and comorbid symptoms for stability, deterioration, or improvement   Collaborate with multidisciplinary team to address chronic and comorbid conditions and prevent exacerbation or deterioration   Update acute care plan with appropriate goals if chronic or comorbid symptoms are exacerbated and prevent overall improvement and discharge  7/17/2022 1718 by Wilian Uriarte RN  Outcome: Progressing     Problem: Pain  Goal: Verbalizes/displays adequate comfort level or baseline comfort level  7/17/2022 1718 by Wilian Uriarte RN  Outcome: Progressing     Problem: Skin/Tissue Integrity  Goal: Absence of new skin breakdown  Description: 1. Monitor for areas of redness and/or skin breakdown  2. Assess vascular access sites hourly  3. Every 4-6 hours minimum:  Change oxygen saturation probe site  4. Every 4-6 hours:  If on nasal continuous positive airway pressure, respiratory therapy assess nares and determine need for appliance change or resting period.   7/17/2022 1718 by Wilian Uriarte RN  Outcome: Progressing

## 2022-07-18 NOTE — PROGRESS NOTES
Occupational Therapy  Facility/Department: [place] [address] [facility]  Occupational Therapy Treatment    Name: Franny Bean  : 1935  MRN: [id number]  Date of Service: 2022    Discharge Recommendations:   Franny Bean scored a 14/24 on the AM-PAC ADL Inpatient form. Current research shows that an AM-PAC score of 17 or less is typically not associated with a discharge to the patient's home setting. Based on the patient's AM-PAC score and their current ADL deficits, it is recommended that the patient have 3-5 sessions per week of Occupational Therapy at d/c to increase the patient's independence. Please see assessment section for further patient specific details. If patient discharges prior to next session this note will serve as a discharge summary. Please see below for the latest assessment towards goals. OT Equipment Recommendations  Equipment Needed: No  Other: defer       Patient Diagnosis(es): The primary encounter diagnosis was Toe osteomyelitis, left ([facility] Diagnoses of [facility] and Osteomyelitis of left foot, unspecified type [facility]) were also pertinent to this visit. Past Medical History:  has a past medical history of Diabetic eye exam ([facility] DM (diabetes mellitus) ([facility] Gout, Hypothyroidism, and Seasonal allergic rhinitis. Past Surgical History:  has a past surgical history that includes Incisional hernia repair; hernia repair (); Colonoscopy ( appx ); Colonoscopy (N/A, 2022); femoral bypass (Left, 2022); and Foot Amputation (Left, 7/15/2022). Treatment Diagnosis: impaired ADLs/transfers s/p L transmet amputation      Assessment   Performance deficits / Impairments: Decreased functional mobility ; Decreased ADL status; Decreased endurance;Decreased strength  Assessment: Pt required assist x 2 for bed mobility to come from supine to sit. Pt required initially min-mod A to sit upright sitting EOB, noted increasd R lateral lean.  Pt was able to feed himself with min assist, assist to place food onto fork. Pt required increased time for oral care, assist to brush dentures for completeness of task. Pt attempted lateral scoot transfer 2/2 NWB status of L LE, however required max A x 2 for scooting. Pt would benefit from continued OT while in acute care, continue OT POC. Treatment Diagnosis: impaired ADLs/transfers s/p L transmet amputation  REQUIRES OT FOLLOW-UP: Yes  Activity Tolerance  Activity Tolerance: Patient limited by fatigue;Treatment limited secondary to medical complications (free text)  Activity Tolerance Comments: Pt reported min-mod fatigue sitting EOB for feeding. HR WFL while seated EOB. Upon return to semi supine pt was engaging in oral care, HR up to 140s. RN in room, and aware. Pt reported no chest pain or heart palpitations. Plan   Plan  Times per Week: 2-5  Times per Day: Daily  Current Treatment Recommendations: Strengthening, Functional mobility training, Endurance training, Safety education & training, Self-Care / ADL     Restrictions  Position Activity Restriction  Other position/activity restrictions: NWB LLE; up with assist    Subjective   General  Chart Reviewed: Yes  Additional Pertinent Hx: 81 y.o. to ED 7/5 for c/o left foot/toe pain. Hospital Course:  OR 7/15 for Transmetatarsal Amputation, Left Foot. PMH: DM type 2, HTN and hypothyroidism, PAF (off pradaxa) and BPH. Family / Caregiver Present: No  Referring Practitioner: Titi Elkins DPM  Diagnosis: Osteomyelitis  Subjective  Subjective: Pt semi supine in bed upon arrival, agreeable to OT session.  \"I'll take all the therapy I can get\"     Social/Functional History  Social/Functional History  Lives With: Alone  Type of Home: Senior housing apartment  Home Layout: One level  Home Access: Elevator  Bathroom Shower/Tub: Walk-in shower  Bathroom Toilet: Handicap height  Bathroom Equipment: Grab bars in shower, Shower chair, Hand-held shower, Grab bars around toilet  Home Equipment: Alert Button, Rollator  Has the patient had two or more falls in the past year or any fall with injury in the past year?: No  ADL Assistance: Independent  Homemaking Assistance: Needs assistance (cleaning, laundry, meals on wheels)  Ambulation Assistance: Independent (uses rollator outside of apartment and \"when I'm in a crisis. \")  Transfer Assistance: Independent  Active : Yes  Occupation: Retired (Manager)  Additional Comments: Pt manages own medications       Objective             Observation/Palpation  Observation: drain LLE w/ drainage observed on pillow  Safety Devices  Type of Devices: Nurse notified;Call light within reach; Bed alarm in place; Left in bed  Balance  Sitting: Impaired  Sitting - Static: Fair (occasional) (initially pt leaning to R and posteriorly, needed assistance to sit upright. Pt required assist to reach to end of bed rail to lean more to the L as pt had difficulty finding midline.)  Sitting - Dynamic: Poor (constant support)        ADL  Feeding: Minimal assistance; Increased time to complete  Feeding Skilled Clinical Factors: upon arrival, PCA was feeding pt. When asked pt why PCA was feeding him, he stated \"She asked and I said yes, it's just faster if she does it\" Pt was able to feed himself when assisted to place food on fork. Increased time bringing food to mouth. Grooming: Moderate assistance; Increased time to complete  Grooming Skilled Clinical Factors: increased time for oral care, assist to brush dentures. Pt very thorough with rinsing mouth, and completed multiple times this session. Assist to remove partial plate, pt with increased difficulty removing initially. Toileting Skilled Clinical Factors: turner     Activity Tolerance  Activity Tolerance: Patient limited by fatigue;Patient limited by pain; Patient limited by endurance  Bed mobility  Rolling to Right: Maximum assistance  Supine to Sit: 2 Person assistance;Maximum assistance  Sit to Supine: 2 Person assistance;Maximum assistance  Scootin Person assistance;Maximal assistance  Bed Mobility Comments: attempted scooting up to [facility] using lateral scoot technique, however pt with increased difficulty to push himself up/ lift bottom. Pt required max A x 2 for scooting to simulate slideboard transfer. Transfers  Transfer Comments: unsafe to attempt at this time 2/2 difficulty lifting buttocks off bed for lateral scoot. Cognition  Overall Cognitive Status: WFL  Orientation  Overall Orientation Status: Within Functional Limits                  Education Given To: Patient  Education Provided: Role of Therapy;Plan of Care;Precautions;Transfer Training  Education Provided Comments: participation in lateral scoot transfers- cont to ed. Education Method: Verbal  Barriers to Learning: None  Education Outcome: Verbalized understanding;Continued education needed                    Goals  Short Term Goals  Time Frame for Short term goals: Discharge  Short Term Goal 1: supine to sit w/ Min A -ongoing, continue  Short Term Goal 2: chair pushups x 5 w/ Min A, NWB LLE -ongoing, continue  Short Term Goal 3: lateral scooting at EOB w/ Mod A, NWB LLE -ongoing, continue  Patient Goals   Patient goals : to regain independence       Therapy Time   Individual Concurrent Group Co-treatment   Time In 930         Time Out 1025         Minutes 55         Timed Code Treatment Minutes: [address]  [address] [facility], OTR/L [id number]

## 2022-07-18 NOTE — PROGRESS NOTES
PM       Radiology:     CXR: I have reviewed the CXR with the following interpretation: NA  EKG:  I have reviewed the EKG with the following interpretation: NA    XR FOOT LEFT (MIN 3 VIEWS)   Final Result   1. Transmetatarsal amputation with mild soft tissue swelling likely due to recent surgery. XR TIBIA FIBULA LEFT (2 VIEWS)   Final Result      Intraoperative fluoroscopy provided as above. See operative report for details. FLUORO FOR SURGICAL PROCEDURES   Final Result      Intraoperative fluoroscopy provided as above. See operative report for details. VL PRE OP VEIN MAPPING   Final Result      XR CHEST PORTABLE   Final Result      No acute cardiopulmonary findings. VL DUP LOWER EXTREMITY ARTERIES LEFT   Final Result      MRI FOOT LEFT WO CONTRAST   Final Result   1. Limited exam secondary to motion artifact. 2. There is marrow edema identified within the fourth metatarsal head and proximal fourth phalanx. In the setting of infection or open wound in this region, this is compatible with osteomyelitis. 3. Status post amputations of the second, third, and fifth metatarsals as detailed above. 4. Healed fracture deformities identified involving the proximal first phalanx. There is osteoarthrosis identified at the first metatarsophalangeal joint. 5. There is subcutaneous edema identified about the foot. This can be seen with cellulitis and/or venous insufficiency. No soft tissue abscess is identified. 6. There is severe fatty atrophy involving the deep muscular foot. Correlate for underlying neuropathy. XR FOOT LEFT (MIN 3 VIEWS)   Final Result      Osteomyelitis is not excluded based on this exam particularly at the base of the proximal phalanx of the second digit. If this is a clinical concern evaluation with MRI should be performed.       IR ANGIOGRAM EXTREMITY LEFT    (Results Pending)       Consults:    IP CONSULT TO PODIATRY  PHARMACY TO DOSE VANCOMYCIN  IP CONSULT TO HOSPITALIST  IP CONSULT TO PHARMACY  IP CONSULT TO INFECTIOUS DISEASES  IP CONSULT TO VASCULAR SURGERY  IP CONSULT TO NEPHROLOGY        ASSESSMENT/PLAN:  81 y.o. male with DM type 2, HTN and hypothyroidism, PAF (off pradaxa) and BPH who presented with worsening wound left foot. Cellulitis left lower extremity with full-thickness ulceration plantar surface with osteomyelitis. POA  - Possible diabetic ulcer. H/e, appears A1C is not elevated and Home med list does not have DM meds: verify home med list  - R/o sec to severe PAD   - Hx of Multiple amputations. - Sed rate was 53 and CRP10  - MRI shows osteo  - Cx sent: mixed skin cash, Diphtheroids, MRSA; follow results  - Started on broad-spectrum IV antibiotics: ID consulted. - S/p  TRANSMETATARSAL AMPUTATION WITH TENDO ACHILLES LENGTHENING LEFT FOOT 7/15/2022  - PT/OT eval: SNF      DM-2 complicated by peripheral neuropathy: POA  - Verify home meds for accuracy as does not seem to have diabetic meds  - A1C too does not appear high  - We will monitor BGs closely for 1-2 days  - SSI      Peripheral vascular disease: POA  - On Plavix  - Vascular studies ordered  - Seen by Vascular Surgery: s/p angiogram of the LLE. Planned L SFA-Peroneal bypass  - S/p TMA by podiatry  - Tolerated procedures      Mental status changes 7/14/2022  - Episode of near syncope/syncopal episode  - Possiby orthostatic with hx of HTN, PAD, and new meds for BP control as well as recent vascular procedure with \" Estimated Blood Loss (mL): 300\"  - Hgb dropped this AM to 8.7, from 12 7/11/2022  - Sezure is also possible, but no apparent hx of seizures. Seizure ruled out  - Episode was likely sec to hypotension, anemia.  Non recurrent  - Avoiding hypotension, correcting anemia      Acute Blood loss anemia 7/14/2022  Post op/samreen-op blood loss from vascular surgery  \" Estimated Blood Loss (mL): 300\" + Estimated Blood Loss (mL): 250 mL at his 2 procedures  - Hgb dropped post sec procedure to 6 range  - Was also on heparin gtt: Held. Defer to Vascular as to resumption. Discussed with Vascular Surgery  - Transfused PRBCs. Monitor hgb and keep > 8      HTN: POA  - Was started on metoprolol: Switched to coreg, decrease dose with some winston noted. Then started hydralazine PO as BP was really high. Added CCB as BP remained high: increased dose to 60 mg dly Nifedipine: all BP meds held in setting of blood loss anemia./hypotension  - Lisonopril held with MO/hyperkalemia  - Gradually resume meds as BP stabilizized and possibly rising now. - PRN hydralazine IV      MO on CKD stage 3  - Baseline Cr 1.6  - Developed renal insuff prior to surgery, and developed MO post angio procedure, and hypotension related to blood loss/surgery  - Monitor cr: hopefully plateauxed and will start improving  - Continue gentle IV hydration  - Holding lisinopril  - Follows with Dr. Costa Console: Peggy Aguayo consulted. Paroxysmal A. Fib: POA  Afib RVR  - Not on rate control meds at home  - Appears was prev on Pradaxa. Appears was taken off after he wore a monitor and as \"couldn't afford it\" in 2014 .   - TSH, Mg: WNL  -  [address] resumed  - Went into RVR in setting of acute illness, major surgery  - Resumed BB: Will use metoprolol for now with marginal Bps      Hypothyroidism: POA  - TSH; WNL    Chronic Macrocytic anemia- POA  - B12, Folate: WNL      BPH: POA  - On  Flomax  - Monitor for retention: Bladder scans        Pxt tells me after discussing with his sons, he wants to be Full code. DVT Prophylaxis: Lovenox     Diet: ADULT DIET; Regular; 4 carb choices (60 gm/meal)  Code Status: Full Code      PT/OT Eval Status: As tolerated with no weightbearing on the left foot      Disposition -[facility] with telemetry    S/P Superficial femoral artery to peroneal artery bypass of LLE   And TMA left foot. 7/15/2022     PT/OT eval: SNF: possibly in about 2 days pending stability of hgb, and improvement in renal fxn    Will need placement.        [place]

## 2022-07-18 NOTE — PROGRESS NOTES
Patient had an episode of elevated HR up into 140s.  Was informed and new orders were received. Pt's HR is WNL at this time after a 2.5 metoprolol was given. RN will keep monitoring.

## 2022-07-18 NOTE — PLAN OF CARE
Problem: Skin/Tissue Integrity - Adult  Goal: Skin integrity remains intact  7/18/2022 1121 by Jonathan Oro RN  Outcome: Progressing Towards Goal  Flowsheets (Taken 7/18/2022 0703)  Skin Integrity Remains Intact:   Monitor for areas of redness and/or skin breakdown   Every 4-6 hours minimum: Change oxygen saturation probe site   Every 4-6 hours: If on nasal continuous positive airway pressure, respiratory therapy assesses nares and determine need for appliance change or resting period     Problem: Skin/Tissue Integrity - Adult  Goal: Incisions, wounds, or drain sites healing without S/S of infection  7/18/2022 1121 by Jonathan Oro RN  Outcome: Progressing Towards Goal  Flowsheets (Taken 7/18/2022 1121)  Incisions, Wounds, or Drain Sites Healing Without Sign and Symptoms of Infection:   TWICE DAILY: Assess and document skin integrity   TWICE DAILY: Assess and document dressing/incision, wound bed, drain sites and surrounding tissue   Initiate isolation precautions as appropriate   Initiate pressure ulcer prevention bundle as indicated   ADMISSION and DAILY: Assess and document risk factors for pressure ulcer development     Problem: Discharge Planning  Goal: Discharge to home or other facility with appropriate resources  7/18/2022 1121 by Jonathan Oro RN  Outcome: Progressing Towards Goal     Problem: Safety - Adult  Goal: Free from fall injury  7/18/2022 1121 by Jonathan Oro RN  Outcome: Progressing Towards Goal  Flowsheets  Taken 7/18/2022 1121  Free From Fall Injury:   Instruct family/caregiver on patient safety   Based on caregiver fall risk screen, instruct family/caregiver to ask for assistance with transferring infant if caregiver noted to have fall risk factors  Taken 7/18/2022 0702  Free From Fall Injury:   Instruct family/caregiver on patient safety   Based on caregiver fall risk screen, instruct family/caregiver to ask for assistance with transferring infant if caregiver noted to have fall risk factors     Problem: Pain  Goal: Verbalizes/displays adequate comfort level or baseline comfort level  Outcome: Progressing Towards Goal  Flowsheets (Taken 7/18/2022 1121)  Verbalizes/displays adequate comfort level or baseline comfort level:   Encourage patient to monitor pain and request assistance   Administer analgesics based on type and severity of pain and evaluate response   Consider cultural and social influences on pain and pain management   Notify Licensed Independent Practitioner if interventions unsuccessful or patient reports new pain   Implement non-pharmacological measures as appropriate and evaluate response   Assess pain using appropriate pain scale

## 2022-07-19 LAB
ALBUMIN SERPL-MCNC: 3 G/DL (ref 3.4–5)
ANION GAP SERPL CALCULATED.3IONS-SCNC: 13 MMOL/L (ref 3–16)
BASOPHILS ABSOLUTE: 0 K/UL (ref 0–0.2)
BASOPHILS RELATIVE PERCENT: 0.2 %
BUN BLDV-MCNC: 65 MG/DL (ref 7–20)
CALCIUM SERPL-MCNC: 8.6 MG/DL (ref 8.3–10.6)
CHLORIDE BLD-SCNC: 102 MMOL/L (ref 99–110)
CO2: 24 MMOL/L (ref 21–32)
CREAT SERPL-MCNC: 4.1 MG/DL (ref 0.8–1.3)
EKG ATRIAL RATE: 277 BPM
EKG ATRIAL RATE: 300 BPM
EKG DIAGNOSIS: NORMAL
EKG DIAGNOSIS: NORMAL
EKG P AXIS: -75 DEGREES
EKG Q-T INTERVAL: 168 MS
EKG Q-T INTERVAL: 342 MS
EKG QRS DURATION: 70 MS
EKG QRS DURATION: 70 MS
EKG QTC CALCULATION (BAZETT): 257 MS
EKG QTC CALCULATION (BAZETT): 462 MS
EKG R AXIS: -14 DEGREES
EKG R AXIS: -2 DEGREES
EKG T AXIS: -18 DEGREES
EKG T AXIS: 241 DEGREES
EKG VENTRICULAR RATE: 110 BPM
EKG VENTRICULAR RATE: 141 BPM
EOSINOPHILS ABSOLUTE: 0.1 K/UL (ref 0–0.6)
EOSINOPHILS RELATIVE PERCENT: 1 %
GFR AFRICAN AMERICAN: 17
GFR NON-AFRICAN AMERICAN: 14
GLUCOSE BLD-MCNC: 117 MG/DL (ref 70–99)
GLUCOSE BLD-MCNC: 134 MG/DL (ref 70–99)
GLUCOSE BLD-MCNC: 135 MG/DL (ref 70–99)
GLUCOSE BLD-MCNC: 163 MG/DL (ref 70–99)
GLUCOSE BLD-MCNC: 172 MG/DL (ref 70–99)
HCT VFR BLD CALC: 23 % (ref 40.5–52.5)
HEMOGLOBIN: 7.7 G/DL (ref 13.5–17.5)
LYMPHOCYTES ABSOLUTE: 1.1 K/UL (ref 1–5.1)
LYMPHOCYTES RELATIVE PERCENT: 9.9 %
MAGNESIUM: 2.1 MG/DL (ref 1.8–2.4)
MCH RBC QN AUTO: 32 PG (ref 26–34)
MCHC RBC AUTO-ENTMCNC: 33.7 G/DL (ref 31–36)
MCV RBC AUTO: 94.8 FL (ref 80–100)
MONOCYTES ABSOLUTE: 1 K/UL (ref 0–1.3)
MONOCYTES RELATIVE PERCENT: 9 %
NEUTROPHILS ABSOLUTE: 8.9 K/UL (ref 1.7–7.7)
NEUTROPHILS RELATIVE PERCENT: 79.9 %
PDW BLD-RTO: 16.9 % (ref 12.4–15.4)
PERFORMED ON: ABNORMAL
PHOSPHORUS: 4.1 MG/DL (ref 2.5–4.9)
PLATELET # BLD: 138 K/UL (ref 135–450)
PMV BLD AUTO: 8.5 FL (ref 5–10.5)
POTASSIUM SERPL-SCNC: 4.5 MMOL/L (ref 3.5–5.1)
RBC # BLD: 2.42 M/UL (ref 4.2–5.9)
SODIUM BLD-SCNC: 139 MMOL/L (ref 136–145)
WBC # BLD: 11.2 K/UL (ref 4–11)

## 2022-07-19 PROCEDURE — 6370000000 HC RX 637 (ALT 250 FOR IP): Performed by: STUDENT IN AN ORGANIZED HEALTH CARE EDUCATION/TRAINING PROGRAM

## 2022-07-19 PROCEDURE — 1200000000 HC SEMI PRIVATE

## 2022-07-19 PROCEDURE — 80069 RENAL FUNCTION PANEL: CPT

## 2022-07-19 PROCEDURE — 97530 THERAPEUTIC ACTIVITIES: CPT

## 2022-07-19 PROCEDURE — 83735 ASSAY OF MAGNESIUM: CPT

## 2022-07-19 PROCEDURE — 2580000003 HC RX 258: Performed by: STUDENT IN AN ORGANIZED HEALTH CARE EDUCATION/TRAINING PROGRAM

## 2022-07-19 PROCEDURE — 97535 SELF CARE MNGMENT TRAINING: CPT

## 2022-07-19 PROCEDURE — 93010 ELECTROCARDIOGRAM REPORT: CPT | Performed by: INTERNAL MEDICINE

## 2022-07-19 PROCEDURE — 6370000000 HC RX 637 (ALT 250 FOR IP): Performed by: INTERNAL MEDICINE

## 2022-07-19 PROCEDURE — 36415 COLL VENOUS BLD VENIPUNCTURE: CPT

## 2022-07-19 PROCEDURE — 85025 COMPLETE CBC W/AUTO DIFF WBC: CPT

## 2022-07-19 RX ORDER — GABAPENTIN 300 MG/1
300 CAPSULE ORAL NIGHTLY
Qty: 90 CAPSULE | Refills: 3 | Status: SHIPPED | OUTPATIENT
Start: 2022-07-19 | End: 2022-08-18

## 2022-07-19 RX ORDER — TRAMADOL HYDROCHLORIDE 50 MG/1
50 TABLET ORAL EVERY 6 HOURS PRN
Qty: 9 TABLET | Refills: 0 | Status: SHIPPED | OUTPATIENT
Start: 2022-07-19 | End: 2022-07-22

## 2022-07-19 RX ORDER — LINEZOLID 600 MG/1
600 TABLET, FILM COATED ORAL EVERY 12 HOURS SCHEDULED
Qty: 12 TABLET | Refills: 0 | Status: SHIPPED | OUTPATIENT
Start: 2022-07-19 | End: 2022-07-25

## 2022-07-19 RX ORDER — INSULIN LISPRO 100 [IU]/ML
0-6 INJECTION, SOLUTION INTRAVENOUS; SUBCUTANEOUS
Qty: 5 PEN | Refills: 2
Start: 2022-07-19

## 2022-07-19 RX ORDER — METHOCARBAMOL 750 MG/1
750 TABLET, FILM COATED ORAL 4 TIMES DAILY PRN
Qty: 20 TABLET | Refills: 1
Start: 2022-07-19 | End: 2022-07-29

## 2022-07-19 RX ORDER — METOPROLOL TARTRATE 50 MG/1
50 TABLET, FILM COATED ORAL 2 TIMES DAILY
Qty: 60 TABLET | Refills: 3 | Status: SHIPPED | OUTPATIENT
Start: 2022-07-19

## 2022-07-19 RX ORDER — ATORVASTATIN CALCIUM 40 MG/1
40 TABLET, FILM COATED ORAL NIGHTLY
Qty: 30 TABLET | Refills: 3 | Status: SHIPPED | OUTPATIENT
Start: 2022-07-19

## 2022-07-19 RX ADMIN — SODIUM CHLORIDE, PRESERVATIVE FREE 20 ML: 5 INJECTION INTRAVENOUS at 08:13

## 2022-07-19 RX ADMIN — METOPROLOL TARTRATE 50 MG: 50 TABLET, FILM COATED ORAL at 20:18

## 2022-07-19 RX ADMIN — SODIUM CHLORIDE, PRESERVATIVE FREE 5 ML: 5 INJECTION INTRAVENOUS at 21:00

## 2022-07-19 RX ADMIN — GABAPENTIN 300 MG: 300 CAPSULE ORAL at 20:11

## 2022-07-19 RX ADMIN — CETIRIZINE HYDROCHLORIDE 5 MG: 10 TABLET, FILM COATED ORAL at 08:11

## 2022-07-19 RX ADMIN — TAMSULOSIN HYDROCHLORIDE 0.4 MG: 0.4 CAPSULE ORAL at 20:12

## 2022-07-19 RX ADMIN — TRAMADOL HYDROCHLORIDE 50 MG: 50 TABLET, COATED ORAL at 08:11

## 2022-07-19 RX ADMIN — APIXABAN 2.5 MG: 2.5 TABLET, FILM COATED ORAL at 20:12

## 2022-07-19 RX ADMIN — ATORVASTATIN CALCIUM 40 MG: 40 TABLET, FILM COATED ORAL at 20:12

## 2022-07-19 RX ADMIN — APIXABAN 2.5 MG: 2.5 TABLET, FILM COATED ORAL at 08:11

## 2022-07-19 RX ADMIN — SODIUM CHLORIDE, PRESERVATIVE FREE 10 ML: 5 INJECTION INTRAVENOUS at 08:13

## 2022-07-19 RX ADMIN — POLYETHYLENE GLYCOL 3350 17 G: 17 POWDER, FOR SOLUTION ORAL at 04:15

## 2022-07-19 RX ADMIN — INSULIN LISPRO 1 UNITS: 100 INJECTION, SOLUTION INTRAVENOUS; SUBCUTANEOUS at 22:08

## 2022-07-19 RX ADMIN — LINEZOLID 600 MG: 600 TABLET, FILM COATED ORAL at 20:11

## 2022-07-19 RX ADMIN — INSULIN LISPRO 1 UNITS: 100 INJECTION, SOLUTION INTRAVENOUS; SUBCUTANEOUS at 13:15

## 2022-07-19 RX ADMIN — LINEZOLID 600 MG: 600 TABLET, FILM COATED ORAL at 08:11

## 2022-07-19 RX ADMIN — ASPIRIN 81 MG: 81 TABLET, COATED ORAL at 08:11

## 2022-07-19 RX ADMIN — METOPROLOL TARTRATE 50 MG: 50 TABLET, FILM COATED ORAL at 08:11

## 2022-07-19 ASSESSMENT — PAIN SCALES - GENERAL
PAINLEVEL_OUTOF10: 6
PAINLEVEL_OUTOF10: 0

## 2022-07-19 ASSESSMENT — PAIN DESCRIPTION - LOCATION
LOCATION: FOOT
LOCATION: FOOT

## 2022-07-19 ASSESSMENT — PAIN DESCRIPTION - ORIENTATION
ORIENTATION: LEFT;LOWER
ORIENTATION: LOWER;LEFT

## 2022-07-19 ASSESSMENT — PAIN DESCRIPTION - DESCRIPTORS
DESCRIPTORS: ACHING;SORE
DESCRIPTORS: ACHING;DISCOMFORT;SORE

## 2022-07-19 ASSESSMENT — PAIN - FUNCTIONAL ASSESSMENT: PAIN_FUNCTIONAL_ASSESSMENT: PREVENTS OR INTERFERES SOME ACTIVE ACTIVITIES AND ADLS

## 2022-07-19 ASSESSMENT — PAIN DESCRIPTION - ONSET: ONSET: ON-GOING

## 2022-07-19 ASSESSMENT — PAIN DESCRIPTION - FREQUENCY: FREQUENCY: CONTINUOUS

## 2022-07-19 ASSESSMENT — PAIN DESCRIPTION - PAIN TYPE: TYPE: ACUTE PAIN;SURGICAL PAIN

## 2022-07-19 NOTE — CARE COORDINATION
ADDENDUM SW met w/Pt he wants SW to ask his son or placement decision. SW spoke to pt's son he would like Pt to go to [facility] care. SW spoke to admissions, they are starting pre-cert. Electronically signed by PETER Maxwell LSW on 7/19/2022 at 1:32 PM  SW spoke to [facility] at [facility], they have denied Pt. SW sent ou referrals to SNFs nearby Pt's home to provide options for Pt. Vamsi - SW spoke to admission they are reviewing   [facility] [facility] at [facility]    SW following.      Electronically signed by PETER Maxwell LSW on 7/19/2022 at 9:55 AM  [phone]

## 2022-07-19 NOTE — PLAN OF CARE
Problem: ABCDS Injury Assessment  Goal: Absence of physical injury  7/19/2022 0940 by Cecille Vega RN  Outcome: Progressing Towards Goal  Flowsheets (Taken 7/19/2022 0940)  Absence of Physical Injury: Implement safety measures based on patient assessment    Problem: Safety - Adult  Goal: Free from fall injury  7/19/2022 0940 by Cecille Vega RN  Outcome: Progressing Towards Goal     Problem: Chronic Conditions and Co-morbidities  Goal: Patient's chronic conditions and co-morbidity symptoms are monitored and maintained or improved  Outcome: Progressing Towards Goal     Problem: Pain  Goal: Verbalizes/displays adequate comfort level or baseline comfort level  7/19/2022 0940 by Cecille Vega RN  Outcome: Progressing Towards Goal  Flowsheets (Taken 7/19/2022 0940)  Verbalizes/displays adequate comfort level or baseline comfort level:   Encourage patient to monitor pain and request assistance   Administer analgesics based on type and severity of pain and evaluate response   Consider cultural and social influences on pain and pain management   Notify Licensed Independent Practitioner if interventions unsuccessful or patient reports new pain   Implement non-pharmacological measures as appropriate and evaluate response   Assess pain using appropriate pain scale     Problem: Respiratory - Adult  Goal: Achieves optimal ventilation and oxygenation  7/19/2022 0940 by Cecille Vega RN  Outcome: Progressing Towards Goal  Flowsheets (Taken 7/19/2022 0940)  Achieves optimal ventilation and oxygenation:   Assess for changes in respiratory status   Position to facilitate oxygenation and minimize respiratory effort   Initiate smoking cessation protocol as indicated   Assess the need for suctioning and aspirate as needed   Respiratory therapy support as indicated   Assess for changes in mentation and behavior   Oxygen supplementation based on oxygen saturation or arterial blood gases   Encourage broncho-pulmonary hygiene including cough, deep breathe, incentive spirometry   Assess and instruct to report shortness of breath or any respiratory difficulty     Problem: Cardiovascular - Adult  Goal: Maintains optimal cardiac output and hemodynamic stability  7/19/2022 0940 by Roberto Pereira RN  Outcome: Progressing Towards Goal     Problem: Skin/Tissue Integrity - Adult  Goal: Skin integrity remains intact  Flowsheets  Taken 7/19/2022 0940 by Roberto Pereira RN  Skin Integrity Remains Intact:   Monitor for areas of redness and/or skin breakdown   Every 4-6 hours minimum: Change oxygen saturation probe site   Every 4-6 hours: If on nasal continuous positive airway pressure, respiratory therapy assesses nares and determine need for appliance change or resting period  Taken 7/18/2022 2022 by Andrea Jerome  Skin Integrity Remains Intact: Monitor for areas of redness and/or skin breakdown

## 2022-07-19 NOTE — PLAN OF CARE
Problem: ABCDS Injury Assessment  Goal: Absence of physical injury  Outcome: Progressing Towards Goal  Flowsheets (Taken 7/18/2022 2021)  Absence of Physical Injury: Implement safety measures based on patient assessment     Problem: Skin/Tissue Integrity - Adult  Goal: Incisions, wounds, or drain sites healing without S/S of infection  7/19/2022 0119 by Alberto Maldonado  Outcome: Progressing Towards Goal  Flowsheets (Taken 7/18/2022 2022)  Incisions, Wounds, or Drain Sites Healing Without Sign and Symptoms of Infection:   Initiate isolation precautions as appropriate   Implement wound care per orders   TWICE DAILY: Assess and document skin integrity   TWICE DAILY: Assess and document dressing/incision, wound bed, drain sites and surrounding tissue   ADMISSION and DAILY: Assess and document risk factors for pressure ulcer development  Note: Pt's L foot has dressing in place and his LLE has staples intact. Problem: Safety - Adult  Goal: Free from fall injury  7/19/2022 0119 by Alberto Maldonado  Outcome: Progressing Towards Goal  Flowsheets (Taken 7/18/2022 2021)  Free From Fall Injury: Instruct family/caregiver on patient safety  Note: Pt's bed is in lowest position, brake and bed exit alarm are on, call light and bedside table are within reach. Problem: Pain  Goal: Verbalizes/displays adequate comfort level or baseline comfort level  7/19/2022 0119 by Alberto Maldonado  Outcome: Progressing Towards Goal  Flowsheets (Taken 7/19/2022 0119)  Verbalizes/displays adequate comfort level or baseline comfort level:   Encourage patient to monitor pain and request assistance   Administer analgesics based on type and severity of pain and evaluate response   Assess pain using appropriate pain scale   Implement non-pharmacological measures as appropriate and evaluate response   Notify Licensed Independent Practitioner if interventions unsuccessful or patient reports new pain  Note: Pt has denied pain during shift.  Will continue to monitor pt's pain level. Problem: Skin/Tissue Integrity  Goal: Absence of new skin breakdown  Description: 1. Monitor for areas of redness and/or skin breakdown  Outcome: Progressing Towards Goal     Problem: Respiratory - Adult  Goal: Achieves optimal ventilation and oxygenation  Outcome: Progressing Towards Goal  Flowsheets (Taken 7/19/2022 0119)  Achieves optimal ventilation and oxygenation:   Assess for changes in respiratory status   Position to facilitate oxygenation and minimize respiratory effort   Assess the need for suctioning and aspirate as needed   Respiratory therapy support as indicated   Assess and instruct to report shortness of breath or any respiratory difficulty  Note: Pt remains on RA and SpO2 has been WNL. Problem: Cardiovascular - Adult  Goal: Maintains optimal cardiac output and hemodynamic stability  Outcome: Progressing Towards Goal  Flowsheets (Taken 7/19/2022 0119)  Maintains optimal cardiac output and hemodynamic stability:   Monitor blood pressure and heart rate   Monitor urine output and notify Licensed Independent Practitioner for values outside of normal range   Assess for signs of decreased cardiac output  Note: Pt is on continuous telemetry and heart rhythm has been NSR with PACs. Pt's BP has been stable during shift. Problem: Genitourinary - Adult  Goal: Urinary catheter remains patent  Outcome: Progressing Towards Goal  Flowsheets (Taken 7/19/2022 0119)  Urinary catheter remains patent: Assess patency of urinary catheter  Note: Urinary catheter remains in place.      Problem: Metabolic/Fluid and Electrolytes - Adult  Goal: Glucose maintained within prescribed range  Outcome: Progressing Towards Goal  Flowsheets (Taken 7/19/2022 0119)  Glucose maintained within prescribed range:   Monitor blood glucose as ordered   Administer ordered medications to maintain glucose within target range   Assess for signs and symptoms of hyperglycemia and hypoglycemia  Note: Pt's blood sugar is being checked ACHS and being covered with insulin lispro.      Problem: Skin/Tissue Integrity - Adult  Goal: Oral mucous membranes remain intact  Recent Flowsheet Documentation  Taken 7/18/2022 2022 by Ava Hurley  Oral Mucous Membranes Remain Intact:   Assess oral mucosa and hygiene practices   Implement preventative oral hygiene regimen

## 2022-07-19 NOTE — PROGRESS NOTES
Physical Therapy  [address]/Department[address]  Physical Therapy Treatment    Name: Pastor Rodriguez  : 1935  MRN: [id number]  Date of Service: 2022    Discharge Recommendations:  Pastor Rodriguez scored a 10/24 on the AM-PAC short mobility form. Current research shows that an AM-PAC score of 17 or less is typically not associated with a discharge to the patient's home setting. Based on the patient's AM-PAC score and their current functional mobility deficits, it is recommended that the patient have 3-5 sessions per week of Physical Therapy at d/c to increase the patient's independence. Please see assessment section for further patient specific details. If patient discharges prior to next session this note will serve as a discharge summary. Please see below for the latest assessment towards goals. PT Equipment Recommendations  Other: defer      Patient Diagnosis(es): The primary encounter diagnosis was Toe osteomyelitis, left ([facility] [address] Diagnoses of MO (acute kidney injury) ([facility] [address] and Osteomyelitis of left foot, unspecified type [facility]) were also pertinent to this visit. Past Medical History:  has a past medical history of Diabetic eye exam ([facility] [address] DM (diabetes mellitus) ([facility] [address] Gout, Hypothyroidism, and Seasonal allergic rhinitis. Past Surgical History:  has a past surgical history that includes Incisional hernia repair; hernia repair (); Colonoscopy ( appx ); Colonoscopy (N/A, 2022); femoral bypass (Left, 2022); and Foot Amputation (Left, 7/15/2022). Assessment   Body Structures, Functions, Activity Limitations Requiring Skilled Therapeutic Intervention: Decreased functional mobility ; Decreased safe awareness;Decreased endurance; Increased pain;Decreased strength  Assessment: Pt continues to require assist of two skilled therapists to maximize safety and outcomes.   Pt able to perform squat pivot with MaxAx2, however was incontinent of bowel during transfer, limiting rest of shower, Shower chair, Hand-held shower, Grab bars around toilet  Home Equipment: Alert Button, Rollator  Has the patient had two or more falls in the past year or any fall with injury in the past year?: No  ADL Assistance: [address]: Needs assistance (cleaning, laundry, meals on wheels)  Ambulation Assistance: Independent (uses rollator outside of apartment and \"when I'm in a crisis. \")  Transfer Assistance: Independent  Active : Yes  Occupation: Retired (Manager)  Additional Comments: Pt manages own medications  Vision/Hearing       Cognition         Objective   Heart Rate: 59  Heart Rate Source: Monitor  BP: (!) 115/48  BP Location: Right upper arm  BP Method: Automatic  Patient Position: Up in chair  MAP (Calculated): 70.33  Resp: 14  SpO2: 100 %  O2 Device: None (Room air)     Observation/Palpation  Observation: drainage from LLE on hannah pad                       Bed mobility  Supine to Sit: Contact guard assistance (HOB elevated, increased time, tactile cues for LLE, CGA at trunk)  Scooting: Moderate assistance (to scoot to EOB in sitting)  Transfers  Sit to Stand: Dependent/Total (MaxAx2 from recliner to marisol steady, cues for hand placement, pt unable to maintain NWB LLE, facilitation for anterior weight shifting)  Stand to sit: Dependent/Total (cues for hand placement, poor eccentric control)  Squat Pivot Transfers: Dependent/Total (MaxAx2 from EOB to recliner, heavy cues for technique and hand placement, pt unable to maintain NWB LLE, pt having bowel movement during transfer)  Ambulation  Comments: unsafe to attempt     Balance  Comments: Approximately 10 minutes of sitting balance EOB with SBA-[place] for oral care and gown change to prevent R lateral lean. Pt able to correct mostly with cues, however occasionally required [place] to correct. [address] progressing to MaxA to maintain standing balance with BUE support on marisol steady for pericare, heavy cues for posture and for NWB LLE. Pt unable to maintain NWB LLE. OutComes Score                                                  AM-PAC Score  AM-PAC Inpatient Mobility Raw Score : 10 (07/19/22 1536)  AM-PAC Inpatient T-Scale Score : 32.29 (07/19/22 1536)  Mobility Inpatient CMS 0-100% Score: 76.75 (07/19/22 1536)  Mobility Inpatient CMS G-Code Modifier : CL (07/19/22 1536)          Goals  Short Term Goals  Time Frame for Short term goals: Discharge  Short term goal 1: Rolling with Min A   ongoing  Short term goal 2: supine <> sit Min A  ongoing  Short term goal 3: sit <> stand Mod A x2 to full stance NWB L LE   ongoing  Patient Goals   Patient goals : Get stronger       Education  Patient Education  Education Given To: Patient  Education Provided Comments: NWB LLE  Education Method: Verbal  Barriers to Learning: Cognition  Education Outcome: Verbalized understanding;Continued education needed      Therapy Time   Individual Concurrent Group Co-treatment   Time In 1400         Time Out 1441         Minutes 41             Timed Code Treatment Minutes:   41    Total Treatment Minutes:  [id number], PT   This note to serve as discharge summary if patient discharged before next session.

## 2022-07-19 NOTE — PROGRESS NOTES
Hospital Medicine  Progress Note    PCP: Jane Castle    Date of Admission: 7/5/2022      Chief Complaint: Worsening left foot pain and swelling      History Of Present Illness:    81 y.o. male with DM type 2, HTN and hypothyroidism, PAF (off pradaxa) and BPH who presented with worsening wound left foot. The wound started in the bottom of his left foot as a callus. Patient has been putting Vaseline and soaking the foot. When he saw his podiatrist (Dr. Alina Wilhelm ) last week he was recommended to start using Betadine to the left foot. Patient has not following the wound care recommendations, has been putting Betadine on the wound and has been wearing a sock over it and continue to walk bearing weight on the left foot. Patient started noticing mild yellow drainage coming from the wound. Patient denies fever, chills, nausea, vomiting, chest pain or shortness of breath    On presentation,, apart from elevated BP, he had fairly normal vitals. He had no leucocytosis. Hgb was 12 with . Cr was 2.3 (baseline about 1.6). Interim HPI  No new complaints  Denies chest pain    Underwent a left superficial femoral to peroneal artery bypass yesterday afternoon. Patient denies any acute overnight events    Was in OR 7/15 for TRANSMETATARSAL AMPUTATION WITH TENDO ACHILLES LENGTHENING LEFT FOOT  Estimated Blood Loss (mL): 250 mL (300cc at previous vascular surgery). Transfused PRBCs     Appears went into RVR  (post op/ anemia). Rates improved now with metoprolol resumed. Cr still 4.1. Appears has plateauxed. Nephro anticipating should start improving in several days and intends to follow as o/p if discharged        Medications: Reviewed        Allergies:  Patient has no known allergies. REVIEW OF SYSTEMS COMPLETED:   Pertinent positives as noted in the HPI. All other systems reviewed and negative.     PHYSICAL EXAM PERFORMED:    BP (!) 142/56   Pulse 74   Temp 97.8 °F (36.6 °C) (Oral) Resp 18   Ht 6' 2\" (1.88 m)   Wt 191 lb 12.8 oz (87 kg)   SpO2 94%   BMI 24.63 kg/m²     General appearance:  No apparent distress, appears stated age and cooperative. HEENT:  Normal cephalic, atraumatic without obvious deformity. .  Neck: Supple, with full range of motion. No jugular venous distention. Trachea midline. Respiratory:  Normal respiratory effort. Clear to auscultation, bilaterally without Rales/Wheezes/Rhonchi. Cardiovascular:  Regular rate and rhythm with normal S1/S2 without murmurs, rubs or gallops. Abdomen: Soft, non-tender, non-distended with normal bowel sounds. Musculoskeletal:  No clubbing, cyanosis or edema bilaterally. Full range of motion without deformity. Skin: Dressing over left foot: C/D/I  Neurologic:  grossly non-focal.  Psychiatric:  Alert and oriented, thought content appropriate, normal insight  Capillary Refill: Brisk,3 seconds, normal  Peripheral Pulses: DP and PT pulses-nonpalpable bilaterally, biphasic with Doppler per podiatry on the right, monophasic on the left. Labs:     Recent Labs     07/17/22  0456 07/17/22  1624 07/17/22  2338 07/18/22  0606 07/19/22  0546   WBC 10.0  --   --  11.3* 11.2*   HGB 7.2*   < > 7.9* 7.8* 7.7*   HCT 21.0*   < > 23.8* 22.7* 23.0*   PLT 81*  --   --  114* 138    < > = values in this interval not displayed. Recent Labs     07/17/22  1028 07/18/22  0606 07/19/22  0546    140  140 139   K 4.3 4.5  4.4 4.5    103  104 102   CO2 23 27  26 24   BUN 62* 63*  62* 65*   CREATININE 4.1* 4.0*  4.0* 4.1*   CALCIUM 7.8* 8.1*  8.3 8.6   PHOS 3.2 3.8 4.1       No results for input(s): AST, ALT, BILIDIR, BILITOT, ALKPHOS in the last 72 hours. No results for input(s): INR in the last 72 hours.     Recent Labs     07/18/22  0606   TROPONINI 0.05*       Urinalysis:      Lab Results   Component Value Date/Time    NITRU Negative 07/10/2022 05:02 PM    WBCUA 0-2 07/10/2022 05:02 PM    BACTERIA Rare 07/10/2022 05:02 PM    Mehnaz Westfall 3-4 07/10/2022 05:02 PM    BLOODU Negative 07/10/2022 05:02 PM    SPECGRAV 1.020 07/10/2022 05:02 PM    GLUCOSEU Negative 07/10/2022 05:02 PM    GLUCOSEU Negative 07/05/2011 09:10 PM       Radiology:     CXR: I have reviewed the CXR with the following interpretation: NA  EKG:  I have reviewed the EKG with the following interpretation: NA    XR FOOT LEFT (MIN 3 VIEWS)   Final Result   1. Transmetatarsal amputation with mild soft tissue swelling likely due to recent surgery. XR TIBIA FIBULA LEFT (2 VIEWS)   Final Result      Intraoperative fluoroscopy provided as above. See operative report for details. FLUORO FOR SURGICAL PROCEDURES   Final Result      Intraoperative fluoroscopy provided as above. See operative report for details. VL PRE OP VEIN MAPPING   Final Result      XR CHEST PORTABLE   Final Result      No acute cardiopulmonary findings. VL DUP LOWER EXTREMITY ARTERIES LEFT   Final Result      MRI FOOT LEFT WO CONTRAST   Final Result   1. Limited exam secondary to motion artifact. 2. There is marrow edema identified within the fourth metatarsal head and proximal fourth phalanx. In the setting of infection or open wound in this region, this is compatible with osteomyelitis. 3. Status post amputations of the second, third, and fifth metatarsals as detailed above. 4. Healed fracture deformities identified involving the proximal first phalanx. There is osteoarthrosis identified at the first metatarsophalangeal joint. 5. There is subcutaneous edema identified about the foot. This can be seen with cellulitis and/or venous insufficiency. No soft tissue abscess is identified. 6. There is severe fatty atrophy involving the deep muscular foot. Correlate for underlying neuropathy. XR FOOT LEFT (MIN 3 VIEWS)   Final Result      Osteomyelitis is not excluded based on this exam particularly at the base of the proximal phalanx of the second digit.  If this is a clinical concern evaluation with MRI should be performed. IR ANGIOGRAM EXTREMITY LEFT    (Results Pending)       Consults:    IP CONSULT TO PODIATRY  PHARMACY TO DOSE VANCOMYCIN  IP CONSULT TO HOSPITALIST  IP CONSULT TO PHARMACY  IP CONSULT TO INFECTIOUS DISEASES  IP CONSULT TO VASCULAR SURGERY  IP CONSULT TO NEPHROLOGY        ASSESSMENT/PLAN:  81 y.o. male with DM type 2, HTN and hypothyroidism, PAF (off pradaxa) and BPH who presented with worsening wound left foot. Cellulitis left lower extremity with full-thickness ulceration plantar surface with osteomyelitis. POA  - Possible diabetic ulcer. H/e, appears A1C is not elevated and Home med list does not have DM meds: verify home med list  - R/o sec to severe PAD   - Hx of Multiple amputations. - Sed rate was 53 and CRP10  - MRI shows osteo  - Cx sent: mixed skin cash, Diphtheroids, MRSA; follow results  - Started on broad-spectrum IV antibiotics: ID consulted. - S/p  TRANSMETATARSAL AMPUTATION WITH TENDO ACHILLES LENGTHENING LEFT FOOT 7/15/2022  - PT/OT eval: SNF      DM-2 complicated by peripheral neuropathy: POA  - Verify home meds for accuracy as does not seem to have diabetic meds  - A1C too does not appear high: 5.9  - On SSI; but may not need this long term inessa if Bgs remain satisfactory as apparent. Peripheral vascular disease: POA  - On Plavix  - Vascular studies ordered  - Seen by Vascular Surgery: s/p angiogram of the LLE. Planned L SFA-Peroneal bypass  - S/p TMA by podiatry Tolerated procedures  - F/u with Vascular Surgery      Mental status changes 7/14/2022  - Episode of near syncope/syncopal episode  - Possiby orthostatic with hx of HTN, PAD, and new meds for BP control as well as recent vascular procedure with \" Estimated Blood Loss (mL): 300\"  - Hgb dropped this AM to 8.7, from 12 7/11/2022  - Sezure is also possible, but no apparent hx of seizures. Seizure ruled out  - Episode was likely sec to hypotension, anemia.  Non recurrent  - Avoiding hypotension, corrected anemia      Acute Blood loss anemia 7/14/2022  Post op/samreen-op blood loss from surgeries  \" Estimated Blood Loss (mL): 300\" + Estimated Blood Loss (mL): 250 mL at his 2 procedures  - Hgb dropped post sec procedure to 6 range  - Was also on heparin gtt: Held. [facility] resumed  - Transfused PRBCs. - Hgb stable and satisfactory now       HTN: POA  - Was started on metoprolol: Switched to coreg, decrease dose with some winston noted. Then started hydralazine PO as BP was really high. Added CCB as BP remained high: increased dose to 60 mg dly Nifedipine: all BP meds held in setting of blood loss anemia./hypotension  - Lisonopril held with MO/hyperkalemia  - Gradually resume meds as BP stabilized and possibly rising now. MO on CKD stage 3  - Baseline Cr 1.6  - Developed renal insuff prior to surgery, and developed MO post angio procedure, and hypotension related to blood loss/surgery  - Monitor cr: hopefully plateauxed and will start improving  - Holding lisinopril  - Follows with Dr. Ana Watkins: [facility] [place] following: states ok to DC to f/u with him as o/p      Paroxysmal A. Fib: POA  Afib RVR  - Not on rate control meds at home  - Appears was prev on Pradaxa. Appears was taken off after he wore a monitor and as \"couldn't afford it\" in 2014 .   - TSH, Mg: WNL  -  [facility] resumed  - Went into RVR in setting of acute illness, major surgery  - Resumed BB: Rate controlled now      Hypothyroidism: POA  - TSH; WNL    Chronic Macrocytic anemia- POA  - B12, Folate: WNL      BPH with Acute urinary retention post op  - On  Flomax for BPH  - Turner placed for post op retention: Remove turner in about 3 days, TOV. DVT Prophylaxis: On [facility]    Diet: ADULT DIET; Regular; 4 carb choices (60 gm/meal)  Code Status: Full Code      PT/OT Eval Status: As tolerated with no weightbearing on the left foot      Disposition  S/P Superficial femoral artery to peroneal artery bypass of LLE   S/p TMA left foot.  7/15/2022     PT/OT tala: SNF with saji Hill MD

## 2022-07-19 NOTE — PROGRESS NOTES
Physician Progress Note      Analilia Tena  [facility] #:                  [id number]  :                       1935  ADMIT DATE:       2022 8:07 PM  [address] [place] DATE:  RESPONDING  PROVIDER #:        Neto Haddad MD          QUERY TEXT:    Patient admitted with . Noted documentation of sepsis in Nephrology consult   note . In order to support the diagnosis of sepsis, please include   additional clinical indicators in your documentation. Or please document if   the diagnosis of sepsis has been ruled out after further study. The medical record reflects the following:  Risk Factors: 82 yo w/ hx of DM, osteomyelitis  Clinical Indicators: Per Nephrology note : sepsis. WBC 6.9 - 10.4. Temp   97.4 - 98.2. Per IM 7/10: Cellulitis left lower extremity with full-thickness   ulceration plantar surface with osteomyelitis. Treatment: Blood cultures, IVF bolus, IV Maxipime, IV Vancomycin. Options provided:  -- Sepsis was ruled out after study  -- Sepsis present as evidenced by, Please document evidence. -- Other - I will add my own diagnosis  -- Disagree - Not applicable / Not valid  -- Disagree - Clinically unable to determine / Unknown  -- Refer to Clinical Documentation Reviewer    PROVIDER RESPONSE TEXT:    Sepsis was ruled out after study.     Query created by: Shayy Schmidt on 2022 12:59 PM      Electronically signed by:  Neto Haddad MD 2022 6:28 PM

## 2022-07-19 NOTE — OP NOTE
Operative Note      Patient: Toño Waddell  YOB: 1935  MRN: [id number]    Date of Procedure: 7/13/2022    Pre-Op Diagnosis: Osteomyelitis of left foot, unspecified type ([address] [M86.9]    Post-Op Diagnosis: Same       Procedure(s):  LEFT SUPERFICIAL FEMORAL ARTERY TO PERONEAL ARTERY BYPASS WITH REVERSED GREAT SAPHENOUS VEIN GRAFT    Surgeon(s):  Keanu Herring MD    Assistant:   Resident: Fernando Nolan MD    Anesthesia: General    Estimated Blood Loss (mL): 065     Complications: None    Specimens:   * No specimens in log *      Implants:  * No implants in log *        Drains:   Open Drain 07/15/22 Left; Anterior Foot (Active)   Site Description Unable to view 07/18/22 1651   Dressing Status Old drainage noted 07/18/22 1651   Drainage Appearance Bloody 07/18/22 1651   Status Clamped 07/17/22 2029   Output (ml) 5 ml 07/16/22 2007       Urinary Catheter Schumacher (Active)   $ Urethral catheter insertion $ Not inserted for procedure 07/15/22 1110   Catheter Indications Urinary retention (acute or chronic), continuous bladder irrigation or bladder outlet obstruction 07/19/22 1532   Site Assessment No urethral drainage 07/19/22 1532   Urine Color Yellow 07/19/22 1532   Urine Appearance Hazy 07/19/22 1532   Urine Odor Other (Comment) 07/19/22 1532   Collection Container Standard 07/19/22 1532   Securement Method Securing device (Describe) 07/19/22 1532   Catheter Care Completed Yes 07/19/22 0349   Catheter Best Practices  Drainage tube clipped to bed;Catheter secured to thigh; Tamper seal intact; Bag below bladder;Bag not on floor;Drainage bag less than half full 07/19/22 1532   Status Draining 07/19/22 1532   Output (mL) 350 mL 07/19/22 1532       [REMOVED] Closed/Suction Drain Left Leg Bulb (Removed)   Site Description Intact 07/18/22 0822   Dressing Status New drainage noted 07/18/22 3576   Drainage Appearance Clots; Serous 07/18/22 0822   Drain Status To bulb suction 07/18/22 0822   Output (ml) 30 ml 07/17/22 1630       [REMOVED] Urinary Catheter Schumacher (Removed)   Catheter Indications Perioperative use for selected surgical procedures 07/14/22 1200   Site Assessment Other (Comment) 07/14/22 1200   Urine Color Ashley 07/14/22 1200   Urine Appearance Hazy 07/14/22 1200   Collection Container Standard 07/14/22 1200   Securement Method Securing device (Describe) 07/14/22 1200   Catheter Best Practices  Catheter secured to thigh;Bag not on floor; Bag below bladder 07/14/22 1200   Status Draining 07/14/22 1200   Output (mL) 200 mL 07/14/22 0952       [REMOVED] External Urinary Catheter (Removed)   Site Assessment Clean,dry & intact 07/13/22 1225   Securement Method Tape 07/13/22 1225   Perineal Care Yes 07/11/22 2055   Suction 40 mmgHg continuous 07/13/22 1225   Urine Color Yellow 07/13/22 1232   Urine Appearance Clear 07/13/22 1232   Urine Odor Malodorous 07/13/22 1232   Output (mL) 875 mL 07/13/22 1232       Findings: Palpable PT pulse at end of the case and good capillary refill to toes. No DP signal immediately post-op. Detailed Description of Procedure:   Patient was brought to the operating room, placed in supine position, prepped and draped in the usual sterile fashion using Betadine solution. Timeout was called and patient operative site were properly identified. He was given IV antibiotics prior to incision. The greater saphenous vein was evaluated with ultrasound guidance and appeared of good caliber, patent and compressible, throughout the leg. A longitudinal incision was made in the medial aspect of the distal thigh and tissue was dissected down through the skin and subcutaneous tissue with electrocautery. The saphenous vein was identified and looped with a vessel loop and protected throughout the dissection. The distal superficial femoral artery was identified and dissected from the surrounding tissue.   It was noted to be quite inflammatory adhesed to surrounding tissue, making dissection somewhat difficult. It was looped with a vessel loop. Attention was then turned to identifying the distal target. A 5 cm longitudinal incision was made in the mid distal calf, just inferior to the medial border of the tibia. Dissection was done dissected down through the subcutaneous tissue with electrocautery. The greater saphenous vein was identified in the same incision and looped with vessel loop. The distal edge of the soleus muscle was dissected away from the medial border of the tibia. Immediately, the neurovascular bundle was identified and had a monophasic Doppler signal.  However at the peroneal artery was densely adherent to the surrounding fat tissue. In fact, the fatty tissue was \"caked on\" and very difficult to separate from the artery. After some lengthy dissection, I chose to isolate the peroneal artery more proximally. Therefore, the medial calf and distal thigh incisions were extended to include the proximal calf. This was created to 1 continuous incision. The gastrocnemius muscle was retracted inferiorly and  from the tibia using electrocautery. Eventually, the distal popliteal artery, tibioperoneal trunk and proximal portion of the peroneal artery were identified. The peroneal artery, again was densely adherent to some of the surrounding tissue. There was basically a dense inflammatory reaction surrounding the vessels at all points where they were identified and evaluated. After some continued dissection, a 3 cm of segment of the peroneal artery was notified and looped with a vessel loop. The patient was systemically heparinized with 5000 units of IV heparin. The greater saphenous vein was ligated proximally and distally within the incision. All side branches were ligated between 2 oh or 4-0 silk sutures and transected. The vein was reversed and tunneled between incisions. The distal superficial femoral artery was proximally distally controlled with titanium clamps.   A 1.5cm longitudinal arteriotomy was performed and the proximal end of the vein conduit was spatulated and anastomosed using 6-0 Prolene suture in standard running fashion. There was good inflow noted in the graft was flushed vigorously with heparinized saline solution and clamped. The proximal peroneal artery was then proximally distally controlled and a 1 cm arteriotomy was performed, to which the distal end of the bypass graft was spatulated and anastomosed using 7-0 Prolene suture in the standard running fashion. All bleeding points were controlled. The patient had a palpable posterior tibial pulse at the end of the case. The wounds were copiously irrigated with antibiotic saline solution and closed with 3 layers of a combination of 2-0 Vicryl interrupted sutures and 3-0 running Vicryl suture. The skin was closed with staples. The patient was taken to the recovery room stable condition.   Needle sponge and instrument counts were correct    Electronically signed by Ilda Samaniego MD on 7/19/2022 at 4:01 PM

## 2022-07-19 NOTE — PROGRESS NOTES
Interval History and plan:      BP is well controlled   UOP is 1650 ml    SP  Fem / pop bypass on 7/13/2022  SP ( TMA) left foot       Plan:    Creatinine is stable 2.1 > 4.3 > 4.1  Creatinine pleatued likely should see improvement   Electrolytes better   Good UOP  Closely watch anbx dosing and levels  Will monitor for the need for dialysis   Stop IVF                        Assessment :     CKD Stage 3b  Likely due to DM and hypertension  Cr is 1.8- eGFR of 36 ml/min  UA- bland, wbc 6-9,  No recent one in the system      Hypertension   BP: (129)/(63)  Heart Rate:  [68-74]   BP goal inpatient [phone] systolic inpatient  Home regimen: lisinopril 10 mg daily only  Current inpatient regimen: carvedilol 6.25 mg BID, hydralazine 25 mg TID, nifedipine 60 mg daily. Also has Afib    Anemia of CKD             sepsis  DM  PVD    [facility] would like to thank Cyndi Snow MD   for opportunity to serve this patient      Please call with questions at-   24 Hrs Answering service [phone] or  7 am- 5 pm via Perfect serve or cell phone  To Abel MD          CC/reason for consult :     MO     HPI :     Lissett Woo is a 81 y.o. male presented to   the hospital on 7/5/2022 with wound infection in the left leg. He is followed outpatient by podiatry,for wound, which continued to   Get worse despite the treatment. Also has discharge from the wound  Brought to hospital admitted  Also found to have CKD  because of which we are consulted.          ROS:     Seen with- no family    positives in bold   Constitutional:  fever, chills, weakness, weight change, fatigue  Skin:  rash, pruritus, hair loss, bruising, dry skin, petechiae  Head, Face, Neck   headaches, swelling,  cervical adenopathy  Respiratory: shortness of breath, cough, or wheezing  Cardiovascular: chest pain, palpitations, dizzy, edema  Gastrointestinal: nausea, vomiting, diarrhea, constipation,belly pain    Yellow skin, blood in stool  Musculoskeletal:  back pain, muscle weakness, gait problems,       joint pain or swelling. Genitourinary:  dysuria, poor urine flow, flank pain, blood in urine  Neurologic:  vertigo, TIA'S, syncope, seizures, focal weakness  Psychosocial:  insomnia, anxiety, or depression. Additional positive findings:                    All other remaining systems are negative or unable to obtain        PMH/PSH/SH/Family History:     Past Medical History:   Diagnosis Date    Di[facility] 1/29/14    Dr. Jakob Rodriguez    DM (diabetes mellitus) ([facility] 1/13/2014    Gout     Hypothyroidism 4/18/2016    Seasonal allergic rhinitis        Past Surgical History:   Procedure Laterality Date    COLONOSCOPY  2009 appx     normal     COLONOSCOPY N/A 6/20/2022    COLONOSCOPY POLYPECTOMY SNARE/COLD BIOPSY performed by Patience Barnes MD at [place] [address] Left 7/13/2022    LEFT SUPERFICIAL FEMORAL ARTERY TO PERONEAL ARTERY BYPASS WITH REVERSED GREAT SAPHENOUS VEIN GRAFT performed by Sonny Kennedy MD at [address] [place] [address]. Left 7/15/2022    TRANSMETATARSAL AMPUTATION WITH TENDO ACHILLES LENGTHENING LEFT FOOT performed by Jennifer Monk DPM at [address] [place] [address], bilateral    INCISIONAL HERNIA REPAIR      bilateral        reports that he quit smoking about 63 years ago. He has never used smokeless tobacco. He reports that he does not drink alcohol and does not use drugs. family history is not on file.          Medication:     Current Facility-Administered Medications: apixaban (ELIQUIS) tablet 2.5 mg, 2.5 mg, Oral, BID  metoprolol tartrate (LOPRESSOR) tablet 50 mg, 50 mg, Oral, BID  oxyCODONE (ROXICODONE) immediate release tablet 5 mg, 5 mg, Oral, Once  linezolid (ZYVOX) tablet 600 mg, 600 mg, Oral, 2 times per day  [Held by provider] hydrALAZINE (APRESOLINE) tablet 50 mg, 50 mg, Oral, 3 times per day  sodium chloride flush 0.9 % injection 5-40 mL, 5-40 mL, IntraVENous, 2 times per day  sodium chloride flush 0.9 % injection 5-40 mL, 5-40 mL, IntraVENous, PRN  0.9 % sodium chloride infusion, , IntraVENous, PRN  cloNIDine (CATAPRES) tablet 0.1 mg, 0.1 mg, Oral, Q2H PRN  labetalol (NORMODYNE;TRANDATE) injection 10 mg, 10 mg, IntraVENous, Q2H PRN  aspirin EC tablet 81 mg, 81 mg, Oral, Daily  [Held by provider] NIFEdipine (ADALAT CC) extended release tablet 60 mg, 60 mg, Oral, Daily  atorvastatin (LIPITOR) tablet 40 mg, 40 mg, Oral, Nightly  traMADol (ULTRAM) tablet 50 mg, 50 mg, Oral, Q6H PRN  hydrALAZINE (APRESOLINE) injection 10 mg, 10 mg, IntraVENous, Q4H PRN  methocarbamol (ROBAXIN) tablet 750 mg, 750 mg, Oral, 4x Daily PRN  gabapentin (NEURONTIN) capsule 300 mg, 300 mg, Oral, Nightly  glucose chewable tablet 16 g, 4 tablet, Oral, PRN  dextrose bolus 10% 125 mL, 125 mL, IntraVENous, PRN **OR** dextrose bolus 10% 250 mL, 250 mL, IntraVENous, PRN  glucagon (rDNA) injection 1 mg, 1 mg, IntraMUSCular, PRN  dextrose 5 % solution, 100 mL/hr, IntraVENous, PRN  insulin lispro (1 Unit Dial) 0-6 Units, 0-6 Units, SubCUTAneous, TID WC  insulin lispro (1 Unit Dial) 0-3 Units, 0-3 Units, SubCUTAneous, Nightly  cetirizine (ZYRTEC) tablet 5 mg, 5 mg, Oral, Daily  tamsulosin (FLOMAX) capsule 0.4 mg, 0.4 mg, Oral, Nightly  sodium chloride flush 0.9 % injection 5-40 mL, 5-40 mL, IntraVENous, 2 times per day  sodium chloride flush 0.9 % injection 5-40 mL, 5-40 mL, IntraVENous, PRN  0.9 % sodium chloride infusion, , IntraVENous, PRN  ondansetron (ZOFRAN-ODT) disintegrating tablet 4 mg, 4 mg, Oral, Q8H PRN **OR** ondansetron (ZOFRAN) injection 4 mg, 4 mg, IntraVENous, Q6H PRN  polyethylene glycol (GLYCOLAX) packet 17 g, 17 g, Oral, Daily PRN  acetaminophen (TYLENOL) tablet 650 mg, 650 mg, Oral, Q6H PRN **OR** acetaminophen (TYLENOL) suppository 650 mg, 650 mg, Rectal, Q6H PRN       Vitals :     Vitals:    07/19/22 0724   BP:    Pulse: 74   Resp: 16   Temp: 97.8 °F (36.6 °C)   SpO2:        I & O : Intake/Output Summary (Last 24 hours) at 7/19/2022 0744  Last data filed at 7/19/2022 0600  Gross per 24 hour   Intake 920 ml   Output 1650 ml   Net -730 ml          Physical Examination :     General appearance: Anxious- no, distressed- no, in good spirits-    Alert, pleasant  HEENT: Lips- normal, teeth- ok , oral mucosa- moist  Neck : Mass- no, appears symmetrical, JVD- not visible  Respiratory: Respiratory effort-  normal, wheeze- no, crackles -   Cardiovascular: Ausculation- No M/R/G, Edema left leg   Abdomen: visible mass- no, distention- no, scar- no, tenderness- no                            hepatosplenomegaly-  no  Musculoskeletal:  clubbing no,cyanosis- no , digital ischemia- no                           muscle strength- grossly normal , tone - grossly normal  Skin: rashes- no , ulcers- no, induration- no, tightening - no  Psychiatric:  Judgement and insight- normal           AAO X 3  Additional finding:   Has left leg wound      LABS:     Recent Labs     07/17/22  0456 07/17/22  1624 07/17/22  2338 07/18/22  0606 07/19/22  0546   WBC 10.0  --   --  11.3* 11.2*   HGB 7.2*   < > 7.9* 7.8* 7.7*   HCT 21.0*   < > 23.8* 22.7* 23.0*   PLT 81*  --   --  114* 138    < > = values in this interval not displayed. Recent Labs     07/16/22  1011 07/17/22  0456 07/17/22  1028 07/18/22  0606 07/19/22  0546   NA  --    < > 141 140  140 139   K  --    < > 4.3 4.5  4.4 4.5   CL  --    < > 105 103  104 102   CO2  --    < > 23 27  26 24   BUN  --    < > 62* 63*  62* 65*   CREATININE  --    < > 4.1* 4.0*  4.0* 4.1*   GLUCOSE  --    < > 196* 152*  157* 134*   MG 2.20  --   --  2.10  2.10 2.10   PHOS  --    < > 3.2 3.8 4.1    < > = values in this interval not displayed.

## 2022-07-19 NOTE — PROGRESS NOTES
Vascular Surgery   Daily Progress Note  Patient: Rowena Face    CC: Atherosclerosis native artery left lower extremity with ulceration    SUBJECTIVE:   Patient did well overnight. Afebrile and HDS. No acute complaints. ROS: A 14 point review of systems was conducted, significant findings as noted above. All other systems negative. OBJECTIVE:   Infusions:   sodium chloride Stopped (07/12/22 0100)    dextrose      sodium chloride 25 mL/hr at 07/15/22 0743      I/O:I/O last 3 completed shifts: In: 1120 [P.O.:1110; I.V.:10]  Out: 2650 [Urine:2650]           Wt Readings from Last 1 Encounters:   07/19/22 191 lb 12.8 oz (87 kg)       Exam:  /63   Pulse 68   Temp 99.9 °F (37.7 °C) (Oral)   Resp 13   Ht 6' 2\" (1.88 m)   Wt 191 lb 12.8 oz (87 kg)   SpO2 94%   BMI 24.63 kg/m²     General appearance: NAD  Neuro: A&Ox3, no focal deficits, sensation intact. [place]  Chest/Lungs: normal effort, on RA  Cardiovascular: RRR  Abdomen: soft, non-distended, non-tender  Extremities: Incision line C/D/I with staples in place. No hematoma noted. Soft. Drain with minimal output and old coagulated blood in drain   Vascular Exam:    femoral  DP  PT  AT   LEFT  +  defer -/+ -/+   RIGHT  +  +  + defer       LABS:   Recent Labs     07/18/22  0606 07/19/22  0546   WBC 11.3* 11.2*   HGB 7.8* 7.7*   HCT 22.7* 23.0*   MCV 93.7 94.8   * 138          Recent Labs     07/18/22  0606 07/19/22  0546     140 139   K 4.5  4.4 4.5     104 102   CO2 27  26 24   PHOS 3.8 4.1   BUN 63*  62* 65*   CREATININE 4.0*  4.0* 4.1*        No results for input(s): AST, ALT, ALB, BILIDIR, BILITOT, ALKPHOS in the last 72 hours. No results for input(s): LIPASE, AMYLASE in the last 72 hours.      Recent Labs     07/17/22  2338   APTT 41.9*          Recent Labs     07/18/22  0606   TROPONINI 0.05*       ASSESSMENT/PLAN:   This is a 81y.o. year old male with a diagnosis of LLE PAD s/p Left Superficial Femoral Artery to Peroneal

## 2022-07-19 NOTE — PROGRESS NOTES
with calf compression b/l. NEUROLOGIC: Gross and epicritic sensation is diminished b/l. Protective sensation is diminished at all pedal sites b/l. DERMATOLOGIC: Chronic dermatological changes noted to b/l LE. Left LE: Surgical incision noted to the distal aspect of TMA stump with sutures intact and skin edges well coapted. No signs of dehiscence noted. No periincisional erythema noted. No fluctuance or crepitus noted. No malodor noted. No purulent drainage expressed. Surgical incision noted to medial aspect of left leg with surgical staples intact and skin edges well coapted. No acute signs of infection noted. Images from 7/19. Right lower extremity soft tissue envelope is closed and without acute signs of infection. MUSCULOSKELETAL: Muscle strength not tested 2/2 postoperative state. No pain with palpation of b/l foot or ankle. History of TMA to left foot. IMAGING:  XR Left foot 7/15/22  Narrative   EXAM: XR FOOT LEFT (MIN 3 VIEWS)       INDICATION: s/p left foot TMA,        COMPARISON: MRI July 6, 2022, radiograph July 5, 2022       FINDINGS:       Bones: There is been interval amputation of the first through fourth metatarsals. There is been a reamputation of the fifth metatarsal.       Joints: The joint spaces are well maintained. No joint effusion. Soft tissues: Mild soft tissue swelling is present likely due to recent surgery. Impression   1. Transmetatarsal amputation with mild soft tissue swelling likely due to recent surgery.          ASSESSMENT/PLAN  -s/p transmetatarsal amputation; left LE 2/2 osteomyelitis  -Peripheral arterial disease; b/l LE  -Type 2 diabetes mellitus with peripheral neuropathy  -Acute blood loss anemia     -Patient seen and examined at bedside this AM  -Hypertensive, otherwise VSS, leukocytosis noted (WBC 11.2)  -ESR 53, CRP 10.4  -HbA1c 5.9%  -Blood cultures x2; NGTD  -Wound culture: MRSA, diphtheroids  -Surgical pathology pending  -Imaging reviewed, impression noted above  -ID following; continue antibiotics per their recommendations  -Vascular surgery following; s/p left SFA to peroneal bypass (7/13/22)  -Dressing applied to left LE consisting of Adaptic, gauze, ABD, Kerlix, Ace bandage  -Strict nonweightbearing to left lower extremity  -Acute blood loss anemia postoperatively; received 2 units of pRBC 7/15, 1 unit 7/16. Hemoglobin has stabilized. -Patient would likely benefit from skilled nursing facility placement at discharge. DISPO: S/p transmetatarsal amputation; left LE 2/2 osteomyelitis. Labs and imaging reviewed. Continue antibiotics per ID recommendations. Vascular surgery following. No further surgical intervention from podiatric standpoint.      Discussed assessment and plan with Dr. Matt Atwood DPM.    Leandro Malin DPM  Podiatric Resident, PGY-3  Pager #: [phone] or Perfect Serve

## 2022-07-20 LAB
ALBUMIN SERPL-MCNC: 2.8 G/DL (ref 3.4–5)
ANION GAP SERPL CALCULATED.3IONS-SCNC: 9 MMOL/L (ref 3–16)
BASOPHILS ABSOLUTE: 0 K/UL (ref 0–0.2)
BASOPHILS RELATIVE PERCENT: 0.5 %
BUN BLDV-MCNC: 70 MG/DL (ref 7–20)
CALCIUM SERPL-MCNC: 8.4 MG/DL (ref 8.3–10.6)
CHLORIDE BLD-SCNC: 101 MMOL/L (ref 99–110)
CO2: 26 MMOL/L (ref 21–32)
CREAT SERPL-MCNC: 4.2 MG/DL (ref 0.8–1.3)
EOSINOPHILS ABSOLUTE: 0.1 K/UL (ref 0–0.6)
EOSINOPHILS RELATIVE PERCENT: 1.6 %
GFR AFRICAN AMERICAN: 16
GFR NON-AFRICAN AMERICAN: 14
GLUCOSE BLD-MCNC: 139 MG/DL (ref 70–99)
GLUCOSE BLD-MCNC: 150 MG/DL (ref 70–99)
GLUCOSE BLD-MCNC: 165 MG/DL (ref 70–99)
GLUCOSE BLD-MCNC: 180 MG/DL (ref 70–99)
GLUCOSE BLD-MCNC: 190 MG/DL (ref 70–99)
HCT VFR BLD CALC: 21.5 % (ref 40.5–52.5)
HEMOGLOBIN: 7.3 G/DL (ref 13.5–17.5)
LYMPHOCYTES ABSOLUTE: 0.8 K/UL (ref 1–5.1)
LYMPHOCYTES RELATIVE PERCENT: 8.9 %
MAGNESIUM: 2.1 MG/DL (ref 1.8–2.4)
MCH RBC QN AUTO: 32.3 PG (ref 26–34)
MCHC RBC AUTO-ENTMCNC: 34 G/DL (ref 31–36)
MCV RBC AUTO: 94.7 FL (ref 80–100)
MONOCYTES ABSOLUTE: 0.8 K/UL (ref 0–1.3)
MONOCYTES RELATIVE PERCENT: 8.6 %
NEUTROPHILS ABSOLUTE: 7.1 K/UL (ref 1.7–7.7)
NEUTROPHILS RELATIVE PERCENT: 80.4 %
PDW BLD-RTO: 16.7 % (ref 12.4–15.4)
PERFORMED ON: ABNORMAL
PHOSPHORUS: 4.8 MG/DL (ref 2.5–4.9)
PLATELET # BLD: 139 K/UL (ref 135–450)
PMV BLD AUTO: 8.2 FL (ref 5–10.5)
POTASSIUM SERPL-SCNC: 4.9 MMOL/L (ref 3.5–5.1)
RBC # BLD: 2.27 M/UL (ref 4.2–5.9)
SODIUM BLD-SCNC: 136 MMOL/L (ref 136–145)
WBC # BLD: 8.8 K/UL (ref 4–11)

## 2022-07-20 PROCEDURE — 97535 SELF CARE MNGMENT TRAINING: CPT

## 2022-07-20 PROCEDURE — 97530 THERAPEUTIC ACTIVITIES: CPT

## 2022-07-20 PROCEDURE — 85025 COMPLETE CBC W/AUTO DIFF WBC: CPT

## 2022-07-20 PROCEDURE — 6370000000 HC RX 637 (ALT 250 FOR IP): Performed by: INTERNAL MEDICINE

## 2022-07-20 PROCEDURE — 83735 ASSAY OF MAGNESIUM: CPT

## 2022-07-20 PROCEDURE — 6370000000 HC RX 637 (ALT 250 FOR IP): Performed by: STUDENT IN AN ORGANIZED HEALTH CARE EDUCATION/TRAINING PROGRAM

## 2022-07-20 PROCEDURE — 36415 COLL VENOUS BLD VENIPUNCTURE: CPT

## 2022-07-20 PROCEDURE — 97110 THERAPEUTIC EXERCISES: CPT

## 2022-07-20 PROCEDURE — 51798 US URINE CAPACITY MEASURE: CPT

## 2022-07-20 PROCEDURE — 94669 MECHANICAL CHEST WALL OSCILL: CPT

## 2022-07-20 PROCEDURE — 2580000003 HC RX 258: Performed by: STUDENT IN AN ORGANIZED HEALTH CARE EDUCATION/TRAINING PROGRAM

## 2022-07-20 PROCEDURE — 80069 RENAL FUNCTION PANEL: CPT

## 2022-07-20 PROCEDURE — 1200000000 HC SEMI PRIVATE

## 2022-07-20 RX ORDER — TRAMADOL HYDROCHLORIDE 50 MG/1
50 TABLET ORAL EVERY 6 HOURS PRN
Status: DISCONTINUED | OUTPATIENT
Start: 2022-07-20 | End: 2022-07-22 | Stop reason: HOSPADM

## 2022-07-20 RX ORDER — HYDROCORTISONE ACETATE 25 MG/1
25 SUPPOSITORY RECTAL 2 TIMES DAILY
Status: DISCONTINUED | OUTPATIENT
Start: 2022-07-20 | End: 2022-07-22 | Stop reason: HOSPADM

## 2022-07-20 RX ADMIN — CETIRIZINE HYDROCHLORIDE 5 MG: 10 TABLET, FILM COATED ORAL at 08:43

## 2022-07-20 RX ADMIN — LINEZOLID 600 MG: 600 TABLET, FILM COATED ORAL at 21:03

## 2022-07-20 RX ADMIN — METOPROLOL TARTRATE 50 MG: 50 TABLET, FILM COATED ORAL at 21:02

## 2022-07-20 RX ADMIN — APIXABAN 2.5 MG: 2.5 TABLET, FILM COATED ORAL at 08:43

## 2022-07-20 RX ADMIN — ATORVASTATIN CALCIUM 40 MG: 40 TABLET, FILM COATED ORAL at 21:03

## 2022-07-20 RX ADMIN — TRAMADOL HYDROCHLORIDE 50 MG: 50 TABLET, COATED ORAL at 23:37

## 2022-07-20 RX ADMIN — SODIUM CHLORIDE, PRESERVATIVE FREE 10 ML: 5 INJECTION INTRAVENOUS at 08:49

## 2022-07-20 RX ADMIN — GABAPENTIN 300 MG: 300 CAPSULE ORAL at 21:03

## 2022-07-20 RX ADMIN — SODIUM CHLORIDE, PRESERVATIVE FREE 10 ML: 5 INJECTION INTRAVENOUS at 21:02

## 2022-07-20 RX ADMIN — ASPIRIN 81 MG: 81 TABLET, COATED ORAL at 08:43

## 2022-07-20 RX ADMIN — HYDROCORTISONE ACETATE 25 MG: 25 SUPPOSITORY RECTAL at 13:43

## 2022-07-20 RX ADMIN — INSULIN LISPRO 1 UNITS: 100 INJECTION, SOLUTION INTRAVENOUS; SUBCUTANEOUS at 09:21

## 2022-07-20 RX ADMIN — INSULIN LISPRO 1 UNITS: 100 INJECTION, SOLUTION INTRAVENOUS; SUBCUTANEOUS at 21:50

## 2022-07-20 RX ADMIN — HYDROCORTISONE ACETATE 25 MG: 25 SUPPOSITORY RECTAL at 21:31

## 2022-07-20 RX ADMIN — TRAMADOL HYDROCHLORIDE 50 MG: 50 TABLET, COATED ORAL at 11:16

## 2022-07-20 RX ADMIN — TAMSULOSIN HYDROCHLORIDE 0.4 MG: 0.4 CAPSULE ORAL at 21:02

## 2022-07-20 RX ADMIN — SODIUM CHLORIDE, PRESERVATIVE FREE 10 ML: 5 INJECTION INTRAVENOUS at 08:48

## 2022-07-20 RX ADMIN — LINEZOLID 600 MG: 600 TABLET, FILM COATED ORAL at 08:42

## 2022-07-20 RX ADMIN — APIXABAN 2.5 MG: 2.5 TABLET, FILM COATED ORAL at 21:03

## 2022-07-20 RX ADMIN — INSULIN LISPRO 1 UNITS: 100 INJECTION, SOLUTION INTRAVENOUS; SUBCUTANEOUS at 18:01

## 2022-07-20 ASSESSMENT — PAIN SCALES - GENERAL
PAINLEVEL_OUTOF10: 9
PAINLEVEL_OUTOF10: 10
PAINLEVEL_OUTOF10: 8
PAINLEVEL_OUTOF10: 0

## 2022-07-20 ASSESSMENT — PAIN DESCRIPTION - PAIN TYPE
TYPE: ACUTE PAIN;SURGICAL PAIN
TYPE: ACUTE PAIN;SURGICAL PAIN

## 2022-07-20 ASSESSMENT — PAIN DESCRIPTION - FREQUENCY
FREQUENCY: INTERMITTENT
FREQUENCY: CONTINUOUS

## 2022-07-20 ASSESSMENT — PAIN DESCRIPTION - ORIENTATION
ORIENTATION: LEFT
ORIENTATION: LEFT;LOWER

## 2022-07-20 ASSESSMENT — PAIN DESCRIPTION - DIRECTION: RADIATING_TOWARDS: TOES

## 2022-07-20 ASSESSMENT — PAIN DESCRIPTION - DESCRIPTORS
DESCRIPTORS: ACHING;CRAMPING;DISCOMFORT;SHOOTING
DESCRIPTORS: ACHING;SORE

## 2022-07-20 ASSESSMENT — PAIN DESCRIPTION - LOCATION
LOCATION: FOOT
LOCATION: FOOT

## 2022-07-20 ASSESSMENT — PAIN DESCRIPTION - ONSET
ONSET: ON-GOING
ONSET: ON-GOING

## 2022-07-20 NOTE — PROGRESS NOTES
Vascular Surgery   Daily Progress Note  Patient: José Kay    CC: Atherosclerosis native artery left lower extremity with ulceration    SUBJECTIVE:   Patient did well overnight. No acute complaints. ROS: A 14 point review of systems was conducted, significant findings as noted above. All other systems negative. OBJECTIVE:   Infusions:   sodium chloride Stopped (07/12/22 0100)    dextrose      sodium chloride 25 mL/hr at 07/15/22 0743      I/O:I/O last 3 completed shifts: In: 5112 [P.O.:1610; I.V.:10]  Out: 2350 [Urine:2350]           Wt Readings from Last 1 Encounters:   07/20/22 191 lb 5.8 oz (86.8 kg)       Exam:  BP (!) 124/48   Pulse 67   Temp 98.9 °F (37.2 °C) (Oral)   Resp 14   Ht 6' 2\" (1.88 m)   Wt 191 lb 5.8 oz (86.8 kg)   SpO2 98%   BMI 24.57 kg/m²     General appearance: NAD  Neuro: A&Ox3, no focal deficits, sensation intact. [place]  Chest/Lungs: normal effort, on RA  Cardiovascular: RRR  Abdomen: soft, non-distended, non-tender  : Schumacher in place draining yellow urine  Extremities: Incision line C/D/I with staples in place. No hematoma noted. Soft. Drain with minimal output and old coagulated blood in drain     Vascular Exam:    femoral  DP  PT  AT   LEFT  +  defer -/+ -/+   RIGHT  +  +  + defer       LABS:   Recent Labs     07/19/22  0546 07/20/22  0435   WBC 11.2* 8.8   HGB 7.7* 7.3*   HCT 23.0* 21.5*   MCV 94.8 94.7    139          Recent Labs     07/19/22  0546 07/20/22  0435    136   K 4.5 4.9    101   CO2 24 26   PHOS 4.1 4.8   BUN 65* 70*   CREATININE 4.1* 4.2*        No results for input(s): AST, ALT, ALB, BILIDIR, BILITOT, ALKPHOS in the last 72 hours. No results for input(s): LIPASE, AMYLASE in the last 72 hours.      Recent Labs     07/17/22  2338   APTT 41.9*        Recent Labs     07/18/22  0606   TROPONINI 0.05*       ASSESSMENT/PLAN:   This is a 81y.o. year old male with a diagnosis of LLE PAD s/p Left Superficial Femoral Artery to Peroneal Artery Bypass with Reversed Greater Saphenous Vein on 7/13/22. Pt now s/p left foot transmetatarsal amputation on 7/15.     - Remove turner for void trial this AM  - Hbg stable  - OOB ambulating today, no bed rest  - ID on board, continue Linezolid (7/15-7/25)  - Okay dispo to SNF once pre-cert received  - Continue PT/OT for mobilization. OT 14/24 and PT 10/24.     Ryne Reynolds DO, [facility]  PGY1, General Surgery  07/20/22  6:36 AM  [employer]  [phone]

## 2022-07-20 NOTE — PROGRESS NOTES
Interval History and plan:      BP is well controlled   UOP is 1650 ml    SP  Fem / pop bypass on 7/13/2022  SP ( TMA) left foot       Plan:    Creatinine is stable 2.1 > 4.3 > 4.2  No significant renal improvement but other electrolytes are Ok  Good UOP  Closely watch anbx dosing and levels  Will monitor for the need for dialysis   Stop IVF                        Assessment :     CKD Stage 3b  Likely due to DM and hypertension  Cr is 1.8- eGFR of 36 ml/min  UA- bland, wbc 6-9,  No recent one in the system      Hypertension   BP: (106-124)/(48-77)  Heart Rate:  [63-67]   BP goal inpatient [phone] systolic inpatient  Home regimen: lisinopril 10 mg daily only  Current inpatient regimen: carvedilol 6.25 mg BID, hydralazine 25 mg TID, nifedipine 60 mg daily. Also has Afib    Anemia of CKD             sepsis  DM  PVD    [facility] would like to thank Paulino Mario MD   for opportunity to serve this patient      Please call with questions at-   24 Hrs Answering service [phone] or  7 am- 5 pm via Perfect serve or cell phone  Mary Anne Fan MD          CC/reason for consult :     MO     HPI :     Colleen Blake is a 81 y.o. male presented to   the hospital on 7/5/2022 with wound infection in the left leg. He is followed outpatient by podiatry,for wound, which continued to   Get worse despite the treatment. Also has discharge from the wound  Brought to hospital admitted  Also found to have CKD  because of which we are consulted.          ROS:     Seen with- no family    positives in bold   Constitutional:  fever, chills, weakness, weight change, fatigue  Skin:  rash, pruritus, hair loss, bruising, dry skin, petechiae  Head, Face, Neck   headaches, swelling,  cervical adenopathy  Respiratory: shortness of breath, cough, or wheezing  Cardiovascular: chest pain, palpitations, dizzy, edema  Gastrointestinal: nausea, vomiting, diarrhea, constipation,belly pain    Yellow skin, blood in stool  Musculoskeletal:  back pain, muscle weakness, gait problems,       joint pain or swelling. Genitourinary:  dysuria, poor urine flow, flank pain, blood in urine  Neurologic:  vertigo, TIA'S, syncope, seizures, focal weakness  Psychosocial:  insomnia, anxiety, or depression. Additional positive findings:                    All other remaining systems are negative or unable to obtain        PMH/PSH/SH/Family History:     Past Medical History:   Diagnosis Date    Diabetic eye exam ([facility] [address] 1/29/14    Dr. Roberto Campbell    DM (diabetes mellitus) ([facility] [address] 1/13/2014    Gout     Hypothyroidism 4/18/2016    Seasonal allergic rhinitis        Past Surgical History:   Procedure Laterality Date    COLONOSCOPY  2009 appx     normal     COLONOSCOPY N/A 6/20/2022    COLONOSCOPY POLYPECTOMY SNARE/COLD BIOPSY performed by Sakshi Garcia MD at [facility] Left 7/13/2022    LEFT SUPERFICIAL FEMORAL ARTERY TO PERONEAL ARTERY BYPASS WITH REVERSED GREAT SAPHENOUS VEIN GRAFT performed by Damir Anderson MD at [address] [place] [address]. Left 7/15/2022    TRANSMETATARSAL AMPUTATION WITH TENDO ACHILLES LENGTHENING LEFT FOOT performed by Sobia Molina DPM at [address] [facility], bilateral    INCISIONAL HERNIA REPAIR      bilateral        reports that he quit smoking about 63 years ago. He has never used smokeless tobacco. He reports that he does not drink alcohol and does not use drugs. family history is not on file.          Medication:     Current Facility-Administered Medications: apixaban (ELIQUIS) tablet 2.5 mg, 2.5 mg, Oral, BID  metoprolol tartrate (LOPRESSOR) tablet 50 mg, 50 mg, Oral, BID  oxyCODONE (ROXICODONE) immediate release tablet 5 mg, 5 mg, Oral, Once  linezolid (ZYVOX) tablet 600 mg, 600 mg, Oral, 2 times per day  [Held by provider] hydrALAZINE (APRESOLINE) tablet 50 mg, 50 mg, Oral, 3 times per day  sodium chloride flush 0.9 % injection 5-40 mL, 5-40 mL, IntraVENous, 2 times per day  sodium chloride flush 0.9 % injection 5-40 mL, 5-40 mL, IntraVENous, PRN  0.9 % sodium chloride infusion, , IntraVENous, PRN  cloNIDine (CATAPRES) tablet 0.1 mg, 0.1 mg, Oral, Q2H PRN  labetalol (NORMODYNE;TRANDATE) injection 10 mg, 10 mg, IntraVENous, Q2H PRN  aspirin EC tablet 81 mg, 81 mg, Oral, Daily  atorvastatin (LIPITOR) tablet 40 mg, 40 mg, Oral, Nightly  traMADol (ULTRAM) tablet 50 mg, 50 mg, Oral, Q6H PRN  hydrALAZINE (APRESOLINE) injection 10 mg, 10 mg, IntraVENous, Q4H PRN  methocarbamol (ROBAXIN) tablet 750 mg, 750 mg, Oral, 4x Daily PRN  gabapentin (NEURONTIN) capsule 300 mg, 300 mg, Oral, Nightly  glucose chewable tablet 16 g, 4 tablet, Oral, PRN  dextrose bolus 10% 125 mL, 125 mL, IntraVENous, PRN **OR** dextrose bolus 10% 250 mL, 250 mL, IntraVENous, PRN  glucagon (rDNA) injection 1 mg, 1 mg, IntraMUSCular, PRN  dextrose 5 % solution, 100 mL/hr, IntraVENous, PRN  insulin lispro (1 Unit Dial) 0-6 Units, 0-6 Units, SubCUTAneous, TID WC  insulin lispro (1 Unit Dial) 0-3 Units, 0-3 Units, SubCUTAneous, Nightly  cetirizine (ZYRTEC) tablet 5 mg, 5 mg, Oral, Daily  tamsulosin (FLOMAX) capsule 0.4 mg, 0.4 mg, Oral, Nightly  sodium chloride flush 0.9 % injection 5-40 mL, 5-40 mL, IntraVENous, 2 times per day  sodium chloride flush 0.9 % injection 5-40 mL, 5-40 mL, IntraVENous, PRN  0.9 % sodium chloride infusion, , IntraVENous, PRN  ondansetron (ZOFRAN-ODT) disintegrating tablet 4 mg, 4 mg, Oral, Q8H PRN **OR** ondansetron (ZOFRAN) injection 4 mg, 4 mg, IntraVENous, Q6H PRN  polyethylene glycol (GLYCOLAX) packet 17 g, 17 g, Oral, Daily PRN  acetaminophen (TYLENOL) tablet 650 mg, 650 mg, Oral, Q6H PRN **OR** acetaminophen (TYLENOL) suppository 650 mg, 650 mg, Rectal, Q6H PRN       Vitals :     Vitals:    07/20/22 0831   BP: 106/77   Pulse: 63   Resp: 14   Temp: 98.3 °F (36.8 °C)   SpO2: 100%       I & O :       Intake/Output Summary (Last 24 hours) at 7/20/2022 1009  Last data filed at 7/20/2022 0600  Gross per 24 hour   Intake 600 ml   Output 1300 ml   Net -700 ml          Physical Examination :     General appearance: Anxious- no, distressed- no, in good spirits-    Alert, pleasant  HEENT: Lips- normal, teeth- ok , oral mucosa- moist  Neck : Mass- no, appears symmetrical, JVD- not visible  Respiratory: Respiratory effort-  normal, wheeze- no, crackles -   Cardiovascular:  Ausculation- No M/R/G, Edema left leg   Abdomen: visible mass- no, distention- no, scar- no, tenderness- no                            hepatosplenomegaly-  no  Musculoskeletal:  clubbing no,cyanosis- no , digital ischemia- no                           muscle strength- grossly normal , tone - grossly normal  Skin: rashes- no , ulcers- no, induration- no, tightening - no  Psychiatric:  Judgement and insight- normal           AAO X 3  Additional finding:   Has left leg wound      LABS:     Recent Labs     07/18/22  0606 07/19/22  0546 07/20/22  0435   WBC 11.3* 11.2* 8.8   HGB 7.8* 7.7* 7.3*   HCT 22.7* 23.0* 21.5*   * 138 139       Recent Labs     07/18/22  0606 07/19/22  0546 07/20/22  0435     140 139 136   K 4.5  4.4 4.5 4.9     104 102 101   CO2 27  26 24 26   BUN 63*  62* 65* 70*   CREATININE 4.0*  4.0* 4.1* 4.2*   GLUCOSE 152*  157* 134* 190*   MG 2.10  2.10 2.10 2.10   PHOS 3.8 4.1 4.8

## 2022-07-20 NOTE — PROGRESS NOTES
Schumacher was removed at 1530, pt voided at 200ml at 1801, bladder scan post void showed 164ml.  MD made aware

## 2022-07-20 NOTE — PLAN OF CARE
Problem: Skin/Tissue Integrity - Adult  Goal: Skin integrity remains intact  Outcome: Progressing  Flowsheets (Taken 7/20/2022 0040)  Skin Integrity Remains Intact:   Monitor for areas of redness and/or skin breakdown   Assess vascular access sites hourly       Problem: Respiratory - Adult  Goal: Achieves optimal ventilation and oxygenation  Outcome: Progressing  Flowsheets (Taken 7/20/2022 0040)  Achieves optimal ventilation and oxygenation:   Assess for changes in respiratory status   Position to facilitate oxygenation and minimize respiratory effort   Assess for changes in mentation and behavior   Assess and instruct to report shortness of breath or any respiratory difficulty       Problem: Cardiovascular - Adult  Goal: Maintains optimal cardiac output and hemodynamic stability  Recent Flowsheet Documentation  Taken 7/20/2022 0040 by Isa Mahajan RN  Maintains optimal cardiac output and hemodynamic stability:   Monitor blood pressure and heart rate   Monitor urine output and notify Licensed Independent Practitioner for values outside of normal range   Assess for signs of decreased cardiac output     Problem: Genitourinary - Adult  Goal: Urinary catheter remains patent  Outcome: Progressing  Flowsheets (Taken 7/20/2022 0040)  Urinary catheter remains patent: Assess patency of urinary catheter

## 2022-07-20 NOTE — PLAN OF CARE
Problem: ABCDS Injury Assessment  Goal: Absence of physical injury  Outcome: Progressing  Flowsheets  Taken 7/20/2022 1429  Absence of Physical Injury: Implement safety measures based on patient assessment  Taken 7/20/2022 0716  Absence of Physical Injury: Implement safety measures based on patient assessment     Problem: Skin/Tissue Integrity - Adult  Goal: Skin integrity remains intact  7/20/2022 1429 by Tejas Alexander RN  Outcome: Progressing  Flowsheets  Taken 7/20/2022 1429  Skin Integrity Remains Intact:   Every 4-6 hours minimum: Change oxygen saturation probe site   Monitor for areas of redness and/or skin breakdown   Every 4-6 hours: If on nasal continuous positive airway pressure, respiratory therapy assesses nares and determine need for appliance change or resting period  Taken 7/20/2022 0731  Skin Integrity Remains Intact:   Monitor for areas of redness and/or skin breakdown   Every 4-6 hours minimum: Change oxygen saturation probe site   Every 4-6 hours: If on nasal continuous positive airway pressure, respiratory therapy assesses nares and determine need for appliance change or resting period     Problem: Skin/Tissue Integrity - Adult  Goal: Incisions, wounds, or drain sites healing without S/S of infection  Outcome: Progressing  Flowsheets (Taken 7/20/2022 1429)  Incisions, Wounds, or Drain Sites Healing Without Sign and Symptoms of Infection:   TWICE DAILY: Assess and document skin integrity   TWICE DAILY: Assess and document dressing/incision, wound bed, drain sites and surrounding tissue   Initiate isolation precautions as appropriate   Initiate pressure ulcer prevention bundle as indicated     Problem: Safety - Adult  Goal: Free from fall injury  Recent Flowsheet Documentation  Taken 7/20/2022 0716 by Tejas Alexander RN  Free From Fall Injury:   Instruct family/caregiver on patient safety   Based on caregiver fall risk screen, instruct family/caregiver to ask for assistance with transferring infant if caregiver noted to have fall risk factors     Problem: Genitourinary - Adult  Goal: Urinary catheter remains patent  7/20/2022 1429 by Anna Donovan RN  Outcome: Progressing     Problem: Cardiovascular - Adult  Goal: Maintains optimal cardiac output and hemodynamic stability  Flowsheets  Taken 7/20/2022 1429 by Anna Donovan RN  Maintains optimal cardiac output and hemodynamic stability:   Monitor blood pressure and heart rate   Monitor urine output and notify Licensed Independent Practitioner for values outside of normal range   Assess for signs of decreased cardiac output   Administer fluid and/or volume expanders as ordered   Administer vasoactive medications as ordered   For PPHN infants, administer sedation as ordered and minimize all controllable stressors.   Taken 7/20/2022 0040 by Rudy Valenzuela RN  Maintains optimal cardiac output and hemodynamic stability:   Monitor blood pressure and heart rate   Monitor urine output and notify Licensed Independent Practitioner for values outside of normal range   Assess for signs of decreased cardiac output

## 2022-07-20 NOTE — PROGRESS NOTES
Podiatric Surgery Daily Progress Note  Fadia Zhou      Subjective :   Patient seen and examined this am at the bedside. Patient denies any acute overnight events. Patient denies N/V/F/C/SOB. Patient denies calf pain, thigh pain, chest pain. Review of Systems: A 12 point review of symptoms is unremarkable with the exception of the chief complaint. Patient specifically denies nausea, fever, vomiting, chills, shortness of breath, chest pain, abdominal pain, constipation or difficulty urinating. Objective     BP (!) 124/48   Pulse 67   Temp 98.9 °F (37.2 °C) (Oral)   Resp 14   Ht 6' 2\" (1.88 m)   Wt 191 lb 5.8 oz (86.8 kg)   SpO2 98%   BMI 24.57 kg/m²     I/O:  Intake/Output Summary (Last 24 hours) at 7/20/2022 0607  Last data filed at 7/20/2022 0040  Gross per 24 hour   Intake 1000 ml   Output 1200 ml   Net -200 ml              Wt Readings from Last 3 Encounters:   07/20/22 191 lb 5.8 oz (86.8 kg)   07/05/22 178 lb 3.2 oz (80.8 kg)   06/20/22 177 lb (80.3 kg)       LABS:    Recent Labs     07/19/22  0546 07/20/22  0435   WBC 11.2* 8.8   HGB 7.7* 7.3*   HCT 23.0* 21.5*    139        Recent Labs     07/20/22  0435      K 4.9      CO2 26   PHOS 4.8   BUN 70*   CREATININE 4.2*        Recent Labs     07/17/22  2338   APTT 41.9*           LOWER EXTREMITY EXAMINATION      Dressing to left lower extremity clean, dry, intact. No strikethrough drainage noted to external layers of the dressing. No pain with calf compression. Right lower extremity soft tissue envelope is closed and without acute signs infection. IMAGING:  XR Left foot 7/15/22  Narrative   EXAM: XR FOOT LEFT (MIN 3 VIEWS)       INDICATION: s/p left foot TMA,        COMPARISON: MRI July 6, 2022, radiograph July 5, 2022       FINDINGS:       Bones: There is been interval amputation of the first through fourth metatarsals. There is been a reamputation of the fifth metatarsal.       Joints:  The joint spaces are well maintained. No joint effusion. Soft tissues: Mild soft tissue swelling is present likely due to recent surgery. Impression   1. Transmetatarsal amputation with mild soft tissue swelling likely due to recent surgery. ASSESSMENT/PLAN  -s/p transmetatarsal amputation; left LE 2/2 osteomyelitis  -Peripheral arterial disease; b/l LE  -Type 2 diabetes mellitus with peripheral neuropathy  -Acute blood loss anemia     -Patient seen and examined at bedside this AM  -VSS, no leukocytosis noted (WBC 8.8)  -ESR 53, CRP 10.4  -HbA1c 5.9%  -Blood cultures x2; NGTD  -Wound culture: MRSA, diphtheroids  -Surgical pathology negative for OM  -Imaging reviewed, impression noted above  -ID following; continue antibiotics per their recommendations  -Vascular surgery following; s/p left SFA to peroneal bypass (7/13/22)  -Dressing applied to left LE left clean, dry, intact  -Strict nonweightbearing to left lower extremity  -Acute blood loss anemia postoperatively; received 2 units of pRBC 7/15, 1 unit 7/16. Hemoglobin has stabilized. DISPO: S/p transmetatarsal amputation; left LE 2/2 osteomyelitis. Labs and imaging reviewed. Continue antibiotics per ID recommendations. Vascular surgery following. No further surgical intervention from podiatric standpoint. Patient is OK to discharge from podiatric standpoint pending medical clearance.      Discussed assessment and plan with Dr. Talat Hernandez DPM.    Aicha Martinez DPM  Podiatric Resident, PGY-3  Pager #: [phone] or Perfect Serve

## 2022-07-20 NOTE — PROGRESS NOTES
Physical Therapy  Facility/Department: [place] [address] [facility]  Physical Therapy treatment note      Name: Fadia Zhou  : 1935  MRN: [id number]  Date of Service: 2022    Discharge Recommendations:Guero Austin scored a 10/ on the AM-PAC short mobility form. Current research shows that an AM-PAC score of 17 or less is typically not associated with a discharge to the patient's home setting. Based on the patient's AM-PAC score and their current functional mobility deficits, it is recommended that the patient have 3-5 sessions per week of Physical Therapy at d/c to increase the patient's independence. Please see assessment section for further patient specific details. If patient discharges prior to next session this note will serve as a discharge summary. Please see below for the latest assessment towards goals. PT Equipment Recommendations  Equipment Needed: No      Patient Diagnosis(es): The primary encounter diagnosis was Toe osteomyelitis, left ([facility] [address] Diagnoses of MO (acute kidney injury) ([facility] and Osteomyelitis of left foot, unspecified type [facility]) were also pertinent to this visit. Past Medical History:  has a past medical history of Diabetic eye exam ([facility] [address] DM (diabetes mellitus) ([facility] [address] Gout, Hypothyroidism, and Seasonal allergic rhinitis. Past Surgical History:  has a past surgical history that includes Incisional hernia repair; hernia repair (); Colonoscopy ( appx ); Colonoscopy (N/A, 2022); femoral bypass (Left, 2022); and Foot Amputation (Left, 7/15/2022). Assessment   Assessment: Pt demo improved mobility and mentation this date; 2 goals met. Pt still functioning well below his reported baseline of independent and he is now NWB L. Pt plans to pursue SNF at discharge. Pt would benefit from continued skilled IP PT at discharge to maximize his functional independence. Treatment Diagnosis: Decreased mobility associated with osteomyelitis.   Requires PT Follow-Up: Yes  Activity Tolerance  Activity Tolerance: Patient limited by fatigue;Patient limited by endurance; Patient limited by pain     Plan   Plan  Plan:  (2-5)  Current Treatment Recommendations: Transfer training, Functional mobility training, Strengthening, Gait training  Safety Devices  Type of Devices: Bed alarm in place, Call light within reach, Left in bed, Nurse notified     Restrictions  Position Activity Restriction  Other position/activity restrictions: NWB LLE; up with assist, contact isolation     Subjective   General  Chart Reviewed: Yes  Additional Pertinent Hx: Pt to ED 7/5 with L foot pain from recent toe amputation. Pt admitted for Osteomyelitis. Podiatry consult. Surgery consult for arteriogram on 7/11.  7/13 s/p LEFT SUPERFICIAL FEMORAL ARTERY TO PERONEAL ARTERY BYPASS WITH REVERSED GREAT SAPHENOUS VEIN GRAFT. Rapid response. To ICU. Back to OR 7/14 for Left foot TMA. PMH:  Gout, DM, HTN  Family / Caregiver Present: No  Diagnosis: Osteomyelitis  Follows Commands: Within Functional Limits  Subjective  Subjective: Pt found supine in bed and agreeable to PT. Pt rates L LE pain 6/10, RN aware. Social/Functional History  Social/Functional History  Lives With: Alone  Type of Home: Senior housing apartment  Home Layout: One level  Home Access: Elevator  Bathroom Shower/Tub: Walk-in shower  Bathroom Toilet: Handicap height  Bathroom Equipment: Grab bars in shower, Shower chair, Hand-held shower, Grab bars around toilet  Home Equipment: [address], Rollator  Has the patient had two or more falls in the past year or any fall with injury in the past year?: No  ADL Assistance: [address]: Needs assistance (cleaning, laundry, meals on wheels)  Ambulation Assistance: Independent (uses rollator outside of apartment and \"when I'm in a crisis. \")  Transfer Assistance: Independent  Active : Yes  Occupation: Retired (Manager)  Additional Comments: Pt manages own medications    Vision/Hearing  Vision  Vision: Impaired  Vision Exceptions: Wears glasses at all times  Hearing  Hearing: Exceptions to [facility]  Hearing Exceptions: Hard of hearing/hearing concerns;Bilateral hearing aid (no hearing aid batteries today)      Cognition   Orientation  Overall Orientation Status: Within Functional Limits     Objective             Bed mobility  Rolling to Left: Minimal assistance (with use of rail)  Rolling to Right: Minimal assistance (with use of rail)  Supine to Sit: Minimal assistance (for L LE; slow and effortful)  Sit to Supine: Minimal assistance (for L LE)  Scooting: Minimal assistance    Transfers  Sit to Stand: Minimal Assistance (attempt EOB; pt able to almost clear buttocks off matress NWB L; x5 trials)  Stand to sit: Minimal Assistance (modified (unable to stand upright); x5 trials)  Lateral Transfers: Minimal Assistance (able to scoot laterally R and L for 2 ft with max vc; able to maintain NWB L)        Balance  Comments: able to sit approx 13 mins with SBA overall and maintain NWB L LE.   Exercise Treatment: seated x10 B LE: LAQ (limited ROM on L LE due to c/o pain)                 AM-PAC Score  -PAC Inpatient Mobility Raw Score : 10 (07/20/22 1123)  -PAC Inpatient T-Scale Score : 32.29 (07/20/22 1123)  Mobility Inpatient CMS 0-100% Score: 76.75 (07/20/22 1123)  Mobility Inpatient CMS G-Code Modifier : CL (07/20/22 1123)          Goals  Short Term Goals  Time Frame for Short term goals: Discharge  Short term goal 1: Rolling with Min A   met 7/20  new goal: roll R and L independent  Short term goal 2: supine <> sit Min A  met 7/20   new goal: sit to/from supine supervision  Short term goal 3: sit <> stand Mod A x2 to full stance NWB L LE   ongoing  Patient Goals   Patient goals : Get stronger       Education  Patient Education  Education Given To: Patient  Education Provided: Role of Therapy  Education Provided Comments: BRE VELA  Education Method: Verbal  Barriers to Learning: Cognition  Education Outcome: Verbalized understanding;Continued education needed      Therapy Time   Individual Concurrent Group Co-treatment   Time In 1035         Time Out 1113         Minutes 38          Timed Code Treatment Minutes: 38      Total Treatment Minutes:   14[address] Kristin Cabral PT

## 2022-07-20 NOTE — PROGRESS NOTES
Hospital Medicine  Progress Note    PCP: Maria E Fernandes    Date of Admission: 7/5/2022      Chief Complaint: Worsening left foot pain and swelling      History Of Present Illness:    81 y.o. male with DM type 2, HTN and hypothyroidism, PAF (off pradaxa) and BPH who presented with worsening wound left foot. The wound started in the bottom of his left foot as a callus. Patient has been putting Vaseline and soaking the foot. When he saw his podiatrist (Dr. Belkis Landry ) last week he was recommended to start using Betadine to the left foot. Patient has not following the wound care recommendations, has been putting Betadine on the wound and has been wearing a sock over it and continue to walk bearing weight on the left foot. Patient started noticing mild yellow drainage coming from the wound. Patient denies fever, chills, nausea, vomiting, chest pain or shortness of breath    On presentation,, apart from elevated BP, he had fairly normal vitals. He had no leucocytosis. Hgb was 12 with . Cr was 2.3 (baseline about 1.6). Podiatry and vascular surgery were consulted. Interim HPI    Underwent a left superficial femoral to peroneal artery bypass 7/13  TRANSMETATARSAL AMPUTATION WITH TENDO ACHILLES LENGTHENING LEFT FOOT on 7/15    Patient is moving bowels but feels constipated. Overnight no active complaints. Medications: Reviewed        Allergies:  Patient has no known allergies. REVIEW OF SYSTEMS COMPLETED:   Pertinent positives as noted in the HPI. All other systems reviewed and negative. PHYSICAL EXAM PERFORMED:    /77   Pulse 63   Temp 98.3 °F (36.8 °C) (Oral)   Resp 14   Ht 6' 2\" (1.88 m)   Wt 191 lb 5.8 oz (86.8 kg)   SpO2 100%   BMI 24.57 kg/m²     General appearance:  No apparent distress, appears stated age and cooperative. HEENT:  Normal cephalic, atraumatic without obvious deformity. .  Neck: Supple, with full range of motion. No jugular venous distention.  Trachea midline. Respiratory:  Normal respiratory effort. Clear to auscultation, bilaterally without Rales/Wheezes/Rhonchi. Cardiovascular:  Regular rate and rhythm with normal S1/S2 without murmurs, rubs or gallops. Abdomen: Soft, non-tender, non-distended with normal bowel sounds. Musculoskeletal:  No clubbing, cyanosis or edema bilaterally. Full range of motion without deformity. Skin: Dressing over left foot: C/D/I  Neurologic:  grossly non-focal.  Psychiatric:  Alert and oriented, thought content appropriate, normal insight  Capillary Refill: Brisk,3 seconds, normal  Peripheral Pulses: DP and PT pulses-nonpalpable bilaterally, biphasic with Doppler per podiatry on the right, monophasic on the left. Labs:     Recent Labs     07/18/22  0606 07/19/22  0546 07/20/22  0435   WBC 11.3* 11.2* 8.8   HGB 7.8* 7.7* 7.3*   HCT 22.7* 23.0* 21.5*   * 138 139       Recent Labs     07/18/22 0606 07/19/22  0546 07/20/22  0435     140 139 136   K 4.5  4.4 4.5 4.9     104 102 101   CO2 27  26 24 26   BUN 63*  62* 65* 70*   CREATININE 4.0*  4.0* 4.1* 4.2*   CALCIUM 8.1*  8.3 8.6 8.4   PHOS 3.8 4.1 4.8       No results for input(s): AST, ALT, BILIDIR, BILITOT, ALKPHOS in the last 72 hours. No results for input(s): INR in the last 72 hours. Recent Labs     07/18/22 0606   TROPONINI 0.05*         Urinalysis:      Lab Results   Component Value Date/Time    NITRU Negative 07/10/2022 05:02 PM    WBCUA 0-2 07/10/2022 05:02 PM    BACTERIA Rare 07/10/2022 05:02 PM    RBCUA 3-4 07/10/2022 05:02 PM    BLOODU Negative 07/10/2022 05:02 PM    SPECGRAV 1.020 07/10/2022 05:02 PM    GLUCOSEU Negative 07/10/2022 05:02 PM    GLUCOSEU Negative 07/05/2011 09:10 PM       Radiology:     CXR: I have reviewed the CXR with the following interpretation: NA  EKG:  I have reviewed the EKG with the following interpretation: NA    XR FOOT LEFT (MIN 3 VIEWS)   Final Result   1.   Transmetatarsal amputation with mild soft tissue swelling likely due to recent surgery. XR TIBIA FIBULA LEFT (2 VIEWS)   Final Result      Intraoperative fluoroscopy provided as above. See operative report for details. FLUORO FOR SURGICAL PROCEDURES   Final Result      Intraoperative fluoroscopy provided as above. See operative report for details. VL PRE OP VEIN MAPPING   Final Result      XR CHEST PORTABLE   Final Result      No acute cardiopulmonary findings. VL DUP LOWER EXTREMITY ARTERIES LEFT   Final Result      MRI FOOT LEFT WO CONTRAST   Final Result   1. Limited exam secondary to motion artifact. 2. There is marrow edema identified within the fourth metatarsal head and proximal fourth phalanx. In the setting of infection or open wound in this region, this is compatible with osteomyelitis. 3. Status post amputations of the second, third, and fifth metatarsals as detailed above. 4. Healed fracture deformities identified involving the proximal first phalanx. There is osteoarthrosis identified at the first metatarsophalangeal joint. 5. There is subcutaneous edema identified about the foot. This can be seen with cellulitis and/or venous insufficiency. No soft tissue abscess is identified. 6. There is severe fatty atrophy involving the deep muscular foot. Correlate for underlying neuropathy. XR FOOT LEFT (MIN 3 VIEWS)   Final Result      Osteomyelitis is not excluded based on this exam particularly at the base of the proximal phalanx of the second digit. If this is a clinical concern evaluation with MRI should be performed.       IR ANGIOGRAM EXTREMITY LEFT    (Results Pending)       Consults:    IP CONSULT TO PODIATRY  PHARMACY TO DOSE VANCOMYCIN  IP CONSULT TO HOSPITALIST  IP CONSULT TO PHARMACY  IP CONSULT TO INFECTIOUS DISEASES  IP CONSULT TO VASCULAR SURGERY  IP CONSULT TO NEPHROLOGY        ASSESSMENT/PLAN:  81 y.o. male with DM type 2, HTN and hypothyroidism, PAF (off pradaxa) and BPH who presented with worsening wound left foot. Cellulitis left lower extremity with full-thickness ulceration plantar surface with osteomyelitis. POA  - sec to severe PAD   - MRI shows osteo  - Cx sent: mixed skin cash, Diphtheroids, MRSA  - Started on broad-spectrum IV antibiotics: ID consulted. Plan on oral zyvox on dc  - S/p  TRANSMETATARSAL AMPUTATION WITH TENDO ACHILLES LENGTHENING LEFT FOOT 7/15/2022  - PT/OT eval: SNF      DM-2 complicated by peripheral neuropathy: POA  - Verify home meds for accuracy as does not seem to have diabetic meds  - A1C too does not appear high: 5.9  - continue with diet control      Peripheral vascular disease: POA  - On Plavix  - Seen by Vascular Surgery: s/p angiogram of the LLE. S/p L SFA-Peroneal bypass 7/13  - S/p TMA by podiatry Tolerated procedures  - F/u with Vascular Surgery      Mental status changes 7/14/2022  - Episode of near syncope/syncopal episode  - Possiby orthostatic with hx of HTN, PAD, and new meds for BP control as well as recent vascular procedure with \" Estimated Blood Loss (mL): 300\"  - Hgb dropped this AM to 8.7, from 12 7/11/2022  - Sezure is also possible, but no apparent hx of seizures. Seizure ruled out  - Episode was likely sec to hypotension, anemia. Non recurrent  - Avoiding hypotension, corrected anemia  - no further events      Acute Blood loss anemia 7/14/2022  Post op/samreen-op blood loss from surgeries  \" Estimated Blood Loss (mL): 300\" + Estimated Blood Loss (mL): 250 mL at his 2 procedures  - Hgb dropped post sec procedure to 6 range  - Was also on heparin gtt: Held. [facility] resumed  - Transfused PRBCs. - Hgb stable and satisfactory now       HTN: POA  - Was started on metoprolol: Switched to coreg, decrease dose with some winston noted. Then started hydralazine PO as BP was really high.   Added CCB as BP remained high: increased dose to 60 mg dly Nifedipine: all BP meds held in setting of blood loss anemia./hypotension  - Lisonopril held with MO/hyperkalemia  - Gradually resume meds as BP stabilized and possibly rising now. MO on CKD stage 3  - Baseline Cr 1.6  - Developed renal insuff prior to surgery, and developed MO post angio procedure, and hypotension related to blood loss/surgery  - Monitor cr: hopefully plateauxed and will start improving  - Holding lisinopril  - Follows with Dr. Darby Fleischer: Treasure Santacruz following: states ok to DC to f/u with him as o/p      Paroxysmal A. Fib: POA  Afib RVR  - Not on rate control meds at home  - Appears was prev on Pradaxa. Appears was taken off after he wore a monitor and as \"couldn't afford it\" in 2014 .   - TSH, Mg: WNL  -  [address] resumed  - Went into RVR in setting of acute illness, major surgery  - Resumed BB: Rate controlled now      Hypothyroidism: POA  - TSH; WNL    Chronic Macrocytic anemia- POA  - B12, Folate: WNL      BPH with Acute urinary retention post op  - On  Flomax for BPH  - Turner placed for post op retention: Remove turner in about 3 days, TOV. DVT Prophylaxis: On [address]    Diet: ADULT DIET; Regular; 4 carb choices (60 gm/meal)  Code Status: Full Code      PT/OT Eval Status: As tolerated with no weightbearing on the left foot      Disposition  S/P Superficial femoral artery to peroneal artery bypass of LLE   S/p TMA left foot.  7/15/2022     PT/OT eval: SNF with precministerio Melo MD

## 2022-07-20 NOTE — PROGRESS NOTES
Occupational Therapy  Daily Treatment Note  Patient Name: Nallely Nam  MRN: [id number]    Discharge Recommendation  Nallely Nam scored a 15/24 on the AM-PAC ADL Inpatient form. Current research shows that an AM-PAC score of 17 or less is typically not associated with a discharge to the patient's home setting. Based on the patient's AM-PAC score and their current ADL deficits, it is recommended that the patient have 3-5 sessions per week of Occupational Therapy at d/c to increase the patient's independence. Please see assessment section for further patient specific details. If patient discharges prior to next session this note will serve as a discharge summary. Please see below for the latest assessment towards goals. Assessment: Pt demo improving bed mobility and lateral transfers along edge of bed. Pt slightly confused but demo improved cognitive status. Pt limited by NWB status of LLE- would require assist of 2 for transfers from bed- would benefit from trialing slideboard next session. Requires assist for ADLS due to NWB. Recommend ongoing OT tx at d/c.    DME Recommendations: defer to d/c setting    Chart Reviewed: Yes       Other position/activity restrictions: NWB LLE; up with assist, contact isolation     Additional Pertinent Hx: Pt to ED 7/5 with L foot pain from recent toe amputation. Pt admitted for Osteomyelitis. Podiatry consult. Surgery consult for arteriogram on 7/11.  7/13 s/p LEFT SUPERFICIAL FEMORAL ARTERY TO PERONEAL ARTERY BYPASS WITH REVERSED GREAT SAPHENOUS VEIN GRAFT. Rapid response. To ICU. Back to OR 7/14 for Left foot TMA. PMH:  Gout, DM, HTN      Diagnosis: Osteomyelitis  Treatment Diagnosis: impaired ADLs/transfers s/p L transmet amputation    Subjective: Pt in bed and finishing breakfast, agreeable to OT treatment.  \"I'm kind of confused\"    Pain: L LE 8/10, RN made aware      Objective:    Cognition/Orientation: Oriented x4, delayed command following and mild Time Out 1113         Minutes 38         Timed Code Treatment Minutes: Latosha Levin OT

## 2022-07-20 NOTE — CARE COORDINATION
SW spoke to [facility] SNF admission, pre-cert is pending, they will follow up w/SW when it is approved. [facility]U received VM from pt's niko Mills, at [employer], [facility] for , [phone]. SW following.      Electronically signed by PETER Mullen, LSW on 7/20/2022 at 1:01 PM  [phone]

## 2022-07-21 LAB
ALBUMIN SERPL-MCNC: 2.8 G/DL (ref 3.4–5)
ANION GAP SERPL CALCULATED.3IONS-SCNC: 13 MMOL/L (ref 3–16)
BASOPHILS ABSOLUTE: 0 K/UL (ref 0–0.2)
BASOPHILS RELATIVE PERCENT: 0.2 %
BUN BLDV-MCNC: 67 MG/DL (ref 7–20)
CALCIUM SERPL-MCNC: 8.4 MG/DL (ref 8.3–10.6)
CHLORIDE BLD-SCNC: 102 MMOL/L (ref 99–110)
CO2: 22 MMOL/L (ref 21–32)
CREAT SERPL-MCNC: 3.8 MG/DL (ref 0.8–1.3)
EOSINOPHILS ABSOLUTE: 0.1 K/UL (ref 0–0.6)
EOSINOPHILS RELATIVE PERCENT: 1.8 %
GFR AFRICAN AMERICAN: 18
GFR NON-AFRICAN AMERICAN: 15
GLUCOSE BLD-MCNC: 114 MG/DL (ref 70–99)
GLUCOSE BLD-MCNC: 127 MG/DL (ref 70–99)
GLUCOSE BLD-MCNC: 146 MG/DL (ref 70–99)
GLUCOSE BLD-MCNC: 174 MG/DL (ref 70–99)
GLUCOSE BLD-MCNC: 177 MG/DL (ref 70–99)
GLUCOSE BLD-MCNC: 223 MG/DL (ref 70–99)
HCT VFR BLD CALC: 21.9 % (ref 40.5–52.5)
HEMOGLOBIN: 7.2 G/DL (ref 13.5–17.5)
LYMPHOCYTES ABSOLUTE: 0.9 K/UL (ref 1–5.1)
LYMPHOCYTES RELATIVE PERCENT: 10.6 %
MAGNESIUM: 2.1 MG/DL (ref 1.8–2.4)
MCH RBC QN AUTO: 31.7 PG (ref 26–34)
MCHC RBC AUTO-ENTMCNC: 33 G/DL (ref 31–36)
MCV RBC AUTO: 96.1 FL (ref 80–100)
MONOCYTES ABSOLUTE: 0.7 K/UL (ref 0–1.3)
MONOCYTES RELATIVE PERCENT: 8.5 %
NEUTROPHILS ABSOLUTE: 6.4 K/UL (ref 1.7–7.7)
NEUTROPHILS RELATIVE PERCENT: 78.9 %
PDW BLD-RTO: 16.8 % (ref 12.4–15.4)
PERFORMED ON: ABNORMAL
PHOSPHORUS: 4.4 MG/DL (ref 2.5–4.9)
PLATELET # BLD: 154 K/UL (ref 135–450)
PMV BLD AUTO: 8.3 FL (ref 5–10.5)
POTASSIUM SERPL-SCNC: 4.7 MMOL/L (ref 3.5–5.1)
RBC # BLD: 2.28 M/UL (ref 4.2–5.9)
SODIUM BLD-SCNC: 137 MMOL/L (ref 136–145)
WBC # BLD: 8.1 K/UL (ref 4–11)

## 2022-07-21 PROCEDURE — 6370000000 HC RX 637 (ALT 250 FOR IP): Performed by: INTERNAL MEDICINE

## 2022-07-21 PROCEDURE — 85025 COMPLETE CBC W/AUTO DIFF WBC: CPT

## 2022-07-21 PROCEDURE — 1200000000 HC SEMI PRIVATE

## 2022-07-21 PROCEDURE — 97530 THERAPEUTIC ACTIVITIES: CPT

## 2022-07-21 PROCEDURE — 6370000000 HC RX 637 (ALT 250 FOR IP): Performed by: STUDENT IN AN ORGANIZED HEALTH CARE EDUCATION/TRAINING PROGRAM

## 2022-07-21 PROCEDURE — 83735 ASSAY OF MAGNESIUM: CPT

## 2022-07-21 PROCEDURE — 80069 RENAL FUNCTION PANEL: CPT

## 2022-07-21 PROCEDURE — 2580000003 HC RX 258: Performed by: STUDENT IN AN ORGANIZED HEALTH CARE EDUCATION/TRAINING PROGRAM

## 2022-07-21 PROCEDURE — 36415 COLL VENOUS BLD VENIPUNCTURE: CPT

## 2022-07-21 RX ADMIN — LINEZOLID 600 MG: 600 TABLET, FILM COATED ORAL at 21:01

## 2022-07-21 RX ADMIN — GABAPENTIN 300 MG: 300 CAPSULE ORAL at 21:07

## 2022-07-21 RX ADMIN — METOPROLOL TARTRATE 50 MG: 50 TABLET, FILM COATED ORAL at 08:59

## 2022-07-21 RX ADMIN — ASPIRIN 81 MG: 81 TABLET, COATED ORAL at 08:59

## 2022-07-21 RX ADMIN — SODIUM CHLORIDE, PRESERVATIVE FREE 10 ML: 5 INJECTION INTRAVENOUS at 09:14

## 2022-07-21 RX ADMIN — APIXABAN 2.5 MG: 2.5 TABLET, FILM COATED ORAL at 21:01

## 2022-07-21 RX ADMIN — INSULIN LISPRO 2 UNITS: 100 INJECTION, SOLUTION INTRAVENOUS; SUBCUTANEOUS at 09:00

## 2022-07-21 RX ADMIN — TRAMADOL HYDROCHLORIDE 50 MG: 50 TABLET, COATED ORAL at 21:06

## 2022-07-21 RX ADMIN — TAMSULOSIN HYDROCHLORIDE 0.4 MG: 0.4 CAPSULE ORAL at 21:01

## 2022-07-21 RX ADMIN — INSULIN LISPRO 1 UNITS: 100 INJECTION, SOLUTION INTRAVENOUS; SUBCUTANEOUS at 22:09

## 2022-07-21 RX ADMIN — INSULIN LISPRO 1 UNITS: 100 INJECTION, SOLUTION INTRAVENOUS; SUBCUTANEOUS at 18:27

## 2022-07-21 RX ADMIN — TRAMADOL HYDROCHLORIDE 50 MG: 50 TABLET, COATED ORAL at 09:28

## 2022-07-21 RX ADMIN — SODIUM CHLORIDE, PRESERVATIVE FREE 10 ML: 5 INJECTION INTRAVENOUS at 08:21

## 2022-07-21 RX ADMIN — SODIUM CHLORIDE, PRESERVATIVE FREE 10 ML: 5 INJECTION INTRAVENOUS at 21:13

## 2022-07-21 RX ADMIN — APIXABAN 2.5 MG: 2.5 TABLET, FILM COATED ORAL at 08:59

## 2022-07-21 RX ADMIN — TRAMADOL HYDROCHLORIDE 50 MG: 50 TABLET, COATED ORAL at 15:30

## 2022-07-21 RX ADMIN — SODIUM CHLORIDE, PRESERVATIVE FREE 10 ML: 5 INJECTION INTRAVENOUS at 21:14

## 2022-07-21 RX ADMIN — LINEZOLID 600 MG: 600 TABLET, FILM COATED ORAL at 08:59

## 2022-07-21 RX ADMIN — HYDROCORTISONE ACETATE 25 MG: 25 SUPPOSITORY RECTAL at 21:01

## 2022-07-21 RX ADMIN — ATORVASTATIN CALCIUM 40 MG: 40 TABLET, FILM COATED ORAL at 21:00

## 2022-07-21 RX ADMIN — METOPROLOL TARTRATE 50 MG: 50 TABLET, FILM COATED ORAL at 20:59

## 2022-07-21 RX ADMIN — CETIRIZINE HYDROCHLORIDE 5 MG: 10 TABLET, FILM COATED ORAL at 08:59

## 2022-07-21 ASSESSMENT — PAIN SCALES - WONG BAKER: WONGBAKER_NUMERICALRESPONSE: 0

## 2022-07-21 ASSESSMENT — PAIN DESCRIPTION - DESCRIPTORS
DESCRIPTORS: ACHING
DESCRIPTORS: ACHING

## 2022-07-21 ASSESSMENT — PAIN SCALES - GENERAL
PAINLEVEL_OUTOF10: 8
PAINLEVEL_OUTOF10: 0
PAINLEVEL_OUTOF10: 0
PAINLEVEL_OUTOF10: 10
PAINLEVEL_OUTOF10: 7
PAINLEVEL_OUTOF10: 0

## 2022-07-21 ASSESSMENT — PAIN DESCRIPTION - LOCATION
LOCATION: FOOT;LEG
LOCATION: BACK;LEG
LOCATION: BACK;LEG

## 2022-07-21 ASSESSMENT — PAIN DESCRIPTION - ORIENTATION: ORIENTATION: LEFT

## 2022-07-21 ASSESSMENT — PAIN - FUNCTIONAL ASSESSMENT: PAIN_FUNCTIONAL_ASSESSMENT: PREVENTS OR INTERFERES SOME ACTIVE ACTIVITIES AND ADLS

## 2022-07-21 NOTE — PROGRESS NOTES
midline. Respiratory:  Normal respiratory effort. Clear to auscultation, bilaterally without Rales/Wheezes/Rhonchi. Cardiovascular:  Regular rate and rhythm with normal S1/S2 without murmurs, rubs or gallops. Abdomen: Soft, non-tender, non-distended with normal bowel sounds. Musculoskeletal:  No clubbing, cyanosis or edema bilaterally. Full range of motion without deformity. Skin: Dressing over left foot: C/D/I  Neurologic:  grossly non-focal.  Psychiatric:  Alert and oriented, thought content appropriate, normal insight        Labs:     Recent Labs     07/19/22  0546 07/20/22  0435 07/21/22  0555   WBC 11.2* 8.8 8.1   HGB 7.7* 7.3* 7.2*   HCT 23.0* 21.5* 21.9*    139 154       Recent Labs     07/19/22  0546 07/20/22  0435 07/21/22  0555    136 137   K 4.5 4.9 4.7    101 102   CO2 24 26 22   BUN 65* 70* 67*   CREATININE 4.1* 4.2* 3.8*   CALCIUM 8.6 8.4 8.4   PHOS 4.1 4.8 4.4       No results for input(s): AST, ALT, BILIDIR, BILITOT, ALKPHOS in the last 72 hours. No results for input(s): INR in the last 72 hours. No results for input(s): Lenora York in the last 72 hours. Urinalysis:      Lab Results   Component Value Date/Time    NITRU Negative 07/10/2022 05:02 PM    WBCUA 0-2 07/10/2022 05:02 PM    BACTERIA Rare 07/10/2022 05:02 PM    RBCUA 3-4 07/10/2022 05:02 PM    BLOODU Negative 07/10/2022 05:02 PM    SPECGRAV 1.020 07/10/2022 05:02 PM    GLUCOSEU Negative 07/10/2022 05:02 PM    GLUCOSEU Negative 07/05/2011 09:10 PM       Radiology:     CXR: I have reviewed the CXR with the following interpretation: NA  EKG:  I have reviewed the EKG with the following interpretation: NA    XR FOOT LEFT (MIN 3 VIEWS)   Final Result   1. Transmetatarsal amputation with mild soft tissue swelling likely due to recent surgery. XR TIBIA FIBULA LEFT (2 VIEWS)   Final Result      Intraoperative fluoroscopy provided as above. See operative report for details.           FLUORO FOR SURGICAL PROCEDURES   Final Result      Intraoperative fluoroscopy provided as above. See operative report for details. VL PRE OP VEIN MAPPING   Final Result      XR CHEST PORTABLE   Final Result      No acute cardiopulmonary findings. VL DUP LOWER EXTREMITY ARTERIES LEFT   Final Result      MRI FOOT LEFT WO CONTRAST   Final Result   1. Limited exam secondary to motion artifact. 2. There is marrow edema identified within the fourth metatarsal head and proximal fourth phalanx. In the setting of infection or open wound in this region, this is compatible with osteomyelitis. 3. Status post amputations of the second, third, and fifth metatarsals as detailed above. 4. Healed fracture deformities identified involving the proximal first phalanx. There is osteoarthrosis identified at the first metatarsophalangeal joint. 5. There is subcutaneous edema identified about the foot. This can be seen with cellulitis and/or venous insufficiency. No soft tissue abscess is identified. 6. There is severe fatty atrophy involving the deep muscular foot. Correlate for underlying neuropathy. XR FOOT LEFT (MIN 3 VIEWS)   Final Result      Osteomyelitis is not excluded based on this exam particularly at the base of the proximal phalanx of the second digit. If this is a clinical concern evaluation with MRI should be performed. IR ANGIOGRAM EXTREMITY LEFT    (Results Pending)       Consults:    IP CONSULT TO PODIATRY  PHARMACY TO DOSE VANCOMYCIN  IP CONSULT TO HOSPITALIST  IP CONSULT TO PHARMACY  IP CONSULT TO INFECTIOUS DISEASES  IP CONSULT TO VASCULAR SURGERY  IP CONSULT TO NEPHROLOGY        ASSESSMENT/PLAN:  81 y.o. male with DM type 2, HTN and hypothyroidism, PAF (off pradaxa) and BPH who presented with worsening wound left foot. Cellulitis left lower extremity with full-thickness ulceration plantar surface with osteomyelitis.  POA  - sec to severe PAD   - MRI shows osteo  - Cx sent: mixed skin cash, Diphtheroids, MRSA  - Started on broad-spectrum IV antibiotics: ID consulted. Plan on oral zyvox on dc  - S/p  TRANSMETATARSAL AMPUTATION WITH TENDO ACHILLES LENGTHENING LEFT FOOT 7/15/2022  - PT/OT eval: SNF      DM-2 complicated by peripheral neuropathy: POA  - A1C too does not appear high: 5.9  - continue with diet control      Peripheral vascular disease: POA  - On Plavix  - Seen by Vascular Surgery: s/p angiogram of the LLE. S/p L SFA-Peroneal bypass 7/13  - S/p TMA by podiatry Tolerated procedures  - F/u with Vascular Surgery as outpatient      Mental status changes 7/14/2022  - Episode of near syncope/syncopal episode  - Possiby orthostatic with hx of HTN, PAD, and new meds for BP control as well as recent vascular procedure with \" Estimated Blood Loss (mL): 300\"  - Hgb dropped this AM to 8.7, from 12 7/11/2022  - Sezure is also possible, but no apparent hx of seizures. Seizure ruled out  - Episode was likely sec to hypotension, anemia. Non recurrent  - Avoiding hypotension, corrected anemia  - no further events      Acute Blood loss anemia 7/14/2022  Post op/samreen-op blood loss from surgeries  \" Estimated Blood Loss (mL): 300\" + Estimated Blood Loss (mL): 250 mL at his 2 procedures  - Hgb dropped post sec procedure to 6 range  - Was also on heparin gtt: Held. [address] resumed  - Transfused PRBCs. - Hgb stable and satisfactory now       HTN: POA  - Was started on metoprolol: Switched to coreg, decrease dose with some winston noted. Then started hydralazine PO as BP was really high. Added CCB as BP remained high: increased dose to 60 mg dly Nifedipine: all BP meds held in setting of blood loss anemia./hypotension  - Lisonopril held with MO/hyperkalemia  - Gradually resume meds as BP stabilized and possibly rising now.          MO on CKD stage 3  - Baseline Cr 1.6  - Developed renal insuff prior to surgery, and developed MO post angio procedure, and hypotension related to blood loss/surgery  - Monitor cr: hopefully plateauxed and will start improving  - Holding lisinopril  - Follows with Dr. Magnolia Rogers: Rahul Hernández following: states ok to DC to f/u with him as o/p      Paroxysmal A. Fib: POA  Afib RVR  - Not on rate control meds at home  - Appears was prev on Pradaxa. Appears was taken off after he wore a monitor and as \"couldn't afford it\" in 2014 .   - TSH, Mg: WNL  -  [address] resumed  - Went into RVR in setting of acute illness, major surgery  - Resumed BB: Rate controlled now      Hypothyroidism: POA  - TSH; WNL    Chronic Macrocytic anemia- POA  - B12, Folate: WNL      BPH with Acute urinary retention post op  - On  Flomax for BPH  - Turner placed for post op retention: Remove turner in about 3 days, TOV. DVT Prophylaxis: On [address]    Diet: ADULT DIET; Regular; 4 carb choices (60 gm/meal)  Code Status: Full Code      PT/OT Eval Status: As tolerated with no weightbearing on the left foot      Disposition  S/P Superficial femoral artery to peroneal artery bypass of LLE   S/p TMA left foot.  7/15/2022     PT/OT eval: SNF with precert       Molina Sanchez MD

## 2022-07-21 NOTE — PROGRESS NOTES
Interval History and plan:      BP is well controlled   UOP is 78918 ml    SP  Fem / pop bypass on 7/13/2022  SP ( TMA) left foot       Plan:    Creatinine is improving  2.1 > 4.3 > 3.8  Electrolytes are Ok  Good UOP  Closely watch anbx dosing and levels  No need for dialysis yet but may require it down the line   Stop IVF                      Assessment :     CKD Stage 3b  Likely due to DM and hypertension  Cr is 1.8- eGFR of 36 ml/min  UA- bland, wbc 6-9,  No recent one in the system      Hypertension   BP: (136)/(59)  Heart Rate:  [59]   BP goal inpatient 792-730 systolic inpatient  Home regimen: lisinopril 10 mg daily only  Current inpatient regimen: carvedilol 6.25 mg BID, hydralazine 25 mg TID, nifedipine 60 mg daily. Also has Afib    Anemia of CKD             sepsis  DM  PVD    [facility] would like to thank Angelic Yee MD   for opportunity to serve this patient      Please call with questions at-   24 Hrs Answering service [phone] or  7 am- 5 pm via Perfect serve or cell phone  Yana Vasquez MD          CC/reason for consult :     MO     HPI :     José Kay is a 81 y.o. male presented to   the hospital on 7/5/2022 with wound infection in the left leg. He is followed outpatient by podiatry,for wound, which continued to   Get worse despite the treatment. Also has discharge from the wound  Brought to hospital admitted  Also found to have CKD  because of which we are consulted.          ROS:     Seen with- no family    positives in bold   Constitutional:  fever, chills, weakness, weight change, fatigue  Skin:  rash, pruritus, hair loss, bruising, dry skin, petechiae  Head, Face, Neck   headaches, swelling,  cervical adenopathy  Respiratory: shortness of breath, cough, or wheezing  Cardiovascular: chest pain, palpitations, dizzy, edema  Gastrointestinal: nausea, vomiting, diarrhea, constipation,belly pain    Yellow skin, blood in stool  Musculoskeletal:  back pain, muscle weakness, gait problems,       joint pain or swelling. Genitourinary:  dysuria, poor urine flow, flank pain, blood in urine  Neurologic:  vertigo, TIA'S, syncope, seizures, focal weakness  Psychosocial:  insomnia, anxiety, or depression. Additional positive findings:                    All other remaining systems are negative or unable to obtain        PMH/PSH/SH/Family History:     Past Medical History:   Diagnosis Date    [facility] 1/29/14    Dr. Cathi Alaniz    DM (diabetes mellitus) ([facility] 1/13/2014    Gout     Hypothyroidism 4/18/2016    Seasonal allergic rhinitis        Past Surgical History:   Procedure Laterality Date    COLONOSCOPY  2009 appx     normal     COLONOSCOPY N/A 6/20/2022    COLONOSCOPY POLYPECTOMY SNARE/COLD BIOPSY performed by Savi Landers MD at [facility] Left 7/13/2022    LEFT SUPERFICIAL FEMORAL ARTERY TO PERONEAL ARTERY BYPASS WITH REVERSED GREAT SAPHENOUS VEIN GRAFT performed by Nabeel Scott MD at [address] [place] [address]. Left 7/15/2022    TRANSMETATARSAL AMPUTATION WITH TENDO ACHILLES LENGTHENING LEFT FOOT performed by Radha Luo DPM at [address] [facility], bilateral    INCISIONAL HERNIA REPAIR      bilateral        reports that he quit smoking about 63 years ago. He has never used smokeless tobacco. He reports that he does not drink alcohol and does not use drugs. family history is not on file.          Medication:     Current Facility-Administered Medications: hydrocortisone (ANUSOL-HC) suppository 25 mg, 25 mg, Rectal, BID  traMADol (ULTRAM) tablet 50 mg, 50 mg, Oral, Q6H PRN  apixaban (ELIQUIS) tablet 2.5 mg, 2.5 mg, Oral, BID  metoprolol tartrate (LOPRESSOR) tablet 50 mg, 50 mg, Oral, BID  oxyCODONE (ROXICODONE) immediate release tablet 5 mg, 5 mg, Oral, Once  linezolid (ZYVOX) tablet 600 mg, 600 mg, Oral, 2 times per day  sodium chloride flush 0.9 % injection 5-40 mL, 5-40 mL, IntraVENous, 2 times per day  sodium chloride flush 0.9 % injection 5-40 mL, 5-40 mL, IntraVENous, PRN  0.9 % sodium chloride infusion, , IntraVENous, PRN  cloNIDine (CATAPRES) tablet 0.1 mg, 0.1 mg, Oral, Q2H PRN  labetalol (NORMODYNE;TRANDATE) injection 10 mg, 10 mg, IntraVENous, Q2H PRN  aspirin EC tablet 81 mg, 81 mg, Oral, Daily  atorvastatin (LIPITOR) tablet 40 mg, 40 mg, Oral, Nightly  hydrALAZINE (APRESOLINE) injection 10 mg, 10 mg, IntraVENous, Q4H PRN  methocarbamol (ROBAXIN) tablet 750 mg, 750 mg, Oral, 4x Daily PRN  gabapentin (NEURONTIN) capsule 300 mg, 300 mg, Oral, Nightly  glucose chewable tablet 16 g, 4 tablet, Oral, PRN  dextrose bolus 10% 125 mL, 125 mL, IntraVENous, PRN **OR** dextrose bolus 10% 250 mL, 250 mL, IntraVENous, PRN  glucagon (rDNA) injection 1 mg, 1 mg, IntraMUSCular, PRN  dextrose 5 % solution, 100 mL/hr, IntraVENous, PRN  insulin lispro (1 Unit Dial) 0-6 Units, 0-6 Units, SubCUTAneous, TID WC  insulin lispro (1 Unit Dial) 0-3 Units, 0-3 Units, SubCUTAneous, Nightly  cetirizine (ZYRTEC) tablet 5 mg, 5 mg, Oral, Daily  tamsulosin (FLOMAX) capsule 0.4 mg, 0.4 mg, Oral, Nightly  sodium chloride flush 0.9 % injection 5-40 mL, 5-40 mL, IntraVENous, 2 times per day  sodium chloride flush 0.9 % injection 5-40 mL, 5-40 mL, IntraVENous, PRN  0.9 % sodium chloride infusion, , IntraVENous, PRN  ondansetron (ZOFRAN-ODT) disintegrating tablet 4 mg, 4 mg, Oral, Q8H PRN **OR** ondansetron (ZOFRAN) injection 4 mg, 4 mg, IntraVENous, Q6H PRN  polyethylene glycol (GLYCOLAX) packet 17 g, 17 g, Oral, Daily PRN  acetaminophen (TYLENOL) tablet 650 mg, 650 mg, Oral, Q6H PRN **OR** acetaminophen (TYLENOL) suppository 650 mg, 650 mg, Rectal, Q6H PRN       Vitals :     Vitals:    07/21/22 0817   BP:    Pulse: 59   Resp: 12   Temp:    SpO2: 98%       I & O :       Intake/Output Summary (Last 24 hours) at 7/21/2022 0853  Last data filed at 7/21/2022 0426  Gross per 24 hour   Intake --   Output 1500 ml   Net -1500 ml Physical Examination :     General appearance: Anxious- no, distressed- no, in good spirits-    Alert, pleasant  HEENT: Lips- normal, teeth- ok , oral mucosa- moist  Neck : Mass- no, appears symmetrical, JVD- not visible  Respiratory: Respiratory effort-  normal, wheeze- no, crackles -   Cardiovascular:  Ausculation- No M/R/G, Edema left leg   Abdomen: visible mass- no, distention- no, scar- no, tenderness- no                            hepatosplenomegaly-  no  Musculoskeletal:  clubbing no,cyanosis- no , digital ischemia- no                           muscle strength- grossly normal , tone - grossly normal  Skin: rashes- no , ulcers- no, induration- no, tightening - no  Psychiatric:  Judgement and insight- normal           AAO X 3  Additional finding:   Has left leg wound      LABS:     Recent Labs     07/19/22  0546 07/20/22  0435 07/21/22  0555   WBC 11.2* 8.8 8.1   HGB 7.7* 7.3* 7.2*   HCT 23.0* 21.5* 21.9*    139 154       Recent Labs     07/19/22  0546 07/20/22  0435 07/21/22  0555    136 137   K 4.5 4.9 4.7    101 102   CO2 24 26 22   BUN 65* 70* 67*   CREATININE 4.1* 4.2* 3.8*   GLUCOSE 134* 190* 174*   MG 2.10 2.10 2.10   PHOS 4.1 4.8 4.4

## 2022-07-21 NOTE — PROGRESS NOTES
Occupational Therapy  Daily Treatment Note  Patient Name: Colleen Blake  MRN: [id number]    Discharge Recommendation  Colleen Blake scored a 15/24 on the AM-PAC ADL Inpatient form. Current research shows that an AM-PAC score of 17 or less is typically not associated with a discharge to the patient's home setting. Based on the patient's AM-PAC score and their current ADL deficits, it is recommended that the patient have 3-5 sessions per week of Occupational Therapy at d/c to increase the patient's independence. Please see assessment section for further patient specific details. If patient discharges prior to next session this note will serve as a discharge summary. Please see below for the latest assessment towards goals. Assessment: Pt demo improving bed mobility and lateral transfers along edge of bed. Pt limited by gross deconditioning, required frequent rest breaks with tasks this date. Pt also somewhat confused and with delayed processing and insight compared to yesterday's treatment session. Pt's mobility and ADL status is significantly limited by NWB of L LE. Recommend ongoing OT tx at d/c      Discharge Recommendations:   Equipment Needs:  defer to d/c setting       Chart Reviewed: Yes       Other position/activity restrictions: NWB LLE; up with assist, contact isolation     Additional Pertinent Hx: Pt to ED 7/5 with L foot pain from recent toe amputation. Pt admitted for Osteomyelitis. Podiatry consult. Surgery consult for arteriogram on 7/11.  7/13 s/p LEFT SUPERFICIAL FEMORAL ARTERY TO PERONEAL ARTERY BYPASS WITH REVERSED GREAT SAPHENOUS VEIN GRAFT. Rapid response. To ICU. Back to OR 7/14 for Left foot TMA. PMH:  Gout, DM, HTN      Diagnosis: Osteomyelitis  Treatment Diagnosis: impaired ADLs/transfers s/p L transmet amputation    Subjective: Pt in bed, agreeable to OT treatment. \"It's February, [id number]'t it? \"    Pain: 10/10 L SAHIL RN made Aware      Objective:    Cognition/Orientation: Oriented to person and place, not to time or consistently to situation. Noted delayed processing and initiation of tasks, decreased insight into deficits, somewhat confused    Bed mobility   Rolling: CGA  Supine to sit: SBA (slow and effortful, required significant time, HOB raised and use of rail, min VC for problem solving/sequencing)  Sit to Supine: SBA (slow and effortful to lift LEs)  Lateral Scooting: simulated slide board transfer with lateral scoots L<>R 2' each direction- pt completed with SBA and significantly increased time due to need for frequent rest breaks. Too fatigued after to complete additional activity and requested return to bed to eat lunch that just arrived      ADLs   Eating: set up for lunch  LB Dressing: Dependent don R sock  Toileting: Dependent with male purewick, uses bedpan with RN assist    Exercises : Edema and decreased ROM noted L LE.  Pt completed AROM of L knee to improve ROM- required mod VC to complete task    Activity Tolerance: Puts forth good effort, limited by fatigue    Safety Devices in Place: Pt in bed eating lunch, needs in reach, bed alarm engaged, RN aware      Plan:  Times per Week: 2-5   Times per Day: Daily      AM-PAC Score  AM-[facility] Inpatient Daily Activity Raw Score: 15 (07/21/22 1516)  AM-PAC Inpatient ADL T-Scale Score : 34.69 (07/21/22 1516)  ADL Inpatient CMS 0-100% Score: 56.46 (07/21/22 1516)  ADL Inpatient CMS G-Code Modifier : CK (07/21/22 1516)    Goals  Short Term Goals  Time Frame for Short term goals: Discharge  Short Term Goal 1: supine to sit w/ Min A -met 7/9 NEW GOAL: supine to sit w/ Min A- met 7/20; upgrade to supervision bed mobility- not met  Short Term Goal 2: chair pushups x 5 w/ Min A, NWB LLE -ongoing, continue  Short Term Goal 3: lateral scooting at EOB w/ Mod A, NWB LLE - met 7/20; upgrade to mod A slide board transfer- not met  Short Term Goal 4: new goal 7/20: LB dressing using AE prn and min A while maintaining NWB LLE- not met  Patient Goals   Patient goals : to regain independence       Therapy Time   Individual Concurrent Group Co-treatment   Time In 1325         Time Out 1404         Minutes 39         Timed Code Treatment Minutes: [address] [facility], OT

## 2022-07-21 NOTE — PLAN OF CARE
Problem: ABCDS Injury Assessment  Goal: Absence of physical injury  7/20/2022 2230 by Octavia Temple RN  Outcome: Progressing  Flowsheets (Taken 7/20/2022 2111)  Absence of Physical Injury: Implement safety measures based on patient assessment     Problem: Skin/Tissue Integrity - Adult  Goal: Skin integrity remains intact  7/20/2022 2230 by Octavia Temple RN  Outcome: Progressing  Flowsheets  Taken 7/20/2022 2111  Skin Integrity Remains Intact: Monitor for areas of redness and/or skin breakdown  Taken 7/20/2022 2053  Skin Integrity Remains Intact: Monitor for areas of redness and/or skin breakdown  Goal: Incisions, wounds, or drain sites healing without S/S of infection  7/20/2022 2230 by Octavia Temple RN  Outcome: Progressing  Flowsheets  Taken 7/20/2022 2111  Incisions, Wounds, or Drain Sites Healing Without Sign and Symptoms of Infection: ADMISSION and DAILY: Assess and document risk factors for pressure ulcer development  Taken 7/20/2022 2053  Incisions, Wounds, or Drain Sites Healing Without Sign and Symptoms of Infection: ADMISSION and DAILY: Assess and document risk factors for pressure ulcer development  Goal: Oral mucous membranes remain intact  7/20/2022 2230 by Octavia Temple RN  Outcome: Progressing  Flowsheets  Taken 7/20/2022 2111  Oral Mucous Membranes Remain Intact: Assess oral mucosa and hygiene practices  Taken 7/20/2022 2053  Oral Mucous Membranes Remain Intact: Assess oral mucosa and hygiene practices     Problem: Discharge Planning  Goal: Discharge to home or other facility with appropriate resources  7/20/2022 2230 by Octavia Temple RN  Outcome: Progressing  Flowsheets (Taken 7/20/2022 2053)  Discharge to home or other facility with appropriate resources: Identify barriers to discharge with patient and caregiver     Problem: Safety - Adult  Goal: Free from fall injury  7/21/2022 0723 by Daryl Ellsworth RN  Outcome: Progressing  Note: Pt free from injury or falls at this time, fall precautions in place, bed in low position, side rail up x2, Cook Fall Risk: High (45 and higher), bed alarm on, reoriented to room and call light, reminded not to get up without assistance, call light in reach, will continue to monitor. Pt verbalizes understanding of fall risk procedures. 7/20/2022 2230 by Rajni Ewing RN  Outcome: Progressing  Flowsheets (Taken 7/20/2022 2111)  Free From Fall Injury: Instruct family/caregiver on patient safety     Problem: Chronic Conditions and Co-morbidities  Goal: Patient's chronic conditions and co-morbidity symptoms are monitored and maintained or improved  7/20/2022 2230 by Rajni Ewing RN  Outcome: Progressing  Flowsheets (Taken 7/20/2022 2053)  Care Plan - Patient's Chronic Conditions and Co-Morbidity Symptoms are Monitored and Maintained or Improved: Monitor and assess patient's chronic conditions and comorbid symptoms for stability, deterioration, or improvement     Problem: Pain  Goal: Verbalizes/displays adequate comfort level or baseline comfort level  7/20/2022 2230 by Rajni Ewing RN  Outcome: Progressing     Problem: Skin/Tissue Integrity  Goal: Absence of new skin breakdown  Description: 1. Monitor for areas of redness and/or skin breakdown  2. Assess vascular access sites hourly  3. Every 4-6 hours minimum:  Change oxygen saturation probe site  4. Every 4-6 hours:  If on nasal continuous positive airway pressure, respiratory therapy assess nares and determine need for appliance change or resting period.   7/20/2022 2230 by Rajni Ewing RN  Outcome: Progressing     Problem: Respiratory - Adult  Goal: Achieves optimal ventilation and oxygenation  7/20/2022 2230 by Rajni Ewing RN  Outcome: Progressing     Problem: Cardiovascular - Adult  Goal: Maintains optimal cardiac output and hemodynamic stability  7/20/2022 2230 by Rajni Ewing RN  Outcome: Progressing     Problem: Genitourinary - Adult  Goal: Urinary catheter remains patent  7/20/2022 2230 by Jacques Gilford, RN  Outcome: Progressing     Problem: Metabolic/Fluid and Electrolytes - Adult  Goal: Glucose maintained within prescribed range  7/20/2022 2230 by Jacques Gilford, RN  Outcome: Progressing  Flowsheets (Taken 7/20/2022 2053)  Glucose maintained within prescribed range: Monitor blood glucose as ordered

## 2022-07-21 NOTE — PROGRESS NOTES
Podiatric Surgery Daily Progress Note  Melly Jia      Subjective :   Patient seen and examined this am at the bedside. Patient denies any acute overnight events. Patient denies N/V/F/C/SOB. Patient denies calf pain, thigh pain, chest pain. Review of Systems: A 12 point review of symptoms is unremarkable with the exception of the chief complaint. Patient specifically denies nausea, fever, vomiting, chills, shortness of breath, chest pain, abdominal pain, constipation or difficulty urinating. Objective     BP (!) 136/59   Pulse 65   Temp 97.3 °F (36.3 °C) (Axillary)   Resp 16   Ht 6' 2\" (1.88 m)   Wt 197 lb 12 oz (89.7 kg)   SpO2 93%   BMI 25.39 kg/m²     I/O:  Intake/Output Summary (Last 24 hours) at 7/21/2022 0547  Last data filed at 7/21/2022 0426  Gross per 24 hour   Intake --   Output 1950 ml   Net -1950 ml              Wt Readings from Last 3 Encounters:   07/21/22 197 lb 12 oz (89.7 kg)   07/05/22 178 lb 3.2 oz (80.8 kg)   06/20/22 177 lb (80.3 kg)       LABS:    Recent Labs     07/19/22  0546 07/20/22  0435   WBC 11.2* 8.8   HGB 7.7* 7.3*   HCT 23.0* 21.5*    139        Recent Labs     07/20/22  0435      K 4.9      CO2 26   PHOS 4.8   BUN 70*   CREATININE 4.2*        No results for input(s): PROT, INR, APTT in the last 72 hours. LOWER EXTREMITY EXAMINATION      Dressing to left lower extremity clean, dry, intact. No strikethrough drainage noted to external layers of the dressing. No pain with calf compression. Right lower extremity soft tissue envelope is closed and without acute signs infection. IMAGING:  XR Left foot 7/15/22  Narrative   EXAM: XR FOOT LEFT (MIN 3 VIEWS)       INDICATION: s/p left foot TMA,        COMPARISON: MRI July 6, 2022, radiograph July 5, 2022       FINDINGS:       Bones: There is been interval amputation of the first through fourth metatarsals. There is been a reamputation of the fifth metatarsal.       Joints:  The joint spaces are well maintained. No joint effusion. Soft tissues: Mild soft tissue swelling is present likely due to recent surgery. Impression   1. Transmetatarsal amputation with mild soft tissue swelling likely due to recent surgery. ASSESSMENT/PLAN  -s/p transmetatarsal amputation; left LE 2/2 osteomyelitis  -Peripheral arterial disease; b/l LE  -Type 2 diabetes mellitus with peripheral neuropathy  -Acute blood loss anemia     -Patient seen and examined at bedside this AM  -Hypertensive, otherwise VSS, no leukocytosis noted (WBC 8.1)  -ESR 53, CRP 10.4  -HbA1c 5.9%  -Blood cultures x2; NGTD  -Wound culture: MRSA, diphtheroids  -Surgical pathology negative for OM  -Imaging reviewed, impression noted above  -ID following; continue antibiotics per their recommendations  -Vascular surgery following; s/p left SFA to peroneal bypass (7/13/22)  -Dressing to left LE left clean, dry, intact  -Strict nonweightbearing to left lower extremity  -Acute blood loss anemia postoperatively; received 2 units of pRBC 7/15, 1 unit 7/16. Hemoglobin has stabilized. DISPO: S/p transmetatarsal amputation; left LE 2/2 osteomyelitis. Labs and imaging reviewed. Continue antibiotics per ID recommendations. Vascular surgery following. No further surgical intervention from podiatric standpoint. Patient is OK to discharge from podiatric standpoint pending medical clearance.      Discussed assessment and plan with Dr. Nacho Mccormick DPM.    Mely Whiting DPM  Podiatric Resident, PGY-3  Pager #: [phone] or Perfect Serve

## 2022-07-21 NOTE — PROGRESS NOTES
Pt evening shift assessment complete. VSS and medication given as ordered. Pt assessed for comfort needs, pt repositioned, incontinence care provided and pt assisted with setting up his meal. Pt does not express any additional needs at this time, resting comfortably in bed.

## 2022-07-21 NOTE — PROGRESS NOTES
Vascular Surgery   Daily Progress Note  Patient: Rakesh Adler    CC: Atherosclerosis native artery left lower extremity with ulceration    SUBJECTIVE:   Patient did well overnight. No acute complaints. Schumacher removed yesterday and voiding appropriately. ROS: A 14 point review of systems was conducted, significant findings as noted above. All other systems negative. OBJECTIVE:   Infusions:   sodium chloride Stopped (07/12/22 0100)    dextrose      sodium chloride 25 mL/hr at 07/15/22 0743      I/O:I/O last 3 completed shifts: In: 1000 [P.O.:1000]  Out: 2650 [Urine:2650]           Wt Readings from Last 1 Encounters:   07/21/22 197 lb 12 oz (89.7 kg)       Exam:  BP (!) 136/59   Pulse 65   Temp 97.3 °F (36.3 °C) (Axillary)   Resp 16   Ht 6' 2\" (1.88 m)   Wt 197 lb 12 oz (89.7 kg)   SpO2 93%   BMI 25.39 kg/m²     General appearance: NAD  Neuro: A&Ox3, no focal deficits, sensation intact. [place]  Chest/Lungs: normal effort, on RA  Cardiovascular: RRR  Abdomen: soft, non-distended, non-tender  : External Schumacher in place draining yellow urine  Extremities: Incision line C/D/I with staples in place. No hematoma noted. Soft. Drain with minimal output and old coagulated blood in drain     Vascular Exam:    femoral  DP  PT  AT   LEFT  +  defer -/+ -/+   RIGHT  +  +  + defer       LABS:   Recent Labs     07/20/22  0435 07/21/22  0555   WBC 8.8 8.1   HGB 7.3* 7.2*   HCT 21.5* 21.9*   MCV 94.7 96.1    154          Recent Labs     07/19/22  0546 07/20/22  0435    136   K 4.5 4.9    101   CO2 24 26   PHOS 4.1 4.8   BUN 65* 70*   CREATININE 4.1* 4.2*        No results for input(s): AST, ALT, ALB, BILIDIR, BILITOT, ALKPHOS in the last 72 hours. No results for input(s): LIPASE, AMYLASE in the last 72 hours. No results for input(s): PROT, INR, APTT in the last 72 hours. No results for input(s): CKTOTAL, CKMB, CKMBINDEX, TROPONINI in the last 72 hours. ASSESSMENT/PLAN:   This is a 81 y.o. year old male with a diagnosis of LLE PAD s/p Left Superficial Femoral Artery to Peroneal Artery Bypass with Reversed Greater Saphenous Vein on 7/13/22. Pt now s/p left foot transmetatarsal amputation on 7/15.     - OOB ambulation  - ID on board, continue Linezolid (7/15-7/25)  - Okay dispo to SNF from surgery standpoint once pre-cert received  - Continue PT/OT for mobilization. OT 14/24 and PT 10/24.     Kevin King DO, [facility]  PGY1, General Surgery  07/21/22  6:20 AM  Perfect[employer]erve  [phone]

## 2022-07-21 NOTE — PROGRESS NOTES
Physical Therapy  Facility/Department: [facility] 4 PCU  Physical Therapy treatment note    Name: Marlen Moser  : 1935  MRN: [id number]  Date of Service: 2022    Discharge Recommendations:Guero Troy Speaker scored a  on the AM-PAC short mobility form. Current research shows that an AM-PAC score of 17 or less is typically not associated with a discharge to the patient's home setting. Based on the patient's AM-PAC score and their current functional mobility deficits, it is recommended that the patient have 3-5 sessions per week of Physical Therapy at d/c to increase the patient's independence. Please see assessment section for further patient specific details. If patient discharges prior to next session this note will serve as a discharge summary. Please see below for the latest assessment towards goals. PT Equipment Recommendations  Equipment Needed: No  Other: defer to next level of care      Patient Diagnosis(es): The primary encounter diagnosis was Toe osteomyelitis, left ([facility] [address] Diagnoses of MO (acute kidney injury) ([facility] [address] [facility] and Osteomyelitis of left foot, unspecified type [facility]) were also pertinent to this visit. Past Medical History:  has a past medical history of Diabetic eye exam ([facility] [address] DM (diabetes mellitus) ([facility] [address] Gout, Hypothyroidism, and Seasonal allergic rhinitis. Past Surgical History:  has a past surgical history that includes Incisional hernia repair; hernia repair (); Colonoscopy ( appx ); Colonoscopy (N/A, 2022); femoral bypass (Left, 2022); and Foot Amputation (Left, 7/15/2022). Assessment   Assessment: Pt demo improved mobility but decreased mentation this date (RN aware). Pt much more confused and lethargic today. Pt continues to demo mobility well below his reported baseline of independent at home alone; pt is currently NWB L. Pt pursuing SNF at discharge.  Pt would definitely benefit from continued skilled IP PT at discharge to maximize his functional independence. Treatment Diagnosis: Decreased mobility associated with osteomyelitis. Requires PT Follow-Up: Yes  Activity Tolerance  Activity Tolerance: Treatment limited secondary to decreased cognition;Patient limited by fatigue;Patient limited by pain     Plan   Plan  Plan:  (2-5)  Current Treatment Recommendations: Transfer training, Functional mobility training, Strengthening  Safety Devices  Type of Devices: Bed alarm in place, Call light within reach, Left in bed, Nurse notified (pt set up to eat lunch)     Restrictions  Position Activity Restriction  Other position/activity restrictions: NWB LLE; up with assist, contact isolation     Subjective   General  Chart Reviewed: Yes  Additional Pertinent Hx: Pt to ED 7/5 with L foot pain from recent toe amputation. Pt admitted for Osteomyelitis. Podiatry consult. Surgery consult for arteriogram on 7/11.  7/13 s/p LEFT SUPERFICIAL FEMORAL ARTERY TO PERONEAL ARTERY BYPASS WITH REVERSED GREAT SAPHENOUS VEIN GRAFT. Rapid response. To ICU. Back to OR 7/14 for Left foot TMA. PMH:  Gout, DM, HTN  Family / Caregiver Present: No  Diagnosis: Osteomyelitis  Follows Commands: Within Functional Limits  Subjective  Subjective: Pt found supine in bed and agreeable to PT. Pt rates L LE pain 10/10, RN aware.          Social/Functional History  Social/Functional History  Lives With: Alone  Type of Home: Senior housing apartment  Home Layout: One level  Home Access: Elevator  Bathroom Shower/Tub: Walk-in shower  Bathroom Toilet: Handicap height  Bathroom Equipment: Grab bars in shower, Shower chair, Hand-held shower, Grab bars around toilet  Home Equipment: [address], Rollator  Has the patient had two or more falls in the past year or any fall with injury in the past year?: No  ADL Assistance: [facility]: Needs assistance (cleaning, laundry, meals on wheels)  Ambulation Assistance: Independent (uses rollator outside of apartment and \"when I'm in a crisis. \")  Transfer Assistance: Independent  Active : Yes  Occupation: Retired (Manager)  Additional Comments: Pt manages own medications         Cognition   Orientation  Overall Orientation Status: Impaired  Orientation Level: Oriented to place;Oriented to person (slow to respond this date)       Objective          Bed mobility  Rolling to Left: Stand by assistance (use of rail)  Supine to Sit: Stand by assistance (slow and effortful with HOB up and use of rail)  Sit to Supine: Stand by assistance (slow and effortful)  Scooting: Stand by assistance (slow and effortful)    Transfers  Lateral Transfers: Stand by assistance (lateral scoot R and L to simulate slide board transfer; slow and effortful with pt maintaining NWB L; pt required frequent rest breaks)        Balance  Comments: Pt able to sit EOB approx 17 mins with SBA overall and NWB L LE.   Exercise Treatment: seated x10 B LE: LAQ (limited ROM on L LE due to c/o pain-pt encouraged to perform AAROM into L knee flexion but he was quite hesitant), AP                   AM-PAC Score  AM-PAC Inpatient Mobility Raw Score : 12 (07/21/22 1443)  -PAC Inpatient T-Scale Score : 35.33 (07/21/22 1443)  Mobility Inpatient CMS 0-100% Score: 68.66 (07/21/22 1443)  Mobility Inpatient CMS G-Code Modifier : CL (07/21/22 1443)       Goals  Short Term Goals  Time Frame for Short term goals: Discharge  Short term goal 1: Rolling with Min A   met 7/20  new goal: roll R and L independent  ongoing  Short term goal 2: supine <> sit Min A  met 7/20   new goal: sit to/from supine supervision  ongoing  Short term goal 3: sit <> stand Mod A x2 to full stance NWB L LE   ongoing  Patient Goals   Patient goals : Get stronger       Education  Patient Education  Education Given To: Patient  Education Provided: Role of Therapy (NWB L)  Education Method: Verbal  Barriers to Learning: Cognition  Education Outcome: Verbalized understanding;Continued education needed      Therapy Time Individual Concurrent Group Co-treatment   Time In 1325         Time Out 1403         Minutes 38          Timed Code Treatment Minutes:  38     Total Treatment Minutes:   1463 Kristin Cabral PT

## 2022-07-21 NOTE — PLAN OF CARE
Problem: ABCDS Injury Assessment  Goal: Absence of physical injury  7/20/2022 2230 by Luana Gutierrez RN  Outcome: Progressing  Flowsheets (Taken 7/20/2022 2111)  Absence of Physical Injury: Implement safety measures based on patient assessment  7/20/2022 1429 by Roberto Pereira RN  Outcome: Progressing  Flowsheets  Taken 7/20/2022 1429  Absence of Physical Injury: Implement safety measures based on patient assessment  Taken 7/20/2022 0716  Absence of Physical Injury: Implement safety measures based on patient assessment     Problem: Skin/Tissue Integrity - Adult  Goal: Skin integrity remains intact  7/20/2022 2230 by Luana Gutierrez RN  Outcome: Progressing  Flowsheets  Taken 7/20/2022 2111  Skin Integrity Remains Intact: Monitor for areas of redness and/or skin breakdown  Taken 7/20/2022 2053  Skin Integrity Remains Intact: Monitor for areas of redness and/or skin breakdown  7/20/2022 1429 by Roberto Pereira RN  Outcome: Progressing  Flowsheets  Taken 7/20/2022 1429  Skin Integrity Remains Intact:   Every 4-6 hours minimum: Change oxygen saturation probe site   Monitor for areas of redness and/or skin breakdown   Every 4-6 hours: If on nasal continuous positive airway pressure, respiratory therapy assesses nares and determine need for appliance change or resting period  Taken 7/20/2022 0731  Skin Integrity Remains Intact:   Monitor for areas of redness and/or skin breakdown   Every 4-6 hours minimum: Change oxygen saturation probe site   Every 4-6 hours: If on nasal continuous positive airway pressure, respiratory therapy assesses nares and determine need for appliance change or resting period  Goal: Incisions, wounds, or drain sites healing without S/S of infection  7/20/2022 2230 by Luana Gutierrez RN  Outcome: Progressing  Flowsheets  Taken 7/20/2022 2111  Incisions, Wounds, or Drain Sites Healing Without Sign and Symptoms of Infection: ADMISSION and DAILY: Assess and document risk factors for pressure ulcer development  Taken 7/20/2022 2053  Incisions, Wounds, or Drain Sites Healing Without Sign and Symptoms of Infection: ADMISSION and DAILY: Assess and document risk factors for pressure ulcer development  7/20/2022 1429 by Mariya Parada RN  Outcome: Progressing  Flowsheets (Taken 7/20/2022 1429)  Incisions, Wounds, or Drain Sites Healing Without Sign and Symptoms of Infection:   TWICE DAILY: Assess and document skin integrity   TWICE DAILY: Assess and document dressing/incision, wound bed, drain sites and surrounding tissue   Initiate isolation precautions as appropriate   Initiate pressure ulcer prevention bundle as indicated  Goal: Oral mucous membranes remain intact  Outcome: Progressing  Flowsheets  Taken 7/20/2022 2111  Oral Mucous Membranes Remain Intact: Assess oral mucosa and hygiene practices  Taken 7/20/2022 2053  Oral Mucous Membranes Remain Intact: Assess oral mucosa and hygiene practices

## 2022-07-22 VITALS
OXYGEN SATURATION: 96 % | TEMPERATURE: 97.9 F | DIASTOLIC BLOOD PRESSURE: 56 MMHG | WEIGHT: 200.18 LBS | HEART RATE: 56 BPM | BODY MASS INDEX: 25.69 KG/M2 | RESPIRATION RATE: 16 BRPM | SYSTOLIC BLOOD PRESSURE: 131 MMHG | HEIGHT: 74 IN
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
## 2022-07-22 PROBLEM — S61.451A CAT BITE OF HAND, RIGHT, INITIAL ENCOUNTER: Status: RESOLVED | Noted: 2022-07-06 | Resolved: 2022-07-22

## 2022-07-22 LAB
ALBUMIN SERPL-MCNC: 2.8 G/DL (ref 3.4–5)
ANION GAP SERPL CALCULATED.3IONS-SCNC: 11 MMOL/L (ref 3–16)
BASOPHILS ABSOLUTE: 0 K/UL (ref 0–0.2)
BASOPHILS RELATIVE PERCENT: 0.3 %
BUN BLDV-MCNC: 69 MG/DL (ref 7–20)
CALCIUM SERPL-MCNC: 8.3 MG/DL (ref 8.3–10.6)
CHLORIDE BLD-SCNC: 107 MMOL/L (ref 99–110)
CO2: 22 MMOL/L (ref 21–32)
CREAT SERPL-MCNC: 3.7 MG/DL (ref 0.8–1.3)
EOSINOPHILS ABSOLUTE: 0.2 K/UL (ref 0–0.6)
EOSINOPHILS RELATIVE PERCENT: 2 %
GFR AFRICAN AMERICAN: 19
GFR NON-AFRICAN AMERICAN: 16
GLUCOSE BLD-MCNC: 109 MG/DL (ref 70–99)
GLUCOSE BLD-MCNC: 111 MG/DL (ref 70–99)
GLUCOSE BLD-MCNC: 135 MG/DL (ref 70–99)
GLUCOSE BLD-MCNC: 139 MG/DL (ref 70–99)
HCT VFR BLD CALC: 20.4 % (ref 40.5–52.5)
HCT VFR BLD CALC: 21.5 % (ref 40.5–52.5)
HEMOGLOBIN: 7.1 G/DL (ref 13.5–17.5)
HEMOGLOBIN: 7.2 G/DL (ref 13.5–17.5)
IRON SATURATION: 35 % (ref 20–50)
IRON: 53 UG/DL (ref 59–158)
LYMPHOCYTES ABSOLUTE: 0.9 K/UL (ref 1–5.1)
LYMPHOCYTES RELATIVE PERCENT: 10.5 %
MAGNESIUM: 2 MG/DL (ref 1.8–2.4)
MCH RBC QN AUTO: 32.8 PG (ref 26–34)
MCHC RBC AUTO-ENTMCNC: 34.8 G/DL (ref 31–36)
MCV RBC AUTO: 94.4 FL (ref 80–100)
MONOCYTES ABSOLUTE: 0.7 K/UL (ref 0–1.3)
MONOCYTES RELATIVE PERCENT: 8.9 %
NEUTROPHILS ABSOLUTE: 6.4 K/UL (ref 1.7–7.7)
NEUTROPHILS RELATIVE PERCENT: 78.3 %
PDW BLD-RTO: 16.7 % (ref 12.4–15.4)
PERFORMED ON: ABNORMAL
PHOSPHORUS: 4.1 MG/DL (ref 2.5–4.9)
PLATELET # BLD: 152 K/UL (ref 135–450)
PMV BLD AUTO: 8.1 FL (ref 5–10.5)
POTASSIUM SERPL-SCNC: 4.7 MMOL/L (ref 3.5–5.1)
RBC # BLD: 2.16 M/UL (ref 4.2–5.9)
SARS-COV-2, NAAT: NOT DETECTED
SODIUM BLD-SCNC: 140 MMOL/L (ref 136–145)
TOTAL IRON BINDING CAPACITY: 151 UG/DL (ref 260–445)
WBC # BLD: 8.2 K/UL (ref 4–11)

## 2022-07-22 PROCEDURE — 6370000000 HC RX 637 (ALT 250 FOR IP): Performed by: INTERNAL MEDICINE

## 2022-07-22 PROCEDURE — 80069 RENAL FUNCTION PANEL: CPT

## 2022-07-22 PROCEDURE — 83550 IRON BINDING TEST: CPT

## 2022-07-22 PROCEDURE — 85018 HEMOGLOBIN: CPT

## 2022-07-22 PROCEDURE — 83540 ASSAY OF IRON: CPT

## 2022-07-22 PROCEDURE — 2580000003 HC RX 258: Performed by: STUDENT IN AN ORGANIZED HEALTH CARE EDUCATION/TRAINING PROGRAM

## 2022-07-22 PROCEDURE — 85025 COMPLETE CBC W/AUTO DIFF WBC: CPT

## 2022-07-22 PROCEDURE — 2580000003 HC RX 258: Performed by: INTERNAL MEDICINE

## 2022-07-22 PROCEDURE — 6360000002 HC RX W HCPCS: Performed by: INTERNAL MEDICINE

## 2022-07-22 PROCEDURE — 6370000000 HC RX 637 (ALT 250 FOR IP): Performed by: STUDENT IN AN ORGANIZED HEALTH CARE EDUCATION/TRAINING PROGRAM

## 2022-07-22 PROCEDURE — 87635 SARS-COV-2 COVID-19 AMP PRB: CPT

## 2022-07-22 PROCEDURE — 83735 ASSAY OF MAGNESIUM: CPT

## 2022-07-22 PROCEDURE — 85014 HEMATOCRIT: CPT

## 2022-07-22 PROCEDURE — 36415 COLL VENOUS BLD VENIPUNCTURE: CPT

## 2022-07-22 RX ADMIN — HYDROCORTISONE ACETATE 25 MG: 25 SUPPOSITORY RECTAL at 09:43

## 2022-07-22 RX ADMIN — LINEZOLID 600 MG: 600 TABLET, FILM COATED ORAL at 09:39

## 2022-07-22 RX ADMIN — CETIRIZINE HYDROCHLORIDE 5 MG: 10 TABLET, FILM COATED ORAL at 09:40

## 2022-07-22 RX ADMIN — METOPROLOL TARTRATE 50 MG: 50 TABLET, FILM COATED ORAL at 10:26

## 2022-07-22 RX ADMIN — IRON SUCROSE 200 MG: 20 INJECTION, SOLUTION INTRAVENOUS at 09:55

## 2022-07-22 RX ADMIN — SODIUM CHLORIDE: 9 INJECTION, SOLUTION INTRAVENOUS at 09:54

## 2022-07-22 RX ADMIN — TRAMADOL HYDROCHLORIDE 50 MG: 50 TABLET, COATED ORAL at 17:14

## 2022-07-22 RX ADMIN — APIXABAN 2.5 MG: 2.5 TABLET, FILM COATED ORAL at 09:40

## 2022-07-22 RX ADMIN — ASPIRIN 81 MG: 81 TABLET, COATED ORAL at 09:39

## 2022-07-22 RX ADMIN — SODIUM CHLORIDE, PRESERVATIVE FREE 10 ML: 5 INJECTION INTRAVENOUS at 09:41

## 2022-07-22 RX ADMIN — TRAMADOL HYDROCHLORIDE 50 MG: 50 TABLET, COATED ORAL at 03:42

## 2022-07-22 ASSESSMENT — PAIN SCALES - GENERAL
PAINLEVEL_OUTOF10: 10
PAINLEVEL_OUTOF10: 10
PAINLEVEL_OUTOF10: 0
PAINLEVEL_OUTOF10: 7
PAINLEVEL_OUTOF10: 9
PAINLEVEL_OUTOF10: 9

## 2022-07-22 ASSESSMENT — PAIN DESCRIPTION - ORIENTATION
ORIENTATION: LEFT

## 2022-07-22 ASSESSMENT — PAIN DESCRIPTION - FREQUENCY
FREQUENCY: CONTINUOUS

## 2022-07-22 ASSESSMENT — PAIN DESCRIPTION - DESCRIPTORS
DESCRIPTORS: ACHING

## 2022-07-22 ASSESSMENT — PAIN DESCRIPTION - LOCATION
LOCATION: FOOT
LOCATION: FOOT;LEG
LOCATION: FOOT;LEG

## 2022-07-22 ASSESSMENT — PAIN - FUNCTIONAL ASSESSMENT
PAIN_FUNCTIONAL_ASSESSMENT: PREVENTS OR INTERFERES SOME ACTIVE ACTIVITIES AND ADLS
PAIN_FUNCTIONAL_ASSESSMENT: ACTIVITIES ARE NOT PREVENTED

## 2022-07-22 ASSESSMENT — PAIN DESCRIPTION - PAIN TYPE
TYPE: ACUTE PAIN;SURGICAL PAIN
TYPE: ACUTE PAIN

## 2022-07-22 ASSESSMENT — PAIN DESCRIPTION - DIRECTION
RADIATING_TOWARDS: TOE
RADIATING_TOWARDS: TOE

## 2022-07-22 ASSESSMENT — PAIN DESCRIPTION - ONSET
ONSET: ON-GOING

## 2022-07-22 NOTE — PROGRESS NOTES
Vein on 7/13/22 (POD 9). Pt now s/p left foot transmetatarsal amputation on 7/15.     - ID on board, continue Linezolid (7/15-7/25)  - Okay dispo to SNF from vascular surgery standpoint once pre-cert received. - Continue PT/OT for mobilization. Evals from PT/OT on 7/21 (yesterday): OT 15/24 and PT 12/24.   -staples to come out POD 14/next week in office.      Lakshmi Rowe DO   PGY1, General Surgery  07/22/22  6:51 AM  Pager # [phone]

## 2022-07-22 NOTE — PLAN OF CARE
Problem: Skin/Tissue Integrity - Adult  Goal: Skin integrity remains intact  Outcome: Progressing  Flowsheets (Taken 7/21/2022 1932)  Skin Integrity Remains Intact: Monitor for areas of redness and/or skin breakdown     Problem: Skin/Tissue Integrity - Adult  Goal: Incisions, wounds, or drain sites healing without S/S of infection  Outcome: Progressing  Flowsheets (Taken 7/21/2022 1932)  Incisions, Wounds, or Drain Sites Healing Without Sign and Symptoms of Infection: ADMISSION and DAILY: Assess and document risk factors for pressure ulcer development     Problem: Skin/Tissue Integrity - Adult  Goal: Oral mucous membranes remain intact  Outcome: Progressing  Flowsheets (Taken 7/21/2022 1932)  Oral Mucous Membranes Remain Intact: Assess oral mucosa and hygiene practices     Problem: Metabolic/Fluid and Electrolytes - Adult  Goal: Glucose maintained within prescribed range  Outcome: Progressing  Flowsheets (Taken 7/21/2022 1948)  Glucose maintained within prescribed range:   Monitor blood glucose as ordered   Assess for signs and symptoms of hyperglycemia and hypoglycemia   Administer ordered medications to maintain glucose within target range     Problem: Discharge Planning  Goal: Discharge to home or other facility with appropriate resources  Outcome: Progressing  Flowsheets (Taken 7/21/2022 1948)  Discharge to home or other facility with appropriate resources:   Identify barriers to discharge with patient and caregiver   Identify discharge learning needs (meds, wound care, etc)   Arrange for needed discharge resources and transportation as appropriate     Problem: Safety - Adult  Goal: Free from fall injury  Outcome: Progressing  Flowsheets (Taken 7/21/2022 1932)  Free From Fall Injury: Instruct family/caregiver on patient safety     Problem: Chronic Conditions and Co-morbidities  Goal: Patient's chronic conditions and co-morbidity symptoms are monitored and maintained or improved  Outcome:

## 2022-07-22 NOTE — PROGRESS NOTES
Interval History and plan:      BP is well controlled   UOP is 450 ml ? SP  Fem / pop bypass on 7/13/2022  SP ( TMA) left foot       Plan:    Creatinine is improving  2.1 > 4.3 > 3.8> 3.7  Electrolytes are Ok    Closely watch anbx dosing and levels  No need for dialysis yet but may require it down the line   OK to DC from my perspective and follow up in 1-2 weeks in the office                      Assessment :     CKD Stage 3b  Likely due to DM and hypertension  Cr is 1.8- eGFR of 36 ml/min  UA- bland, wbc 6-9,  No recent one in the system      Hypertension   BP: (123-129)/(47-51)  Heart Rate:  [59-67]   BP goal inpatient [id number] systolic inpatient  Home regimen: lisinopril 10 mg daily only  Current inpatient regimen: carvedilol 6.25 mg BID, hydralazine 25 mg TID, nifedipine 60 mg daily. Also has Afib    Anemia of CKD             sepsis  DM  PVD    [facility] would like to thank Tung Brooks MD   for opportunity to serve this patient      Please call with questions at-   24 Hrs Answering service [phone] or  7 am- 5 pm via Perfect serve or cell phone  Sridevi Babb MD          CC/reason for consult :     MO     HPI :     Miko Boo is a 81 y.o. male presented to   the hospital on 7/5/2022 with wound infection in the left leg. He is followed outpatient by podiatry,for wound, which continued to   Get worse despite the treatment. Also has discharge from the wound  Brought to hospital admitted  Also found to have CKD  because of which we are consulted.          ROS:     Seen with- no family    positives in bold   Constitutional:  fever, chills, weakness, weight change, fatigue  Skin:  rash, pruritus, hair loss, bruising, dry skin, petechiae  Head, Face, Neck   headaches, swelling,  cervical adenopathy  Respiratory: shortness of breath, cough, or wheezing  Cardiovascular: chest pain, palpitations, dizzy, edema  Gastrointestinal: nausea, vomiting, diarrhea, constipation,belly pain    Yellow skin, blood in stool  Musculoskeletal:  back pain, muscle weakness, gait problems,       joint pain or swelling. Genitourinary:  dysuria, poor urine flow, flank pain, blood in urine  Neurologic:  vertigo, TIA'S, syncope, seizures, focal weakness  Psychosocial:  insomnia, anxiety, or depression. Additional positive findings:                    All other remaining systems are negative or unable to obtain        PMH/PSH/SH/Family History:     Past Medical History:   Diagnosis Date    Diabetic eye exam ([facility] [address] 1/29/14    Dr. Polina Lerner    DM (diabetes mellitus) ([facility] [address] 1/13/2014    Gout     Hypothyroidism 4/18/2016    Seasonal allergic rhinitis        Past Surgical History:   Procedure Laterality Date    COLONOSCOPY  2009 appx     normal     COLONOSCOPY N/A 6/20/2022    COLONOSCOPY POLYPECTOMY SNARE/COLD BIOPSY performed by Lilly Chilel MD at [facility] Left 7/13/2022    LEFT SUPERFICIAL FEMORAL ARTERY TO PERONEAL ARTERY BYPASS WITH REVERSED GREAT SAPHENOUS VEIN GRAFT performed by Lester Humphrey MD at [address] [place] [address]. Left 7/15/2022    TRANSMETATARSAL AMPUTATION WITH TENDO ACHILLES LENGTHENING LEFT FOOT performed by Nacho Hansen DPM at [facility], bilateral    INCISIONAL HERNIA REPAIR      bilateral        reports that he quit smoking about 63 years ago. He has never used smokeless tobacco. He reports that he does not drink alcohol and does not use drugs. family history is not on file.          Medication:     Current Facility-Administered Medications: iron sucrose (VENOFER) 200 mg in sodium chloride 0.9 % 100 mL IVPB, 200 mg, IntraVENous, Once  hydrocortisone (ANUSOL-HC) suppository 25 mg, 25 mg, Rectal, BID  traMADol (ULTRAM) tablet 50 mg, 50 mg, Oral, Q6H PRN  apixaban (ELIQUIS) tablet 2.5 mg, 2.5 mg, Oral, BID  metoprolol tartrate (LOPRESSOR) tablet 50 mg, 50 mg, Oral, BID  oxyCODONE (ROXICODONE) immediate release tablet 5 mg, 5 mg, Oral, Once  linezolid (ZYVOX) tablet 600 mg, 600 mg, Oral, 2 times per day  sodium chloride flush 0.9 % injection 5-40 mL, 5-40 mL, IntraVENous, 2 times per day  sodium chloride flush 0.9 % injection 5-40 mL, 5-40 mL, IntraVENous, PRN  0.9 % sodium chloride infusion, , IntraVENous, PRN  cloNIDine (CATAPRES) tablet 0.1 mg, 0.1 mg, Oral, Q2H PRN  labetalol (NORMODYNE;TRANDATE) injection 10 mg, 10 mg, IntraVENous, Q2H PRN  aspirin EC tablet 81 mg, 81 mg, Oral, Daily  atorvastatin (LIPITOR) tablet 40 mg, 40 mg, Oral, Nightly  hydrALAZINE (APRESOLINE) injection 10 mg, 10 mg, IntraVENous, Q4H PRN  methocarbamol (ROBAXIN) tablet 750 mg, 750 mg, Oral, 4x Daily PRN  gabapentin (NEURONTIN) capsule 300 mg, 300 mg, Oral, Nightly  glucose chewable tablet 16 g, 4 tablet, Oral, PRN  dextrose bolus 10% 125 mL, 125 mL, IntraVENous, PRN **OR** dextrose bolus 10% 250 mL, 250 mL, IntraVENous, PRN  glucagon (rDNA) injection 1 mg, 1 mg, IntraMUSCular, PRN  dextrose 5 % solution, 100 mL/hr, IntraVENous, PRN  insulin lispro (1 Unit Dial) 0-6 Units, 0-6 Units, SubCUTAneous, TID WC  insulin lispro (1 Unit Dial) 0-3 Units, 0-3 Units, SubCUTAneous, Nightly  cetirizine (ZYRTEC) tablet 5 mg, 5 mg, Oral, Daily  tamsulosin (FLOMAX) capsule 0.4 mg, 0.4 mg, Oral, Nightly  sodium chloride flush 0.9 % injection 5-40 mL, 5-40 mL, IntraVENous, 2 times per day  sodium chloride flush 0.9 % injection 5-40 mL, 5-40 mL, IntraVENous, PRN  0.9 % sodium chloride infusion, , IntraVENous, PRN  ondansetron (ZOFRAN-ODT) disintegrating tablet 4 mg, 4 mg, Oral, Q8H PRN **OR** ondansetron (ZOFRAN) injection 4 mg, 4 mg, IntraVENous, Q6H PRN  polyethylene glycol (GLYCOLAX) packet 17 g, 17 g, Oral, Daily PRN  acetaminophen (TYLENOL) tablet 650 mg, 650 mg, Oral, Q6H PRN **OR** acetaminophen (TYLENOL) suppository 650 mg, 650 mg, Rectal, Q6H PRN       Vitals :     Vitals:    07/22/22 0745   BP:    Pulse: 61   Resp:    Temp:    SpO2:        I & O :

## 2022-07-22 NOTE — PROGRESS NOTES
Podiatric Surgery Daily Progress Note  Eduardo Scull      Subjective :   Patient seen and examined this am at the bedside. Patient denies any acute overnight events. Patient denies N/V/F/C/SOB. Patient denies calf pain, thigh pain, chest pain. Review of Systems: A 12 point review of symptoms is unremarkable with the exception of the chief complaint. Patient specifically denies nausea, fever, vomiting, chills, shortness of breath, chest pain, abdominal pain, constipation or difficulty urinating. Objective     BP (!) 129/47   Pulse 64   Temp 98.4 °F (36.9 °C) (Oral)   Resp 18   Ht 6' 2\" (1.88 m)   Wt 197 lb 12 oz (89.7 kg)   SpO2 99%   BMI 25.39 kg/m²     I/O:  Intake/Output Summary (Last 24 hours) at 7/22/2022 0550  Last data filed at 7/22/2022 0200  Gross per 24 hour   Intake 710 ml   Output --   Net 710 ml              Wt Readings from Last 3 Encounters:   07/21/22 197 lb 12 oz (89.7 kg)   07/05/22 178 lb 3.2 oz (80.8 kg)   06/20/22 177 lb (80.3 kg)       LABS:    Recent Labs     07/20/22  0435 07/21/22  0555   WBC 8.8 8.1   HGB 7.3* 7.2*   HCT 21.5* 21.9*    154        Recent Labs     07/21/22  0555      K 4.7      CO2 22   PHOS 4.4   BUN 67*   CREATININE 3.8*        No results for input(s): PROT, INR, APTT in the last 72 hours. LOWER EXTREMITY EXAMINATION      Dressing to left lower extremity clean, dry, intact. No strikethrough drainage noted to external layers of the dressing. No pain with calf compression. Right lower extremity soft tissue envelope is closed and without acute signs infection. IMAGING:  XR Left foot 7/15/22  Narrative   EXAM: XR FOOT LEFT (MIN 3 VIEWS)       INDICATION: s/p left foot TMA,        COMPARISON: MRI July 6, 2022, radiograph July 5, 2022       FINDINGS:       Bones: There is been interval amputation of the first through fourth metatarsals. There is been a reamputation of the fifth metatarsal.       Joints:  The joint spaces are well maintained. No joint effusion. Soft tissues: Mild soft tissue swelling is present likely due to recent surgery. Impression   1. Transmetatarsal amputation with mild soft tissue swelling likely due to recent surgery. ASSESSMENT/PLAN  -s/p transmetatarsal amputation; left LE 2/2 osteomyelitis  -Peripheral arterial disease; b/l LE  -Type 2 diabetes mellitus with peripheral neuropathy  -Acute blood loss anemia     -Patient seen and examined at bedside this AM  -Hypotensive, otherwise VSS, no leukocytosis noted (WBC 8.2)  -ESR 53, CRP 10.4  -HbA1c 5.9%  -Blood cultures x2; NGTD  -Wound culture: MRSA, diphtheroids  -Surgical pathology negative for OM  -Imaging reviewed, impression noted above  -ID following; continue antibiotics per their recommendations  -Vascular surgery following; s/p left SFA to peroneal bypass (7/13/22)  -Dressing to left LE left clean, dry, intact  -Strict nonweightbearing to left lower extremity  -Acute blood loss anemia postoperatively; received 2 units of pRBC 7/15, 1 unit 7/16. Hemoglobin has stabilized. DISPO: S/p transmetatarsal amputation; left LE 2/2 osteomyelitis. Labs and imaging reviewed. Continue antibiotics per ID recommendations. Vascular surgery following. No further surgical intervention from podiatric standpoint. Patient is OK to discharge from podiatric standpoint pending medical clearance.      Discussed assessment and plan with Dr. Ludin Bucio, YOHANM.    Alyson Jc DPM  Podiatric Resident, PGY-3  Pager #: [phone] or Perfect Serve

## 2022-07-22 NOTE — CARE COORDINATION
Case Management Assessment            Discharge Note                    Date / Time of Note: 7/22/2022 1:29 PM                  Discharge Note Completed by: Lizz Nieves RN    Patient Name: Tisha Serrano   YOB: 1935  Diagnosis: Osteomyelitis ([facility] [M86.9]  Toe osteomyelitis, left ([facility] [M86.9]  MO (acute kidney injury) ([facility] [N17.9]  Cat bite of hand, right, initial encounter Algis Cap   Date / Time: 7/5/2022  8:07 PM    Current PCP: [address] patient: No    Hospitalization in the last 30 days: Yes    Advance Directives:  Code Status: Full Code  [place] DNR form completed and on chart: Not Indicated    Financial:  Payor: Stacey Patino / Plan: Sagar Lepe ESSENTIAL/PLUS / Product Type: *No Product type* /      Pharmacy:    Miko Lopez [address] [place] [address] [phone] - F [phone]  20 [facility] [address]  Phone: [phone] Fax: [phone] 530 [address] [phone] - f [phone]  [address] [place] [address]  Phone: [phone] Fax: [address] [place] [address] [facility] [phone] Danita Santiagooy [phone]  [address] [place] [address]  Phone: [phone] Fax: [phone]      Assistance purchasing medications?: Potential Assistance Purchasing Medications: No  Assistance provided by Case Management: None at this time    Does patient want to participate in local refill/ meds to beds program?: No    Meds To Beds General Rules:  1. Can ONLY be done Monday- Friday between 8:30am-5pm  2. Prescription(s) must be in pharmacy by 3pm to be filled same day  3. Copy of patient's insurance/ prescription drug card and patient face sheet must be sent along with the prescription(s)  4. Cost of Rx cannot be added to hospital bill.  If financial assistance is needed, please contact unit  or ;  or  CANNOT provide pharmacy voucher for patients co-pays  5. Patients can then  the prescription on their way out of the hospital at discharge, or pharmacy can deliver to the bedside if staff is available. (payment due at time of pick-up or delivery - cash, check, or card accepted)     Able to afford home medications/ co-pay costs: Yes    ADLS:  Current PT AM-PAC Score: 12 /24  Current OT AM-PAC Score: 15 /24      DISCHARGE Disposition: [facility] (SNF): Vamsi Phone:   Fax:      LOC at discharge: Skilled  NIEVES Completed: Yes    Notification completed in HENS/PAS?:  Yes : CM has completed HENS online through secure website for SNF admission at [employer]. Document ID #:   [id number]    IMM Completed:   Yes, Case management has presented and reviewed IMM letter #2 to the patient and/or family/ POA. Patient and/or family/POA verbalized understanding of their medicare rights and appeal process if needed. Patient and/or family/POA has signed, initialed and placed today's date (7/22) and time ([phone]) on IMM letter #2 on the the appropriate lines. Patient and/or family/POA, copy of letter offered and they are aware that this original copy of IMM letter #2 is available prior to discharge from the paper chart on the unit. Electronic documentation has been entered into epic for IMM letter #2 and original paper copy has been added to the paper chart at the nurses station.      Transportation:  Transportation PLAN for discharge: EMS transportation   Mode of Transport: Ambulance stretcher - \[facility]\""  Reason for medical transport: Severe muscular weakness and de-conditioned state due to surgery and requires ambulance transport due to needs supervision  Name of [address]: [address]  Phone: [phone]  Time of Transport: 3pm     Transport form completed: Yes    Home Care:  [address] ordered at discharge: Not [address]: Not Applicable  Orders faxed: No        Additional CM Notes:  Cm spoke with pt's son Lyndsey Chang. Cm informed discharging to [facility] today at 3pm. He is in agreement. The Plan for Transition of Care is related to the following treatment goals of Osteomyelitis ([address] [M86.9]  Toe osteomyelitis, left ([address] [M86.9]  MO (acute kidney injury) ([facility] [N17.9]  Cat bite of hand, right, initial encounter Mookie Flood    The Patient and/or patient representative Matt Magaña and his family were provided with a choice of provider and agrees with the discharge plan Yes    Freedom of choice list was provided with basic dialogue that supports the patient's individualized plan of care/goals and shares the quality data associated with the providers.  Yes    Care Transitions patient: No    Trudi Piedra RN  [facility]  Case Management Department  Ph: [phone]

## 2022-07-22 NOTE — DISCHARGE SUMMARY
Pt discharged in good condition to [facility] via [facility] transportation. Report given to transportation staff, assisted in gathering pts belongings, and transferred pt onto cot. Iv, male external catheter, and telemetry removed. Provided transportation staff with wayne and avs for the staff at [facility].    Electronically signed by Marshal Machado RN on 7/22/2022 at 6:14 PM

## 2022-07-22 NOTE — DISCHARGE SUMMARY
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) [M86.472] 07/07/2022     Priority: Medium    Open wound of left foot [S91.302A] 07/07/2022     Priority: Medium    Toe osteomyelitis, left ([address] [M86.9] 07/05/2022     Priority: Medium         The patient was seen and examined on day of discharge and this discharge summary is in conjunction with any daily progress note from day of discharge. Hospital Course:     Patient is 81 y.o. male with DM type 2, HTN and hypothyroidism, PAF (off pradaxa) and BPH who presented with worsening wound on left foot. In the ER apart from elevated BP, he had fairly normal vitals. He had no leucocytosis. Hgb was 12 with . Cr was 2.3 (baseline about 1.6). Hospital course:  Cellulitis left lower extremity with full-thickness ulceration plantar surface with osteomyelitis. POA  - sec to severe PAD  - MRI shows osteo  - Cx sent: mixed skin cash, Diphtheroids, MRSA  - Started on broad-spectrum IV antibiotics: ID consulted. Plan on oral zyvox on dc  - S/p  TRANSMETATARSAL AMPUTATION WITH TENDO ACHILLES LENGTHENING LEFT FOOT 7/15/2022  - PT/OT eval: SNF        2. DM-2 complicated by peripheral neuropathy: POA  - A1C too does not appear high: 5.9  - continue with diet control        3. Peripheral vascular disease: POA  - On Plavix  - Seen by Vascular Surgery: s/p angiogram of the LLE. S/p L SFA-Peroneal bypass 7/13  - S/p TMA by podiatry Tolerated procedures  - F/u with Vascular Surgery as outpatient     4.  Acute Blood loss anemia   Post op/samreen-op blood loss from surgeries  - Transfused PRBCs. Given IV iron  - Hgb stable and satisfactory now     5. MO on CKD stage 3  - Baseline Cr 1.6  - Developed renal insuff prior to surgery, and developed MO post angio procedure, and hypotension related to blood loss/surgery  - Follows with Dr. Edouard Reed: Iwona Shirley following: ok to DC to f/u with him as o/p        6. Paroxysmal A. Fib: POA. Developed RVR in the hospital.   - Not on rate control meds at home  - Appears was prev on Pradaxa. Appears was taken off after he wore a monitor and as \"couldn't afford it\" in 2014 .  - TSH, Mg: WNL  -  [address] resumed  - Went into RVR in setting of acute illness, major surgery  - Resumed BB: Rate controlled now        7. Hypothyroidism: POA  - TSH; WNL     8. Chronic Macrocytic anemia- POA  - B12, Folate: WNL        9. BPH with Acute urinary retention post op  - On  Flomax for BPH  - Schumacher placed for post op retention. Remove the Schumacher, voiding well. Consults:     IP CONSULT TO PODIATRY  PHARMACY TO DOSE VANCOMYCIN  IP CONSULT TO HOSPITALIST  IP CONSULT TO PHARMACY  IP CONSULT TO INFECTIOUS DISEASES  IP CONSULT TO VASCULAR SURGERY  IP CONSULT TO NEPHROLOGY    Disposition: SNF     Discharged Condition: Stable    Code Status: Full Code    Activity: activity as tolerated    Diet: cardiac diet    Follow Up: Primary Care Physician in one week    Exam:     General appearance: No apparent distress, appears stated age and cooperative. Lungs: Clear to ascultation, bilaterally without Rales/Wheezes/Rhonchi with good respiratory effort. Heart: Regular rate and rhythm with Normal S1/S2 without  murmurs, rubs or gallops, point of maximum impulse non-displaced  Abdomen: Soft, non-tender or non-distended without rigidity or guarding and positive bowel sounds all four quadrants.   Extremities: Covered in dressing, clean and intact  Skin: Skin color, texture, turgor normal.  Neurologic: Alert and oriented X 3,  grossly non-focal.  Mental status: Alert, oriented, thought content Units  300-349  4 Units  350-399  5 Units  400 and above 6 Units  Qty: 5 pen, Refills: 2                Details   diclofenac sodium (VOLTAREN) 1 % GEL Apply 2 g topically 4 times daily as needed for Pain (wrist pain)      ketoconazole (NIZORAL) 2 % cream Apply topically daily Apply topically daily. terbinafine (LAMISIL) 250 MG tablet Take 250 mg by mouth daily      valACYclovir (VALTREX) 500 MG tablet Take 500 mg by mouth 2 times daily as needed      fexofenadine (ALLEGRA) 180 MG tablet Take 180 mg by mouth daily as needed      acetaminophen (TYLENOL) 325 MG tablet Take 650 mg by mouth every 6 hours as needed for Pain      vitamin D (CHOLECALCIFEROL) 25 MCG (1000 UT) TABS tablet Take 1,000 Units by mouth daily      Multiple Vitamins-Minerals (THERAPEUTIC MULTIVITAMIN-MINERALS) tablet Take 1 tablet by mouth daily      polyethylene glycol (MIRALAX) 17 g PACK packet Take 17 g by mouth daily as needed      fluticasone (FLONASE) 50 MCG/ACT nasal spray 1 spray by Each Nostril route daily as needed for Rhinitis or Allergies       tamsulosin (FLOMAX) 0.4 MG capsule Take 0.4 mg by mouth daily One tablet nightly      vitamin B-12 (CYANOCOBALAMIN) 1000 MCG tablet Take 1,000 mcg by mouth daily. aspirin 81 MG tablet Take 81 mg by mouth daily. Time Spent on discharge is more than 30 minutes in the examination, evaluation, counseling and review of medications and discharge plan. Signed:  Josette Hutchins MD   7/22/2022      Thank you Lori Brooks for the opportunity to be involved in this patient's care. If you have any questions or concerns please feel free to contact me at [phone].

## 2022-07-22 NOTE — PLAN OF CARE
Problem: Respiratory - Adult  Goal: Achieves optimal ventilation and oxygenation  7/22/2022 1116 by Alphonse Le RN  Outcome: Progressing  Flowsheets (Taken 7/22/2022 1116)  Achieves optimal ventilation and oxygenation:   Assess the need for suctioning and aspirate as needed   Initiate smoking cessation protocol as indicated   Position to facilitate oxygenation and minimize respiratory effort     Problem: Cardiovascular - Adult  Goal: Maintains optimal cardiac output and hemodynamic stability  7/22/2022 1116 by Alphonse Le RN  Outcome: Progressing  Flowsheets (Taken 7/22/2022 1116)  Maintains optimal cardiac output and hemodynamic stability:   Administer fluid and/or volume expanders as ordered   Assess for signs of decreased cardiac output   Monitor urine output and notify Licensed Independent Practitioner for values outside of normal range     Problem: Discharge Planning  Goal: Discharge to home or other facility with appropriate resources  7/22/2022 1116 by Alphonse Le RN  Outcome: Progressing  Flowsheets (Taken 7/22/2022 1116)  Discharge to home or other facility with appropriate resources:   Refer to discharge planning if patient needs post-hospital services based on physician order or complex needs related to functional status, cognitive ability or social support system   Identify discharge learning needs (meds, wound care, etc)   Identify barriers to discharge with patient and caregiver     Problem: Safety - Adult  Goal: Free from fall injury  7/22/2022 1116 by Alphonse Le RN  Outcome: Progressing  Flowsheets  Taken 7/22/2022 1116  Free From Fall Injury: Instruct family/caregiver on patient safety  Taken 7/22/2022 0845  Free From Fall Injury: Instruct family/caregiver on patient safety

## 2022-08-02 NOTE — OP NOTE
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 Ultrasound guided access right common femoral artery  Left lower extremity arteriogram  Left popliteal and tibioperoneal trunk angioplasty     Surgeon:  Nacho Keane MD     Monitored sedation     Estimated Blood Loss (mL): Minimal     Complications: None     Specimens:   * Cannot find log *     Implants:  * No surgical log found *      Drains:        External Urinary Catheter (Active)   Suction N/A 07/10/22 2329   Urine Color Yellow 07/10/22 2329   Urine Appearance Clear 07/10/22 2329   Output (mL) 250 mL 07/10/22 2329         Findings: Partial reconstitution of vessel with angioplasty; however, could not resolve dissection and patient will require bypass for long-term result. Detailed Description of Procedure:   Patient was taken to the hybrid operating room, placed in supine position prepped and draped in the usual sterile fashion. Timeout was called and patient and operative site were appropriately divided. The right common femoral artery was accessed under ultrasound guidance using a micropuncture system which was exchanged over a Advanced Circulatory wire for a 6 [employer] sheath. An Omni Flush catheter was inserted and positioned in the distal abdominal aorta. Because of his renal insufficiency, the initial portion of the procedure was performed with CO2 imaging. This included distal abdominal aortogram, iliac arteriogram and left lower extremity arteriogram down to the knee.   The catheter was then removed and replaced with an angled glide catheter which was guided down into the mid to distal superficial femoral artery and the remaining left lower extremity arteriogram was performed with contrast.    Preintervention findings:  No significant stenosis of the distal abdominal aorta, bilateral common iliac, internal iliac, or external iliac arteries. Widely patent left common femoral and profundofemoral artery. The superficial femoral artery was widely patent down to its distal portion and then occluded. The popliteal artery is occluded throughout. The tibioperoneal trunk is occluded. There is one-vessel runoff to the foot by the peroneal artery which is diseased distally. The posterior tibial artery is patent throughout the midsegment, though quite diseased. It is reconstituted distally with an abundance of collaterals. The anterior tibial artery is occluded. Therapeutic intervention:  The right femoral sheath was exchanged over a [employer] wire for a 6 [employer] destination sheath, the tip of which was positioned in the distal left external iliac artery. Patient was systemically heparinized with 7000 use of IV heparin. After appropriate circulation time, the popliteal and tibioperoneal trunk occlusions were actually quite easily crossed with a combination of 0.014 advantage glidewire. Balloon angioplasty was then performed with a 3 mm balloon throughout the entirety of the lesions. This was a prolonged angioplasty for approximately 2 minutes for total of 2 inflations. This showed improvement in patency of the area of prior occlusion; however, there was significant residual stenosis in the mid segment of the popliteal artery as well as a dissection flap in the tibioperoneal trunk. The balloon was reinserted and repeat angioplasty was performed with a 3 mm balloon and then the posterior artery segment was treated with a 4 mm balloon, again with a prolonged inflation. Final imaging showed persistence of the dissection flap though there was now inline flow to the peroneal runoff. However, this was a significant dissection and because of the location, felt to be better served with bypass.     The right femoral sheath was removed and compression was held for hemostasis. A Proglide closure device was successfully used. He tolerated the procedure well was taken to recovery area in stable condition. Plan will be for the patient to undergo open bypass revascularization during this hospitalization for definitive management.       Electronically signed by Colton Gonzalez MD on 8/2/2022 at 3:57 PM

## 2024-08-07 NOTE — PROGRESS NOTES
4 Eyes Admission Assessment     I agree as the admission nurse that 2 RN's have performed a thorough Head to Toe Skin Assessment on the patient. ALL assessment sites listed below have been assessed on admission. Areas assessed by both nurses: Burnice Naidu &  [x]   Head, Face, and Ears   [x]   Shoulders, Back, and Chest  [x]   Arms, Elbows, and Hands   [x]   Coccyx, Sacrum, and Ischium  [x]   Legs, Feet, and Heels - Ulceration LLE. Wrapped and seen by podietry. Does the Patient have Skin Breakdown?  YES      Greyson Prevention initiated:  Yes   Wound Care Orders initiated:  Yes      [address]  nurse consulted for Pressure Injury (Stage 3,4, Unstageable, DTI, NWPT, and Complex wounds) or Greyson score 18 or lower:  Yes      Nurse 1 eSignature: Electronically signed by Sue Castillo RN on 7/10/22 at 7:02 PM EDT    **SHARE this note so that the co-signing nurse is able to place an eSignature    Nurse 2 eSignature: Electronically signed by Leeann Whitehead RN on 7/11/22 at 12:17 AM EDT Standing/Walking

## (undated) DEVICE — GOWN,SIRUS,POLYRNF,BRTHSLV,XL,30/CS: Brand: MEDLINE

## (undated) DEVICE — TOWEL,STOP FLAG GOLD N-W: Brand: MEDLINE

## (undated) DEVICE — COVER LT HNDL BLU PLAS

## (undated) DEVICE — RESERVOIR,SUCTION,100CC,SILICONE: Brand: MEDLINE

## (undated) DEVICE — TOWEL,OR,DSP,ST,BLUE,DLX,8/PK,10PK/CS: Brand: MEDLINE

## (undated) DEVICE — CLIP LIG M BLU TI HRT SHP WIRE HORZ 600 PER BX

## (undated) DEVICE — PACK,UNIVERSAL,NO GOWNS: Brand: MEDLINE

## (undated) DEVICE — SUTURE VCRL SZ 3-0 L27IN ABSRB UD L26MM SH 1/2 CIR J416H

## (undated) DEVICE — E-Z CLEAN, NON-STICK, PTFE COATED, ELECTROSURGICAL BLADE ELECTRODE, 2.5 INCH (6.35 CM): Brand: EZ CLEAN

## (undated) DEVICE — DECANTER BAG 9": Brand: MEDLINE INDUSTRIES, INC.

## (undated) DEVICE — 6 ML SYRINGE LUER-LOCK TIP: Brand: MONOJECT

## (undated) DEVICE — SOLUTION IRRIG 3000ML 0.9% SOD CHL USP UROMATIC PLAS CONT

## (undated) DEVICE — KIT MICROINTRODUCER NIT TUNGSTEN GWIRE ECHOGENIC CATH MINI

## (undated) DEVICE — SUTURE VCRL UD BR CT 3-0 18IN CT1 J838D

## (undated) DEVICE — STAPLER SKIN H3.9MM WIRE DIA0.58MM CRWN 6.9MM 35 STPL ROT

## (undated) DEVICE — GLOVE SURG SZ 65 L12IN FNGR THK79MIL GRN LTX FREE

## (undated) DEVICE — DRESSING FOAM W4XL12IN SIL RECT ADH WTRPRF FLM BK W/ BORD

## (undated) DEVICE — NEEDLE HYPO 16GA L1.5IN PUR POLYPR HUB S STL REG BVL STR

## (undated) DEVICE — SET EXTN PRIMING 4.9ML L30IN INCL SLDE CLMP SPIN M LUERLOCK

## (undated) DEVICE — SOLUTION IV 500ML 0.9% SOD CHL PH 5 INJ USP VIAFLX PLAS

## (undated) DEVICE — FORCEPS BX L240CM JAW DIA2.8MM L CAP W/ NDL MIC MESH TOOTH

## (undated) DEVICE — SUTURE VCRL SZ 2-0 L18IN ABSRB VLT L26MM SH 1/2 CIR J775D

## (undated) DEVICE — SUTURE VCRL SZ 4-0 L18IN ABSRB UD POLYGLACTIN 910 BRAID TIE J643H

## (undated) DEVICE — RAN-1115 BLUE F/G, STERILE, K2M P/N 74193-01M: Brand: RAN-1115

## (undated) DEVICE — HM34SPU @ HOVERMATT, SNGL USE, 34X78: Brand: MEDLINE

## (undated) DEVICE — ELECTRODE PT RET AD L9FT HI MOIST COND ADH HYDRGEL CORDED

## (undated) DEVICE — HANDPIECE SUCTION TUBING INTERPULSE 10FT

## (undated) DEVICE — CLIP INT SM WIDE RED TI TRNSVRS GRV CHEVRON SHP W PRECIS

## (undated) DEVICE — APPLICATOR PREP 26ML 0.7% IOD POVACRYLEX 74% ISO ALC ST

## (undated) DEVICE — ELECTROSURGICAL PENCIL ROCKER SWITCH NON COATED BLADE ELECTRODE 10 FT (3 M) CORD HOLSTER: Brand: MEGADYNE

## (undated) DEVICE — GEL US 20GM NONIRRITATING OVERWRAPPED FILE PCH TRNSMIT

## (undated) DEVICE — SUTURE ETHLN SZ 4-0 L18IN NONABSORBABLE BLK L16MM PS-4 1/2 1662G

## (undated) DEVICE — Z DUP USE 2701075 SYSTEM SKIN CLSR 42CM DERMBND PRINEO

## (undated) DEVICE — BANDAGE COMPR COHESIVE 5 YDX4 IN SELF ADH TAN NS COBAN

## (undated) DEVICE — SPONGE,LAP,18"X18",DLX,XR,ST,5/PK,40/PK: Brand: MEDLINE

## (undated) DEVICE — FOAM BUMP, LARGE: Brand: MEDLINE INDUSTRIES, INC.

## (undated) DEVICE — SUTURE PERMAHAND SZ 2-0 L30IN NONABSORBABLE BLK SILK W/O A305H

## (undated) DEVICE — SUTURE PERMAHAND SZ 3-0 L30IN NONABSORBABLE BLK SILK BRAID A304H

## (undated) DEVICE — CLIP SM RED INTERN HMOCLP TITAN LIGATING

## (undated) DEVICE — PROBE DOPPLER PENCIL

## (undated) DEVICE — OINTMENT ANESTHETIC LIDOCAINE HCL 2% 10 ML

## (undated) DEVICE — GLOVE SURG SZ 75 L12IN FNGR THK94MIL WHT POLYMER LTX BEAD

## (undated) DEVICE — 3M™ STERI-DRAPE™ ISOLATION BAG, 10 PER CARTON / 4 CARTONS PER CASE, 1003: Brand: 3M™ STERI-DRAPE™

## (undated) DEVICE — PREMIUM WET SKIN PREP TRAY: Brand: MEDLINE INDUSTRIES, INC.

## (undated) DEVICE — SUTURE VCRL SZ 2-0 L27IN ABSRB UD L26MM SH 1/2 CIR J417H

## (undated) DEVICE — GOWN,SIRUS,POLYRNF,BRTHSLV,LG,30/CS: Brand: MEDLINE

## (undated) DEVICE — LOOP,VESSEL,MAXI,BLUE,2/PK,STERILE: Brand: MEDLINE

## (undated) DEVICE — SOLUTION IV 1000ML 0.9% SOD CHL

## (undated) DEVICE — BANDAGE,ELASTIC,ESMARK,STERILE,6"X9',LF: Brand: MEDLINE

## (undated) DEVICE — SUTURE NONABSORBABLE MONOFILAMENT 4-0 RB-1 36 IN BLU PROLENE 8557H

## (undated) DEVICE — PROVE COVER: Brand: UNBRANDED

## (undated) DEVICE — SUTURE NONABSORBABLE MONOFILAMENT 7-0 BV-1 1X24 IN PROLENE 8702H

## (undated) DEVICE — GENERAL: Brand: MEDLINE INDUSTRIES, INC.

## (undated) DEVICE — SUTURE VCRL SZ 2-0 L18IN ABSRB UD CT-1 L36MM 1/2 CIR J839D

## (undated) DEVICE — SUTURE N ABSRB MONOFILAMENT 6-0 BV1 30 IN BLU PROLENE EPM8709

## (undated) DEVICE — SYRINGE 20ML LL S/C 50

## (undated) DEVICE — SYRINGE MED 10ML LUERLOCK TIP W/O SFTY DISP

## (undated) DEVICE — SYSTEM SKIN CLSR 22CM DERMBND PRINEO

## (undated) DEVICE — 3M™ TEGADERM™ TRANSPARENT FILM DRESSING FRAME STYLE, 1626W, 4 IN X 4-3/4 IN (10 CM X 12 CM), 50/CT 4CT/CASE: Brand: 3M™ TEGADERM™

## (undated) DEVICE — SUTURE COAT VCRL SZ 4-0 L18IN ABSRB UD L19MM PS-2 1/2 CIR J496G

## (undated) DEVICE — DRAIN SURG W10XL20CM SIL SMOOTH FLAT 3/4 PERF DBL WRP

## (undated) DEVICE — C-ARM: Brand: UNBRANDED

## (undated) DEVICE — BANDAGE COMPR W4INXL5YD TAN BRTH SELF ADH WRP W/ HND TEAR

## (undated) DEVICE — PODIATRY: Brand: MEDLINE INDUSTRIES, INC.

## (undated) DEVICE — SYRINGE MED 10ML TRNSLUC BRL PLUNG BLK MRK POLYPR CTRL

## (undated) DEVICE — SCANLAN® SURG-I-LOOP® SILICONE LOOPS - RED MINI, 1.5X.88 MM, 16"/40 CM LONG (2/PKG): Brand: SCANLAN® SURG-I-LOOP® SILICONE LOOPS

## (undated) DEVICE — SUTURE MCRYL SZ 4-0 L27IN ABSRB UD L19MM PS-2 1/2 CIR PRIM Y426H

## (undated) DEVICE — SCANLAN® SUTURE BOOT™ INSTRUMENT JAW COVERS - ORIGINAL YELLOW, STANDARD PKG (5 PAIR/CARTRIDGE, 1 CARTRIDGE/PKG): Brand: SCANLAN® SUTURE BOOT™ INSTRUMENT JAW COVERS

## (undated) DEVICE — SYRINGE WITH HYPODERMIC SAFETY NEEDLE: Brand: MAGELLAN

## (undated) DEVICE — SYRINGE MED 3ML CLR PLAS STD N CTRL LUERLOCK TIP DISP

## (undated) DEVICE — TOTAL TRAY, 16FR 10ML SIL FOLEY, URN: Brand: MEDLINE

## (undated) DEVICE — DRAPE SURG W82XL124IN SMS INTVENT FEM ANGIO PCH POLY CIR FEN

## (undated) DEVICE — SUTURE PERMAHAND SZ 4-0 L12X30IN NONABSORBABLE BLK SILK A303H

## (undated) DEVICE — INTENDED FOR TISSUE SEPARATION, AND OTHER PROCEDURES THAT REQUIRE A SHARP SURGICAL BLADE TO PUNCTURE OR CUT.: Brand: BARD-PARKER ® CARBON RIB-BACK BLADES

## (undated) DEVICE — LARGE BORE STOPCOCK WITH ROTATING MALE LUER LOCK

## (undated) DEVICE — BLANKET WRM W29.9XL79.1IN UP BODY FORC AIR MISTRAL-AIR

## (undated) DEVICE — GLOVE SURG SZ 6 L11.2IN FNGR THK9.8MIL STRW LTX POLYMER

## (undated) DEVICE — MICRO SAGITTAL BLADE (9.4 X 0.4 X 26.2MM)

## (undated) DEVICE — COAXIAL HIGH FLOW TIP WITH SOFT SHIELD

## (undated) DEVICE — SUTURE ETHLN SZ 2-0 L30IN NONABSORBABLE BLK L36MM PSLX 3/8 1697H